# Patient Record
Sex: MALE | Race: WHITE | NOT HISPANIC OR LATINO
[De-identification: names, ages, dates, MRNs, and addresses within clinical notes are randomized per-mention and may not be internally consistent; named-entity substitution may affect disease eponyms.]

---

## 2018-04-03 ENCOUNTER — APPOINTMENT (OUTPATIENT)
Dept: PULMONOLOGY | Facility: CLINIC | Age: 83
End: 2018-04-03
Payer: MEDICARE

## 2018-04-03 VITALS
WEIGHT: 145 LBS | SYSTOLIC BLOOD PRESSURE: 145 MMHG | DIASTOLIC BLOOD PRESSURE: 80 MMHG | OXYGEN SATURATION: 96 % | TEMPERATURE: 96.8 F | BODY MASS INDEX: 24.16 KG/M2 | HEIGHT: 65 IN | HEART RATE: 66 BPM

## 2018-04-03 DIAGNOSIS — Z87.09 PERSONAL HISTORY OF OTHER DISEASES OF THE RESPIRATORY SYSTEM: ICD-10-CM

## 2018-04-03 PROCEDURE — 94060 EVALUATION OF WHEEZING: CPT

## 2018-04-03 PROCEDURE — 99214 OFFICE O/P EST MOD 30 MIN: CPT | Mod: 25

## 2018-04-03 PROCEDURE — 71046 X-RAY EXAM CHEST 2 VIEWS: CPT

## 2018-04-28 ENCOUNTER — EMERGENCY (EMERGENCY)
Facility: HOSPITAL | Age: 83
LOS: 1 days | Discharge: HOME | End: 2018-04-28
Attending: EMERGENCY MEDICINE | Admitting: EMERGENCY MEDICINE
Payer: MEDICARE

## 2018-04-28 VITALS
HEART RATE: 88 BPM | DIASTOLIC BLOOD PRESSURE: 81 MMHG | TEMPERATURE: 98 F | HEIGHT: 65 IN | OXYGEN SATURATION: 97 % | WEIGHT: 145.06 LBS | RESPIRATION RATE: 28 BRPM | SYSTOLIC BLOOD PRESSURE: 178 MMHG

## 2018-04-28 VITALS
SYSTOLIC BLOOD PRESSURE: 165 MMHG | OXYGEN SATURATION: 98 % | TEMPERATURE: 98 F | RESPIRATION RATE: 18 BRPM | DIASTOLIC BLOOD PRESSURE: 80 MMHG | HEART RATE: 90 BPM

## 2018-04-28 DIAGNOSIS — Z98.890 OTHER SPECIFIED POSTPROCEDURAL STATES: Chronic | ICD-10-CM

## 2018-04-28 DIAGNOSIS — Z87.891 PERSONAL HISTORY OF NICOTINE DEPENDENCE: ICD-10-CM

## 2018-04-28 DIAGNOSIS — J44.1 CHRONIC OBSTRUCTIVE PULMONARY DISEASE WITH (ACUTE) EXACERBATION: ICD-10-CM

## 2018-04-28 DIAGNOSIS — R06.02 SHORTNESS OF BREATH: ICD-10-CM

## 2018-04-28 DIAGNOSIS — Z90.2 ACQUIRED ABSENCE OF LUNG [PART OF]: Chronic | ICD-10-CM

## 2018-04-28 DIAGNOSIS — E78.00 PURE HYPERCHOLESTEROLEMIA, UNSPECIFIED: ICD-10-CM

## 2018-04-28 DIAGNOSIS — I10 ESSENTIAL (PRIMARY) HYPERTENSION: ICD-10-CM

## 2018-04-28 LAB
ALBUMIN SERPL ELPH-MCNC: 3.9 G/DL — SIGNIFICANT CHANGE UP (ref 3.3–5)
ALP SERPL-CCNC: 40 U/L — SIGNIFICANT CHANGE UP (ref 40–120)
ALT FLD-CCNC: 20 U/L — SIGNIFICANT CHANGE UP (ref 10–45)
ANION GAP SERPL CALC-SCNC: 15 MMOL/L — SIGNIFICANT CHANGE UP (ref 5–17)
APTT BLD: 29.9 SEC — SIGNIFICANT CHANGE UP (ref 27.5–37.4)
AST SERPL-CCNC: 23 U/L — SIGNIFICANT CHANGE UP (ref 10–40)
BASE EXCESS BLDV CALC-SCNC: -0.1 MMOL/L — SIGNIFICANT CHANGE UP
BASOPHILS NFR BLD AUTO: 0.5 % — SIGNIFICANT CHANGE UP (ref 0–2)
BILIRUB SERPL-MCNC: 0.7 MG/DL — SIGNIFICANT CHANGE UP (ref 0.2–1.2)
BUN SERPL-MCNC: 21 MG/DL — SIGNIFICANT CHANGE UP (ref 7–23)
CALCIUM SERPL-MCNC: 9 MG/DL — SIGNIFICANT CHANGE UP (ref 8.4–10.5)
CHLORIDE SERPL-SCNC: 104 MMOL/L — SIGNIFICANT CHANGE UP (ref 96–108)
CK MB CFR SERPL CALC: 4.1 NG/ML — SIGNIFICANT CHANGE UP (ref 0–6.7)
CK SERPL-CCNC: 114 U/L — SIGNIFICANT CHANGE UP (ref 30–200)
CO2 SERPL-SCNC: 23 MMOL/L — SIGNIFICANT CHANGE UP (ref 22–31)
CREAT SERPL-MCNC: 1.36 MG/DL — HIGH (ref 0.5–1.3)
EOSINOPHIL NFR BLD AUTO: 9.5 % — HIGH (ref 0–6)
GAS PNL BLDV: SIGNIFICANT CHANGE UP
GLUCOSE SERPL-MCNC: 106 MG/DL — HIGH (ref 70–99)
HCO3 BLDV-SCNC: 26 MMOL/L — SIGNIFICANT CHANGE UP (ref 20–27)
HCT VFR BLD CALC: 41.4 % — SIGNIFICANT CHANGE UP (ref 39–50)
HGB BLD-MCNC: 13.9 G/DL — SIGNIFICANT CHANGE UP (ref 13–17)
INR BLD: 1 — SIGNIFICANT CHANGE UP (ref 0.88–1.16)
LYMPHOCYTES # BLD AUTO: 37.5 % — SIGNIFICANT CHANGE UP (ref 13–44)
MAGNESIUM SERPL-MCNC: 2 MG/DL — SIGNIFICANT CHANGE UP (ref 1.6–2.6)
MCHC RBC-ENTMCNC: 30 PG — SIGNIFICANT CHANGE UP (ref 27–34)
MCHC RBC-ENTMCNC: 33.6 G/DL — SIGNIFICANT CHANGE UP (ref 32–36)
MCV RBC AUTO: 89.4 FL — SIGNIFICANT CHANGE UP (ref 80–100)
MONOCYTES NFR BLD AUTO: 9.8 % — SIGNIFICANT CHANGE UP (ref 2–14)
NEUTROPHILS NFR BLD AUTO: 42.7 % — LOW (ref 43–77)
NT-PROBNP SERPL-SCNC: 609 PG/ML — HIGH (ref 0–300)
PCO2 BLDV: 47 MMHG — SIGNIFICANT CHANGE UP (ref 41–51)
PH BLDV: 7.36 — SIGNIFICANT CHANGE UP (ref 7.32–7.43)
PLATELET # BLD AUTO: 127 K/UL — LOW (ref 150–400)
PO2 BLDV: 41 MMHG — SIGNIFICANT CHANGE UP
POTASSIUM SERPL-MCNC: 4.5 MMOL/L — SIGNIFICANT CHANGE UP (ref 3.5–5.3)
POTASSIUM SERPL-SCNC: 4.5 MMOL/L — SIGNIFICANT CHANGE UP (ref 3.5–5.3)
PROT SERPL-MCNC: 6.8 G/DL — SIGNIFICANT CHANGE UP (ref 6–8.3)
PROTHROM AB SERPL-ACNC: 11.1 SEC — SIGNIFICANT CHANGE UP (ref 9.8–12.7)
RBC # BLD: 4.63 M/UL — SIGNIFICANT CHANGE UP (ref 4.2–5.8)
RBC # FLD: 14.7 % — SIGNIFICANT CHANGE UP (ref 10.3–16.9)
SAO2 % BLDV: 72 % — SIGNIFICANT CHANGE UP
SODIUM SERPL-SCNC: 142 MMOL/L — SIGNIFICANT CHANGE UP (ref 135–145)
TROPONIN T SERPL-MCNC: <0.01 NG/ML — SIGNIFICANT CHANGE UP (ref 0–0.01)
WBC # BLD: 6.1 K/UL — SIGNIFICANT CHANGE UP (ref 3.8–10.5)
WBC # FLD AUTO: 6.1 K/UL — SIGNIFICANT CHANGE UP (ref 3.8–10.5)

## 2018-04-28 PROCEDURE — 93005 ELECTROCARDIOGRAM TRACING: CPT | Mod: 76

## 2018-04-28 PROCEDURE — 83735 ASSAY OF MAGNESIUM: CPT

## 2018-04-28 PROCEDURE — 80053 COMPREHEN METABOLIC PANEL: CPT

## 2018-04-28 PROCEDURE — 71250 CT THORAX DX C-: CPT

## 2018-04-28 PROCEDURE — 85025 COMPLETE CBC W/AUTO DIFF WBC: CPT

## 2018-04-28 PROCEDURE — 84484 ASSAY OF TROPONIN QUANT: CPT

## 2018-04-28 PROCEDURE — 83880 ASSAY OF NATRIURETIC PEPTIDE: CPT

## 2018-04-28 PROCEDURE — 85610 PROTHROMBIN TIME: CPT

## 2018-04-28 PROCEDURE — 96374 THER/PROPH/DIAG INJ IV PUSH: CPT

## 2018-04-28 PROCEDURE — 82550 ASSAY OF CK (CPK): CPT

## 2018-04-28 PROCEDURE — 93010 ELECTROCARDIOGRAM REPORT: CPT

## 2018-04-28 PROCEDURE — 94640 AIRWAY INHALATION TREATMENT: CPT

## 2018-04-28 PROCEDURE — 71250 CT THORAX DX C-: CPT | Mod: 26

## 2018-04-28 PROCEDURE — 85730 THROMBOPLASTIN TIME PARTIAL: CPT

## 2018-04-28 PROCEDURE — 36415 COLL VENOUS BLD VENIPUNCTURE: CPT

## 2018-04-28 PROCEDURE — 82553 CREATINE MB FRACTION: CPT

## 2018-04-28 PROCEDURE — 99285 EMERGENCY DEPT VISIT HI MDM: CPT | Mod: 25

## 2018-04-28 PROCEDURE — 82803 BLOOD GASES ANY COMBINATION: CPT

## 2018-04-28 PROCEDURE — 99284 EMERGENCY DEPT VISIT MOD MDM: CPT | Mod: 25

## 2018-04-28 RX ORDER — IPRATROPIUM/ALBUTEROL SULFATE 18-103MCG
3 AEROSOL WITH ADAPTER (GRAM) INHALATION
Qty: 30 | Refills: 0
Start: 2018-04-28 | End: 2018-05-07

## 2018-04-28 RX ORDER — IPRATROPIUM/ALBUTEROL SULFATE 18-103MCG
3 AEROSOL WITH ADAPTER (GRAM) INHALATION
Qty: 0 | Refills: 0 | Status: COMPLETED | OUTPATIENT
Start: 2018-04-28 | End: 2018-04-28

## 2018-04-28 RX ORDER — IPRATROPIUM/ALBUTEROL SULFATE 18-103MCG
3 AEROSOL WITH ADAPTER (GRAM) INHALATION ONCE
Qty: 0 | Refills: 0 | Status: COMPLETED | OUTPATIENT
Start: 2018-04-28 | End: 2018-04-28

## 2018-04-28 RX ADMIN — Medication 125 MILLIGRAM(S): at 02:25

## 2018-04-28 RX ADMIN — Medication 3 MILLILITER(S): at 03:20

## 2018-04-28 RX ADMIN — Medication 3 MILLILITER(S): at 02:40

## 2018-04-28 RX ADMIN — Medication 3 MILLILITER(S): at 05:06

## 2018-04-28 RX ADMIN — Medication 3 MILLILITER(S): at 02:55

## 2018-04-28 NOTE — ED PROVIDER NOTE - MEDICAL DECISION MAKING DETAILS
diffuse wheezing, SOB, cough, speaking in full sentences  -check labs, ekg, ct chest, duonebs, solumedrol

## 2018-04-28 NOTE — ED PROVIDER NOTE - PROGRESS NOTE DETAILS
wheezing improved, pt feeling much better.  no PNA on CT chest.  pt would like to go home and continue nebs and prednisone as outpt.  offered admission however pt declined.  recommend f/u with Dr. Santo.  I have discussed the discharge plan with the patient. The patient agrees with the plan, as discussed.  The patient understands Emergency Department diagnosis is a preliminary diagnosis often based on limited information and that the patient must adhere to the follow-up plan as discussed.  The patient understands that if the symptoms worsen or if prescribed medications do not have the desired/planned effect that the patient may return to the Emergency Department at any time for further evaluation and treatment.

## 2018-04-28 NOTE — ED ADULT NURSE NOTE - PMH
BPH (benign prostatic hyperplasia)    COPD (chronic obstructive pulmonary disease)    High cholesterol    HTN (hypertension)    Lung cancer

## 2018-04-28 NOTE — ED PROVIDER NOTE - OBJECTIVE STATEMENT
84M hx COPD, lung CA (s/p lung resection), htn, c/o SOB.  pt states worsening SOB over past 2 days. +cough with yellow phlegm.  no fevers. tightness across chest.  states used nebulizer without relief at home.  pt states he saw Dr. Santo 2 weeks ago and was treated with course of steroids and ABX with improvement in breathing.  now worsening again. no LE swelling. no n/v/d.  states did not take his metoprolol today d/t wheezing.

## 2018-05-16 ENCOUNTER — APPOINTMENT (OUTPATIENT)
Dept: PULMONOLOGY | Facility: CLINIC | Age: 83
End: 2018-05-16
Payer: MEDICARE

## 2018-05-16 PROCEDURE — 96374 THER/PROPH/DIAG INJ IV PUSH: CPT

## 2018-05-16 PROCEDURE — 99214 OFFICE O/P EST MOD 30 MIN: CPT | Mod: 25

## 2018-05-16 PROCEDURE — 94010 BREATHING CAPACITY TEST: CPT

## 2018-05-17 ENCOUNTER — MED ADMIN CHARGE (OUTPATIENT)
Age: 83
End: 2018-05-17

## 2018-05-17 RX ORDER — METHYLPREDNISOLONE 125 MG/2ML
125 INJECTION, POWDER, LYOPHILIZED, FOR SOLUTION INTRAMUSCULAR; INTRAVENOUS
Qty: 0 | Refills: 0 | Status: COMPLETED | OUTPATIENT
Start: 2018-05-17

## 2018-05-17 RX ADMIN — Medication 0 MG: at 00:00

## 2018-05-23 ENCOUNTER — APPOINTMENT (OUTPATIENT)
Dept: PULMONOLOGY | Facility: CLINIC | Age: 83
End: 2018-05-23
Payer: MEDICARE

## 2018-05-23 VITALS
WEIGHT: 145 LBS | DIASTOLIC BLOOD PRESSURE: 84 MMHG | HEART RATE: 68 BPM | OXYGEN SATURATION: 98 % | SYSTOLIC BLOOD PRESSURE: 138 MMHG | TEMPERATURE: 98 F | HEIGHT: 65 IN | BODY MASS INDEX: 24.16 KG/M2

## 2018-05-23 PROCEDURE — 94010 BREATHING CAPACITY TEST: CPT

## 2018-05-23 PROCEDURE — 99214 OFFICE O/P EST MOD 30 MIN: CPT | Mod: 25

## 2018-08-22 ENCOUNTER — APPOINTMENT (OUTPATIENT)
Dept: PULMONOLOGY | Facility: CLINIC | Age: 83
End: 2018-08-22
Payer: MEDICARE

## 2018-08-22 VITALS
HEART RATE: 62 BPM | HEIGHT: 65 IN | TEMPERATURE: 97.8 F | SYSTOLIC BLOOD PRESSURE: 120 MMHG | DIASTOLIC BLOOD PRESSURE: 70 MMHG | OXYGEN SATURATION: 96 %

## 2018-08-22 PROCEDURE — 99213 OFFICE O/P EST LOW 20 MIN: CPT | Mod: 25

## 2018-08-22 PROCEDURE — 94010 BREATHING CAPACITY TEST: CPT

## 2019-04-09 PROBLEM — N40.0 BENIGN PROSTATIC HYPERPLASIA WITHOUT LOWER URINARY TRACT SYMPTOMS: Chronic | Status: ACTIVE | Noted: 2018-04-28

## 2019-04-09 PROBLEM — E78.00 PURE HYPERCHOLESTEROLEMIA, UNSPECIFIED: Chronic | Status: ACTIVE | Noted: 2018-04-28

## 2019-04-09 PROBLEM — I10 ESSENTIAL (PRIMARY) HYPERTENSION: Chronic | Status: ACTIVE | Noted: 2018-04-28

## 2019-04-09 PROBLEM — J44.9 CHRONIC OBSTRUCTIVE PULMONARY DISEASE, UNSPECIFIED: Chronic | Status: ACTIVE | Noted: 2018-04-28

## 2019-04-09 PROBLEM — C34.90 MALIGNANT NEOPLASM OF UNSPECIFIED PART OF UNSPECIFIED BRONCHUS OR LUNG: Chronic | Status: ACTIVE | Noted: 2018-04-28

## 2019-05-02 ENCOUNTER — OUTPATIENT (OUTPATIENT)
Dept: OUTPATIENT SERVICES | Facility: HOSPITAL | Age: 84
LOS: 1 days | Discharge: HOME | End: 2019-05-02

## 2019-05-02 DIAGNOSIS — Z98.890 OTHER SPECIFIED POSTPROCEDURAL STATES: Chronic | ICD-10-CM

## 2019-05-02 DIAGNOSIS — Z90.2 ACQUIRED ABSENCE OF LUNG [PART OF]: Chronic | ICD-10-CM

## 2019-06-11 ENCOUNTER — APPOINTMENT (OUTPATIENT)
Dept: PULMONOLOGY | Facility: CLINIC | Age: 84
End: 2019-06-11
Payer: MEDICARE

## 2019-06-11 VITALS
SYSTOLIC BLOOD PRESSURE: 130 MMHG | HEART RATE: 80 BPM | HEIGHT: 65 IN | DIASTOLIC BLOOD PRESSURE: 80 MMHG | TEMPERATURE: 98.9 F | OXYGEN SATURATION: 95 %

## 2019-06-11 PROCEDURE — 94010 BREATHING CAPACITY TEST: CPT

## 2019-06-11 PROCEDURE — 71046 X-RAY EXAM CHEST 2 VIEWS: CPT

## 2019-06-11 PROCEDURE — 99214 OFFICE O/P EST MOD 30 MIN: CPT | Mod: 25

## 2019-06-11 PROCEDURE — 96374 THER/PROPH/DIAG INJ IV PUSH: CPT

## 2019-06-12 ENCOUNTER — APPOINTMENT (OUTPATIENT)
Dept: PULMONOLOGY | Facility: CLINIC | Age: 84
End: 2019-06-12
Payer: MEDICARE

## 2019-06-12 PROCEDURE — 96374 THER/PROPH/DIAG INJ IV PUSH: CPT

## 2019-06-12 PROCEDURE — 99213 OFFICE O/P EST LOW 20 MIN: CPT | Mod: 25

## 2019-06-12 NOTE — PROCEDURE
[FreeTextEntry1] : reduced spisromatry flows\par \par chest film with no change from post surgical film\par

## 2019-06-12 NOTE — HISTORY OF PRESENT ILLNESS
[FreeTextEntry1] : [age] year old man status post remote lung cancer, with copd exacerbation,  colored sputum, dyspnea cough, and dyspnea, no fever

## 2019-06-12 NOTE — DISCUSSION/SUMMARY
[FreeTextEntry1] : 125 mg iv steroids given\par \par cefdinir\par \par will give another dose tomorrow\par \par nebulizer.

## 2019-06-12 NOTE — REVIEW OF SYSTEMS
[Fatigue] : fatigue [Cough] : cough [Sputum] : sputum  [Hemoptysis] : no hemoptysis [Dyspnea] : dyspnea [Chest Tightness] : no chest tightness [Pleuritic Pain] : no pleuritic pain [Frequent URIs] : no frequent upper respiratory infections [Wheezing] : wheezing [Negative] : Musculoskeletal [FreeTextEntry8] : dyspnea, cough,  wheezing [FreeTextEntry9] : slgiht cough still

## 2019-06-12 NOTE — PHYSICAL EXAM
[General Appearance - Well Developed] : well developed [Normal Appearance] : normal appearance [Well Groomed] : well groomed [General Appearance - Well Nourished] : well nourished [No Deformities] : no deformities [General Appearance - In No Acute Distress] : no acute distress [Normal Conjunctiva] : the conjunctiva exhibited no abnormalities [Eyelids - No Xanthelasma] : the eyelids demonstrated no xanthelasmas [Heart Rate And Rhythm] : heart rate and rhythm were normal [Heart Sounds] : normal S1 and S2 [Murmurs] : no murmurs present [Respiration, Rhythm And Depth] : normal respiratory rhythm and effort [FreeTextEntry1] : wheezing dyspnea [Kyphosis] : kyphosis [Abnormal Walk] : normal gait [Gait - Sufficient For Exercise Testing] : the gait was sufficient for exercise testing [Nail Clubbing] : no clubbing of the fingernails [Cyanosis, Localized] : no localized cyanosis [Petechial Hemorrhages (___cm)] : no petechial hemorrhages [] : no ischemic changes

## 2019-06-13 NOTE — REASON FOR VISIT
[Follow-Up] : a follow-up visit [FreeTextEntry1] : brandy for another IV medrol for copd exacerbation

## 2019-06-13 NOTE — DISCUSSION/SUMMARY
[FreeTextEntry1] : svere exacerbation of copd, giving  daily iv steroids in effort to keep him out of the hospital.\par \par will return tomorrow.

## 2019-06-13 NOTE — HISTORY OF PRESENT ILLNESS
[FreeTextEntry1] : using nebulizer and on antibiotics, status post IV steroids yesterday, here for the same copious colored sputum send for culture

## 2019-06-18 ENCOUNTER — APPOINTMENT (OUTPATIENT)
Dept: PULMONOLOGY | Facility: CLINIC | Age: 84
End: 2019-06-18
Payer: MEDICARE

## 2019-06-18 VITALS
DIASTOLIC BLOOD PRESSURE: 70 MMHG | OXYGEN SATURATION: 97 % | HEIGHT: 65 IN | SYSTOLIC BLOOD PRESSURE: 112 MMHG | TEMPERATURE: 97 F | HEART RATE: 66 BPM

## 2019-06-18 PROCEDURE — 94010 BREATHING CAPACITY TEST: CPT

## 2019-06-18 PROCEDURE — 99213 OFFICE O/P EST LOW 20 MIN: CPT | Mod: 25

## 2019-06-19 NOTE — DISCUSSION/SUMMARY
[FreeTextEntry1] : wean steroids to off\par \par follow up in one month, so I can see baseline\par \par discused exacerbation of copd and possible prophylaxis

## 2019-06-19 NOTE — REASON FOR VISIT
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

## 2019-06-19 NOTE — PHYSICAL EXAM
[Normal Appearance] : normal appearance [General Appearance - Well Developed] : well developed [Well Groomed] : well groomed [General Appearance - Well Nourished] : well nourished [No Deformities] : no deformities [General Appearance - In No Acute Distress] : no acute distress [Heart Rate And Rhythm] : heart rate and rhythm were normal [Normal Conjunctiva] : the conjunctiva exhibited no abnormalities [Eyelids - No Xanthelasma] : the eyelids demonstrated no xanthelasmas [Murmurs] : no murmurs present [Heart Sounds] : normal S1 and S2 [Kyphosis] : kyphosis [FreeTextEntry1] : bilateral wheeze continues [Gait - Sufficient For Exercise Testing] : the gait was sufficient for exercise testing [Abnormal Walk] : normal gait [Cyanosis, Localized] : no localized cyanosis [Nail Clubbing] : no clubbing of the fingernails [Petechial Hemorrhages (___cm)] : no petechial hemorrhages [] : no ischemic changes

## 2019-07-19 ENCOUNTER — APPOINTMENT (OUTPATIENT)
Dept: PULMONOLOGY | Facility: CLINIC | Age: 84
End: 2019-07-19
Payer: MEDICARE

## 2019-07-19 VITALS
WEIGHT: 135 LBS | SYSTOLIC BLOOD PRESSURE: 117 MMHG | OXYGEN SATURATION: 98 % | HEIGHT: 65 IN | BODY MASS INDEX: 22.49 KG/M2 | HEART RATE: 63 BPM | DIASTOLIC BLOOD PRESSURE: 78 MMHG | TEMPERATURE: 97.3 F

## 2019-07-19 PROCEDURE — 94010 BREATHING CAPACITY TEST: CPT

## 2019-07-19 PROCEDURE — 99213 OFFICE O/P EST LOW 20 MIN: CPT | Mod: 25

## 2019-07-20 NOTE — HISTORY OF PRESENT ILLNESS
[FreeTextEntry1] : finally wanted to see this gentleman when he was not in the midst of an exacerbation and he is acutlly qutie good.

## 2019-07-20 NOTE — PHYSICAL EXAM
[General Appearance - Well Developed] : well developed [Normal Appearance] : normal appearance [Well Groomed] : well groomed [General Appearance - Well Nourished] : well nourished [No Deformities] : no deformities [General Appearance - In No Acute Distress] : no acute distress [Normal Conjunctiva] : the conjunctiva exhibited no abnormalities [Eyelids - No Xanthelasma] : the eyelids demonstrated no xanthelasmas [Heart Rate And Rhythm] : heart rate and rhythm were normal [Heart Sounds] : normal S1 and S2 [Murmurs] : no murmurs present [FreeTextEntry1] : clear lungs [Kyphosis] : kyphosis [Abnormal Walk] : normal gait [Gait - Sufficient For Exercise Testing] : the gait was sufficient for exercise testing [Nail Clubbing] : no clubbing of the fingernails [Cyanosis, Localized] : no localized cyanosis [Petechial Hemorrhages (___cm)] : no petechial hemorrhages [] : no ischemic changes

## 2019-07-20 NOTE — DISCUSSION/SUMMARY
[FreeTextEntry1] : very interesting, he has almost normal pfts when he is not sick.  he was a heavy smoker, but does this make him a person with mild copd or an asthmatic who happened to have smoked.\par \par i really don't know

## 2019-07-20 NOTE — REVIEW OF SYSTEMS
[Fatigue] : fatigue [Cough] : no cough [Sputum] : not coughing up ~M sputum [Hemoptysis] : no hemoptysis [Dyspnea] : no dyspnea [Chest Tightness] : no chest tightness [Pleuritic Pain] : no pleuritic pain [Frequent URIs] : no frequent upper respiratory infections [Wheezing] : no wheezing [Negative] : Sleep Disorder [FreeTextEntry9] : slgiht cough still

## 2019-09-10 ENCOUNTER — APPOINTMENT (OUTPATIENT)
Dept: PULMONOLOGY | Facility: CLINIC | Age: 84
End: 2019-09-10
Payer: MEDICARE

## 2019-09-10 VITALS
WEIGHT: 135 LBS | OXYGEN SATURATION: 97 % | SYSTOLIC BLOOD PRESSURE: 110 MMHG | HEART RATE: 67 BPM | TEMPERATURE: 97 F | HEIGHT: 65 IN | BODY MASS INDEX: 22.49 KG/M2 | DIASTOLIC BLOOD PRESSURE: 72 MMHG

## 2019-09-10 PROCEDURE — 94010 BREATHING CAPACITY TEST: CPT

## 2019-09-10 PROCEDURE — 99214 OFFICE O/P EST MOD 30 MIN: CPT | Mod: 25

## 2019-09-10 RX ORDER — BUDESONIDE AND FORMOTEROL FUMARATE DIHYDRATE 160; 4.5 UG/1; UG/1
160-4.5 AEROSOL RESPIRATORY (INHALATION) TWICE DAILY
Qty: 1 | Refills: 2 | Status: DISCONTINUED | COMMUNITY
Start: 2019-09-10 | End: 2019-09-10

## 2019-09-11 NOTE — PHYSICAL EXAM
[Exaggerated Use Of Accessory Muscles For Inspiration] : no accessory muscle use [Well Groomed] : well groomed [General Appearance - In No Acute Distress] : no acute distress [Normal Conjunctiva] : the conjunctiva exhibited no abnormalities [Normal Oropharynx] : normal oropharynx [IV] : IV [Neck Appearance] : the appearance of the neck was normal [Neck Cervical Mass (___cm)] : no neck mass was observed [Jugular Venous Distention Increased] : there was no jugular-venous distention [Heart Rate And Rhythm] : heart rate and rhythm were normal [Murmurs] : no murmurs present [Arterial Pulses Normal] : the arterial pulses were normal [Kyphosis] : kyphosis [Nail Clubbing] : no clubbing of the fingernails [Cyanosis, Localized] : no localized cyanosis [] : no rash [Skin Lesions] : no skin lesions [No Focal Deficits] : no focal deficits [Affect] : the affect was normal [Mood] : the mood was normal [FreeTextEntry1] : Mild mid to end expiratory wheezing at left base and B/L upper lung fields

## 2019-09-11 NOTE — ASSESSMENT
[FreeTextEntry1] : 87 yo M with COPD/chronic bronchitis, 1-2 exacerbations per year with recent exacerbation 2 weeks ago resolved with self administered prednisone, now episode symptoms always relating to meal intake - especially quick and large meals. Suspect element of aspiration\par - Given frequent exacerbations, will increase to triple therapy with Trilegy, no indication for systemic steroids at this time\par - Recommend increasing nebulizer to 3x/day for the next few days to avoid progression to exacerbation\par - Barium swallow for aspiration work up as well as counseling re: small meals and eating slowly to prevent aspiration

## 2019-09-11 NOTE — HISTORY OF PRESENT ILLNESS
[FreeTextEntry1] : Presents for follow up, felt 2 weeks ago he had an "attack", started himself on tapering prednisone from 40mg and completed it 1 week ago. Was feeling well, but yesterday after lunch began wheezing. Took 4 nebulizers at home and klonopin to sleep, woke up this morning feeling like a "gem". Again today, after lunch, started wheezing and had difficulty getting home. Now feels semi good, but still wheezing. Minimal cough with small amount of whitish phlegm. Unsure if he is aspirating, but does eat quickly and notices occasionally bringing up food. Doesn't notice obvious acid reflux. On symbicort daily, uses the nebulizer rarely when feeling well.

## 2019-09-11 NOTE — REVIEW OF SYSTEMS
[Dyspnea] : dyspnea [Wheezing] : wheezing [Reflux] : reflux [Dysphagia] : dysphagia [Negative] : Neurologic [Chills] : no chills [Fever] : no fever [Cough] : no cough [Fatigue] : no fatigue [Palpitations] : no palpitations [Sputum] : not coughing up ~M sputum [Heartburn] : no heartburn [Edema] : ~T edema was not present [Nausea] : no nausea

## 2019-09-13 ENCOUNTER — APPOINTMENT (OUTPATIENT)
Dept: PULMONOLOGY | Facility: CLINIC | Age: 84
End: 2019-09-13
Payer: MEDICARE

## 2019-09-13 VITALS
DIASTOLIC BLOOD PRESSURE: 70 MMHG | HEIGHT: 65 IN | HEART RATE: 74 BPM | SYSTOLIC BLOOD PRESSURE: 110 MMHG | WEIGHT: 135 LBS | TEMPERATURE: 98.3 F | OXYGEN SATURATION: 93 % | BODY MASS INDEX: 22.49 KG/M2

## 2019-09-13 PROCEDURE — 99213 OFFICE O/P EST LOW 20 MIN: CPT | Mod: 25

## 2019-09-13 PROCEDURE — 71046 X-RAY EXAM CHEST 2 VIEWS: CPT

## 2019-09-13 PROCEDURE — 94010 BREATHING CAPACITY TEST: CPT

## 2019-09-13 PROCEDURE — 96374 THER/PROPH/DIAG INJ IV PUSH: CPT

## 2019-09-15 NOTE — HISTORY OF PRESENT ILLNESS
[FreeTextEntry1] : two days before this visit, mild exacerbatoin and now worsened with green sputum and worsenign dyspnea,  using nebulizer frequently.  no fever, and chest pain, but increase dyspnea and wheezing

## 2019-09-15 NOTE — PROCEDURE
[FreeTextEntry1] : given 125 mg iv steroids, and will take 40mg thru weekend, may need another dose on monday\par \par will put him on azithormycin every other day after this since exacerbations are coming too frequently\par \par at age 86, perhaps it's time to retire?

## 2019-09-15 NOTE — REASON FOR VISIT
[Follow-Up] : a follow-up visit [FreeTextEntry1] : here after being seen two days ago for exacerbation of copd that worsened

## 2019-09-15 NOTE — REVIEW OF SYSTEMS
[Fatigue] : fatigue [Cough] : cough [Sputum] : sputum  [Hemoptysis] : no hemoptysis [Chest Tightness] : no chest tightness [Pleuritic Pain] : no pleuritic pain [Frequent URIs] : no frequent upper respiratory infections [Wheezing] : no wheezing [Negative] : Sleep Disorder [FreeTextEntry9] : slgiht cough still

## 2019-09-15 NOTE — PHYSICAL EXAM
[General Appearance - Well Developed] : well developed [Normal Appearance] : normal appearance [Well Groomed] : well groomed [General Appearance - Well Nourished] : well nourished [No Deformities] : no deformities [General Appearance - In No Acute Distress] : no acute distress [Normal Conjunctiva] : the conjunctiva exhibited no abnormalities [Eyelids - No Xanthelasma] : the eyelids demonstrated no xanthelasmas [Heart Rate And Rhythm] : heart rate and rhythm were normal [Heart Sounds] : normal S1 and S2 [Murmurs] : no murmurs present [FreeTextEntry1] : bilateral wheezing and rhonchi [Kyphosis] : kyphosis [Abnormal Walk] : normal gait [Gait - Sufficient For Exercise Testing] : the gait was sufficient for exercise testing [Nail Clubbing] : no clubbing of the fingernails [Cyanosis, Localized] : no localized cyanosis [Petechial Hemorrhages (___cm)] : no petechial hemorrhages [] : no ischemic changes

## 2019-09-16 ENCOUNTER — APPOINTMENT (OUTPATIENT)
Dept: PULMONOLOGY | Facility: CLINIC | Age: 84
End: 2019-09-16
Payer: MEDICARE

## 2019-09-16 VITALS
OXYGEN SATURATION: 96 % | SYSTOLIC BLOOD PRESSURE: 130 MMHG | DIASTOLIC BLOOD PRESSURE: 80 MMHG | BODY MASS INDEX: 22.49 KG/M2 | TEMPERATURE: 97.7 F | HEART RATE: 59 BPM | HEIGHT: 65 IN | WEIGHT: 135 LBS

## 2019-09-16 PROCEDURE — 99213 OFFICE O/P EST LOW 20 MIN: CPT | Mod: 25

## 2019-09-16 PROCEDURE — 94010 BREATHING CAPACITY TEST: CPT

## 2019-09-17 NOTE — PHYSICAL EXAM
[General Appearance - Well Developed] : well developed [Normal Appearance] : normal appearance [Well Groomed] : well groomed [General Appearance - Well Nourished] : well nourished [No Deformities] : no deformities [General Appearance - In No Acute Distress] : no acute distress [Normal Conjunctiva] : the conjunctiva exhibited no abnormalities [Eyelids - No Xanthelasma] : the eyelids demonstrated no xanthelasmas [Heart Rate And Rhythm] : heart rate and rhythm were normal [Murmurs] : no murmurs present [Heart Sounds] : normal S1 and S2 [FreeTextEntry1] : much clearer today [Kyphosis] : kyphosis [Abnormal Walk] : normal gait [Gait - Sufficient For Exercise Testing] : the gait was sufficient for exercise testing [Nail Clubbing] : no clubbing of the fingernails [Cyanosis, Localized] : no localized cyanosis [Petechial Hemorrhages (___cm)] : no petechial hemorrhages [] : no ischemic changes

## 2019-09-17 NOTE — DISCUSSION/SUMMARY
[FreeTextEntry1] : will taper prednisone\par \par start on alternate day azithromycin  for prophylaxis,  already on advair daily.

## 2019-09-17 NOTE — HISTORY OF PRESENT ILLNESS
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  given iv steroids and then po prednisone three days ago, and now improved, with cleareing sputum and better breathing.

## 2019-10-03 ENCOUNTER — OUTPATIENT (OUTPATIENT)
Dept: OUTPATIENT SERVICES | Facility: HOSPITAL | Age: 84
LOS: 1 days | Discharge: HOME | End: 2019-10-03

## 2019-10-03 DIAGNOSIS — Z90.2 ACQUIRED ABSENCE OF LUNG [PART OF]: Chronic | ICD-10-CM

## 2019-10-03 DIAGNOSIS — Z98.890 OTHER SPECIFIED POSTPROCEDURAL STATES: Chronic | ICD-10-CM

## 2019-10-10 LAB — BACTERIA SPT CULT: ABNORMAL

## 2019-10-11 ENCOUNTER — APPOINTMENT (OUTPATIENT)
Dept: PULMONOLOGY | Facility: CLINIC | Age: 84
End: 2019-10-11
Payer: MEDICARE

## 2019-10-11 VITALS
HEIGHT: 65 IN | TEMPERATURE: 95.5 F | SYSTOLIC BLOOD PRESSURE: 110 MMHG | OXYGEN SATURATION: 98 % | BODY MASS INDEX: 22.49 KG/M2 | WEIGHT: 135 LBS | HEART RATE: 54 BPM | DIASTOLIC BLOOD PRESSURE: 60 MMHG

## 2019-10-11 PROCEDURE — 99213 OFFICE O/P EST LOW 20 MIN: CPT | Mod: 25

## 2019-10-11 PROCEDURE — 94010 BREATHING CAPACITY TEST: CPT

## 2019-10-12 NOTE — REVIEW OF SYSTEMS
[Fatigue] : fatigue [Cough] : no cough [Sputum] : not coughing up ~M sputum [Dyspnea] : dyspnea [Chest Tightness] : no chest tightness [Pleuritic Pain] : no pleuritic pain [Frequent URIs] : no frequent upper respiratory infections [Wheezing] : no wheezing [Negative] : Sleep Disorder [FreeTextEntry9] : slgiht cough still

## 2019-10-12 NOTE — HISTORY OF PRESENT ILLNESS
[FreeTextEntry1] : ex smoker physician with innumerable exacerbations,  about to retire, and his exposure to viral illnesses likely makes his exacerbation frequency worse\par \par feeling beter but her has not returned to his baseline

## 2019-10-12 NOTE — DISCUSSION/SUMMARY
[FreeTextEntry1] : continue with advair,  ad azithry every other day\par \par return in three months\par \par encouraged ot retire

## 2019-10-12 NOTE — PHYSICAL EXAM
[General Appearance - Well Developed] : well developed [Normal Appearance] : normal appearance [Well Groomed] : well groomed [General Appearance - Well Nourished] : well nourished [No Deformities] : no deformities [General Appearance - In No Acute Distress] : no acute distress [Normal Conjunctiva] : the conjunctiva exhibited no abnormalities [Eyelids - No Xanthelasma] : the eyelids demonstrated no xanthelasmas [Heart Rate And Rhythm] : heart rate and rhythm were normal [Heart Sounds] : normal S1 and S2 [Murmurs] : no murmurs present [FreeTextEntry1] : much clearer today [Kyphosis] : kyphosis [Abnormal Walk] : normal gait [Gait - Sufficient For Exercise Testing] : the gait was sufficient for exercise testing [Nail Clubbing] : no clubbing of the fingernails [Cyanosis, Localized] : no localized cyanosis [Petechial Hemorrhages (___cm)] : no petechial hemorrhages [] : no ischemic changes [Deep Tendon Reflexes (DTR)] : deep tendon reflexes were 2+ and symmetric [Sensation] : the sensory exam was normal to light touch and pinprick [No Focal Deficits] : no focal deficits [Oriented To Time, Place, And Person] : oriented to person, place, and time [Impaired Insight] : insight and judgment were intact [Affect] : the affect was normal

## 2020-07-14 NOTE — ED ADULT TRIAGE NOTE - RESPIRATORY RATE (BREATHS/MIN)
Referred by: Roseanna Edwards NP; Medical Diagnosis (from order):    Diagnosis Information      Diagnosis    719.41, 338.29 (ICD-9-CM) - M25.512, G89.29 (ICD-10-CM) - Chronic left shoulder pain                Physical Therapy -  Daily Treatment Note    Visit:  2     SUBJECTIVE                                                                                                             Reports pain increases to 8-9/10 with working.  Was sore the night after last session.         Pain / Symptoms:  Pain rating (out of 10): Current: 2     OBJECTIVE                                                                                                                          TREATMENT                                                                                                                initial evaluation completed  Therapeutic Exercise:  Arm bike workload 2 x 5 minutes retro and forward    Supine:  AAROM shoulder flexion x 10  AAROM shoulder external rotation x 10; close to full range  AROM flexion x 10    Sidelying:   AROM External Rotation with scapular retraction x 10  AROM abduction x 12    Seated:  AAROM shoulder flexion x 10  AAROM shoulder External Rotation x 10   Scapular retraction x 10   upper trapezius stretch x 30 seconds x 2  Levator scapula stretch x 30 seconds x 2    Standing:  Resisted scapular rows vs orange tubing x 10 with scapular retraction  Resisted shoulder extension vs orange tubing x 10 with scapular retraction       Manual Therapy:  Supine:  Glenohumeral joint mobilization posterior and inferior grade 2 and 3   Passive shoulder flexion, scaption and external rotation range of motion  Soft tissue mobilization with Omni roller to Left lateral deltoid, biceps, supraspinatus and medial scapular border    Skilled input: verbal instruction/cues and as detailed above    Writer verbally educated and received verbal consent for hand placement, positioning of patient, and techniques to be performed today  from patient for clothing adjustments for techniques, hand placement and palpation for techniques and therapist position for techniques as described above and how they are pertinent to the patient's plan of care.    Home Exercise Program: (*above indicates provided as part of home exercise program)    Evaluation:  AAROM shoulder flexion, AAROM shoulder external rotation, scapular retraction, upper trapezius and Levator scapula stretches      ASSESSMENT                                                                                                             Patient tolerated use of arm bike.  Completed massage with Omni roller which patient found more tolerable over therapist hand.  Note full passive and improved active range to External Rotation.  Encouraged completing AAROM in sitting as supine is getting easy.  Added AROM and scapular strengthening, patient denied increase in pain.  Note significant tightness along medial scapular border; recommended use of tennis ball to decrease pain and tightness.  Encouraged ice/heat as needed.  If patient responded well to session, will progress Home Exercises Program next session.    Pain/symptoms after session: 2  Patient Education:   Results of above outlined education: Verbalizes understanding and Needs reinforcement     PLAN                                                                                                                             Suggestions for next session as indicated: Progress per plan of care assess response to previous treatment, progress AROM and scapular strengthening as tolerated, continue manual as needed.        Procedures and total treatment time documented Time Entry flowsheet.     28

## 2020-10-05 ENCOUNTER — TRANSCRIPTION ENCOUNTER (OUTPATIENT)
Age: 85
End: 2020-10-05

## 2020-10-07 ENCOUNTER — LABORATORY RESULT (OUTPATIENT)
Age: 85
End: 2020-10-07

## 2020-10-12 ENCOUNTER — APPOINTMENT (OUTPATIENT)
Dept: PULMONOLOGY | Facility: CLINIC | Age: 85
End: 2020-10-12
Payer: MEDICARE

## 2020-10-12 VITALS
HEIGHT: 65 IN | WEIGHT: 135 LBS | BODY MASS INDEX: 22.49 KG/M2 | SYSTOLIC BLOOD PRESSURE: 110 MMHG | OXYGEN SATURATION: 97 % | TEMPERATURE: 97.4 F | DIASTOLIC BLOOD PRESSURE: 66 MMHG | HEART RATE: 65 BPM

## 2020-10-12 PROCEDURE — 71046 X-RAY EXAM CHEST 2 VIEWS: CPT

## 2020-10-12 PROCEDURE — 94010 BREATHING CAPACITY TEST: CPT

## 2020-10-12 PROCEDURE — 99214 OFFICE O/P EST MOD 30 MIN: CPT | Mod: 25

## 2020-10-13 NOTE — PHYSICAL EXAM
[No Acute Distress] : no acute distress [Normal Oropharynx] : normal oropharynx [Normal Appearance] : normal appearance [No Neck Mass] : no neck mass [Normal Rate/Rhythm] : normal rate/rhythm [Normal S1, S2] : normal s1, s2 [No Murmurs] : no murmurs [No Resp Distress] : no resp distress [Clear to Auscultation Bilaterally] : clear to auscultation bilaterally [Normal Gait] : normal gait [No Clubbing] : no clubbing [No Edema] : no edema

## 2020-10-13 NOTE — DISCUSSION/SUMMARY
[FreeTextEntry1] : continue héctor trelegy that seems to be working well\par \par return in four months\par \par watch out for winter exacerbgation that always seems to happened

## 2020-10-13 NOTE — PROCEDURE
[FreeTextEntry1] : chest film, sp resection no acute findings\par \par spirometyr with moderately severe obstruction

## 2020-10-13 NOTE — REVIEW OF SYSTEMS
[Dyspnea] : dyspnea [Frequency] : dysuria [Arthralgias] : arthralgias [Negative] : Gastrointestinal [TextBox_30] : baseline

## 2020-10-21 ENCOUNTER — LABORATORY RESULT (OUTPATIENT)
Age: 85
End: 2020-10-21

## 2020-10-23 ENCOUNTER — APPOINTMENT (OUTPATIENT)
Dept: PULMONOLOGY | Facility: CLINIC | Age: 85
End: 2020-10-23
Payer: MEDICARE

## 2020-10-23 VITALS
WEIGHT: 135 LBS | BODY MASS INDEX: 22.49 KG/M2 | DIASTOLIC BLOOD PRESSURE: 62 MMHG | SYSTOLIC BLOOD PRESSURE: 110 MMHG | HEART RATE: 54 BPM | HEIGHT: 65 IN | OXYGEN SATURATION: 98 % | TEMPERATURE: 97.7 F

## 2020-10-23 PROCEDURE — 89220 SPUTUM SPECIMEN COLLECTION: CPT

## 2020-10-23 PROCEDURE — 99213 OFFICE O/P EST LOW 20 MIN: CPT | Mod: 25

## 2020-10-24 NOTE — DISCUSSION/SUMMARY
[FreeTextEntry1] : short course of prednisone is reasonab le\par \par 40 down by 10 every two days\par \par will try hypertonic saline \par \par folow up prn

## 2020-10-24 NOTE — REASON FOR VISIT
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

## 2020-10-24 NOTE — REVIEW OF SYSTEMS
[Fatigue] : fatigue [Cough] : cough [Sputum] : no sputum [Dyspnea] : no dyspnea [Negative] : Psychiatric [TextBox_30] : no more dyspnea than usual [TextBox_134] : seems very depressed since he retired

## 2020-10-24 NOTE — HISTORY OF PRESENT ILLNESS
[TextBox_4] : mild exacerbatiion , no clear trigger took 40 mg of pred and 30 \par \par used nebulizer as well,  he says he "doesn’t know why he's here".  meaning he may have jumped the gun and didn't need to make an appt\par \par there is no clear trigger,  just increased cogh and congestion\par \par no fever, sputum, not more dyspneic

## 2020-12-17 NOTE — PROCEDURE
The patient has been notified of this information and all questions answered.   [FreeTextEntry1] : chest film, sp resection no acute findings\par \par spirometyr with moderately severe obstruction

## 2021-01-13 ENCOUNTER — RX RENEWAL (OUTPATIENT)
Age: 86
End: 2021-01-13

## 2021-04-16 ENCOUNTER — NON-APPOINTMENT (OUTPATIENT)
Age: 86
End: 2021-04-16

## 2021-04-16 ENCOUNTER — APPOINTMENT (OUTPATIENT)
Dept: PULMONOLOGY | Facility: CLINIC | Age: 86
End: 2021-04-16
Payer: MEDICARE

## 2021-04-16 VITALS
TEMPERATURE: 97.5 F | SYSTOLIC BLOOD PRESSURE: 110 MMHG | BODY MASS INDEX: 22.49 KG/M2 | HEART RATE: 52 BPM | HEIGHT: 65 IN | DIASTOLIC BLOOD PRESSURE: 62 MMHG | OXYGEN SATURATION: 97 % | WEIGHT: 135 LBS

## 2021-04-16 PROCEDURE — 71046 X-RAY EXAM CHEST 2 VIEWS: CPT

## 2021-04-16 PROCEDURE — 94010 BREATHING CAPACITY TEST: CPT

## 2021-04-16 PROCEDURE — 99213 OFFICE O/P EST LOW 20 MIN: CPT | Mod: 25

## 2021-04-18 NOTE — DISCUSSION/SUMMARY
[FreeTextEntry1] : unclear if this was an "event" or what cardiac, or simply aging and debilitation along with depression\par \par wilil be seeing a cardiologist

## 2021-04-18 NOTE — HISTORY OF PRESENT ILLNESS
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

## 2021-04-18 NOTE — PHYSICAL EXAM
[No Acute Distress] : no acute distress [Normal Oropharynx] : normal oropharynx [Normal Appearance] : normal appearance [No Neck Mass] : no neck mass [Normal Rate/Rhythm] : normal rate/rhythm [Normal S1, S2] : normal s1, s2 [No Murmurs] : no murmurs [No Resp Distress] : no resp distress [Clear to Auscultation Bilaterally] : clear to auscultation bilaterally [Normal Gait] : normal gait [No Clubbing] : no clubbing [No Edema] : no edema [TextBox_68] : clear quiet lungs

## 2021-04-18 NOTE — REASON FOR VISIT
[Follow-Up] : a follow-up visit [TextBox_44] : patient with copd, who had episode of dyspnea that was transient

## 2021-04-18 NOTE — REVIEW OF SYSTEMS
[Fatigue] : fatigue [Cough] : cough [Sputum] : no sputum [Dyspnea] : no dyspnea [Negative] : Psychiatric [TextBox_30] : now back to baseline level of dyspnea [TextBox_134] : seems very depressed since he retired

## 2021-08-25 ENCOUNTER — RX RENEWAL (OUTPATIENT)
Age: 86
End: 2021-08-25

## 2021-09-13 NOTE — ED ADULT NURSE NOTE - CAS ELECT INFOMATION PROVIDED
Vaccine Information Statement(s) was given today. This has been reviewed, questions answered, and verbal consent given by Patient for injection(s) and administration of Influenza (Inactivated).      Patient tolerated without incident. See immunization grid for documentation.         DC instructions

## 2021-11-24 RX ORDER — ALBUTEROL SULFATE 90 UG/1
108 (90 BASE) INHALANT RESPIRATORY (INHALATION)
Qty: 1 | Refills: 5 | Status: ACTIVE | COMMUNITY
Start: 2020-12-15 | End: 1900-01-01

## 2021-11-30 ENCOUNTER — APPOINTMENT (OUTPATIENT)
Dept: PULMONOLOGY | Facility: CLINIC | Age: 86
End: 2021-11-30
Payer: MEDICARE

## 2021-11-30 VITALS
HEIGHT: 65 IN | WEIGHT: 135 LBS | TEMPERATURE: 97.9 F | DIASTOLIC BLOOD PRESSURE: 62 MMHG | OXYGEN SATURATION: 94 % | HEART RATE: 65 BPM | BODY MASS INDEX: 22.49 KG/M2 | SYSTOLIC BLOOD PRESSURE: 110 MMHG

## 2021-11-30 PROCEDURE — 99214 OFFICE O/P EST MOD 30 MIN: CPT | Mod: 25

## 2021-11-30 PROCEDURE — 94010 BREATHING CAPACITY TEST: CPT

## 2021-12-02 NOTE — PHYSICAL EXAM
[No Acute Distress] : no acute distress [Normal Oropharynx] : normal oropharynx [Normal Appearance] : normal appearance [No Neck Mass] : no neck mass [Normal Rate/Rhythm] : normal rate/rhythm [Normal S1, S2] : normal s1, s2 [No Murmurs] : no murmurs [No Resp Distress] : no resp distress [Clear to Auscultation Bilaterally] : clear to auscultation bilaterally [Normal Gait] : normal gait [No Clubbing] : no clubbing [No Edema] : no edema [TextBox_68] : bilateral rhonchi and wheeze

## 2021-12-02 NOTE — PROCEDURE
[FreeTextEntry1] : exacerbation\par \par [id number] is lower than usual for him\par \par plan prednisone and also start azithromycin long term q o d

## 2021-12-02 NOTE — HISTORY OF PRESENT ILLNESS
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

## 2021-12-02 NOTE — REVIEW OF SYSTEMS
[Fatigue] : fatigue [Cough] : cough [Sputum] : sputum [Dyspnea] : no dyspnea [Wheezing] : wheezing [Negative] : Psychiatric [TextBox_30] : dyspnea, wheezing [TextBox_134] : seems very depressed since he retired

## 2021-12-21 ENCOUNTER — APPOINTMENT (OUTPATIENT)
Dept: PULMONOLOGY | Facility: CLINIC | Age: 86
End: 2021-12-21
Payer: MEDICARE

## 2021-12-21 VITALS
HEIGHT: 66 IN | HEART RATE: 61 BPM | TEMPERATURE: 97 F | WEIGHT: 133 LBS | DIASTOLIC BLOOD PRESSURE: 75 MMHG | SYSTOLIC BLOOD PRESSURE: 117 MMHG | OXYGEN SATURATION: 95 % | BODY MASS INDEX: 21.38 KG/M2

## 2021-12-21 PROCEDURE — 99214 OFFICE O/P EST MOD 30 MIN: CPT | Mod: 25

## 2021-12-21 PROCEDURE — 94010 BREATHING CAPACITY TEST: CPT

## 2021-12-24 NOTE — PHYSICAL EXAM
[No Acute Distress] : no acute distress [Normal Appearance] : normal appearance [Normal Oropharynx] : normal oropharynx [No Neck Mass] : no neck mass [Normal Rate/Rhythm] : normal rate/rhythm [Normal S1, S2] : normal s1, s2 [No Murmurs] : no murmurs [No Resp Distress] : no resp distress [Clear to Auscultation Bilaterally] : clear to auscultation bilaterally [Normal Gait] : normal gait [No Clubbing] : no clubbing [No Edema] : no edema [TextBox_68] : bilateral rhonchi and wheeze

## 2021-12-24 NOTE — HISTORY OF PRESENT ILLNESS
[TextBox_4] : 88 year old man with copd that when he is not having an exacerbation is relatilvely modest, but when he does excerbate he is terrible.  just started him on alternate day azithromycin at the same time I treated him for an exacerbation.  already on triple therapy.\par \par he is much bttter and his spirometry is exceptionally better,.\par \par often there is no real reason for a trigger

## 2021-12-24 NOTE — REVIEW OF SYSTEMS
[Fatigue] : fatigue [Cough] : no cough [Sputum] : no sputum [Wheezing] : wheezing [Negative] : Psychiatric [TextBox_30] : says he wheezed a bit the other day [TextBox_134] : seems very depressed since he retired

## 2021-12-24 NOTE — DISCUSSION/SUMMARY
[FreeTextEntry1] : i am hoping that every other day azithromycin helps his exacerbations to lessen \par \par remain on present inhalers

## 2022-03-18 ENCOUNTER — EMERGENCY (EMERGENCY)
Facility: HOSPITAL | Age: 87
LOS: 1 days | Discharge: HOME | End: 2022-03-18
Attending: EMERGENCY MEDICINE | Admitting: EMERGENCY MEDICINE
Payer: MEDICARE

## 2022-03-18 VITALS
OXYGEN SATURATION: 98 % | DIASTOLIC BLOOD PRESSURE: 66 MMHG | SYSTOLIC BLOOD PRESSURE: 110 MMHG | HEART RATE: 65 BPM | RESPIRATION RATE: 18 BRPM | TEMPERATURE: 99 F

## 2022-03-18 VITALS
HEIGHT: 65 IN | OXYGEN SATURATION: 96 % | TEMPERATURE: 99 F | RESPIRATION RATE: 18 BRPM | WEIGHT: 130.07 LBS | DIASTOLIC BLOOD PRESSURE: 68 MMHG | SYSTOLIC BLOOD PRESSURE: 132 MMHG | HEART RATE: 63 BPM

## 2022-03-18 DIAGNOSIS — S20.229A CONTUSION OF UNSPECIFIED BACK WALL OF THORAX, INITIAL ENCOUNTER: ICD-10-CM
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
## 2022-03-18 DIAGNOSIS — Z85.118 PERSONAL HISTORY OF OTHER MALIGNANT NEOPLASM OF BRONCHUS AND LUNG: ICD-10-CM
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
## 2022-03-18 DIAGNOSIS — I10 ESSENTIAL (PRIMARY) HYPERTENSION: ICD-10-CM

## 2022-03-18 DIAGNOSIS — Z90.2 ACQUIRED ABSENCE OF LUNG [PART OF]: Chronic | ICD-10-CM

## 2022-03-18 DIAGNOSIS — Z20.822 CONTACT WITH AND (SUSPECTED) EXPOSURE TO COVID-19: ICD-10-CM

## 2022-03-18 DIAGNOSIS — E78.00 PURE HYPERCHOLESTEROLEMIA, UNSPECIFIED: ICD-10-CM

## 2022-03-18 DIAGNOSIS — Z90.2 ACQUIRED ABSENCE OF LUNG [PART OF]: ICD-10-CM

## 2022-03-18 DIAGNOSIS — Z98.890 OTHER SPECIFIED POSTPROCEDURAL STATES: Chronic | ICD-10-CM

## 2022-03-18 DIAGNOSIS — R53.1 WEAKNESS: ICD-10-CM

## 2022-03-18 DIAGNOSIS — J44.9 CHRONIC OBSTRUCTIVE PULMONARY DISEASE, UNSPECIFIED: ICD-10-CM

## 2022-03-18 DIAGNOSIS — M54.6 PAIN IN THORACIC SPINE: ICD-10-CM

## 2022-03-18 LAB
ALBUMIN SERPL ELPH-MCNC: 3.9 G/DL — SIGNIFICANT CHANGE UP (ref 3.3–5)
ALP SERPL-CCNC: 51 U/L — SIGNIFICANT CHANGE UP (ref 40–120)
ALT FLD-CCNC: SIGNIFICANT CHANGE UP U/L (ref 10–45)
ANION GAP SERPL CALC-SCNC: 10 MMOL/L — SIGNIFICANT CHANGE UP (ref 5–17)
AST SERPL-CCNC: SIGNIFICANT CHANGE UP U/L (ref 10–40)
BASOPHILS # BLD AUTO: 0.04 K/UL — SIGNIFICANT CHANGE UP (ref 0–0.2)
BASOPHILS NFR BLD AUTO: 0.6 % — SIGNIFICANT CHANGE UP (ref 0–2)
BILIRUB SERPL-MCNC: 1.2 MG/DL — SIGNIFICANT CHANGE UP (ref 0.2–1.2)
BUN SERPL-MCNC: 28 MG/DL — HIGH (ref 7–23)
CALCIUM SERPL-MCNC: 9.3 MG/DL — SIGNIFICANT CHANGE UP (ref 8.4–10.5)
CHLORIDE SERPL-SCNC: 103 MMOL/L — SIGNIFICANT CHANGE UP (ref 96–108)
CO2 SERPL-SCNC: 20 MMOL/L — LOW (ref 22–31)
CREAT SERPL-MCNC: 1.57 MG/DL — HIGH (ref 0.5–1.3)
EGFR: 42 ML/MIN/1.73M2 — LOW
EOSINOPHIL # BLD AUTO: 0.04 K/UL — SIGNIFICANT CHANGE UP (ref 0–0.5)
EOSINOPHIL NFR BLD AUTO: 0.6 % — SIGNIFICANT CHANGE UP (ref 0–6)
GLUCOSE SERPL-MCNC: 106 MG/DL — HIGH (ref 70–99)
HCT VFR BLD CALC: 48 % — SIGNIFICANT CHANGE UP (ref 39–50)
HGB BLD-MCNC: 14.9 G/DL — SIGNIFICANT CHANGE UP (ref 13–17)
IMM GRANULOCYTES NFR BLD AUTO: 0.1 % — SIGNIFICANT CHANGE UP (ref 0–1.5)
LYMPHOCYTES # BLD AUTO: 0.98 K/UL — LOW (ref 1–3.3)
LYMPHOCYTES # BLD AUTO: 13.9 % — SIGNIFICANT CHANGE UP (ref 13–44)
MCHC RBC-ENTMCNC: 30.1 PG — SIGNIFICANT CHANGE UP (ref 27–34)
MCHC RBC-ENTMCNC: 31 GM/DL — LOW (ref 32–36)
MCV RBC AUTO: 97 FL — SIGNIFICANT CHANGE UP (ref 80–100)
MONOCYTES # BLD AUTO: 0.49 K/UL — SIGNIFICANT CHANGE UP (ref 0–0.9)
MONOCYTES NFR BLD AUTO: 7 % — SIGNIFICANT CHANGE UP (ref 2–14)
NEUTROPHILS # BLD AUTO: 5.47 K/UL — SIGNIFICANT CHANGE UP (ref 1.8–7.4)
NEUTROPHILS NFR BLD AUTO: 77.8 % — HIGH (ref 43–77)
NRBC # BLD: 0 /100 WBCS — SIGNIFICANT CHANGE UP (ref 0–0)
PLATELET # BLD AUTO: 116 K/UL — LOW (ref 150–400)
POTASSIUM SERPL-MCNC: SIGNIFICANT CHANGE UP MMOL/L (ref 3.5–5.3)
POTASSIUM SERPL-SCNC: SIGNIFICANT CHANGE UP MMOL/L (ref 3.5–5.3)
PROT SERPL-MCNC: 6.6 G/DL — SIGNIFICANT CHANGE UP (ref 6–8.3)
RBC # BLD: 4.95 M/UL — SIGNIFICANT CHANGE UP (ref 4.2–5.8)
RBC # FLD: 14 % — SIGNIFICANT CHANGE UP (ref 10.3–14.5)
SARS-COV-2 RNA SPEC QL NAA+PROBE: SIGNIFICANT CHANGE UP
SODIUM SERPL-SCNC: 133 MMOL/L — LOW (ref 135–145)
WBC # BLD: 7.03 K/UL — SIGNIFICANT CHANGE UP (ref 3.8–10.5)
WBC # FLD AUTO: 7.03 K/UL — SIGNIFICANT CHANGE UP (ref 3.8–10.5)

## 2022-03-18 PROCEDURE — 85025 COMPLETE CBC W/AUTO DIFF WBC: CPT

## 2022-03-18 PROCEDURE — 70450 CT HEAD/BRAIN W/O DYE: CPT | Mod: MA

## 2022-03-18 PROCEDURE — 80053 COMPREHEN METABOLIC PANEL: CPT

## 2022-03-18 PROCEDURE — 99284 EMERGENCY DEPT VISIT MOD MDM: CPT | Mod: 25

## 2022-03-18 PROCEDURE — U0003: CPT

## 2022-03-18 PROCEDURE — 72125 CT NECK SPINE W/O DYE: CPT | Mod: 26,MA

## 2022-03-18 PROCEDURE — 72128 CT CHEST SPINE W/O DYE: CPT | Mod: 26,MA

## 2022-03-18 PROCEDURE — 70450 CT HEAD/BRAIN W/O DYE: CPT | Mod: 26,MA

## 2022-03-18 PROCEDURE — 73070 X-RAY EXAM OF ELBOW: CPT | Mod: 26,LT

## 2022-03-18 PROCEDURE — 93010 ELECTROCARDIOGRAM REPORT: CPT

## 2022-03-18 PROCEDURE — 93005 ELECTROCARDIOGRAM TRACING: CPT

## 2022-03-18 PROCEDURE — 99285 EMERGENCY DEPT VISIT HI MDM: CPT | Mod: 25

## 2022-03-18 PROCEDURE — 73070 X-RAY EXAM OF ELBOW: CPT

## 2022-03-18 PROCEDURE — 72128 CT CHEST SPINE W/O DYE: CPT | Mod: MA

## 2022-03-18 PROCEDURE — 36415 COLL VENOUS BLD VENIPUNCTURE: CPT

## 2022-03-18 PROCEDURE — U0005: CPT

## 2022-03-18 PROCEDURE — 72125 CT NECK SPINE W/O DYE: CPT | Mod: MA

## 2022-03-18 RX ORDER — ACETAMINOPHEN 500 MG
1000 TABLET ORAL ONCE
Refills: 0 | Status: COMPLETED | OUTPATIENT
Start: 2022-03-18 | End: 2022-03-18

## 2022-03-18 RX ADMIN — Medication 1000 MILLIGRAM(S): at 13:02

## 2022-03-18 NOTE — ED ADULT NURSE NOTE - MODE OF DISCHARGE
56-year-old woman here today for the first time for the evaluation of disparate results on her stress test.  She had ECG changes with Lexiscan but no chest discomfort.  The myocardial perfusion imaging portion of the study was normal.  It is unusual to have ECG changes associated with Lexiscan and that is rarely a false positive.  She does have risk factors for coronary artery disease including recent cessation of smoking and a marked dyslipidemia with a non-HDL cholesterol of almost 230.  It is however unusual to have conversational dyspnea is a manifestation of angina.  However I think because of her risk factors and the disparity in the stress test, we should look at her coronaries further.  I have recommended diagnostic angiography rather than coronary CTA given her high risk of having disease.  In the interim I have started her on 40 mg of atorvastatin in the hope that we can achieve a 50% reduction in her total cholesterol.  I also started her on a baby aspirin.  Irrespective of the findings on her cath would leave her on long-term lipid-lowering therapy.   Ambulatory

## 2022-03-18 NOTE — ED PROVIDER NOTE - OBJECTIVE STATEMENT
88 m w weakness- pmh copd, htn, lung ca- states he had trouble sleeping- had 1/2 glass of wine yesterday- then took a 1/2 klonopin last night and fell- hit the mid back area- had severe pain- now feeling better no ha no n/v  no AC  mild-moderate severity- able to ambulate  no meds taken for pain pta  he and his wife were both on the floor and had difficulty getting up

## 2022-03-18 NOTE — ED ADULT NURSE NOTE - NSICDXPASTMEDICALHX_GEN_ALL_CORE_FT
PAST MEDICAL HISTORY:  BPH (benign prostatic hyperplasia)     COPD (chronic obstructive pulmonary disease)     High cholesterol     HTN (hypertension)     Lung cancer

## 2022-03-18 NOTE — ED PROVIDER NOTE - PATIENT PORTAL LINK FT
You can access the FollowMyHealth Patient Portal offered by [facility] by registering at the following website: [email or web address]. By joining [employer]’s FollowMyHealth portal, you will also be able to view your health information using other applications (apps) compatible with our system.

## 2022-03-18 NOTE — ED ADULT NURSE REASSESSMENT NOTE - NS ED NURSE REASSESS COMMENT FT1
Patient provided for rounding. Patient in no apparent distress. Resting comfortably. Patient provided for emotional support, comfort, safety, and review of plan of care. Pending urine sample, provided with water.

## 2022-03-18 NOTE — ED PROVIDER NOTE - CLINICAL SUMMARY MEDICAL DECISION MAKING FREE TEXT BOX
gen weakness w fall- check head/spinal CT- labs, ekg gen weakness w fall- check head/spinal CT- labs, ekg  px wants to go- does not want to stay for results - stable gait

## 2022-03-18 NOTE — ED ADULT NURSE NOTE - OBJECTIVE STATEMENT
87 yo M pmhx of COPD, lung CA with lobectomy, stage 3 CKD presents to ED co weakness/ mechanical trip and fall at 3AM. Pt states, "I got the 4th booster shot yesterday and I couldn't sleep so I took a Klonipin last night and I got up at 3am and felt woozy and fell on my back." Denies head inj, LOC, no blood thinner use. Pt ambulatory w assistance in ED with abrasion noted to cervical spine. Denies loss of control of bowels or bladder, headache, dizziness.

## 2022-03-18 NOTE — ED PROVIDER NOTE - NSFOLLOWUPINSTRUCTIONS_ED_ALL_ED_FT
Acute Back Pain, Adult      Acute back pain is sudden and usually short-lived. It is often caused by an injury to the muscles and tissues in the back. The injury may result from:  •A muscle or ligament getting overstretched or torn (strained). Ligaments are tissues that connect bones to each other. Lifting something improperly can cause a back strain.      •Wear and tear (degeneration) of the spinal disks. Spinal disks are circular tissue that provide cushioning between the bones of the spine (vertebrae).      •Twisting motions, such as while playing sports or doing yard work.      •A hit to the back.      •Arthritis.      You may have a physical exam, lab tests, and imaging tests to find the cause of your pain. Acute back pain usually goes away with rest and home care.      Follow these instructions at home:    Managing pain, stiffness, and swelling     •Treatment may include medicines for pain and inflammation that are taken by mouth or applied to the skin, prescription pain medicine, or muscle relaxants. Take over-the-counter and prescription medicines only as told by your health care provider.    •Your health care provider may recommend applying ice during the first 24–48 hours after your pain starts. To do this:  •Put ice in a plastic bag.      •Place a towel between your skin and the bag.      •Leave the ice on for 20 minutes, 2–3 times a day.      •If directed, apply heat to the affected area as often as told by your health care provider. Use the heat source that your health care provider recommends, such as a moist heat pack or a heating pad.  •Place a towel between your skin and the heat source.      •Leave the heat on for 20–30 minutes.      •Remove the heat if your skin turns bright red. This is especially important if you are unable to feel pain, heat, or cold. You have a greater risk of getting burned.          Activity      • Do not stay in bed. Staying in bed for more than 1–2 days can delay your recovery.    •Sit up and stand up straight. Avoid leaning forward when you sit or hunching over when you stand.  •If you work at a desk, sit close to it so you do not need to lean over. Keep your chin tucked in. Keep your neck drawn back, and keep your elbows bent at a 90-degree angle (right angle).      •Sit high and close to the steering wheel when you drive. Add lower back (lumbar) support to your car seat, if needed.        •Take short walks on even surfaces as soon as you are able. Try to increase the length of time you walk each day.      • Do not sit, drive, or  one place for more than 30 minutes at a time. Sitting or standing for long periods of time can put stress on your back.      • Do not drive or use heavy machinery while taking prescription pain medicine.    •Use proper lifting techniques. When you bend and lift, use positions that put less stress on your back:  •Bend your knees.      •Keep the load close to your body.      •Avoid twisting.        •Exercise regularly as told by your health care provider. Exercising helps your back heal faster and helps prevent back injuries by keeping muscles strong and flexible.      •Work with a physical therapist to make a safe exercise program, as recommended by your health care provider. Do any exercises as told by your physical therapist.      Lifestyle     •Maintain a healthy weight. Extra weight puts stress on your back and makes it difficult to have good posture.      •Avoid activities or situations that make you feel anxious or stressed. Stress and anxiety increase muscle tension and can make back pain worse. Learn ways to manage anxiety and stress, such as through exercise.      General instructions     •Sleep on a firm mattress in a comfortable position. Try lying on your side with your knees slightly bent. If you lie on your back, put a pillow under your knees.    •Follow your treatment plan as told by your health care provider. This may include:  •Cognitive or behavioral therapy.      •Acupuncture or massage therapy.      •Meditation or yoga.          Contact a health care provider if:    •You have pain that is not relieved with rest or medicine.      •You have increasing pain going down into your legs or buttocks.      •Your pain does not improve after 2 weeks.      •You have pain at night.      •You lose weight without trying.      •You have a fever or chills.        Get help right away if:    •You develop new bowel or bladder control problems.      •You have unusual weakness or numbness in your arms or legs.      •You develop nausea or vomiting.      •You develop abdominal pain.      •You feel faint.        Summary    •Acute back pain is sudden and usually short-lived.      •Use proper lifting techniques. When you bend and lift, use positions that put less stress on your back.      •Take over-the-counter and prescription medicines and apply heat or ice as directed by your health care provider.      This information is not intended to replace advice given to you by your health care provider. Make sure you discuss any questions you have with your health care provider.      Document Revised: 09/07/2021 Document Reviewed: 09/10/2021    [employer] Patient Education © 2022 [employer]

## 2022-03-18 NOTE — ED PROVIDER NOTE - SECONDARY DIAGNOSIS.
CC:  Betsy Bartlett is here today for Follow-up (Knee pain)   Patient has no concerns     No medications are needed to be refilled at this time    Patient preferred phone number: [phone]  Ok to leave detailed message on [employer]    Health Maintenance Due   Topic Date Due   • COVID-19 Vaccine (1) Never done   • Influenza Vaccine (1) 08/01/2021       Patient is due for topics as listed above but is not proceeding with Immunization(s) COVID-19 and Influenza at this time.      Over the last 2 weeks, how often have you been bothered by the following problems?        PHQ2 Score: 2  PHQ2 Score Interpretation: No further screening needed  1. Feeling down, depressed, irritable or hopeless?: 1  2. Little interest or pleasure in activity?: 1          Contusion of thoracic spine

## 2022-03-18 NOTE — ED ADULT TRIAGE NOTE - CHIEF COMPLAINT QUOTE
BIBA from home c.o weakness. last night took Klonopin at 0400 due to inability to sleep. had 4th moderna booster yesterday. reports weakness to bilateral legs. had a fall at 0500, no hitting head/ LOC. no blood thinners. reports lower back pain.

## 2022-03-22 ENCOUNTER — APPOINTMENT (OUTPATIENT)
Dept: PULMONOLOGY | Facility: CLINIC | Age: 87
End: 2022-03-22
Payer: MEDICARE

## 2022-03-22 VITALS
DIASTOLIC BLOOD PRESSURE: 59 MMHG | WEIGHT: 133 LBS | BODY MASS INDEX: 21.38 KG/M2 | OXYGEN SATURATION: 97 % | SYSTOLIC BLOOD PRESSURE: 99 MMHG | HEART RATE: 60 BPM | HEIGHT: 66 IN

## 2022-03-22 PROCEDURE — 99214 OFFICE O/P EST MOD 30 MIN: CPT | Mod: 25

## 2022-03-22 PROCEDURE — 94010 BREATHING CAPACITY TEST: CPT

## 2022-03-22 NOTE — ED POST DISCHARGE NOTE - DETAILS
Left messages on home and cell phone numbers to call ED to discuss results. Will leave on list for repeated attempts to notify patient.

## 2022-03-22 NOTE — ED POST DISCHARGE NOTE - ADDITIONAL DOCUMENTATION
3/22/2022 at 2:17 pm - discussed results with patient. He reports having an old fracture of left elbow, as well as an old fracture of the left wrist.  He was already aware of the elbow effusion. He says he has no pain or swelling of his elbow, and it is not bothering him at all.  Advised to follow up with orthopedist if he develops any elbow pain or other concerns.

## 2022-03-24 NOTE — REVIEW OF SYSTEMS
[Fatigue] : fatigue [Negative] : Psychiatric [Cough] : no cough [Sputum] : no sputum [Wheezing] : no wheezing [TextBox_30] : says he wheezed a bit the other day [TextBox_134] : seems very depressed since he retired

## 2022-03-24 NOTE — HISTORY OF PRESENT ILLNESS
[TextBox_4] : 88 year old man with COPD here for follow up. \par He reports no new symptoms or worsening of his pulmonary symptoms since every other day azithromycin was started.  He recently had an ER visit after he fell in his house after taking Klonopin.  No fracture was found.  He reports being at baseline with the ability to walk 1 to 2 mile with his dog.  Complains of chronic cough which not producing thick sputum.  Has been using Trelegy.  Asking for referral to a new internist.

## 2022-03-24 NOTE — DISCUSSION/SUMMARY
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  Reports stabilization of his symptoms or some improvement addition of alternative day of azithromycin.  We will continue with the same.  New PCP referral given.  Follow-up annually 4 months

## 2022-07-26 ENCOUNTER — APPOINTMENT (OUTPATIENT)
Dept: PULMONOLOGY | Facility: CLINIC | Age: 87
End: 2022-07-26

## 2022-07-26 VITALS
SYSTOLIC BLOOD PRESSURE: 100 MMHG | WEIGHT: 133 LBS | HEART RATE: 55 BPM | BODY MASS INDEX: 21.38 KG/M2 | OXYGEN SATURATION: 97 % | DIASTOLIC BLOOD PRESSURE: 63 MMHG | HEIGHT: 66 IN | TEMPERATURE: 97.1 F

## 2022-07-26 PROCEDURE — 94010 BREATHING CAPACITY TEST: CPT

## 2022-07-26 PROCEDURE — 99214 OFFICE O/P EST MOD 30 MIN: CPT | Mod: 25

## 2022-07-28 NOTE — HISTORY OF PRESENT ILLNESS
[TextBox_4] : severe copd, soing well with no exacerbations since starting the azithromycin every other day\par \par on trelegy and doing well\par \par depressed from having retired

## 2022-07-28 NOTE — REVIEW OF SYSTEMS
[Fatigue] : fatigue [Cough] : no cough [Sputum] : no sputum [Wheezing] : wheezing [Negative] : Psychiatric [TextBox_30] : doing welll  baseline dyspnea [TextBox_134] : seems very depressed since he retired

## 2022-07-28 NOTE — REASON FOR VISIT
[Follow-Up] : a follow-up visit [TextBox_44] : doing well, with severe copd ever since starting the azithromycin every other day no exacerbations

## 2022-07-28 NOTE — DISCUSSION/SUMMARY
[FreeTextEntry1] : doing very well on present regimen\par \par azithromycin made a major difference\par \par return in six month

## 2022-11-02 ENCOUNTER — APPOINTMENT (OUTPATIENT)
Dept: FAMILY MEDICINE | Facility: CLINIC | Age: 87
End: 2022-11-02

## 2022-11-02 VITALS
DIASTOLIC BLOOD PRESSURE: 59 MMHG | TEMPERATURE: 98 F | OXYGEN SATURATION: 98 % | HEART RATE: 60 BPM | BODY MASS INDEX: 20.09 KG/M2 | WEIGHT: 125 LBS | SYSTOLIC BLOOD PRESSURE: 100 MMHG | HEIGHT: 66 IN

## 2022-11-02 DIAGNOSIS — Z87.09 PERSONAL HISTORY OF OTHER DISEASES OF THE RESPIRATORY SYSTEM: ICD-10-CM

## 2022-11-02 DIAGNOSIS — Z78.9 OTHER SPECIFIED HEALTH STATUS: ICD-10-CM

## 2022-11-02 DIAGNOSIS — J42 UNSPECIFIED CHRONIC BRONCHITIS: ICD-10-CM

## 2022-11-02 DIAGNOSIS — Z87.891 PERSONAL HISTORY OF NICOTINE DEPENDENCE: ICD-10-CM

## 2022-11-02 DIAGNOSIS — Z87.39 PERSONAL HISTORY OF OTHER DISEASES OF THE MUSCULOSKELETAL SYSTEM AND CONNECTIVE TISSUE: ICD-10-CM

## 2022-11-02 PROCEDURE — G0438: CPT

## 2022-11-02 PROCEDURE — 36415 COLL VENOUS BLD VENIPUNCTURE: CPT

## 2022-11-02 RX ORDER — ROSUVASTATIN CALCIUM 10 MG/1
10 TABLET, FILM COATED ORAL
Qty: 90 | Refills: 0 | Status: ACTIVE | COMMUNITY
Start: 2022-09-06

## 2022-11-02 RX ORDER — TAMSULOSIN HYDROCHLORIDE 0.4 MG/1
0.4 CAPSULE ORAL
Qty: 180 | Refills: 0 | Status: ACTIVE | COMMUNITY
Start: 2021-11-08

## 2022-11-02 RX ORDER — METOPROLOL SUCCINATE 50 MG/1
50 TABLET, EXTENDED RELEASE ORAL
Refills: 0 | Status: ACTIVE | COMMUNITY

## 2022-11-02 RX ORDER — PREDNISONE 20 MG/1
20 TABLET ORAL
Qty: 30 | Refills: 0 | Status: DISCONTINUED | COMMUNITY
Start: 2019-06-13 | End: 2022-11-02

## 2022-11-02 RX ORDER — AZITHROMYCIN 250 MG/1
250 TABLET, FILM COATED ORAL
Qty: 15 | Refills: 6 | Status: DISCONTINUED | COMMUNITY
Start: 2019-09-16 | End: 2022-11-02

## 2022-11-02 RX ORDER — CITALOPRAM HYDROBROMIDE 10 MG/1
10 TABLET, FILM COATED ORAL
Qty: 180 | Refills: 0 | Status: DISCONTINUED | COMMUNITY
Start: 2021-11-08

## 2022-11-02 RX ORDER — METOPROLOL SUCCINATE 50 MG/1
50 TABLET, EXTENDED RELEASE ORAL
Qty: 90 | Refills: 0 | Status: DISCONTINUED | COMMUNITY
Start: 2021-09-23

## 2022-11-02 RX ORDER — PREDNISONE 10 MG/1
10 TABLET ORAL
Qty: 60 | Refills: 1 | Status: DISCONTINUED | COMMUNITY
Start: 2018-05-16 | End: 2022-11-02

## 2022-11-02 NOTE — HEALTH RISK ASSESSMENT
[Good] : ~his/her~  mood as  good [Former] : Former [1 or 2 (0 pts)] : 1 or 2 (0 points) [Never (0 pts)] : Never (0 points) [No] : In the past 12 months have you used drugs other than those required for medical reasons? No [0] : 2) Feeling down, depressed, or hopeless: Not at all (0) [PHQ-2 Negative - No further assessment needed] : PHQ-2 Negative - No further assessment needed [YearQuit] : 1980 [Audit-CScore] : 0 [de-identified] : walking  [de-identified] : fruits, veggies  [[id number]] : 0 [Patient declined colonoscopy] : Patient declined colonoscopy [HIV test declined] : HIV test declined [Hepatitis C test declined] : Hepatitis C test declined [Change in mental status noted] : No change in mental status noted [Language] : denies difficulty with language [None] : None [With Significant Other] : lives with significant other [Retired] : retired [] :  [Feels Safe at Home] : Feels safe at home [Fully functional (bathing, dressing, toileting, transferring, walking, feeding)] : Fully functional (bathing, dressing, toileting, transferring, walking, feeding) [Fully functional (using the telephone, shopping, preparing meals, housekeeping, doing laundry, using] : Fully functional and needs no help or supervision to perform IADLs (using the telephone, shopping, preparing meals, housekeeping, doing laundry, using transportation, managing medications and managing finances) [Reports changes in hearing] : Reports no changes in hearing [Reports changes in vision] : Reports no changes in vision [FreeTextEntry2] : primary care

## 2022-11-02 NOTE — HISTORY OF PRESENT ILLNESS
[FreeTextEntry1] : establish care  [de-identified] : 88 yo m presents to establish care. \par No complaints today.

## 2022-11-05 LAB
25(OH)D3 SERPL-MCNC: 72.9 NG/ML
ALBUMIN SERPL ELPH-MCNC: 3.9 G/DL
ALP BLD-CCNC: 40 U/L
ALT SERPL-CCNC: 10 U/L
ANION GAP SERPL CALC-SCNC: 10 MMOL/L
AST SERPL-CCNC: 18 U/L
BASOPHILS # BLD AUTO: 0.03 K/UL
BASOPHILS NFR BLD AUTO: 0.5 %
BILIRUB SERPL-MCNC: 0.8 MG/DL
BUN SERPL-MCNC: 28 MG/DL
CALCIUM SERPL-MCNC: 9.5 MG/DL
CHLORIDE SERPL-SCNC: 105 MMOL/L
CHOLEST SERPL-MCNC: 102 MG/DL
CK SERPL-CCNC: 97 U/L
CO2 SERPL-SCNC: 24 MMOL/L
CREAT SERPL-MCNC: 1.69 MG/DL
EGFR: 38 ML/MIN/1.73M2
EOSINOPHIL # BLD AUTO: 0.29 K/UL
EOSINOPHIL NFR BLD AUTO: 5.2 %
ESTIMATED AVERAGE GLUCOSE: 126 MG/DL
GLUCOSE SERPL-MCNC: 103 MG/DL
HBA1C MFR BLD HPLC: 6 %
HCT VFR BLD CALC: 45.2 %
HDLC SERPL-MCNC: 35 MG/DL
HGB BLD-MCNC: 14.2 G/DL
IMM GRANULOCYTES NFR BLD AUTO: 0.2 %
LDLC SERPL CALC-MCNC: 53 MG/DL
LYMPHOCYTES # BLD AUTO: 1.53 K/UL
LYMPHOCYTES NFR BLD AUTO: 27.2 %
MAN DIFF?: NORMAL
MCHC RBC-ENTMCNC: 30.1 PG
MCHC RBC-ENTMCNC: 31.4 GM/DL
MCV RBC AUTO: 95.8 FL
MONOCYTES # BLD AUTO: 0.54 K/UL
MONOCYTES NFR BLD AUTO: 9.6 %
NEUTROPHILS # BLD AUTO: 3.22 K/UL
NEUTROPHILS NFR BLD AUTO: 57.3 %
NONHDLC SERPL-MCNC: 67 MG/DL
PLATELET # BLD AUTO: 157 K/UL
POTASSIUM SERPL-SCNC: 4.7 MMOL/L
PROT SERPL-MCNC: 6.1 G/DL
RBC # BLD: 4.72 M/UL
RBC # FLD: 14 %
SODIUM SERPL-SCNC: 139 MMOL/L
TRIGL SERPL-MCNC: 73 MG/DL
TSH SERPL-ACNC: 3.36 UIU/ML
WBC # FLD AUTO: 5.62 K/UL

## 2022-11-06 ENCOUNTER — NON-APPOINTMENT (OUTPATIENT)
Age: 87
End: 2022-11-06

## 2022-11-08 ENCOUNTER — NON-APPOINTMENT (OUTPATIENT)
Age: 87
End: 2022-11-08

## 2022-11-28 ENCOUNTER — APPOINTMENT (OUTPATIENT)
Dept: PULMONOLOGY | Facility: CLINIC | Age: 87
End: 2022-11-28

## 2022-11-28 VITALS
WEIGHT: 125 LBS | HEART RATE: 63 BPM | DIASTOLIC BLOOD PRESSURE: 78 MMHG | BODY MASS INDEX: 20.09 KG/M2 | TEMPERATURE: 97.9 F | SYSTOLIC BLOOD PRESSURE: 110 MMHG | HEIGHT: 66 IN | OXYGEN SATURATION: 96 %

## 2022-11-28 PROCEDURE — 99212 OFFICE O/P EST SF 10 MIN: CPT | Mod: 25

## 2022-11-28 PROCEDURE — 94010 BREATHING CAPACITY TEST: CPT

## 2022-11-29 NOTE — DISCUSSION/SUMMARY
[FreeTextEntry1] : doing well\par \par the addition of the azithro has all but eliminated exacerbations\par \par return in four months

## 2022-11-29 NOTE — HISTORY OF PRESENT ILLNESS
[TextBox_4] : doing very well on trelegy and every other day azithromycin\par \par no exacerbations since on this regimen\par \par walks every day for a few hours

## 2022-12-21 ENCOUNTER — APPOINTMENT (OUTPATIENT)
Dept: HEART AND VASCULAR | Facility: CLINIC | Age: 87
End: 2022-12-21

## 2022-12-21 ENCOUNTER — NON-APPOINTMENT (OUTPATIENT)
Age: 87
End: 2022-12-21

## 2022-12-21 VITALS
HEART RATE: 70 BPM | HEIGHT: 66 IN | TEMPERATURE: 97.9 F | DIASTOLIC BLOOD PRESSURE: 72 MMHG | OXYGEN SATURATION: 99 % | SYSTOLIC BLOOD PRESSURE: 128 MMHG | BODY MASS INDEX: 19.93 KG/M2 | WEIGHT: 124 LBS

## 2022-12-21 PROCEDURE — 99203 OFFICE O/P NEW LOW 30 MIN: CPT

## 2022-12-21 PROCEDURE — 93000 ELECTROCARDIOGRAM COMPLETE: CPT

## 2022-12-21 NOTE — ASSESSMENT
[FreeTextEntry1] : CAD- coronary calcification noted on CT.  Discussed with pt who wants a conservative approach.  LDL 53, A1c 6.0.  No testing planned. He was on SA but he elected to stop it D/T bruising\par \par HLD- continue current meds\par \par Prediabetes- manage with diet.

## 2022-12-21 NOTE — REASON FOR VISIT
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  Recently saw Dr Santo for SOB and it was suggested he see a Cardiologist.  He was formerly with Dr Cristi Bryant.  Pt was given prednisone and the SOB has resolved.  Pt walks over a mile daily without CO.  EKG is normal.   A chest CT does report multivessel coronary artery calcification.

## 2022-12-21 NOTE — PHYSICAL EXAM
[Well Developed] : well developed [Well Nourished] : well nourished [No Acute Distress] : no acute distress [Normal Conjunctiva] : normal conjunctiva [Normal Venous Pressure] : normal venous pressure [No Carotid Bruit] : no carotid bruit [Normal S1, S2] : normal S1, S2 [No Murmur] : no murmur [No Rub] : no rub [No Gallop] : no gallop [Clear Lung Fields] : clear lung fields [Good Air Entry] : good air entry [No Respiratory Distress] : no respiratory distress  [Soft] : abdomen soft [Non Tender] : non-tender [No Masses/organomegaly] : no masses/organomegaly [Normal Bowel Sounds] : normal bowel sounds [Normal Gait] : normal gait [No Edema] : no edema [No Cyanosis] : no cyanosis [No Clubbing] : no clubbing [No Varicosities] : no varicosities [No Rash] : no rash [No Skin Lesions] : no skin lesions [Moves all extremities] : moves all extremities [No Focal Deficits] : no focal deficits [Normal Speech] : normal speech [Alert and Oriented] : alert and oriented [Normal memory] : normal memory [Normal PT B/L] : normal PT B/L [de-identified] : Absent DP B/L

## 2023-02-28 ENCOUNTER — APPOINTMENT (OUTPATIENT)
Dept: PULMONOLOGY | Facility: CLINIC | Age: 88
End: 2023-02-28
Payer: MEDICARE

## 2023-02-28 PROCEDURE — 71046 X-RAY EXAM CHEST 2 VIEWS: CPT

## 2023-02-28 PROCEDURE — 99214 OFFICE O/P EST MOD 30 MIN: CPT

## 2023-03-02 ENCOUNTER — EMERGENCY (EMERGENCY)
Facility: HOSPITAL | Age: 88
LOS: 1 days | Discharge: HOME | End: 2023-03-02
Admitting: EMERGENCY MEDICINE
Payer: MEDICARE

## 2023-03-02 VITALS
OXYGEN SATURATION: 99 % | RESPIRATION RATE: 18 BRPM | SYSTOLIC BLOOD PRESSURE: 118 MMHG | DIASTOLIC BLOOD PRESSURE: 71 MMHG | WEIGHT: 125 LBS | TEMPERATURE: 97 F | HEART RATE: 67 BPM

## 2023-03-02 DIAGNOSIS — Z23 ENCOUNTER FOR IMMUNIZATION: ICD-10-CM
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
## 2023-03-02 DIAGNOSIS — S61.411A LACERATION WITHOUT FOREIGN BODY OF RIGHT HAND, INITIAL ENCOUNTER: ICD-10-CM

## 2023-03-02 DIAGNOSIS — Z90.2 ACQUIRED ABSENCE OF LUNG [PART OF]: Chronic | ICD-10-CM
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
## 2023-03-02 DIAGNOSIS — Z98.890 OTHER SPECIFIED POSTPROCEDURAL STATES: Chronic | ICD-10-CM
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
## 2023-03-02 PROCEDURE — 73130 X-RAY EXAM OF HAND: CPT

## 2023-03-02 PROCEDURE — 12001 RPR S/N/AX/GEN/TRNK 2.5CM/<: CPT

## 2023-03-02 PROCEDURE — 99284 EMERGENCY DEPT VISIT MOD MDM: CPT | Mod: 25

## 2023-03-02 PROCEDURE — 90715 TDAP VACCINE 7 YRS/> IM: CPT

## 2023-03-02 PROCEDURE — 73130 X-RAY EXAM OF HAND: CPT | Mod: 26,RT

## 2023-03-02 PROCEDURE — 99283 EMERGENCY DEPT VISIT LOW MDM: CPT | Mod: 25

## 2023-03-02 PROCEDURE — 90471 IMMUNIZATION ADMIN: CPT

## 2023-03-02 RX ORDER — TETANUS TOXOID, REDUCED DIPHTHERIA TOXOID AND ACELLULAR PERTUSSIS VACCINE, ADSORBED 5; 2.5; 8; 8; 2.5 [IU]/.5ML; [IU]/.5ML; UG/.5ML; UG/.5ML; UG/.5ML
0.5 SUSPENSION INTRAMUSCULAR ONCE
Refills: 0 | Status: COMPLETED | OUTPATIENT
Start: 2023-03-02 | End: 2023-03-02

## 2023-03-02 RX ADMIN — TETANUS TOXOID, REDUCED DIPHTHERIA TOXOID AND ACELLULAR PERTUSSIS VACCINE, ADSORBED 0.5 MILLILITER(S): 5; 2.5; 8; 8; 2.5 SUSPENSION INTRAMUSCULAR at 20:21

## 2023-03-02 NOTE — ED PROVIDER NOTE - PATIENT PORTAL LINK FT
You can access the FollowMyHealth Patient Portal offered by [facility] by registering at the following website: [email or web address]. By joining [employer]’s FollowMyHealth portal, you will also be able to view your health information using other applications (apps) compatible with our system.

## 2023-03-02 NOTE — ED ADULT NURSE NOTE - OBJECTIVE STATEMENT
Patient a+o x4 c/o laceration to right hand s/p fall while walking dog. Denies loc/head injury. Bleeding controlled. Medication administered; tolerated well. Physician at bedside.

## 2023-03-02 NOTE — HISTORY OF PRESENT ILLNESS
[TextBox_4] : frequenst exacerbations despite azithromcycin\par \par and advair\par \par had to take prednisoe.\par \par gave himsef two course and then it came back\par \par ten days after prednisone it return.\par \par he doesn't seem to be wheezing just having a great deal of sputum secretions\par

## 2023-03-02 NOTE — ED ADULT NURSE NOTE - NS_ED_NURSE_TEACHING_TOPIC_ED_A_ED
16 year old F with no PMHx transfer from [facility] with first time onset of seizure with flu-like symptoms as per mom.  Mom states that for the last couple of days she has not been eating as well and mom has been trying to encourage her to eat. Mom said that she also was complaining of a headache over the last day. Sibling heard a thump at home, found her on the kitchen floor, episode was unwitnessed, mom came home and noticed shaking movements so called ambulance and went to [facility]. Mom noticed blood around the face and nose after the fall. No prior history of seizures no family hx of seizure disorder.  At [facility] GCS 5 so was intubated.  CT head/cervical spine/chest/abdomen/pelvis normal. CMP, CBC wnl.  Was given a total ativan of 9mg, Keppra 1g, and started on a 10mcg propofol drip. PE: bite on tongue, rotary nystagmus, 4mm pupils sluggish, tachycardic, diminished breath sounds, obtunded, unresponsive, aphasic, unable to bear weight, decerebrate posturing, bruise to left clavicle, swollen nose.    PICU Course:  Patient persistently febrile throughout first day of hospital stay.   Resp: patient remained intubated on PRVC minimal settings initially for altered mental status and then for MRI brain/spinal cord.    Neurological: patient with bilateral lower extremity clonus (8 beats bilaterally) and hyperreflexia in lower extremities bilaterally.  Exam improved over course of stay.  Versed drip started per toxicology given concern for serotonin syndrome. MRI brain/spine within normal limits.  ID: Blood culture, urine culture, trach culture, CSF culture NG.  CSF with pleocytosis of 99 with 38 RBCs.  Ceftriaxone started prior to LP and continued (1/24-   ).   NMDA pending.     Toxicology: serotonin serum and 5 HIAA urine pending,   FEN/GI: patient kept NPO on MIVF while intubated.  CV: hemodynamically stable 16 year old F with no PMHx transfer from [facility] with first time onset of seizure with flu-like symptoms as per mom.  Mom states that for the last couple of days she has not been eating as well and mom has been trying to encourage her to eat. Mom said that she also was complaining of a headache over the last day. Sibling heard a thump at home, found her on the kitchen floor, episode was unwitnessed, mom came home and noticed shaking movements so called ambulance and went to [facility]. Mom noticed blood around the face and nose after the fall. No prior history of seizures no family hx of seizure disorder.  At [facility] GCS 5 so was intubated.  CT head/cervical spine/chest/abdomen/pelvis normal. CMP, CBC wnl.  Was given a total ativan of 9mg, Keppra 1g, and started on a 10mcg propofol drip. PE: bite on tongue, rotary nystagmus, 4mm pupils sluggish, tachycardic, diminished breath sounds, obtunded, unresponsive, aphasic, unable to bear weight, decerebrate posturing, bruise to left clavicle, swollen nose.    PICU Course:  Patient persistently febrile throughout first day of hospital stay.   Resp: patient remained intubated on PRVC minimal settings initially for altered mental status and then for MRI brain/spinal cord.    Neurological: patient with bilateral lower extremity clonus (8 beats bilaterally) and hyperreflexia in lower extremities bilaterally.  Exam improved over course of stay.  Versed drip started per toxicology given concern for serotonin syndrome. MRI brain/spine within normal limits.  ID: Blood culture, urine culture, trach culture, CSF culture NG.  CSF with pleocytosis of 99 with 38 RBCs.  Ceftriaxone started prior to LP and continued (1/24-1/29).   NMDA pending.     Toxicology: serotonin serum and 5 HIAA urine pending,   FEN/GI: patient kept NPO on MIVF while intubated.  CV: hemodynamically stable 16 year old F with no PMHx transfer from [facility] with first time onset of seizure with flu-like symptoms as per mom.  Mom states that for the last couple of days she has not been eating as well and mom has been trying to encourage her to eat. Mom said that she also was complaining of a headache over the last day. Sibling heard a thump at home, found her on the kitchen floor, episode was unwitnessed, mom came home and noticed shaking movements so called ambulance and went to [facility]. Mom noticed blood around the face and nose after the fall. No prior history of seizures no family hx of seizure disorder.  At [facility] GCS 5 so was intubated.  CT head/cervical spine/chest/abdomen/pelvis normal. CMP, CBC wnl.  Was given a total ativan of 9mg, Keppra 1g, and started on a 10mcg propofol drip. PE: bite on tongue, rotary nystagmus, 4mm pupils sluggish, tachycardic, diminished breath sounds, obtunded, unresponsive, aphasic, unable to bear weight, decerebrate posturing, bruise to left clavicle, swollen nose.    PICU Course:  Patient persistently febrile throughout first day of hospital stay.   Resp: patient remained intubated on PRVC minimal settings initially for altered mental status and then for MRI brain/spinal cord.    Neurological: patient with bilateral lower extremity clonus (8 beats bilaterally) and hyperreflexia in lower extremities bilaterally.  Exam improved over course of stay.  Versed drip started per toxicology given concern for serotonin syndrome. MRI brain/spine within normal limits.  ID: Blood culture, urine culture, trach culture, CSF culture NG.  CSF with pleocytosis of 99 with 38 RBCs.  Ceftriaxone started prior to LP and continued (1/24-1/29).   NMDA pending.     Toxicology: serotonin serum and 5 HIAA urine pending,   FEN/GI: patient kept NPO on MIVF while intubated.  CV: hemodynamically stable    Discharge Physical  VS   General: awake, no apparent distress  HEENT: NCAT, white sclera, GUSTAVO, clear oropharynx  Neck: Supple, no lymphadenopathy  Cardiac: regular rate, no murmur  Respiratory: CTAB, no accessory muscle use, retractions, or nasal flaring  Abdomen: Soft, nontender not distended, no HSM,  bowel sounds present  Extremities: FROM, pulses 2+ and equal in upper and lower extremities, no edema, no peeling  Skin: No rash.   Neurologic: alert, oriented 16 year old F with no PMHx transfer from [facility] with first time onset of seizure with flu-like symptoms as per mom.  Mom states that for the last couple of days she has not been eating as well and mom has been trying to encourage her to eat. Mom said that she also was complaining of a headache over the last day. Sibling heard a thump at home, found her on the kitchen floor, episode was unwitnessed, mom came home and noticed shaking movements so called ambulance and went to [facility]. Mom noticed blood around the face and nose after the fall. No prior history of seizures no family hx of seizure disorder.  At [facility] GCS 5 so was intubated.  CT head/cervical spine/chest/abdomen/pelvis normal. CMP, CBC wnl.  Was given a total ativan of 9mg, Keppra 1g, and started on a 10mcg propofol drip. PE: bite on tongue, rotary nystagmus, 4mm pupils sluggish, tachycardic, diminished breath sounds, obtunded, unresponsive, aphasic, unable to bear weight, decerebrate posturing, bruise to left clavicle, swollen nose.    PICU Course:  Patient persistently febrile throughout first day of hospital stay.   Resp: patient remained intubated on PRVC minimal settings initially for altered mental status and then for MRI brain/spinal cord.    Neurological: patient with bilateral lower extremity clonus (8 beats bilaterally) and hyperreflexia in lower extremities bilaterally.  Exam improved over course of stay.  Versed drip started per toxicology given concern for serotonin syndrome. MRI brain/spine within normal limits.  ID: Blood culture, urine culture, trach culture, CSF culture no growth.  CSF with pleocytosis of 99 with 38 RBCs.  Ceftriaxone started prior to LP and continued (1/24-1/29).   NMDA pending.     Toxicology: serotonin serum and 5 HIAA urine pending.    Floor Course: Patient remained afebrile and had no seizure events on the floor. She was ambulating well and tolerating PO at the time of discharge.    Discharge Physical  VS T 36.7, HR 86, /58, RR 20, O2 100%  General: awake, no apparent distress  HEENT: NCAT, white sclera, GUSTAVO, clear oropharynx  Neck: Supple, no lymphadenopathy  Cardiac: regular rate, no murmur  Respiratory: CTAB, no accessory muscle use, retractions, or nasal flaring  Abdomen: Soft, nontender not distended, no HSM,  bowel sounds present  Extremities: FROM, pulses 2+ and equal in upper and lower extremities, no edema, no peeling  Skin: No rash.   Neurologic: alert, oriented, CN II-XII grossly intact, reflexes 2+ Wound care

## 2023-03-02 NOTE — DISCUSSION/SUMMARY
[FreeTextEntry1] : chest film with no infitlrates\par \par right sided residual from surgery many years ago for lung cancer

## 2023-03-02 NOTE — ED PROVIDER NOTE - NSFOLLOWUPINSTRUCTIONS_ED_ALL_ED_FT
Please return to ED in 10 days for suture removal on 3/12/23      Laceration Care, Adult      A laceration is a cut that may go through all layers of the skin. The cut may also go into the tissue that is right under the skin. Some cuts heal on their own. Other cuts need to be closed with stitches (sutures), staples, skin adhesive strips, or skin glue. Taking care of your cut lowers your risk of infection, helps your injury heal better, and may prevent scarring.      General tips    •Keep your wound clean and dry.      • Do not scratch or pick at your wound.      •Wash your hands with soap and water for at least 20 seconds before and after touching your wound or changing your bandage (dressing). If you cannot use soap and water, use hand .      • Do not usedisinfectants or antiseptics, such as rubbing alcohol, to clean your wound unless told by your doctor.      •If you were given a bandage, change it at least once a day, or as told by your doctor. You should also change it if it gets wet or dirty.        How to take care of your cut    If your doctor used stitches or staples:     •Keep the wound fully dry for the first 24 hours, or as told by your doctor. After that, you may take a shower or a bath. Do not soak the wound in water until after the stitches or staples have been taken out.    •Clean the wound once a day, or as told by your doctor. To do this:  •Wash the wound with soap and water.      •Rinse the wound with water to remove all soap.      •Pat the wound dry with a clean towel. Do not rub the wound.      •After you clean the wound, put a thin layer of antibiotic ointment, another ointment, or a nonstick bandage on it as told by your doctor. This will help to:  •Prevent infection.      •Keep the bandage from sticking to the wound.        •Have your stitches or staples taken out as told by your doctor.      If your doctor used skin adhesive strips:     • Do not get the skin adhesive strips wet. You can take a shower or a bath, but keep the wound dry.      •If the wound gets wet, pat it dry with a clean towel. Do not rub the wound.      •Skin adhesive strips fall off on their own. You can trim the strips as the wound heals. Do not take off any strips that are still stuck to the wound unless told by your doctor. The strips will fall off after a while.      If your doctor used skin glue:     •You may take a shower or a bath, but try to keep the wound dry. Do not soak the wound in water.      •After you take a shower or a bath, pat the wound dry with a clean towel. Do not rub the wound.      • Do not do any activities that will make you sweat a lot until the skin glue has fallen off.      • Do not apply liquid, cream, or ointment medicine to your wound while the skin glue is still on.      •If a bandage is placed over the wound, do not put tape right on top of the skin glue.      • Do not pick at the glue. The skin glue usually stays on for 5–10 days. Then, it falls off the skin.        Follow these instructions at home:    Medicines     •Take over-the-counter and prescription medicines only as told by your doctor.      •If you were prescribed an antibiotic medicine, take or apply it as told by your doctor. Do not stop using it even if you start to feel better.      Managing pain and swelling   •If told, put ice on the injured area. To do this:  •Put ice in a plastic bag.      •Place a towel between your skin and the bag.      •Leave the ice on for 20 minutes, 2–3 times a day.      •Take off the ice if your skin turns bright red. This is very important. If you cannot feel pain, heat, or cold, you have a greater risk of damage to the area.        •Raise the injured area above the level of your heart while you are sitting or lying down.        General instructions   Two wounds closed with skin glue. One is normal. The other is red with pus and infected.   •Avoid any activity that could make your wound reopen.    •Check your wound every day for signs of infection. Check for:  •More redness, swelling, or pain.      •Fluid or blood.      •Warmth.      •Pus or a bad smell.        •Keep all follow-up visits.        Contact a doctor if:  •You got a tetanus shot and you have any of these problems where the needle went in:  •Swelling.      •Very bad pain.      •Redness.      •Bleeding.        •A wound that was closed breaks open.      •You have a fever.    •You have any of these signs of infection in your wound:  •More redness, swelling, or pain.      •Fluid or blood.      •Warmth.      •Pus or a bad smell.        •You see something coming out of the wound, such as wood or glass.      •Medicine does not make your pain go away.      •You notice a change in the color of your skin near your wound.      •You need to change the bandage often.      •You have a new rash.      •You lose feeling (have numbness) around the wound.        Get help right away if:    •You have very bad swelling around the wound.      •Your pain suddenly gets worse and is very bad.      •You have painful lumps near the wound or on skin anywhere on your body.      •You have a red streak going away from your wound.    •The wound is on your hand or foot, and:  •You cannot move a finger or toe.      •Your fingers or toes look pale or bluish.          Summary    •A laceration is a cut that may go through all layers of the skin. The cut may also go into the tissue right under the skin.      •Some cuts heal on their own. Others need to be closed with stitches, staples, skin adhesive strips, or skin glue.      •Follow your doctor's instructions for caring for your cut. Proper care of a cut lowers the risk of infection, helps the cut heal better, and may prevent scarring.      This information is not intended to replace advice given to you by your health care provider. Make sure you discuss any questions you have with your health care provider.

## 2023-03-02 NOTE — ED ADULT NURSE NOTE - NSIMPLEMENTINTERV_GEN_ALL_ED
Implemented All Fall with Harm Risk Interventions:  [place] to call system. Call bell, personal items and telephone within reach. Instruct patient to call for assistance. Room bathroom lighting operational. Non-slip footwear when patient is off stretcher. Physically safe environment: no spills, clutter or unnecessary equipment. Stretcher in lowest position, wheels locked, appropriate side rails in place. Provide visual cue, wrist band, yellow gown, etc. Monitor gait and stability. Monitor for mental status changes and reorient to person, place, and time. Review medications for side effects contributing to fall risk. Reinforce activity limits and safety measures with patient and family. Provide visual clues: red socks.

## 2023-03-02 NOTE — PHYSICAL EXAM
[No Acute Distress] : no acute distress [Normal Oropharynx] : normal oropharynx [Normal Appearance] : normal appearance [No Neck Mass] : no neck mass [Normal Rate/Rhythm] : normal rate/rhythm [Normal S1, S2] : normal s1, s2 [No Murmurs] : no murmurs [No Resp Distress] : no resp distress [Clear to Auscultation Bilaterally] : clear to auscultation bilaterally [Normal Gait] : normal gait [No Clubbing] : no clubbing [No Edema] : no edema [TextBox_68] : cl ramirez

## 2023-03-02 NOTE — ASSESSMENT
[FreeTextEntry1] : collectd sputum\par \par try hypertonic saline\par \par treat as sputum culture dictates\par \par possible chronic use of hypertonic saline

## 2023-03-02 NOTE — ED PROVIDER NOTE - SKIN, MLM
2 2 cm lacerations to the palm of right hand, no bony tenderness, no swelling or deformity, +FROM wrist and digits x 5, sensation intact distally

## 2023-03-02 NOTE — ED PROVIDER NOTE - CLINICAL SUMMARY MEDICAL DECISION MAKING FREE TEXT BOX
88 y/o m presents s/p trip and fall today with lacerations to palm of right hand.  No FB on xr, ext NVI, tetanus updated.  Lac repaired, d/c with wound care instructions, f/u in 10 days for suture removal, sooner if having redness, pain, swelling (concerns for infection).

## 2023-03-02 NOTE — REVIEW OF SYSTEMS
[Fatigue] : fatigue [Cough] : cough [Sputum] : sputum [Dyspnea] : dyspnea [Wheezing] : wheezing [Negative] : Psychiatric [TextBox_134] : seems very depressed since he retired

## 2023-03-02 NOTE — ED PROVIDER NOTE - OBJECTIVE STATEMENT
88 y/o m presents c/o trip and fall today, with lacerations to palm of right hand.  Pt broke his fall with his hand, has no pain to the hand apart from the lacerations to the palm.  Denies head trauma, numbness/tingling to ext, all other ROS negative.

## 2023-03-26 LAB — BACTERIA SPT CULT: NORMAL

## 2023-04-25 ENCOUNTER — APPOINTMENT (OUTPATIENT)
Dept: HEART AND VASCULAR | Facility: CLINIC | Age: 88
End: 2023-04-25
Payer: MEDICARE

## 2023-04-25 ENCOUNTER — NON-APPOINTMENT (OUTPATIENT)
Age: 88
End: 2023-04-25

## 2023-04-25 VITALS
BODY MASS INDEX: 20.16 KG/M2 | DIASTOLIC BLOOD PRESSURE: 70 MMHG | OXYGEN SATURATION: 97 % | HEART RATE: 63 BPM | TEMPERATURE: 97.1 F | HEIGHT: 65 IN | WEIGHT: 121 LBS | SYSTOLIC BLOOD PRESSURE: 118 MMHG

## 2023-04-25 PROCEDURE — 99213 OFFICE O/P EST LOW 20 MIN: CPT

## 2023-04-25 PROCEDURE — 93000 ELECTROCARDIOGRAM COMPLETE: CPT

## 2023-04-25 RX ORDER — PREDNISONE 10 MG/1
10 TABLET ORAL
Refills: 0 | Status: DISCONTINUED | COMMUNITY
End: 2023-04-25

## 2023-04-25 NOTE — REASON FOR VISIT
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  Recently saw Dr Santo for SOB and it was suggested he see a Cardiologist.  He was formerly with Dr Cristi Bryant.  Pt was given prednisone and the SOB has resolved.  Pt walks over a mile daily without CO.  EKG is normal.   A chest CT does report multivessel coronary artery calcification.\par \par 4/25/23 No new c/o

## 2023-04-25 NOTE — ASSESSMENT
[FreeTextEntry1] : CAD- coronary calcification noted on CT.  Discussed with pt who wants a conservative approach.  LDL 53, A1c 6.0.  No testing planned. He was on ASA but he elected to stop it D/T bruising\par \par HLD- continue current meds\par \par Prediabetes- manage with diet. A1c 6.0, pt likes cake.

## 2023-04-25 NOTE — PHYSICAL EXAM
[Well Developed] : well developed [Well Nourished] : well nourished [No Acute Distress] : no acute distress [Normal Conjunctiva] : normal conjunctiva [Normal Venous Pressure] : normal venous pressure [No Carotid Bruit] : no carotid bruit [Normal S1, S2] : normal S1, S2 [No Murmur] : no murmur [No Rub] : no rub [No Gallop] : no gallop [Clear Lung Fields] : clear lung fields [Good Air Entry] : good air entry [No Respiratory Distress] : no respiratory distress  [Soft] : abdomen soft [Non Tender] : non-tender [No Masses/organomegaly] : no masses/organomegaly [Normal Bowel Sounds] : normal bowel sounds [Normal Gait] : normal gait [No Edema] : no edema [No Cyanosis] : no cyanosis [No Clubbing] : no clubbing [No Varicosities] : no varicosities [Normal PT B/L] : normal PT B/L [No Rash] : no rash [No Skin Lesions] : no skin lesions [Moves all extremities] : moves all extremities [No Focal Deficits] : no focal deficits [Normal Speech] : normal speech [Alert and Oriented] : alert and oriented [Normal memory] : normal memory [de-identified] : Kyphosis [de-identified] : Absent DP B/L

## 2023-05-01 ENCOUNTER — APPOINTMENT (OUTPATIENT)
Dept: PULMONOLOGY | Facility: CLINIC | Age: 88
End: 2023-05-01
Payer: MEDICARE

## 2023-05-01 VITALS
TEMPERATURE: 97.1 F | WEIGHT: 121 LBS | OXYGEN SATURATION: 93 % | DIASTOLIC BLOOD PRESSURE: 72 MMHG | BODY MASS INDEX: 20.16 KG/M2 | HEIGHT: 65 IN | HEART RATE: 66 BPM | SYSTOLIC BLOOD PRESSURE: 119 MMHG

## 2023-05-01 PROCEDURE — 99214 OFFICE O/P EST MOD 30 MIN: CPT | Mod: 25

## 2023-05-01 PROCEDURE — 94010 BREATHING CAPACITY TEST: CPT

## 2023-05-02 NOTE — ASSESSMENT
[FreeTextEntry1] : 88yo M with severe COPD, on triple-tx and Azithromycin with persistent symptoms. \par \par Will likely require some degree of chronic low-dose Prednisone for symptom control. Will give Prednisone course (40mg 3-days, 30mg 3-days, 20mg 3-days), then remain on 10mg daily until next visit \par \par Referral and contact information for Dr. [facility] given \par \par \par RTC in 1-month

## 2023-05-02 NOTE — PHYSICAL EXAM
[No Acute Distress] : no acute distress [Normal Oropharynx] : normal oropharynx [Normal Appearance] : normal appearance [Normal Rate/Rhythm] : normal rate/rhythm [No Neck Mass] : no neck mass [Normal S1, S2] : normal s1, s2 [No Murmurs] : no murmurs [No Resp Distress] : no resp distress [No Abnormalities] : no abnormalities [Benign] : benign [Normal Gait] : normal gait [No Clubbing] : no clubbing [No Cyanosis] : no cyanosis [No Edema] : no edema [FROM] : FROM [Normal Color/ Pigmentation] : normal color/ pigmentation [No Focal Deficits] : no focal deficits [Oriented x3] : oriented x3 [Normal Affect] : normal affect [TextBox_68] : Scattered wheezing

## 2023-05-02 NOTE — HISTORY OF PRESENT ILLNESS
[Former] : former [Never] : never [TextBox_4] : 88yo M with severe COPD, on triple-tx and Azithromycin with persistent symptoms. \par \par On Trelegy and Atrovent nebs. Every few days, has worsening SOB and productive cough, relieved with Prednisone PRN, which he self-tapers off, but symptoms recur after a few days. Otherwise, no new symptoms.

## 2023-05-02 NOTE — REVIEW OF SYSTEMS
[Cough] : cough [Sputum] : sputum [Dyspnea] : dyspnea [Wheezing] : wheezing [SOB on Exertion] : sob on exertion [Negative] : Endocrine [Hemoptysis] : no hemoptysis

## 2023-06-05 ENCOUNTER — APPOINTMENT (OUTPATIENT)
Dept: PULMONOLOGY | Facility: CLINIC | Age: 88
End: 2023-06-05
Payer: MEDICARE

## 2023-06-05 VITALS
TEMPERATURE: 97.1 F | HEART RATE: 57 BPM | OXYGEN SATURATION: 94 % | WEIGHT: 121 LBS | SYSTOLIC BLOOD PRESSURE: 101 MMHG | HEIGHT: 65 IN | DIASTOLIC BLOOD PRESSURE: 57 MMHG | BODY MASS INDEX: 20.16 KG/M2

## 2023-06-05 PROCEDURE — 99214 OFFICE O/P EST MOD 30 MIN: CPT

## 2023-06-06 NOTE — ASSESSMENT
[FreeTextEntry1] : 89yo M with severe COPD, on triple-tx and Azithromycin with persistent symptoms, now complicated by steroir-induced muscle weakness. \par \par Difficult to control COPD without systemic steroids, which must be weighed against the harms of muscle weakness. \par \par Will continue on Prednisone 5mg every other day; encouraged physical therapy\par Not much else to offer at this point, unfortunately

## 2023-06-06 NOTE — HISTORY OF PRESENT ILLNESS
[TextBox_4] : 89yo M with severe COPD, on triple-tx and Azithromycin with persistent symptoms. \par \par Since last visit, has been on tapering doses of Prednisone, now 5mg every other day. 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 \par \par Compliant on Trelegy and Albuterol nebs

## 2023-07-10 ENCOUNTER — APPOINTMENT (OUTPATIENT)
Dept: PULMONOLOGY | Facility: CLINIC | Age: 88
End: 2023-07-10
Payer: MEDICARE

## 2023-07-10 VITALS
SYSTOLIC BLOOD PRESSURE: 110 MMHG | HEART RATE: 77 BPM | HEIGHT: 65 IN | WEIGHT: 121 LBS | DIASTOLIC BLOOD PRESSURE: 68 MMHG | OXYGEN SATURATION: 96 % | BODY MASS INDEX: 20.16 KG/M2 | TEMPERATURE: 97.9 F

## 2023-07-10 PROCEDURE — 94010 BREATHING CAPACITY TEST: CPT

## 2023-07-10 PROCEDURE — 99213 OFFICE O/P EST LOW 20 MIN: CPT | Mod: 25

## 2023-07-11 NOTE — PHYSICAL EXAM
[No Acute Distress] : no acute distress [Normal Oropharynx] : normal oropharynx [Normal Appearance] : normal appearance [No Neck Mass] : no neck mass [Normal Rate/Rhythm] : normal rate/rhythm [Normal S1, S2] : normal s1, s2 [No Murmurs] : no murmurs [Clear to Auscultation Bilaterally] : clear to auscultation bilaterally [No Resp Distress] : no resp distress [No Abnormalities] : no abnormalities [Benign] : benign [Normal Gait] : normal gait [No Clubbing] : no clubbing [No Edema] : no edema [No Cyanosis] : no cyanosis [FROM] : FROM [Normal Color/ Pigmentation] : normal color/ pigmentation [No Focal Deficits] : no focal deficits [Oriented x3] : oriented x3 [Normal Affect] : normal affect

## 2023-07-11 NOTE — REVIEW OF SYSTEMS
[Fatigue] : fatigue [Cough] : no cough [Sputum] : no sputum [Dyspnea] : dyspnea [Wheezing] : wheezing [Negative] : Psychiatric [TextBox_134] : seems very depressed since he retired

## 2023-07-11 NOTE — HISTORY OF PRESENT ILLNESS
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

## 2023-09-05 ENCOUNTER — INPATIENT (INPATIENT)
Facility: HOSPITAL | Age: 88
LOS: 2 days | Discharge: HOME | DRG: 189 | End: 2023-09-08
Attending: INTERNAL MEDICINE | Admitting: INTERNAL MEDICINE
Payer: MEDICARE

## 2023-09-05 VITALS
HEIGHT: 65 IN | OXYGEN SATURATION: 88 % | HEART RATE: 78 BPM | DIASTOLIC BLOOD PRESSURE: 73 MMHG | RESPIRATION RATE: 29 BRPM | SYSTOLIC BLOOD PRESSURE: 178 MMHG | WEIGHT: 119.93 LBS | TEMPERATURE: 98 F

## 2023-09-05 DIAGNOSIS — Z90.2 ACQUIRED ABSENCE OF LUNG [PART OF]: Chronic | ICD-10-CM

## 2023-09-05 DIAGNOSIS — Z98.890 OTHER SPECIFIED POSTPROCEDURAL STATES: Chronic | ICD-10-CM

## 2023-09-05 LAB
ANION GAP SERPL CALC-SCNC: 12 MMOL/L — SIGNIFICANT CHANGE UP (ref 5–17)
BASE EXCESS BLDV CALC-SCNC: -7.1 MMOL/L — LOW (ref -2–3)
BASE EXCESS BLDV CALC-SCNC: -7.6 MMOL/L — LOW (ref -2–3)
BASOPHILS # BLD AUTO: 0.07 K/UL — SIGNIFICANT CHANGE UP (ref 0–0.2)
BASOPHILS NFR BLD AUTO: 0.8 % — SIGNIFICANT CHANGE UP (ref 0–2)
BUN SERPL-MCNC: 34 MG/DL — HIGH (ref 7–23)
CALCIUM SERPL-MCNC: 9.2 MG/DL — SIGNIFICANT CHANGE UP (ref 8.4–10.5)
CHLORIDE SERPL-SCNC: 110 MMOL/L — HIGH (ref 96–108)
CO2 BLDV-SCNC: 22.8 MMOL/L — SIGNIFICANT CHANGE UP (ref 22–26)
CO2 BLDV-SCNC: 24.5 MMOL/L — SIGNIFICANT CHANGE UP (ref 22–26)
CO2 SERPL-SCNC: 20 MMOL/L — LOW (ref 22–31)
CREAT SERPL-MCNC: 1.45 MG/DL — HIGH (ref 0.5–1.3)
EGFR: 46 ML/MIN/1.73M2 — LOW
EOSINOPHIL # BLD AUTO: 0.55 K/UL — HIGH (ref 0–0.5)
EOSINOPHIL NFR BLD AUTO: 6.1 % — HIGH (ref 0–6)
GAS PNL BLDV: SIGNIFICANT CHANGE UP
GLUCOSE SERPL-MCNC: 124 MG/DL — HIGH (ref 70–99)
HCO3 BLDV-SCNC: 21 MMOL/L — LOW (ref 22–29)
HCO3 BLDV-SCNC: 22 MMOL/L — SIGNIFICANT CHANGE UP (ref 22–29)
HCT VFR BLD CALC: 47.9 % — SIGNIFICANT CHANGE UP (ref 39–50)
HGB BLD-MCNC: 14.9 G/DL — SIGNIFICANT CHANGE UP (ref 13–17)
IMM GRANULOCYTES NFR BLD AUTO: 0.3 % — SIGNIFICANT CHANGE UP (ref 0–0.9)
LYMPHOCYTES # BLD AUTO: 2.79 K/UL — SIGNIFICANT CHANGE UP (ref 1–3.3)
LYMPHOCYTES # BLD AUTO: 30.8 % — SIGNIFICANT CHANGE UP (ref 13–44)
MAGNESIUM SERPL-MCNC: 1.9 MG/DL — SIGNIFICANT CHANGE UP (ref 1.6–2.6)
MCHC RBC-ENTMCNC: 30.8 PG — SIGNIFICANT CHANGE UP (ref 27–34)
MCHC RBC-ENTMCNC: 31.1 GM/DL — LOW (ref 32–36)
MCV RBC AUTO: 99.2 FL — SIGNIFICANT CHANGE UP (ref 80–100)
MONOCYTES # BLD AUTO: 0.87 K/UL — SIGNIFICANT CHANGE UP (ref 0–0.9)
MONOCYTES NFR BLD AUTO: 9.6 % — SIGNIFICANT CHANGE UP (ref 2–14)
NEUTROPHILS # BLD AUTO: 4.75 K/UL — SIGNIFICANT CHANGE UP (ref 1.8–7.4)
NEUTROPHILS NFR BLD AUTO: 52.4 % — SIGNIFICANT CHANGE UP (ref 43–77)
NRBC # BLD: 0 /100 WBCS — SIGNIFICANT CHANGE UP (ref 0–0)
NT-PROBNP SERPL-SCNC: 685 PG/ML — HIGH (ref 0–300)
PCO2 BLDV: 53 MMHG — SIGNIFICANT CHANGE UP (ref 42–55)
PCO2 BLDV: 66 MMHG — HIGH (ref 42–55)
PH BLDV: 7.14 — CRITICAL LOW (ref 7.32–7.43)
PH BLDV: 7.21 — LOW (ref 7.32–7.43)
PLATELET # BLD AUTO: 144 K/UL — LOW (ref 150–400)
PO2 BLDV: 128 MMHG — HIGH (ref 25–45)
PO2 BLDV: 43 MMHG — SIGNIFICANT CHANGE UP (ref 25–45)
POTASSIUM SERPL-MCNC: 4.7 MMOL/L — SIGNIFICANT CHANGE UP (ref 3.5–5.3)
POTASSIUM SERPL-SCNC: 4.7 MMOL/L — SIGNIFICANT CHANGE UP (ref 3.5–5.3)
RAPID RVP RESULT: SIGNIFICANT CHANGE UP
RBC # BLD: 4.83 M/UL — SIGNIFICANT CHANGE UP (ref 4.2–5.8)
RBC # FLD: 13.7 % — SIGNIFICANT CHANGE UP (ref 10.3–14.5)
SAO2 % BLDV: 64.1 % — LOW (ref 67–88)
SAO2 % BLDV: 99.2 % — HIGH (ref 67–88)
SARS-COV-2 RNA SPEC QL NAA+PROBE: SIGNIFICANT CHANGE UP
SODIUM SERPL-SCNC: 142 MMOL/L — SIGNIFICANT CHANGE UP (ref 135–145)
WBC # BLD: 9.06 K/UL — SIGNIFICANT CHANGE UP (ref 3.8–10.5)
WBC # FLD AUTO: 9.06 K/UL — SIGNIFICANT CHANGE UP (ref 3.8–10.5)

## 2023-09-05 PROCEDURE — 99223 1ST HOSP IP/OBS HIGH 75: CPT

## 2023-09-05 PROCEDURE — 71045 X-RAY EXAM CHEST 1 VIEW: CPT | Mod: 26

## 2023-09-05 PROCEDURE — 99291 CRITICAL CARE FIRST HOUR: CPT

## 2023-09-05 RX ORDER — IPRATROPIUM/ALBUTEROL SULFATE 18-103MCG
3 AEROSOL WITH ADAPTER (GRAM) INHALATION
Refills: 0 | Status: COMPLETED | OUTPATIENT
Start: 2023-09-05 | End: 2023-09-05

## 2023-09-05 RX ORDER — MAGNESIUM SULFATE 500 MG/ML
2 VIAL (ML) INJECTION ONCE
Refills: 0 | Status: COMPLETED | OUTPATIENT
Start: 2023-09-05 | End: 2023-09-05

## 2023-09-05 RX ORDER — AZITHROMYCIN 500 MG/1
500 TABLET, FILM COATED ORAL EVERY 24 HOURS
Refills: 0 | Status: COMPLETED | OUTPATIENT
Start: 2023-09-05 | End: 2023-09-07

## 2023-09-05 RX ADMIN — Medication 3 MILLILITER(S): at 21:47

## 2023-09-05 RX ADMIN — Medication 3 MILLILITER(S): at 21:45

## 2023-09-05 RX ADMIN — Medication 125 MILLIGRAM(S): at 21:53

## 2023-09-05 RX ADMIN — Medication 3 MILLILITER(S): at 21:46

## 2023-09-05 RX ADMIN — Medication 150 GRAM(S): at 21:49

## 2023-09-05 NOTE — ED PROVIDER NOTE - WR ORDER ID 1
[address]   Daily Progress Note      Admit Date:  6/1/2023    Reason for follow up visit: Chest pain    CC: \"I'm ready to go home. \"    1 y/o female with H/O congenital long QT syndrome and S/P L sympathetic ganglionectomy, S/p PPM , H/O ASD repair who was admitted with palpitations and chest pain. Primary Cardiologist: Dr Annmarie Ang [facility])    Interval History:  Pt. seen and examined; records reviewed  BP low and stable   Denies further chest pain or SOB  No further palpitations    Subjective:  Pt with no acute overnight cardiac events. Objective:   BP 93/66   Pulse 71   Temp 97.5 °F (36.4 °C) (Oral)   Resp 18   Ht 5' 6\" (1.676 m)   Wt 142 lb 3.2 oz (64.5 kg)   SpO2 96%   BMI 22.95 kg/m²     Intake/Output Summary (Last 24 hours) at 6/3/2023 0807  Last data filed at 6/2/2023 2146  Gross per 24 hour   Intake 250 ml   Output 0 ml   Net 250 ml     Wt Readings from Last 3 Encounters:   06/03/23 142 lb 3.2 oz (64.5 kg)   05/31/23 135 lb (61.2 kg)   05/06/23 136 lb 12.8 oz (62.1 kg)       Physical Exam:  General: In no acute distress. Awake, alert, and oriented X4. Resting in bed  Skin:  Warm and dry. Neck:  Supple. No JVD appreciated. Chest: Lungs clear to auscultation. No wheezes/rhonchi/rales  Cardiovascular:  RRR. Normal S1 and S2. No murmur/gallop or rub. Abdomen:  Soft, nontender, nondistended, +bowel sounds. Extremities:  No LE edema. No clubbing or cyanosis. 2+ bilateral radial and DP pulses.     Medications:    aspirin  81 mg Oral Daily    nadolol  120 mg Oral Daily    venlafaxine  225 mg Oral Daily with breakfast    sodium chloride flush  5-40 mL IntraVENous 2 times per day    enoxaparin  40 mg SubCUTAneous Daily      sodium chloride       albuterol, traZODone, sodium chloride flush, sodium chloride, potassium chloride, magnesium sulfate, ondansetron **OR** ondansetron, polyethylene glycol, nicotine, acetaminophen **OR** acetaminophen, morphine    Lab Data:  CBC:   Recent Labs [id number]

## 2023-09-05 NOTE — ED PROVIDER NOTE - CARE PLAN
1 Principal Discharge DX:	Acute respiratory failure with hypoxia and hypercapnia  Secondary Diagnosis:	COPD exacerbation

## 2023-09-05 NOTE — CONSULT NOTE ADULT - SUBJECTIVE AND OBJECTIVE BOX
ALEKSANDAR HIGGINS, 89y, Male  [id number]    ****ICU CONSULT NOTE****    HPI: 89 yo male with PMHx of COPD presents to the ED I/s/o increased WOB with ambulation. Pt has never been hospitalized prior for his COPD. Noticed worsening symptoms despite using home inhalers. Patient noted to be hypoxic to SpO2 mid 80s in the ED with increased WOB. ICU consulted for further management.     Home Meds:    ED Course:  Vitals: /73, HR 78, SpO2 88% on RA --> placed on BiPAP for WOB --> SpO2 97%.   Labs: WBC 9.06, Hgb 14.9, Plt 144, Na 142, K 4.7, , BUN 34, Cr 1.45, Pro-  VBG: pH 7.14, pCO2 66, pO2 43, venous sat 64.1%  CXR Wet Read: b/l opacities in upper + lower lung fields   Interventions: Mag 2g IV x1, Solumedrol 125mg IV x1, Duoneb x3    12 Point ROS Negative unless noted otherwise above.  -------------------------------------------------------------------------------  VITAL SIGNS:  Vital Signs Last 24 Hrs  T(C): 36.8 (05 Sep 2023 21:30), Max: 36.8 (05 Sep 2023 21:30)  T(F): 98.2 (05 Sep 2023 21:30), Max: 98.2 (05 Sep 2023 21:30)  HR: 78 (05 Sep 2023 21:30) (78 - 78)  BP: 178/73 (05 Sep 2023 21:30) (178/73 - 178/73)  BP(mean): --  RR: 29 (05 Sep 2023 21:30) (29 - 29)  SpO2: 88% (05 Sep 2023 21:30) (88% - 88%)    Parameters below as of 05 Sep 2023 21:30  Patient On (Oxygen Delivery Method): room air      I&O's Summary      PHYSICAL EXAM:    General: NAD, well developed  HEENT: NC/AT; EOMI, PERRLA, anicteric sclera; moist mucosal membranes.  Neck: supple, trachea midline  Cardiovascular: RRR, +S1/S2; NO M/R/G  Respiratory: CTA B/L; no W/R/R  Gastrointestinal: soft, NT/ND; +BSx4  Extremities: WWP; no edema or cyanosis  Vascular: 2+ radial, DP/PT pulses B/L  Neurological: AAOx3; no focal deficits    ALLERGIES:  Allergies    No Known Allergies    Intolerances    MEDICATIONS:  MEDICATIONS  (STANDING):    MEDICATIONS  (PRN):  -------------------------------------------------------------------------------  LABS:                        14.9   9.06  )-----------( 144      ( 05 Sep 2023 21:51 )             47.9     09-05    142  |  110<H>  |  34<H>  ----------------------------<  124<H>  4.7   |  20<L>  |  1.45<H>    Ca    9.2      05 Sep 2023 21:51  Mg     1.9     09-05      Urinalysis Basic - ( 05 Sep 2023 21:51 )    Color: x / Appearance: x / SG: x / pH: x  Gluc: 124 mg/dL / Ketone: x  / Bili: x / Urobili: x   Blood: x / Protein: x / Nitrite: x   Leuk Esterase: x / RBC: x / WBC x   Sq Epi: x / Non Sq Epi: x / Bacteria: x    CAPILLARY BLOOD GLUCOSE    RADIOLOGY & ADDITIONAL TESTS: Reviewed.   ALEKSANDAR HIGGINS, 89y, Male  [id number]    ****ICU CONSULT NOTE****    HPI: 89 yo male with PMHx of COPD, HTN, HLD, NSCLC s/p lobe resection 10 years prior presents to the ED I/s/o increased WOB with ambulation. Pt has never been hospitalized prior for his COPD. Noticed worsening symptoms despite using home inhalers. Patient noted to be hypoxic to SpO2 mid 80s in the ED with increased WOB. ICU consulted for further management.     Per wife at bedside, patient titrates his own medications and self-treats. He was placed on PO Azithromycin every other day by his pulmonologist and his COPD is moderate/severe however he is not on home O2 and baseline O2 is in mid 90s at home. Pt endorsing he has never been hospitalized for an exacerbation. Admits to mild increase in yellow-tinged sputum over the past 2-3 days. Had a fall recently (2 weeks prior) so endorses some chest wall pain w/ deep breaths. Otherwise denies chest pain, n/v, diarrhea/constipation. Pt states he takes PO prednisone every couple of days or so to prevent exacerbations and also uses PRN albuterol nebs at home. Uses Trelegy as his inhaler and is compliant with medications. 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 Quit smoking in the 90's, no alcohol use.     Home Meds: Trelegy, Prednisone (self tapers dose), albuterol nebulizers -- formal med rec in AM pending     ED Course:  Vitals: /73, HR 78, SpO2 88% on RA --> placed on BiPAP for WOB --> SpO2 97%.   Labs: WBC 9.06, Hgb 14.9, Plt 144, Na 142, K 4.7, , BUN 34, Cr 1.45, Pro-  VBG: pH 7.14, pCO2 66, pO2 43, venous sat 64.1%  CXR Wet Read: b/l opacities in upper + lower lung fields   Interventions: Mag 2g IV x1, Solumedrol 125mg IV x1, Duoneb x3    12 Point ROS Negative unless noted otherwise above.  -------------------------------------------------------------------------------  VITAL SIGNS:  Vital Signs Last 24 Hrs  T(C): 36.8 (05 Sep 2023 21:30), Max: 36.8 (05 Sep 2023 21:30)  T(F): 98.2 (05 Sep 2023 21:30), Max: 98.2 (05 Sep 2023 21:30)  HR: 78 (05 Sep 2023 21:30) (78 - 78)  BP: 178/73 (05 Sep 2023 21:30) (178/73 - 178/73)  BP(mean): --  RR: 29 (05 Sep 2023 21:30) (29 - 29)  SpO2: 88% (05 Sep 2023 21:30) (88% - 88%)    Parameters below as of 05 Sep 2023 21:30  Patient On (Oxygen Delivery Method): room air      I&O's Summary      PHYSICAL EXAM:    General: elderly male; BiPAP in place  HEENT: NC/AT  Cardiovascular: RRR, +S1/S2; NO M/R/G  Respiratory: b/l wheezing noted in upper and lower lung fields predominantly during expiration; no rales or rhonchi   Gastrointestinal: soft, NT/ND; +BSx4  Extremities: WWP; no edema or cyanosis  Vascular: 2+ radial, DP/PT pulses B/L  Neurological: AAOx3; no focal deficits    ALLERGIES:  Allergies    No Known Allergies    Intolerances    MEDICATIONS:  MEDICATIONS  (STANDING):    MEDICATIONS  (PRN):  -------------------------------------------------------------------------------  LABS:                        14.9   9.06  )-----------( 144      ( 05 Sep 2023 21:51 )             47.9     09-05    142  |  110<H>  |  34<H>  ----------------------------<  124<H>  4.7   |  20<L>  |  1.45<H>    Ca    9.2      05 Sep 2023 21:51  Mg     1.9     09-05      Urinalysis Basic - ( 05 Sep 2023 21:51 )    Color: x / Appearance: x / SG: x / pH: x  Gluc: 124 mg/dL / Ketone: x  / Bili: x / Urobili: x   Blood: x / Protein: x / Nitrite: x   Leuk Esterase: x / RBC: x / WBC x   Sq Epi: x / Non Sq Epi: x / Bacteria: x    CAPILLARY BLOOD GLUCOSE    RADIOLOGY & ADDITIONAL TESTS: Reviewed.

## 2023-09-05 NOTE — ED PROVIDER NOTE - OBJECTIVE STATEMENT
89yM w/COPD (no prior intubations or hospitalizations), HTN, HLD, NSCLC s/o lobectomy 10 years ago, comes in for respiratory distress. per wife, says pt has been managing his symptoms himself w/ inhalers and then today became acutely worse with difficulty breathing. Per EMS, pt noted w/ retractions and O2 sat 88%, was given 4 albuterol.   In the ED pt noted w/ audible wheezing, belly breathing w/ retractions, tachypnea, hypoxic to high 80s. Denies fever/chills/cough.

## 2023-09-05 NOTE — ED ADULT TRIAGE NOTE - NSWEIGHTCALCTOOLDRUG_GEN_A_CORE
Assessment/Plan:   Jayson Villareal is a 55 y o male who is here for:   Chief Complaint   Patient presents with   • Pre-op Exam     [facility] REPAIR        Scrotal ultrasound shows a moderately sized fat filled inguinal hernia on the left  (Scrotal hernia)  Scrotal component is small on examination so will try to repair this robotically but if can not reduce will open patient understands this    History of a right inguinal hernia repair in 2006  History of pyloric stenosis surgery with scars along the abdomen as a child  Please take a look at the right inguinal hernia as he still has pain there and would like to ensure no recurrent hernia and if is recurrent would like it fixed at the same time  Plan:    1  Patient has been off all medications because he has not followed up with PCP he understands he needs to be seen to manage BP prior to surgery so we will get medical clearance  2  robotic assisted laparoscopic with mesh  repair of Left inguinal hernia, with scrotal component  Please take a look at the right inguinal hernia as he still has pain there and would like to ensure no recurrent hernia and if is recurrent would like it fixed at the same time  3  Patient is due for his first screening colonoscopy which we can do before or after his surgical repair    Body mass index is 34 43 kg/m²  Post Op Pain Management: Norco    Preoperative Clearance: Medical      In preparation for this visit all relevant and known prior office notes, prior consultations, emergency room visits, blood work results, and imaging studies were personally reviewed  A total of  30 minutes was spent reviewing all of this information, caring for this patient, providing differential diagnosis, instructions for management, counseling and coordination of care  This also includes planning surgical intervention where indicated        Patient understands hernia occurrence or re-occurrence risk is higher in a diabetic, tobacco user, with elevated BMIs  - Patient has been instructed to avoid herbs or non-directed vitamins the week prior to surgery to ensure no drug interactions with perioperative surgical and anesthetic medications  - Patient should continue beta-blocker medication up through and including the day of surgery but hold any other hypertensive medications, including diuretics, unless instructed by PCP or anesthesia  - Patient should continue his statin medication up through and including the day of surgery   - Hold metformin , If on this medication, the morning of surgery and do not resume until 48 hours AFTER surgery to avoid risk of lactic acidosis  Do not resume if eGFR is < 30  - Insulin Management:If on Insulin, patient advised to call PCP for explicit instructions  In general, will need to take one-half normal dose am of surgery but pt advised to consult PCP before making any changes  - Patient has been instructed to avoid aspirin containing medications or non-steroidal anti-inflammatory drugs for SEVEN days preceding surgery  ______________________________________________________  CC:  Chief Complaint   Patient presents with   • Pre-op Exam     [facility] REPAIR      HPI:  Beto Garcia is a 55 y o male who was referred for evaluation of Pre-op Exam ([id number]  [facility] [address] )    Currently complaining of persistent     left inguinal hernia worse with coughing, lifting, standing, walking,   no nausea and no vomiting,     Duration of pain: Intermittent  and over 2 years        regular bowel movement  Prior abdominal surgery: Yes:  [facility] open repair   Pyloric stenosis surgery as a child    Smoking Status: Non-smoker     ROS:  General ROS: negative  negative for - chills, fatigue, fever or night sweats, weight loss  Respiratory ROS: no cough, shortness of breath, or wheezing  Cardiovascular ROS: no chest pain or dyspnea on exertion  Genito-Urinary ROS: no dysuria, trouble voiding, or hematuria  Musculoskeletal ROS: negative for - gait disturbance, joint pain or muscle pain  Neurological ROS: no TIA or stroke symptoms  GI ROS: see HPI  Skin ROS: no new rashes or lesions   Lymphatic ROS: no new adenopathy noted by pt  GYN ROS: see HPI, no new GYN history or bleeding noted  Psy ROS: no new mental or behavioral disturbances         Patient Active Problem List   Diagnosis   • Mild persistent asthma without complication   • MANOJ (obstructive sleep apnea)   • Chronic cough   • Class 2 obesity due to excess calories with body mass index (BMI) of 36 0 to 36 9 in adult   • Essential (primary) hypertension   • Hallux interphalangeus, acquired, right       Allergies:  Patient has no known allergies  Current Outpatient Medications:   •  albuterol (ProAir HFA) 90 mcg/act inhaler, Inhale 2 puffs every 6 (six) hours as needed for wheezing (Patient not taking: Reported on 2/28/2023), Disp: 8 5 g, Rfl: 0  •  albuterol (Ventolin HFA) 90 mcg/act inhaler, Inhale 2 puffs every 6 (six) hours as needed for wheezing, Disp: 18 g, Rfl: 3  •  aspirin (ECOTRIN) 325 mg EC tablet, Take 1 tablet (325 mg total) by mouth 2 (two) times a day (Patient not taking: Reported on 3/20/2023), Disp: 90 tablet, Rfl: 0  •  atorvastatin (LIPITOR) 40 mg tablet, Take 1 tablet (40 mg total) by mouth daily (Patient not taking: Reported on 3/20/2023), Disp: 90 tablet, Rfl: 3  •  fluticasone (FLOVENT HFA) 110 MCG/ACT inhaler, Inhale 2 puffs 2 (two) times a day Rinse mouth after use   (Patient not taking: Reported on 3/20/2023), Disp: 1 Inhaler, Rfl: 3  •  LORazepam (ATIVAN) 2 mg tablet, Take 1 tablet (2 mg total) by mouth 60 minutes pre-procedure (Patient not taking: Reported on 3/20/2023), Disp: 1 tablet, Rfl: 0  •  losartan (COZAAR) 50 mg tablet, Take 1 tablet (50 mg total) by mouth daily (Patient not taking: Reported on 2/28/2023), Disp: 90 tablet, Rfl: 3  •  metoprolol succinate (TOPROL-XL) 50 mg 24 hr tablet, Take 1 tablet (50 mg total) by mouth daily (Patient not taking: Reported on 2/28/2023), Disp: 90 tablet, Rfl: 3  •  montelukast (SINGULAIR) 10 mg tablet, Take 1 tablet (10 mg total) by mouth daily at bedtime (Patient not taking: Reported on 3/20/2023), Disp: 30 tablet, Rfl: 3    Past Medical History:   Diagnosis Date   • Asthma    • CPAP (continuous positive airway pressure) dependence    • Hypertension    • Nasal disorder    • Pyloric stenosis in pediatric patient    • Sleep apnea        Past Surgical History:   Procedure Laterality Date   • HERNIA REPAIR     • MD CORRJ HALLUX VALGUS W/SESMDC W/PROX PHLNX OSTEOT Right 4/21/2021    Procedure: Franco Navdeep, And tibial sesamoid excision;  Surgeon: Ron De La Paz MD;  Location: [facility];  Service: Orthopedics       History reviewed  No pertinent family history  reports that he has never smoked  He has never used smokeless tobacco  He reports that he does not drink alcohol and does not use drugs  Vitals:    03/30/23 0852   BP: 152/90   Pulse: 78   Temp: 97 5 °F (36 4 °C)        PHYSICAL EXAM  General Appearance:    Alert, cooperative, no distress  Head:    Normocephalic without obvious abnormality   Eyes:    PERRL, conjunctiva/corneas clear    Neck:   Supple, no adenopathy, no JVD   Back:     Symmetric, no spinal or CVA tenderness   Lungs:     Clear to auscultation bilaterally, no wheezing or rhonchi   Heart:    Regular  rate and rhythm, S1 and S2 normal, no murmur     Abdomen:     abdomen is soft without significant tenderness, masses, organomegaly or guarding    left sided inguinal hernia    Scrotum: normal testicles bilateral  and hernia extends to scrotum       Extremities:   Extremities normal  No clubbing, cyanosis or edema   Psych:   Normal Affect, AOx3  Neurologic:  Skin:   CNII-XII intact  Strength symmetric, speech intact    Warm, dry, intact, no visible rashes or lesions             Informed consent for procedure was personally discussed, reviewed, and signed by Dr Mavis Angel   Discussion by Dr Mavis Angel was carried out regarding risks, benefits, and alternatives with the patient  Risks include but are not limited to:  bleeding, infection, and delayed wound healing or an open wound, pulmonary embolus, leaks from bowel or bile ducts or other viscus, transfusions, death  Discussed in further detail the more common complications and their rates of occurrence   was used if necessary  Patient expressed understanding of the issues discussed and wished/consented to proceed  All questions were answered by Dr Abelardo Hussein  Discussion performed between patient and the provider signing below       Signature:   Dionna Galeana PA-C    Date: 3/30/2023 Time: 9:12 AM  used

## 2023-09-05 NOTE — ED ADULT NURSE NOTE - OBJECTIVE STATEMENT
Pt arrives from home with wife for shortness of breath. Using inhaler/nebs at home without improvement. Hx of lung ca. Pt with labored breathing, retractions on arrival, started on nonrebreather with O2 >95%. PA and MD to bedside, ordered for stat IV solu-medrol and duonebs.

## 2023-09-05 NOTE — ED ADULT NURSE NOTE - NSFALLHARMRISKINTERV_ED_ALL_ED

## 2023-09-05 NOTE — ED PROVIDER NOTE - CLINICAL SUMMARY MEDICAL DECISION MAKING FREE TEXT BOX
Pt w/ COPD exacerbation w/ moderate respiratory distress and acute hypoxic respiratory failure, will check VBG for pCO2   placed on BIPAP w/ nebs/steroids/mag  will check RVP/CXR for any infectious inciting factors such as URI/less likely PNA  not consistent w/ ACS, pt not clinically fluid overloaded  plan for ICU consult

## 2023-09-05 NOTE — CONSULT NOTE ADULT - ASSESSMENT
89 yo male with PMHx of COPD, HLD presents to ED i/s/o increased WOB. Found to be hypoxic to mid 80s on room air. Placed on BiPAP by ED provider with improvement in WOB + hypoxia. ICU Consulted for further management.       Respiratory  #Acute Hypoxic Respiratory Failure i/s/o COPD Exacerbation  Pt with hx of COPD. Home inhalers: ____. Pt endorsing he has been using his home inhalers without much improvemnet. SpO2 88% on RA with tachypnea to 30s per ED provider. Placed on BiPAP with improvement. Initial VBG pH 7.14 --> (on BiPAP).  89 yo male with PMHx of COPD, HLD presents to ED i/s/o increased WOB. Found to be hypoxic to mid 80s on room air. Placed on BiPAP by ED provider with improvement in WOB + hypoxia. ICU Consulted for further management.       Respiratory  #Acute Hypoxic Respiratory Failure i/s/o COPD Exacerbation  Pt with hx of COPD. Home inhalers: Trelegy and PRN Albuterol nebulizers. Pt endorsing he has been using his home inhalers without much improvement. Went on a long walk earlier and can normally walk >1 mile without feeling SOB. SpO2 88% on RA with tachypnea to 30s per ED provider. Placed on BiPAP with improvement. Initial VBG pH 7.14 --> 7.21 (on BiPAP 10/6). S/p Duonebs and IV Solumedrol in ED.   - start Azithromycin 500mg IV qd (9/6-9/8)  - continue with duonebs standing q4hr  - follow up pro-calcitonin levels   - Given IV steroids in ED -- continue with PO Prednisone 40mg x4 days (9/6-9/9)  - keep on BiPAP overnight @ 10/6 for WOB  -- can transition to NC in AM as patient with clinical improvement noted since ED arrival    Dispo: [place]  Discussed w/ ICU Intensivist Dr. Arenas

## 2023-09-05 NOTE — ED PROVIDER NOTE - PHYSICAL EXAMINATION
CONST: in moderate respiratory distress  HEAD: atraumatic  EYES: conjunctivae clear  NECK: supple  CARD: regular rate  CHEST:  tachypnea, retractions, hypoxic, b/l wheezing  EXT: FROM  SKIN: warm, dry  NEURO: awake alert answering questions following commands moving all extremities

## 2023-09-05 NOTE — ED ADULT TRIAGE NOTE - CHIEF COMPLAINT QUOTE
Pt, hx of lung CA and rt lobectomy (approx 10 years ago) and COPD, presents with "SOB", sudden onset x approx one hour PTA. Labored breathing with retractions noted, 4 albuterol mauricio aments PTA with no relief. Spo2 88% on RA.

## 2023-09-06 ENCOUNTER — TRANSCRIPTION ENCOUNTER (OUTPATIENT)
Age: 88
End: 2023-09-06

## 2023-09-06 DIAGNOSIS — Z29.9 ENCOUNTER FOR PROPHYLACTIC MEASURES, UNSPECIFIED: ICD-10-CM

## 2023-09-06 DIAGNOSIS — J44.1 CHRONIC OBSTRUCTIVE PULMONARY DISEASE WITH (ACUTE) EXACERBATION: ICD-10-CM

## 2023-09-06 DIAGNOSIS — R53.81 OTHER MALAISE: ICD-10-CM

## 2023-09-06 DIAGNOSIS — I10 ESSENTIAL (PRIMARY) HYPERTENSION: ICD-10-CM

## 2023-09-06 DIAGNOSIS — N40.0 BENIGN PROSTATIC HYPERPLASIA WITHOUT LOWER URINARY TRACT SYMPTOMS: ICD-10-CM

## 2023-09-06 DIAGNOSIS — Z51.5 ENCOUNTER FOR PALLIATIVE CARE: ICD-10-CM

## 2023-09-06 DIAGNOSIS — E87.20 ACIDOSIS, UNSPECIFIED: ICD-10-CM

## 2023-09-06 DIAGNOSIS — N18.30 CHRONIC KIDNEY DISEASE, STAGE 3 UNSPECIFIED: ICD-10-CM

## 2023-09-06 DIAGNOSIS — Z71.89 OTHER SPECIFIED COUNSELING: ICD-10-CM

## 2023-09-06 DIAGNOSIS — R13.10 DYSPHAGIA, UNSPECIFIED: ICD-10-CM

## 2023-09-06 DIAGNOSIS — E78.5 HYPERLIPIDEMIA, UNSPECIFIED: ICD-10-CM

## 2023-09-06 DIAGNOSIS — F41.9 ANXIETY DISORDER, UNSPECIFIED: ICD-10-CM

## 2023-09-06 LAB
ALBUMIN SERPL ELPH-MCNC: 3.7 G/DL — SIGNIFICANT CHANGE UP (ref 3.3–5)
ALP SERPL-CCNC: 45 U/L — SIGNIFICANT CHANGE UP (ref 40–120)
ALT FLD-CCNC: 9 U/L — LOW (ref 10–45)
ANION GAP SERPL CALC-SCNC: 10 MMOL/L — SIGNIFICANT CHANGE UP (ref 5–17)
APPEARANCE UR: CLEAR — SIGNIFICANT CHANGE UP
AST SERPL-CCNC: 16 U/L — SIGNIFICANT CHANGE UP (ref 10–40)
BASOPHILS # BLD AUTO: 0.01 K/UL — SIGNIFICANT CHANGE UP (ref 0–0.2)
BASOPHILS NFR BLD AUTO: 0.2 % — SIGNIFICANT CHANGE UP (ref 0–2)
BILIRUB SERPL-MCNC: 0.4 MG/DL — SIGNIFICANT CHANGE UP (ref 0.2–1.2)
BILIRUB UR-MCNC: NEGATIVE — SIGNIFICANT CHANGE UP
BLD GP AB SCN SERPL QL: NEGATIVE — SIGNIFICANT CHANGE UP
BUN SERPL-MCNC: 34 MG/DL — HIGH (ref 7–23)
CALCIUM SERPL-MCNC: 9.5 MG/DL — SIGNIFICANT CHANGE UP (ref 8.4–10.5)
CHLORIDE SERPL-SCNC: 108 MMOL/L — SIGNIFICANT CHANGE UP (ref 96–108)
CO2 SERPL-SCNC: 22 MMOL/L — SIGNIFICANT CHANGE UP (ref 22–31)
COLOR SPEC: YELLOW — SIGNIFICANT CHANGE UP
CREAT ?TM UR-MCNC: 63 MG/DL — SIGNIFICANT CHANGE UP
CREAT SERPL-MCNC: 1.4 MG/DL — HIGH (ref 0.5–1.3)
CYSTATIN C SERPL-MCNC: 1.37 MG/L — SIGNIFICANT CHANGE UP
DIFF PNL FLD: NEGATIVE — SIGNIFICANT CHANGE UP
EGFR: 48 ML/MIN/1.73M2 — LOW
EOSINOPHIL # BLD AUTO: 0.01 K/UL — SIGNIFICANT CHANGE UP (ref 0–0.5)
EOSINOPHIL NFR BLD AUTO: 0.2 % — SIGNIFICANT CHANGE UP (ref 0–6)
GFR/BSA.PRED SERPLBLD CYS-BASED-ARV: 45 ML/MIN/1.73M2 — LOW
GLUCOSE SERPL-MCNC: 204 MG/DL — HIGH (ref 70–99)
GLUCOSE UR QL: 100
HCT VFR BLD CALC: 45.8 % — SIGNIFICANT CHANGE UP (ref 39–50)
HGB BLD-MCNC: 14.1 G/DL — SIGNIFICANT CHANGE UP (ref 13–17)
IMM GRANULOCYTES NFR BLD AUTO: 0.4 % — SIGNIFICANT CHANGE UP (ref 0–0.9)
KETONES UR-MCNC: NEGATIVE — SIGNIFICANT CHANGE UP
LACTATE SERPL-SCNC: 1.3 MMOL/L — SIGNIFICANT CHANGE UP (ref 0.5–2)
LEUKOCYTE ESTERASE UR-ACNC: NEGATIVE — SIGNIFICANT CHANGE UP
LYMPHOCYTES # BLD AUTO: 0.6 K/UL — LOW (ref 1–3.3)
LYMPHOCYTES # BLD AUTO: 13 % — SIGNIFICANT CHANGE UP (ref 13–44)
MAGNESIUM SERPL-MCNC: 2.3 MG/DL — SIGNIFICANT CHANGE UP (ref 1.6–2.6)
MCHC RBC-ENTMCNC: 30.6 PG — SIGNIFICANT CHANGE UP (ref 27–34)
MCHC RBC-ENTMCNC: 30.8 GM/DL — LOW (ref 32–36)
MCV RBC AUTO: 99.3 FL — SIGNIFICANT CHANGE UP (ref 80–100)
MONOCYTES # BLD AUTO: 0.06 K/UL — SIGNIFICANT CHANGE UP (ref 0–0.9)
MONOCYTES NFR BLD AUTO: 1.3 % — LOW (ref 2–14)
NEUTROPHILS # BLD AUTO: 3.93 K/UL — SIGNIFICANT CHANGE UP (ref 1.8–7.4)
NEUTROPHILS NFR BLD AUTO: 84.9 % — HIGH (ref 43–77)
NITRITE UR-MCNC: NEGATIVE — SIGNIFICANT CHANGE UP
NRBC # BLD: 0 /100 WBCS — SIGNIFICANT CHANGE UP (ref 0–0)
PH UR: 6 — SIGNIFICANT CHANGE UP (ref 5–8)
PHOSPHATE SERPL-MCNC: 2.9 MG/DL — SIGNIFICANT CHANGE UP (ref 2.5–4.5)
PLATELET # BLD AUTO: 132 K/UL — LOW (ref 150–400)
POTASSIUM SERPL-MCNC: 4.6 MMOL/L — SIGNIFICANT CHANGE UP (ref 3.5–5.3)
POTASSIUM SERPL-SCNC: 4.6 MMOL/L — SIGNIFICANT CHANGE UP (ref 3.5–5.3)
PROCALCITONIN SERPL-MCNC: 0.06 NG/ML — SIGNIFICANT CHANGE UP (ref 0.02–0.1)
PROT SERPL-MCNC: 6.3 G/DL — SIGNIFICANT CHANGE UP (ref 6–8.3)
PROT UR-MCNC: NEGATIVE MG/DL — SIGNIFICANT CHANGE UP
RBC # BLD: 4.61 M/UL — SIGNIFICANT CHANGE UP (ref 4.2–5.8)
RBC # FLD: 13.6 % — SIGNIFICANT CHANGE UP (ref 10.3–14.5)
RH IG SCN BLD-IMP: POSITIVE — SIGNIFICANT CHANGE UP
SODIUM SERPL-SCNC: 140 MMOL/L — SIGNIFICANT CHANGE UP (ref 135–145)
SODIUM UR-SCNC: 68 MMOL/L — SIGNIFICANT CHANGE UP
SP GR SPEC: 1.02 — SIGNIFICANT CHANGE UP (ref 1–1.03)
TROPONIN T, HIGH SENSITIVITY RESULT: 21 NG/L — SIGNIFICANT CHANGE UP (ref 0–51)
UROBILINOGEN FLD QL: 0.2 E.U./DL — SIGNIFICANT CHANGE UP
WBC # BLD: 4.63 K/UL — SIGNIFICANT CHANGE UP (ref 3.8–10.5)
WBC # FLD AUTO: 4.63 K/UL — SIGNIFICANT CHANGE UP (ref 3.8–10.5)

## 2023-09-06 PROCEDURE — 99222 1ST HOSP IP/OBS MODERATE 55: CPT

## 2023-09-06 PROCEDURE — 99497 ADVNCD CARE PLAN 30 MIN: CPT | Mod: 25

## 2023-09-06 RX ORDER — TAMSULOSIN HYDROCHLORIDE 0.4 MG/1
0.8 CAPSULE ORAL AT BEDTIME
Refills: 0 | Status: DISCONTINUED | OUTPATIENT
Start: 2023-09-06 | End: 2023-09-08

## 2023-09-06 RX ORDER — ESCITALOPRAM OXALATE 10 MG/1
10 TABLET, FILM COATED ORAL DAILY
Refills: 0 | Status: DISCONTINUED | OUTPATIENT
Start: 2023-09-06 | End: 2023-09-08

## 2023-09-06 RX ORDER — HEPARIN SODIUM 5000 [USP'U]/ML
5000 INJECTION INTRAVENOUS; SUBCUTANEOUS EVERY 8 HOURS
Refills: 0 | Status: DISCONTINUED | OUTPATIENT
Start: 2023-09-06 | End: 2023-09-08

## 2023-09-06 RX ORDER — ATORVASTATIN CALCIUM 80 MG/1
40 TABLET, FILM COATED ORAL AT BEDTIME
Refills: 0 | Status: DISCONTINUED | OUTPATIENT
Start: 2023-09-06 | End: 2023-09-08

## 2023-09-06 RX ORDER — PANTOPRAZOLE SODIUM 20 MG/1
40 TABLET, DELAYED RELEASE ORAL
Refills: 0 | Status: DISCONTINUED | OUTPATIENT
Start: 2023-09-06 | End: 2023-09-08

## 2023-09-06 RX ORDER — BUDESONIDE AND FORMOTEROL FUMARATE DIHYDRATE 160; 4.5 UG/1; UG/1
2 AEROSOL RESPIRATORY (INHALATION)
Refills: 0 | Status: DISCONTINUED | OUTPATIENT
Start: 2023-09-06 | End: 2023-09-08

## 2023-09-06 RX ORDER — METOPROLOL TARTRATE 50 MG
25 TABLET ORAL EVERY 12 HOURS
Refills: 0 | Status: DISCONTINUED | OUTPATIENT
Start: 2023-09-06 | End: 2023-09-06

## 2023-09-06 RX ORDER — IPRATROPIUM/ALBUTEROL SULFATE 18-103MCG
3 AEROSOL WITH ADAPTER (GRAM) INHALATION EVERY 4 HOURS
Refills: 0 | Status: DISCONTINUED | OUTPATIENT
Start: 2023-09-06 | End: 2023-09-08

## 2023-09-06 RX ORDER — TAMSULOSIN HYDROCHLORIDE 0.4 MG/1
0.4 CAPSULE ORAL AT BEDTIME
Refills: 0 | Status: DISCONTINUED | OUTPATIENT
Start: 2023-09-06 | End: 2023-09-06

## 2023-09-06 RX ADMIN — Medication 3 MILLILITER(S): at 12:40

## 2023-09-06 RX ADMIN — Medication 40 MILLIGRAM(S): at 06:13

## 2023-09-06 RX ADMIN — BUDESONIDE AND FORMOTEROL FUMARATE DIHYDRATE 2 PUFF(S): 160; 4.5 AEROSOL RESPIRATORY (INHALATION) at 17:19

## 2023-09-06 RX ADMIN — Medication 3 MILLILITER(S): at 21:53

## 2023-09-06 RX ADMIN — Medication 3 MILLILITER(S): at 16:53

## 2023-09-06 RX ADMIN — AZITHROMYCIN 255 MILLIGRAM(S): 500 TABLET, FILM COATED ORAL at 02:14

## 2023-09-06 RX ADMIN — Medication 3 MILLILITER(S): at 02:14

## 2023-09-06 RX ADMIN — TAMSULOSIN HYDROCHLORIDE 0.8 MILLIGRAM(S): 0.4 CAPSULE ORAL at 21:38

## 2023-09-06 RX ADMIN — ESCITALOPRAM OXALATE 10 MILLIGRAM(S): 10 TABLET, FILM COATED ORAL at 12:40

## 2023-09-06 RX ADMIN — BUDESONIDE AND FORMOTEROL FUMARATE DIHYDRATE 2 PUFF(S): 160; 4.5 AEROSOL RESPIRATORY (INHALATION) at 07:32

## 2023-09-06 RX ADMIN — Medication 3 MILLILITER(S): at 05:11

## 2023-09-06 RX ADMIN — Medication 3 MILLILITER(S): at 09:00

## 2023-09-06 RX ADMIN — HEPARIN SODIUM 5000 UNIT(S): 5000 INJECTION INTRAVENOUS; SUBCUTANEOUS at 04:54

## 2023-09-06 RX ADMIN — ATORVASTATIN CALCIUM 40 MILLIGRAM(S): 80 TABLET, FILM COATED ORAL at 21:37

## 2023-09-06 NOTE — PHARMACY COMMUNICATION NOTE - COMMENTS
Primary pharmacy updated: [facility]  Admission medication:  -	Lexapro 10mg daily  -	Rosuvastatin 10mg daily  -	Tamsulosin 0.4mg 2cap daily   -	Toprol XL 50mg daily  -	Trelegy Ellipta 100/6.26/25 mcg/inh 1puff daily  -	Azithromycin 250mg every other day  -	Albuterol-ipratropium 0.5/3ml 3ml neb Q4H prn  Spoke with pharmacy and patient to confirm home medication. Updated MD as above:  -	Take Tamsulosin 0.8mg daily instead of 0.4mg daily (per pharmacy and patient)  -	Take Azithromycin 250mg every other day (per pharmacy and patient)  -	Take Albuterol-ipratropium 0.5/3ml 3ml neb Q4H prn (per pharmacy and patient)

## 2023-09-06 NOTE — H&P ADULT - PROBLEM SELECTOR PLAN 6
Patient with a history of HTN. On home Toprol 50mg QD    Plan   - c/w Toprol 50mg when patient is off BiPAP Patient with a history of anxiety/depression. On home Lexapro 10mg QD    Plan   - c/w Lexapro 10mg when patient is off BiPAP

## 2023-09-06 NOTE — CONSULT NOTE ADULT - PROBLEM SELECTOR RECOMMENDATION 5
.  - GOC as above  - HCP completed, names his wife, Carina Celaya #[phone].   - Continue full code, though he is strongly considering DNR with trial of intubation    In addition to the E/M visit, an advance care planning meeting was performed. Start time: 230 ; End time: 300; Total time: 30 min. A face to face meeting to discuss advance care planning was held today regarding: Fernando Ohara    Primary decision maker:  Patient is able to participate in decision making;  Alternate/surrogate: Carina Celaya    . Discussed advance directives including, but not limited to, healthcare proxy and code status, as well as disease trajectory, patient's values/goals, and health care options that are available for end of life care. Decision regarding code status: FULL CODE; Documentation completed today: HCP GOC note

## 2023-09-06 NOTE — PROGRESS NOTE ADULT - PROBLEM SELECTOR PLAN 4
Patient with a history of BPH. On home Flomax 0.4mg QD    Plan   - holding Flomax, will restart upon oral intake

## 2023-09-06 NOTE — H&P ADULT - PROBLEM SELECTOR PLAN 5
Patient with a history of HLD. On home Rosuvastatin 10mg QD    Plan   - c/w therapeutic exchange Atorvastatin 40mg QD when patient is off BiPAP Patient with a history of HTN. On home Toprol 50mg QD    Plan   - c/w Toprol 50mg when patient is off BiPAP

## 2023-09-06 NOTE — DISCHARGE NOTE PROVIDER - NSDCFUADDAPPT_GEN_ALL_CORE_FT
(1) Please follow up with your Pulmonary Medicine Provider, Dr. Ji Santo at [address] [place] [address] on 09/19/2023 at 4:00pm.    Appointment was confirmed by Ms. SARAH Max, Referral Coordinator. (1) Please follow up with your Pulmonary Medicine Provider, Dr. Ji Santo at [address] [place] [address] on 09/18/2023 at 4:00pm.    Appointment was confirmed by Ms. SARAH Max, Referral Coordinator.

## 2023-09-06 NOTE — DISCHARGE NOTE PROVIDER - NSDCFUSCHEDAPPT_GEN_ALL_CORE_FT
Ji Santo  [facility] Physician [facility]  PULMMED [address]  Scheduled Appointment: 09/18/2023    Dharmesh France  [facility] Physician [facility]  HEARTVASC [address]  Scheduled Appointment: 10/25/2023

## 2023-09-06 NOTE — DISCHARGE NOTE PROVIDER - NSDCMRMEDTOKEN_GEN_ALL_CORE_FT
Lexapro 10 mg oral tablet: 1 orally once a day  rosuvastatin 10 mg oral capsule: 1 orally once a day  tamsulosin 0.4 mg oral capsule: 1 orally once a day (at bedtime)  Toprol-XL 50 mg oral tablet, extended release: 1 orally once a day  Trelegy Ellipta 100 mcg-62.5 mcg-25 mcg/inh inhalation powder: 1 inhaled once a day   Lasix 20 mg oral tablet: 1 tab(s) orally 2 times a week  Lexapro 10 mg oral tablet: 1 orally once a day  predniSONE 20 mg oral tablet: 2 tab(s) orally once  predniSONE 20 mg oral tablet: 1 tab(s) orally once a day  rosuvastatin 10 mg oral capsule: 1 orally once a day  tamsulosin 0.4 mg oral capsule: 1 orally once a day (at bedtime)  Toprol-XL 50 mg oral tablet, extended release: 1 orally once a day  Trelegy Ellipta 100 mcg-62.5 mcg-25 mcg/inh inhalation powder: 1 inhaled once a day

## 2023-09-06 NOTE — PROGRESS NOTE ADULT - PROBLEM SELECTOR PLAN 5
Patient with a history of HTN. On home Toprol 50mg QD    Plan   - c/w Toprol 50mg when patient is off BiPAP

## 2023-09-06 NOTE — PATIENT PROFILE ADULT - FALL HARM RISK - HARM RISK INTERVENTIONS

## 2023-09-06 NOTE — PROGRESS NOTE ADULT - PROBLEM SELECTOR PLAN 2
Patient found to have acidemia, pH 7.14. Hyperchloremia, 110. Bicarb 20. Anion Gap 12   pH improved with BiPAP 7.14 --> 7.21    Plan   - Improving, continue to trend

## 2023-09-06 NOTE — H&P ADULT - PROBLEM SELECTOR PLAN 3
Plan   - continue to monitor Patient with history of CKD III. Cr 1.45 (1.57 in 3/2022), eGFR 46 (42 in 3/2022)    Plan   - continue to monitor Patient with history of CKD III. Cr 1.45 (1.57 in 3/2022), eGFR 46 (42 in 3/2022)    Plan   - continue to monitor  - f/u cystatin c Patient with history of CKD III. Cr 1.45 (1.57 in 3/2022), eGFR 46 (42 in 3/2022)  Baseline Cr 1.5    Plan   - continue to monitor  - f/u cystatin c

## 2023-09-06 NOTE — H&P ADULT - PROBLEM SELECTOR PLAN 8
F: none  E: replete K<4, Mg<2  N: NPO for now   VTE Prophylaxis: Heparin 5000 q8h  GI: protonix 40mg QD  C: Full Code  D: telemetry

## 2023-09-06 NOTE — H&P ADULT - PROBLEM SELECTOR PLAN 2
Patient found to have acidemia, pH 7.14. Hyperchloremia, 110. Bicarb     Plan   - Improving  - continue to trend Patient found to have acidemia, pH 7.14. Hyperchloremia, 110. Bicarb 20. Anion Gap 12   pH improved with BiPAP 7.14 --> 7.21    Plan   - Improving, continue to trend

## 2023-09-06 NOTE — CONSULT NOTE ADULT - SUBJECTIVE AND OBJECTIVE BOX
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 Patient is on home inhaler Trelegy and uses Prednisone intermittently, which he self-tapers but reports no improvement in symptoms. Last used Prednisone 10mg the day before coming into the hospital. Patient also complains of a productive cough with yellow sputum over the past 2-3 days. Of note, patient had a mechanical fall 2 weeks ago and has been walking with a cane since then (was able to ambulate without assistance prior to falling). Denies chest pain, n/v/d, constipation. Denies any sick contacts or recent travel.  (06 Sep 2023 01:27)    Hospital course, primary team, and consultant notes reviewed. Met patient at bedside, found sitting up in bed, no acute distress, overtly comfortable. Feels somewhat fatigued, but is tolerable. Breathing significantly improved-- comfortable on RA. Pt follows closely with his pulmonologist. He does not have PCP and is interested in establishing care. Also c/o dysphagia for the last several weeks, he is interested in further workup either as outpatient or inpatient. Eager to go home soon. Comprehensive ROS as noted below, collateral obtained from chart/team/family. Exploration of GOC documented as below.     PAST MEDICAL & SURGICAL HISTORY:  COPD (chronic obstructive pulmonary disease)  Lung cancer  HTN (hypertension)  High cholesterol  BPH (benign prostatic hyperplasia)  S/P lobectomy of lung  History of hip surgery      FAMILY HISTORY:   Reviewed; no history of     in mother or father    Opiate Naive (Y/N):  Yes  iStop reviewed (Y/N): Yes.   No Rx found on iStop review (Ref#:    [id number]       Items that are not checked are not present  PSYCHOSOCIAL ASSESSMENT:  - Significant other/partner: [ x ]  Children: [ x ]  - Living Situation: Home [  x] Long term care [  ]  Rehab[  ]  Other[  ]  - Support system: strong[  x] adequate[  ] inadequate[  ]  - Religious/Spiritual practice: Scientologist   - Role of organized Mandaeism important [  ] some [ x ] unable to assess dt pt mentation [  ]  - Coping: well[ x ]  with difficulty[  ]  poor coping[  ]  unable to assess dt pt mentation [  ]  - retired MD    ADVANCE DIRECTIVES:    - MOLST[  ]     - Living Will [ x ]     DECISION MAKER(s):  - Health Care Proxy(s) [  ]  Surrogate(s)[  ] Guardian[  ]           Name(s)/Phone Number(s):   - wife is legal decision making surrogate, Carina Celaya     BASELINE (I)ADLs (prior to admission):  - [place]:  Total[ x ] Moderate[  ] Dependent[  ]  - pt walks "almost a mile" daily with his dog  - started using a cane in recent weeks after he fell   - lives with his wife, no HHA support     Allergies    No Known Allergies    Intolerances        Medications:      MEDICATIONS  (STANDING):  albuterol/ipratropium for Nebulization 3 milliLiter(s) Nebulizer every 4 hours  atorvastatin 40 milliGRAM(s) Oral at bedtime  azithromycin  IVPB 500 milliGRAM(s) IV Intermittent every 24 hours  budesonide  80 MICROgram(s)/formoterol 4.5 MICROgram(s) Inhaler 2 Puff(s) Inhalation two times a day  escitalopram 10 milliGRAM(s) Oral daily  heparin   Injectable 5000 Unit(s) SubCutaneous every 8 hours  pantoprazole    Tablet 40 milliGRAM(s) Oral before breakfast  tamsulosin 0.4 milliGRAM(s) Oral at bedtime    MEDICATIONS  (PRN):      PRESENT SYMPTOMS:   [ ]Unable to obtain due to poor mentation   Source if other than patient:  [ ]Family   [ ]Team     Pain [  ]    PAIN AD Score:   [email or web address] (press ctrl +  left click to view)    Analgesic Use (PRNs) for past 24 hours:    - none     If [  ], pt denies symptom.   Dyspnea:         [ x ] resolved   Anxiety:           [  ]  Difficulty sleeping: [  ]  Fatigue:           [  x] mild, tolerable   Nausea:           [  ]  Loss of appetite:     [  ]  Dysphagia: [  ]  Constipation:   [  ]        Other Symptoms:    All other review of systems negative [x  ]    ECOG Performance:     2  Current Palliative Performance Scale/Karnofsky Score:  40  %  Preadmit Karnofsky:  60 %          PEx:  General: Elderly man sitting up in bed, pleasant and in NAD on RA  alert  oriented x 3 verbal   Behavioral: Calm, cooperative, engaging   HEENT: atraumatic,  No temporal wasting,  No dry mouth  RESP: Reg rhythm, No  tachypnea/labored breathing,  No audible excessive secretions, diminished bilat bases +barrel chest   CV: RRR, S1S2,  No  tachycardia  GI: soft non distended non tender    MUSK: weakness x4, No  edema, ambulatory   SKIN: No abnormal skin lesions, Poor skin turgor  NEURO:  No deficits, cognitive impairment, encephalopathic dsyphagia dysarthria paresis  Oral intake ability: full capability      T(C): 36.8 (09-06-23 @ 13:39), Max: 36.8 (09-05-23 @ 21:30)  HR: 62 (09-06-23 @ 12:35) (61 - 98)  BP: 160/72 (09-06-23 @ 12:35) (140/64 - 178/73)  RR: 20 (09-06-23 @ 12:35) (18 - 29)  SpO2: 97% (09-06-23 @ 12:35) (88% - 100%)  Wt(kg): --    Labs:    CBC:                        14.1   4.63  )-----------( 132      ( 06 Sep 2023 06:12 )             45.8     CMP:    09-06    140  |  108  |  34<H>  ----------------------------<  204<H>  4.6   |  22  |  1.40<H>    Ca    9.5      06 Sep 2023 06:12  Phos  2.9     09-06  Mg     2.3     09-06    TPro  6.3  /  Alb  3.7  /  TBili  0.4  /  DBili  x   /  AST  16  /  ALT  9<L>  /  AlkPhos  45  09-06       Urinalysis Basic - ( 06 Sep 2023 06:12 )    Color: x / Appearance: x / SG: x / pH: x  Gluc: 204 mg/dL / Ketone: x  / Bili: x / Urobili: x   Blood: x / Protein: x / Nitrite: x   Leuk Esterase: x / RBC: x / WBC x   Sq Epi: x / Non Sq Epi: x / Bacteria: x        RADIOLOGY & ADDITIONAL STUDIES:  Imaging:  Reviewed  < from: Xray Chest 1 View-PORTABLE IMMEDIATE (Xray Chest 1 View-PORTABLE IMMEDIATE .) (09.05.23 @ 22:16) >  INTERPRETATION:  Clinical history reason for exam: Shortness of breath.    Frontal chest.    COMPARISON: May 4, 2010.    Findings/  impression: Right hemithorax decreased volume, status post right lower   lobectomy, postsurgical changes, right basilar focal atelectasis. Heart   size within normal limits, thoracic aortic calcification.. Stable bony   structures, scoliosis.          GOC discussion:

## 2023-09-06 NOTE — DISCHARGE NOTE PROVIDER - CARE PROVIDER_API CALL
Ji Santo  Pulmonary Medicine  [address] [address]  Phone: [phone]  Fax: (   )    -  Established Patient  Scheduled Appointment: 09/18/2023 04:00 PM

## 2023-09-06 NOTE — PROGRESS NOTE ADULT - PROBLEM SELECTOR PLAN 1
Pt with hx of COPD. Home inhalers: Trelegy and PRN Albuterol nebulizers. Pt endorsing he has been using his home inhalers and steroids without much improvement.   SpO2 88% on RA with tachypnea to 30s per ED provider. Placed on BiPAP with improvement. Initial VBG pH 7.14 --> 7.21 (on BiPAP 10/6). S/p Duonebs and IV Solumedrol in ED. Elevated procal.     Plan  - start Azithromycin 500mg IV qd x 3 days (9/6-9/8)  - Outpatient prednison taper pack  - continue with duonebs standing q4hr

## 2023-09-06 NOTE — DISCHARGE NOTE PROVIDER - HOSPITAL COURSE
#Discharge: do not delete    ALEKSANDAR HIGGISN 89 yo male with PMHx of COPD (does not require home O2, reports no prior hospitalizations or intubations due to an exacerbation), HTN, HLD, NSCLC s/p [id number] years prior, BPH, presented due to shortness of breath found to be hypoxic with increased WOB, improved with BiPAP and one Solumederol dose, weaned off oxygen and stable for discharge with outpatient steroid taper.       Problem List/Main Diagnoses (system-based):   #COPD exacerbation.   Reported shortness of breath which did not resolve on home inhalers and steroids. Given Duonebs, Solumederol dose and BIPAP, with resolution and weaned off oxygen to roomair, with no cough or difficulty breathing on ambulation prior to discharge.   Plan: Outpatient followup with Pulmonologist Dr. Santo, Prednison pack, duonebs, trilegy therapy     #Normal anion gap metabolic acidosis.   Normal anion gap metabolic acidosis that resolved with fluids and treatment for COPD exacerbation.    Patient was discharged to: home    New medications: Prednisone taper pack   Changes to old medications: none     Items to follow up as outpatient: outpatient Dr. Santo followup    #Discharge: do not delete    ALEKSANDAR HIGGINS 89 yo male with PMHx of COPD (does not require home O2, reports no prior hospitalizations or intubations due to an exacerbation), HTN, HLD, NSCLC s/p [id number] years prior, BPH, presented due to shortness of breath found to be hypoxic with increased WOB, improved with BiPAP and one Solumederol dose, weaned off oxygen and stable for discharge with outpatient steroid taper.       Problem List/Main Diagnoses (system-based):   #COPD exacerbation.   Reported shortness of breath which did not resolve on home inhalers and steroids. Given Duonebs, Solumederol dose and BIPAP, with resolution and weaned off oxygen to roomair, with no cough or difficulty breathing on ambulation prior to discharge. TTE showed reduced diastolic function.  Plan: Outpatient followup with Pulmonologist Dr. Santo, Prednisone taper, duonebs, trilegy therapy, Lasix 20mg PO 2 a week.     #Normal anion gap metabolic acidosis.   Normal anion gap metabolic acidosis that resolved with fluids and treatment for COPD exacerbation.    #Dysphagia  Barium swallow showed no acute interventions needed. Recommended regular diet with thin liquid alternating.     Patient was discharged to: home    New medications: Prednisone taper pack, Lasix 20mg PO 2 times a week  Changes to old medications: none     Items to follow up as outpatient: outpatient Dr. Santo followup

## 2023-09-06 NOTE — H&P ADULT - HISTORY OF PRESENT ILLNESS
(INCOMPLETE NOTE)    89M w/PMH of COPD presents for acute SOB    In the ED  Vitals 98.2, /73, HR 78, RR 29, spO2 88%   Labs VBG 7.14, CO2 66, HCO3 22. 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 Patient is on home inhaler Trelegy and uses Prednisone intermittently, which he self-tapers. However, he states his symptoms did not improve this time. Last used Prednisone 10mg the day before coming into the hospital. Patient also complains of a productive cough with yellow sputum over the past 2-3 days. Of note, patient had a mechanical fall 2 weeks ago and has been walking with a cane since then (was able to ambulate without assistance prior to falling). Denies chest pain, n/v/d, constipation. Denies any sick contacts and recent travel.     In the ED  Vitals 98.2, /73, HR 78, RR 29, spO2 88%   Labs VBG 7.14, CO2 66, HCO3 22. 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 Patient is on home inhaler Trelegy and uses Prednisone intermittently, which he self-tapers but reports no improvement in symptoms. Last used Prednisone 10mg the day before coming into the hospital. Patient also complains of a productive cough with yellow sputum over the past 2-3 days. Of note, patient had a mechanical fall 2 weeks ago and has been walking with a cane since then (was able to ambulate without assistance prior to falling). Denies chest pain, n/v/d, constipation. Denies any sick contacts and recent travel.     In the ED  Vitals 98.2, /73, HR 78, RR 29, spO2 88%   Labs VBG 7.14, CO2 66, HCO3 22. 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 Patient is on home inhaler Trelegy and uses Prednisone intermittently, which he self-tapers but reports no improvement in symptoms. Last used Prednisone 10mg the day before coming into the hospital. Patient also complains of a productive cough with yellow sputum over the past 2-3 days. Of note, patient had a mechanical fall 2 weeks ago and has been walking with a cane since then (was able to ambulate without assistance prior to falling). Denies chest pain, n/v/d, constipation. Denies any sick contacts or recent travel.     In the ED  Vitals 98.2, /73, HR 78, RR 29, spO2 88%   Labs VBG 7.14, CO2 66, HCO3 22. 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 Patient is on home inhaler Trelegy and uses Prednisone intermittently, which he self-tapers but reports no improvement in symptoms. Last used Prednisone 10mg the day before coming into the hospital. Patient also complains of a productive cough with yellow sputum over the past 2-3 days. Of note, patient had a mechanical fall 2 weeks ago and has been walking with a cane since then (was able to ambulate without assistance prior to falling). Denies chest pain, n/v/d, constipation. Denies any sick contacts or recent travel.     In the ED  Vitals 98.2, /73, HR 78, RR 29, spO2 88%   Labs VBG 7.14, CO2 66, HCO3 22. Cr 1.45 -> after BiPAP pH 7.21, pCO2 128, HCO3 21  CXR Wet Read: b/l opacities in upper + lower lung fields   Interventions Mg 2g, solumedrol 125, duoneb x3

## 2023-09-06 NOTE — CONSULT NOTE ADULT - PROBLEM SELECTOR RECOMMENDATION 2
.  recent mechanical fall in late August, now is walking more regularly with cane. otherwise is independent with all ADLs.   - cw supportive care

## 2023-09-06 NOTE — H&P ADULT - NSHPPHYSICALEXAM_GEN_ALL_CORE
PHYSICAL EXAM  General: NAD  Head: NC/AT; MMM; PERRL; EOMI;  Neck: Supple; no JVD  Respiratory: CTAB; no wheezes/rales/rhonchi  Cardiovascular: Regular rhythm/rate; S1/S2+, no murmurs, rubs gallops   Gastrointestinal: Soft; NTND; bowel sounds normal and present  Extremities: WWP; no edema/cyanosis  Neurological: A&Ox3, CNII-XII grossly intact; no obvious focal deficits  Skin: Clean and intact. Good skin turgor. Without open wounds and sores PHYSICAL EXAM  General: elderly male in NAD, on BiPAP   Respiratory: b/l expiratory wheezing appreciated, no rales or rhonchi   Cardiovascular: Regular rhythm/rate; S1/S2+, no murmurs, rubs gallops   Gastrointestinal: Soft; NTND; positive bowel sounds in all 4 quadrants   Extremities: WWP; no edema/cyanosis  Neurological: A&Ox3 PHYSICAL EXAM  General: elderly male in NAD, on BiPAP   Respiratory: b/l expiratory wheezes appreciated, no rales or rhonchi   Cardiovascular: Regular rhythm/rate; S1/S2+, no murmurs, rubs gallops   Gastrointestinal: Soft; NTND; positive bowel sounds in all 4 quadrants   Extremities: WWP; no edema/cyanosis  Neurological: A&Ox3

## 2023-09-06 NOTE — DISCHARGE NOTE PROVIDER - NSDCCPTREATMENT_GEN_ALL_CORE_FT
PRINCIPAL PROCEDURE  Procedure: XR, chest, 1 view  Findings and Treatment: Findings/impression: Right hemithorax decreased volume, status post right lower lobectomy, postsurgical changes, right basilar focal atelectasis. Heart size within normal limits, thoracic aortic calcification.. Stable bony structures, scoliosis.       PRINCIPAL PROCEDURE  Procedure: CT chest wo con  Findings and Treatment: FINDINGS:  LUNGS AND AIRWAYS: Central airways are patent. Post right lower lobe lobectomy with stable postsurgical change. There is interlobular septal thickening which has increased in extent compatible with interval increase in interstitial pulmonary edema. There is substantial paraseptal emphysema most pronounced within the right apical region. No developing suspicious parenchymal nodule or masses. New linear atelectasis within the posterior basal segment of the left lower lobe scattered calcified granulomata.  Lungs are clear.  PLEURA: Trace bibasilar pleural effusions more pronounced when compared to prior imaging. There are new coarse pleural calcifications identified  MEDIASTINUM AND BRYNN: Surgical clips noted in the rightparatracheal and right infrahilar region. Post right lower lobe lobectomy. No mediastinal or hilar lymphadenopathy.  VESSELS: Moderate calcified aortic mural plaque. No evidence of  aortic aneurysm. Moderate calcified aortic valve and mitral annulus. Moderate coronary artery calcification.  HEART: Heart size is normal. No pericardial effusion.  CHEST WALL AND LOWER NECK: Within normal limits.  VISUALIZED UPPER ABDOMEN: Multiple simple parapelvic as well as cortical cysts the largest emanating from the lateral cortex of the upper pole of the rightkidney measuring 8 cm. Multiple punctate calcified splenic granulomata  BONES: Age-appropriate degenerative changes with a mild S shaped thoracolumbar scoliosis.        SECONDARY PROCEDURE  Procedure: Barium swallow  Findings and Treatment: Oral Phase:  · Oral Phase Comments  Adequate oral containment, mildly prolonged mastication with solids, reduced bolus cohesion with premature loss imaged to the hypopharynx with thin liquids, timely a/p transport, and minimal oral residual. Oral residual cleared with subsequent swallow.  Pharyngeal Phase:  · Pharyngeal Phase Comments  Initiation of the pharyngeal swallow imaged at the level of the pyriform sinuses with thin liquids across trials. Delayed supraglottic closure/delayed swallow initiation resulted in penetration before the swallow with thin liquids. Similarly, reduced hyolaryngeal elevation, anterior hyoid excursion, with partial epiglottic inversion (though variably complete) and delayed and incomplete (narrow column) supraglottic closure resulted in chronic penetration during the swallow with thin liquids and in 1/2 trials with pudding. Though minimal, retention in the vallecular space from pudding and epiglottic position (tip contacting BOT/reduced vallecular space for collection) resulted in spillage of solids over the epiglottic rim resulting in penetration before the swallow, though cleared. Penetration with thin liquids was typically transient, above the level of the vocal folds, and cleared upon completion of the swallow or with spontaneous subsequent swallow. Reduced BOT retraction, partial epiglottic inversion, resulted in minimal vallecular residual with purees and solids. Reduced pharyngeal stripping and reduced pharyngoesophageal segment opening due to reduced anterior hyoid excursion resulted in mild to moderate pyriform sinus residual with solids, cleared with spontaneous subsequent swallow. Liquid wash further cleared pharyngeal residual. Infrequent trace penetration after the swallow imaged with thin liquids from vallecular residual, cleared with spontaneous throat clear-reswallow.  Esophageal Phase:  · Esophageal Stage  Unable to assess  Belching noted intermittently  Recommendations:   · Diagnostic Impressions  Mild oral phase impairment edmond    Procedure: EKG performed  Findings and Treatment:     Procedure: LE arterial dopplers  Findings and Treatment: FINDINGS:  RIGHT:  Normal compressibility of the RIGHT common femoral, femoral and popliteal veins.  Doppler examination shows normal spontaneous and phasic flow.  No RIGHT calf vein thrombosis is detected.  LEFT:  Normal compressibility of the LEFT common femoral, femoral and popliteal veins.  Doppler examination shows normal spontaneous and phasic flow.  No LEFT calf vein thrombosis is detected.  Incidental bilateral groin hernias. Upon Valsalva maneuver, there is a   spontaneously reducing right groin hernia containing bowel with a 0.9 cm neck and 4.8 x 2.5 cm hernia sac. On the left, thereis a spontaneously   reducing left groin hernia containing fat with a 1.3 cm hernia neck and a   2.4 x 1.5 cm hernia sac.      Procedure: Transthoracic echocardiography (TTE)  Findings and Treatment: CONCLUSIONS:   1. Mildly reduced left ventricular systolic function, LVEF 45-50%.   2. Mid and apical anterior septum, apical anterior segment, and apex are   akinetic.   3. Grade II left ventricular diastolic dysfunction.   4. Normal right ventricular size and systolic function.   5. Aortic sclerosis without significant stenosis.   6. Pulmonary hypertension present, pulmonary artery systolic pressure is   35 mmHg.   7. No pericardial effusion.   8. No prior echo is available for comparison.      Procedure: CT chest wo con  Findings and Treatment: FINDINGS:  LUNGS AND AIRWAYS: Central airways are patent. Post right lower lobe lobectomy with stable postsurgical change. There is interlobular septal thickening which has increased in extent compatible with interval increase in interstitial pulmonary edema. There is substantial paraseptal emphysema most pronounced within the right apical region. No developing suspicious parenchymal nodule or masses. New linear atelectasis within the posterior basal segment of the left lower lobe scattered calcified granulomata.  Lungs are clear.  PLEURA: Trace bibasilar pleural effusions more pronounced when compared to prior imaging. There are new coarse pleural calcifications identified  MEDIASTINUM AND BRYNN: Surgical clips noted in the rightparatracheal and right infrahilar region. Post right lower lobe lobectomy. No mediastinal or hilar lymphadenopathy.  VESSELS: Moderate calcified aortic mural plaque. No evidence of  aortic aneurysm. Moderate calcified aortic valve and mitral annulus. Moderate coronary artery calcification.  HEART: Heart size is normal. No pericardial effusion.  CHEST WALL AND LOWER NECK: Within normal limits.  VISUALIZED UPPER ABDOMEN: Multiple simple parapelvic as well as cortical cysts the largest emanating from the lateral cortex of the upper pole of the rightkidney measuring 8 cm. Multiple punctate calcified splenic granulomata  BONES: Age-appropriate degenerative changes with a mild S shaped thoracolumbar scoliosis.

## 2023-09-06 NOTE — CONSULT NOTE ADULT - PROBLEM SELECTOR RECOMMENDATION 3
.  not on home oxygen. first hospitalization for COPD exacerbation. no e/o cor pulmonale, rv dysfunction. good performance status.   - care, abx, steroids per primary team  - does not meet hospice criteria

## 2023-09-06 NOTE — PROGRESS NOTE ADULT - SUBJECTIVE AND OBJECTIVE BOX
CC: Patient is a 89y old  Male who presents with a chief complaint of COPD Exacerbation  (06 Sep 2023 15:21)    INTERVAL EVENTS: GEOFFREY    SUBJECTIVE / INTERVAL HPI: Patient seen and examined at bedside.   Patient specified that his breathing has significantly improved and is able to ambulate with oxygen support.   On initial visit, patient requested home discharge for today if stable enough. Later on in the evening patient requested staying to identify cause of difficulty swallowing. Denied chestpain/pressure, hematemesis, n/v/d, or other concerns.     ROS: negative unless otherwise stated above.    VITAL SIGNS:  Vitals Interpretation review:  Vital Signs Last 24 Hrs  T(C): 36.9 (06 Sep 2023 18:02), Max: 36.9 (06 Sep 2023 18:02)  T(F): 98.5 (06 Sep 2023 18:02), Max: 98.5 (06 Sep 2023 18:02)  HR: 76 (06 Sep 2023 20:32) (61 - 98)  BP: 101/54 (06 Sep 2023 20:32) (101/54 - 160/74)  BP(mean): 76 (06 Sep 2023 20:32) (76 - 107)  RR: 16 (06 Sep 2023 20:32) (16 - 20)  SpO2: 98% (06 Sep 2023 20:32) (95% - 100%)    Parameters below as of 06 Sep 2023 20:32  Patient On (Oxygen Delivery Method): room air          09-05-23 @ 07:01  -  09-06-23 @ 07:00  --------------------------------------------------------  IN: 0 mL / OUT: 150 mL / NET: -150 mL    09-06-23 @ 07:01  -  09-06-23 @ 22:11  --------------------------------------------------------  IN: 0 mL / OUT: 1100 mL / NET: -1100 mL        PHYSICAL EXAM:    General: NAD  HEENT: MMM  Neck: supple  Cardiovascular: +S1/S2; RRR  Respiratory: CTA B/L; no W/R/R  Gastrointestinal: soft, NT/ND  Extremities: WWP; no edema, clubbing or cyanosis. Age related skin changes.   Vascular: 2+ radial, DP/PT pulses B/L  Neurological: AAOx3; no focal deficits    MEDICATIONS:  MEDICATIONS  (STANDING):  albuterol/ipratropium for Nebulization 3 milliLiter(s) Nebulizer every 4 hours  atorvastatin 40 milliGRAM(s) Oral at bedtime  azithromycin  IVPB 500 milliGRAM(s) IV Intermittent every 24 hours  budesonide  80 MICROgram(s)/formoterol 4.5 MICROgram(s) Inhaler 2 Puff(s) Inhalation two times a day  escitalopram 10 milliGRAM(s) Oral daily  heparin   Injectable 5000 Unit(s) SubCutaneous every 8 hours  pantoprazole    Tablet 40 milliGRAM(s) Oral before breakfast  tamsulosin 0.8 milliGRAM(s) Oral at bedtime    MEDICATIONS  (PRN):      ALLERGIES:  Allergies    No Known Allergies    Intolerances        LABS:  Labs Interpretation:                         14.1   4.63  )-----------( 132      ( 06 Sep 2023 06:12 )             45.8     09-06    140  |  108  |  34<H>  ----------------------------<  204<H>  4.6   |  22  |  1.40<H>    Ca    9.5      06 Sep 2023 06:12  Phos  2.9     09-06  Mg     2.3     09-06    TPro  6.3  /  Alb  3.7  /  TBili  0.4  /  DBili  x   /  AST  16  /  ALT  9<L>  /  AlkPhos  45  09-06      Urinalysis Basic - ( 06 Sep 2023 06:12 )    Color: x / Appearance: x / SG: x / pH: x  Gluc: 204 mg/dL / Ketone: x  / Bili: x / Urobili: x   Blood: x / Protein: x / Nitrite: x   Leuk Esterase: x / RBC: x / WBC x   Sq Epi: x / Non Sq Epi: x / Bacteria: x    RADIOLOGY & ADDITIONAL TESTS: Reviewed.

## 2023-09-06 NOTE — DISCHARGE NOTE PROVIDER - PROVIDER TOKENS
FREE:[LAST:[Hansa],FIRST:[Ji],PHONE:[phone]],FAX:[(   )    -],ADDRESS:[Pulmonary Medicine  [address] [place] [address]],SCHEDULEDAPPT:[09/18/2023],SCHEDULEDAPPTTIME:[04:00 PM],ESTABLISHEDPATIENT:[T]]

## 2023-09-06 NOTE — CONSULT NOTE ADULT - PROBLEM SELECTOR RECOMMENDATION 9
.  c/o progressive dysphagia in recent weeks. reports coughing up food during and sometimes after meals.   - he would like further workup with SLP -- fees, mbs

## 2023-09-06 NOTE — H&P ADULT - PROBLEM SELECTOR PLAN 1
Pt with hx of COPD. Home inhalers: Trelegy and PRN Albuterol nebulizers. Pt endorsing he has been using his home inhalers and steroids without much improvement.   SpO2 88% on RA with tachypnea to 30s per ED provider. Placed on BiPAP with improvement. Initial VBG pH 7.14 --> 7.21 (on BiPAP 10/6). S/p Duonebs and IV Solumedrol in ED.     Plan  - start Azithromycin 500mg IV qd x 3 days (9/6-9/8)  - start PO Prednisone 40mg x 4 days  (9/6-9/9)  - continue with duonebs standing q4hr  - follow up pro-calcitonin levels   - keep on BiPAP overnight @ 10/6 for iWOB  -- can transition to NC in AM as patient with clinical improvement noted since ED arrival

## 2023-09-06 NOTE — H&P ADULT - PROBLEM SELECTOR PLAN 7
Patient with a history of anxiety/depression. On home Lexapro 10mg QD    Plan   - c/w Lexapro 10mg when patient is off BiPAP Patient with a history of HLD. On home Rosuvastatin 10mg QD    Plan   - c/w therapeutic exchange Atorvastatin 40mg QD when patient is off BiPAP

## 2023-09-06 NOTE — PROGRESS NOTE ADULT - PROBLEM SELECTOR PLAN 8
F: none  E: replete K<4, Mg<2  N: NPO for now   VTE Prophylaxis: Heparin 5000 q8h  GI: protonix 40mg QD  C: Full Code  D: telemetry F: none, speech and swallow ordered  E: replete K<4, Mg<2  N: NPO for now   VTE Prophylaxis: Heparin 5000 q8h  GI: protonix 40mg QD  C: Full Code  D: telemetry

## 2023-09-06 NOTE — H&P ADULT - ASSESSMENT
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 Found to be hypoxic to mid 80s on room air. Placed on BiPAP by ED provider with improvement in WOB + hypoxia. ICU Consulted for further management.  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 Admitted to telemetry for further management.

## 2023-09-06 NOTE — DISCHARGE NOTE PROVIDER - NSDCCPCAREPLAN_GEN_ALL_CORE_FT
PRINCIPAL DISCHARGE DIAGNOSIS  Diagnosis: COPD exacerbation  Assessment and Plan of Treatment: You were admitted for exacerbation of your COPD condition.   COPD is a lung disease that makes it hard to breathe. In people with COPD, the airways (the branching tubes that carry air within the lungs) become narrow and damaged. This makes people feel out of breath and tired. COPD can be a serious illness. It cannot be cured and it usually gets worse over time. But there are treatments that can help. The most common cause of COPD is smoking. Smoke can damage the lungs forever and cause COPD. Common symptoms include shortness of breath, wheezing, and worsening cough and mucus production. COPD can put you at an increased risk for lung infections, lung cancer and heart problems. If you smoke, the most important thing you can do for your COPD is to stop smoking. There are a lot of medicines to treat COPD. Most people use inhalers that help open up their airways or decrease swelling in the airways. Often people need more than one inhaler at a time. You might need to take a steroid medicine in a pill for a flare of COPD. If the disease gets worse, you might need to use oxygen. Pulmonary rehabilitation may be recommended and can teach you how to improve your symptoms through exercises and different ways to breathe. If your shortness of breath worsens or your experience an increase in or change of your sputum production you should seek evaluation by your primary care doctor, or, if severe, an emergency room.   Please continue to take your home Prednisone regimen upon discussion with your pulmonologist Dr. Santo.   Once you are discharged, please take your a tapered regimen of Prednisole tablets, with 40mg for first three days starting 09/07/23, then 20mg for next 2 days then 10mg daily moving forward.     PRINCIPAL DISCHARGE DIAGNOSIS  Diagnosis: COPD exacerbation  Assessment and Plan of Treatment: You were admitted for exacerbation of your COPD condition.   COPD is a lung disease that makes it hard to breathe. In people with COPD, the airways (the branching tubes that carry air within the lungs) become narrow and damaged. This makes people feel out of breath and tired. COPD can be a serious illness. It cannot be cured and it usually gets worse over time. But there are treatments that can help. The most common cause of COPD is smoking. Smoke can damage the lungs forever and cause COPD. Common symptoms include shortness of breath, wheezing, and worsening cough and mucus production. COPD can put you at an increased risk for lung infections, lung cancer and heart problems. If you smoke, the most important thing you can do for your COPD is to stop smoking. There are a lot of medicines to treat COPD. Most people use inhalers that help open up their airways or decrease swelling in the airways. Often people need more than one inhaler at a time. You might need to take a steroid medicine in a pill for a flare of COPD. If the disease gets worse, you might need to use oxygen. Pulmonary rehabilitation may be recommended and can teach you how to improve your symptoms through exercises and different ways to breathe. If your shortness of breath worsens or your experience an increase in or change of your sputum production you should seek evaluation by your primary care doctor, or, if severe, an emergency room.   Please continue to take your home Prednisone regimen upon discussion with your pulmonologist Dr. Santo. Please take 2 tablets of 20mg on 09/09/23 then 1 tablet of 20mg for 2 days, and then continue with your normal regiment of taper starting with 10mg daily tablets. Additionally, per Cardiology recommendations, please take Lasix 20mg tablets twice a week to keep your fluid levels from going over the limit. If you experience dizziness, headaches, lightheadedness, urinary retention, or any significant concerns, please visit your doctor as soon as possible.      SECONDARY DISCHARGE DIAGNOSES  Diagnosis: Dysphagia  Assessment and Plan of Treatment: For your dysphagia, you underwent a Barium Swallow exam. It showed that you do not need any current intervention and can continue with a regular diet that alternates with thin liquid diet.

## 2023-09-06 NOTE — H&P ADULT - PROBLEM SELECTOR PLAN 4
Patient with a history of BPH. On home Flomax 0.4mg QD    Plan   - holding Flomax since patient is NPO on BiPAP, consider restarting when patient is weaned to NC

## 2023-09-06 NOTE — H&P ADULT - NSHPREVIEWOFSYSTEMS_GEN_ALL_CORE
REVIEW OF SYSTEMS:  CONSTITUTIONAL: denies fevers, fatigue   RESPIRATORY: complains of shortness of breath, productive cough with yellow-tinged sputum, denies hemoptysis   CARDIOVASCULAR: denies chest pain, palpitations  GASTROINTESTINAL: denies abdominal pain. n/v/d, constipation, hematemesis;   GENITOURINARY: denies dysuria, frequency or hematuria  NEUROLOGICAL: denies numbness or weakness  SKIN: denies itching, rashes REVIEW OF SYSTEMS:  CONSTITUTIONAL: denies fevers, fatigue   RESPIRATORY: complains of shortness of breath and productive cough with yellow-tinged sputum, denies hemoptysis   CARDIOVASCULAR: denies chest pain, palpitations  GASTROINTESTINAL: denies abdominal pain. n/v/d, constipation, hematemesis   GENITOURINARY: denies dysuria, frequency or hematuria  NEUROLOGICAL: denies numbness or weakness  SKIN: denies itching, rashes

## 2023-09-06 NOTE — CONSULT NOTE ADULT - ASSESSMENT
89 yo M ambulatory from home PMHx of COPD (does not require home O2, reports no prior hospitalizations or intubations due to an exacerbation), remote hx NSCLC s/p lobectomy (2013), BPH, presented due to shortness of breath found to be hypoxic with increased WOB, improved with BiPAP and one Solumederol dose, weaned off oxygen and stable for discharge with outpatient steroid taper. Palliative trigger for GOC/ACP in the setting of advanced age, chronic illness.        89 yo M ambulatory from home PMHx of COPD (does not require home O2, reports no prior hospitalizations or intubations due to an exacerbation), remote hx NSCLC s/p lobectomy (2013), BPH, presented due to shortness of breath found to be hypoxic with increased WOB, improved with BiPAP and one Solumederol dose, weaned off oxygen and stable for discharge with outpatient steroid taper. Palliative trigger for GOC/ACP in the setting of advanced age, chronic illness.       ·	c/o dysphagia, wants further workup   ·	HCP completed, names his wife, Carina Celaya #[phone]. Discussed code status: continue full code, though he is strongly considering DNR with trial of intubation.   ·	Requesting to establish PCP care with Dr. Brandt

## 2023-09-06 NOTE — H&P ADULT - ATTENDING COMMENTS
LACERATION LACERATION/inferior/medial to right eye Agree with above note, Please see earlier consult.

## 2023-09-06 NOTE — H&P ADULT - NSHPSOCIALHISTORY_GEN_ALL_CORE
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

## 2023-09-06 NOTE — H&P ADULT - NSHPLABSRESULTS_GEN_ALL_CORE
.  LABS:                         14.9   9.06  )-----------( 144      ( 05 Sep 2023 21:51 )             47.9     09-05    142  |  110<H>  |  34<H>  ----------------------------<  124<H>  4.7   |  20<L>  |  1.45<H>    Ca    9.2      05 Sep 2023 21:51  Mg     1.9     09-05        Urinalysis Basic - ( 05 Sep 2023 21:51 )    Color: x / Appearance: x / SG: x / pH: x  Gluc: 124 mg/dL / Ketone: x  / Bili: x / Urobili: x   Blood: x / Protein: x / Nitrite: x   Leuk Esterase: x / RBC: x / WBC x   Sq Epi: x / Non Sq Epi: x / Bacteria: x            Lactate, Blood: 1.3 mmol/L (09-06 @ 01:23)      RADIOLOGY, EKG & ADDITIONAL TESTS: Reviewed.

## 2023-09-06 NOTE — CONSULT NOTE ADULT - CONVERSATION DETAILS
Met with patient and his wife at bedside to discuss diagnosis, explore treatment preferences/GOC, and review ACP. Introduced the role of palliative medicine for ACP, GOC, and support.    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 This is his first admission for COPD exacerbation. Patient typically "titrates his medications (prednisone)" at home, though he does follow closely with pulmonologist. He currently lives with a good performance status- walking dog daily, is independent with all ADLs-  and acceptable QOL.     Pt has not completed HCP. Explained role and circumstances when form would come into effect. Form completed and placed in physical chart; pt electing his wife, Carina.     Pt reportedly has a Living Will, however he is unsure if it outlines his wishes for/against resuscitation. Reviewed risks/benefits/potential complications of cpr/intubation in light of advanced age and alternatively explained option for allowing a natural death. Patient is clear that he would never want to be kept alive on machines if there was no chance of meaningful recovery-- confirms trach would never be acceptable. He feels a trial of intubation, but allowing natural death in the event of cardiac arrest are most in line with his treatment preferences, but he would like more time to consider his options. Continue full code.

## 2023-09-06 NOTE — CONSULT NOTE ADULT - PROBLEM SELECTOR RECOMMENDATION 6
.  Complex medical decision making / symptom management in the setting of advanced illness.    Provided patient and his wife the office/contact information for Geriatrician Dr. Palmer, he declines formal appointment at this time  GOC reviewed, ACP completed   Without distressing symptom burden     Coping:  well[ x ] with difficulty[  ] poor coping[  ] unable to assess[  ]   Support system: strong[ x ] adequate[  ] inadequate[  ]    Active Psychosocial Referrals:  SW/CM[ x ] PT/OT[  ] Hospice[  ]  Ethics[  ]    - Palliative medicine will sign off.  Please contact Palliative Medicine 24/7 at [phone] if the patient's symptoms and/or goals of care change, need to be readdressed, or if patient is readmitted in the future.

## 2023-09-07 LAB
A1C WITH ESTIMATED AVERAGE GLUCOSE RESULT: 5.7 % — HIGH (ref 4–5.6)
ALBUMIN SERPL ELPH-MCNC: 3.2 G/DL — LOW (ref 3.3–5)
ALP SERPL-CCNC: 36 U/L — LOW (ref 40–120)
ALT FLD-CCNC: 8 U/L — LOW (ref 10–45)
ANION GAP SERPL CALC-SCNC: 9 MMOL/L — SIGNIFICANT CHANGE UP (ref 5–17)
AST SERPL-CCNC: 10 U/L — SIGNIFICANT CHANGE UP (ref 10–40)
BASOPHILS # BLD AUTO: 0.01 K/UL — SIGNIFICANT CHANGE UP (ref 0–0.2)
BASOPHILS NFR BLD AUTO: 0.1 % — SIGNIFICANT CHANGE UP (ref 0–2)
BILIRUB SERPL-MCNC: 0.5 MG/DL — SIGNIFICANT CHANGE UP (ref 0.2–1.2)
BLD GP AB SCN SERPL QL: NEGATIVE — SIGNIFICANT CHANGE UP
BUN SERPL-MCNC: 40 MG/DL — HIGH (ref 7–23)
CALCIUM SERPL-MCNC: 9.2 MG/DL — SIGNIFICANT CHANGE UP (ref 8.4–10.5)
CHLORIDE SERPL-SCNC: 110 MMOL/L — HIGH (ref 96–108)
CK MB CFR SERPL CALC: 2.7 NG/ML — SIGNIFICANT CHANGE UP (ref 0–6.7)
CK SERPL-CCNC: 38 U/L — SIGNIFICANT CHANGE UP (ref 30–200)
CO2 SERPL-SCNC: 21 MMOL/L — LOW (ref 22–31)
CREAT SERPL-MCNC: 1.73 MG/DL — HIGH (ref 0.5–1.3)
EGFR: 37 ML/MIN/1.73M2 — LOW
EOSINOPHIL # BLD AUTO: 0.01 K/UL — SIGNIFICANT CHANGE UP (ref 0–0.5)
EOSINOPHIL NFR BLD AUTO: 0.1 % — SIGNIFICANT CHANGE UP (ref 0–6)
ESTIMATED AVERAGE GLUCOSE: 117 MG/DL — HIGH (ref 68–114)
GLUCOSE SERPL-MCNC: 128 MG/DL — HIGH (ref 70–99)
HCT VFR BLD CALC: 39.6 % — SIGNIFICANT CHANGE UP (ref 39–50)
HGB BLD-MCNC: 12.6 G/DL — LOW (ref 13–17)
IMM GRANULOCYTES NFR BLD AUTO: 0.5 % — SIGNIFICANT CHANGE UP (ref 0–0.9)
LYMPHOCYTES # BLD AUTO: 0.99 K/UL — LOW (ref 1–3.3)
LYMPHOCYTES # BLD AUTO: 6.6 % — LOW (ref 13–44)
MAGNESIUM SERPL-MCNC: 2 MG/DL — SIGNIFICANT CHANGE UP (ref 1.6–2.6)
MCHC RBC-ENTMCNC: 31 PG — SIGNIFICANT CHANGE UP (ref 27–34)
MCHC RBC-ENTMCNC: 31.8 GM/DL — LOW (ref 32–36)
MCV RBC AUTO: 97.5 FL — SIGNIFICANT CHANGE UP (ref 80–100)
MONOCYTES # BLD AUTO: 0.99 K/UL — HIGH (ref 0–0.9)
MONOCYTES NFR BLD AUTO: 6.6 % — SIGNIFICANT CHANGE UP (ref 2–14)
NEUTROPHILS # BLD AUTO: 13.03 K/UL — HIGH (ref 1.8–7.4)
NEUTROPHILS NFR BLD AUTO: 86.1 % — HIGH (ref 43–77)
NRBC # BLD: 0 /100 WBCS — SIGNIFICANT CHANGE UP (ref 0–0)
PHOSPHATE SERPL-MCNC: 3.4 MG/DL — SIGNIFICANT CHANGE UP (ref 2.5–4.5)
PLATELET # BLD AUTO: 132 K/UL — LOW (ref 150–400)
POTASSIUM SERPL-MCNC: 4.6 MMOL/L — SIGNIFICANT CHANGE UP (ref 3.5–5.3)
POTASSIUM SERPL-SCNC: 4.6 MMOL/L — SIGNIFICANT CHANGE UP (ref 3.5–5.3)
PROT SERPL-MCNC: 5.5 G/DL — LOW (ref 6–8.3)
RBC # BLD: 4.06 M/UL — LOW (ref 4.2–5.8)
RBC # FLD: 14 % — SIGNIFICANT CHANGE UP (ref 10.3–14.5)
RH IG SCN BLD-IMP: POSITIVE — SIGNIFICANT CHANGE UP
SODIUM SERPL-SCNC: 140 MMOL/L — SIGNIFICANT CHANGE UP (ref 135–145)
TROPONIN T, HIGH SENSITIVITY RESULT: 23 NG/L — SIGNIFICANT CHANGE UP (ref 0–51)
WBC # BLD: 15.1 K/UL — HIGH (ref 3.8–10.5)
WBC # FLD AUTO: 15.1 K/UL — HIGH (ref 3.8–10.5)

## 2023-09-07 PROCEDURE — 93010 ELECTROCARDIOGRAM REPORT: CPT

## 2023-09-07 PROCEDURE — 99223 1ST HOSP IP/OBS HIGH 75: CPT | Mod: GC

## 2023-09-07 PROCEDURE — 93970 EXTREMITY STUDY: CPT | Mod: 26

## 2023-09-07 PROCEDURE — 71045 X-RAY EXAM CHEST 1 VIEW: CPT | Mod: 26

## 2023-09-07 PROCEDURE — 93306 TTE W/DOPPLER COMPLETE: CPT | Mod: 26

## 2023-09-07 PROCEDURE — 99233 SBSQ HOSP IP/OBS HIGH 50: CPT | Mod: GC

## 2023-09-07 PROCEDURE — 71250 CT THORAX DX C-: CPT | Mod: 26

## 2023-09-07 RX ORDER — FUROSEMIDE 40 MG
20 TABLET ORAL ONCE
Refills: 0 | Status: COMPLETED | OUTPATIENT
Start: 2023-09-07 | End: 2023-09-07

## 2023-09-07 RX ORDER — SODIUM CHLORIDE 9 MG/ML
500 INJECTION, SOLUTION INTRAVENOUS ONCE
Refills: 0 | Status: COMPLETED | OUTPATIENT
Start: 2023-09-07 | End: 2023-09-07

## 2023-09-07 RX ORDER — POLYETHYLENE GLYCOL 3350 17 G/17G
17 POWDER, FOR SOLUTION ORAL DAILY
Refills: 0 | Status: DISCONTINUED | OUTPATIENT
Start: 2023-09-07 | End: 2023-09-08

## 2023-09-07 RX ADMIN — Medication 3 MILLILITER(S): at 21:05

## 2023-09-07 RX ADMIN — HEPARIN SODIUM 5000 UNIT(S): 5000 INJECTION INTRAVENOUS; SUBCUTANEOUS at 15:28

## 2023-09-07 RX ADMIN — ESCITALOPRAM OXALATE 10 MILLIGRAM(S): 10 TABLET, FILM COATED ORAL at 10:03

## 2023-09-07 RX ADMIN — Medication 3 MILLILITER(S): at 18:44

## 2023-09-07 RX ADMIN — AZITHROMYCIN 255 MILLIGRAM(S): 500 TABLET, FILM COATED ORAL at 02:41

## 2023-09-07 RX ADMIN — TAMSULOSIN HYDROCHLORIDE 0.8 MILLIGRAM(S): 0.4 CAPSULE ORAL at 21:05

## 2023-09-07 RX ADMIN — BUDESONIDE AND FORMOTEROL FUMARATE DIHYDRATE 2 PUFF(S): 160; 4.5 AEROSOL RESPIRATORY (INHALATION) at 06:10

## 2023-09-07 RX ADMIN — PANTOPRAZOLE SODIUM 40 MILLIGRAM(S): 20 TABLET, DELAYED RELEASE ORAL at 06:09

## 2023-09-07 RX ADMIN — BUDESONIDE AND FORMOTEROL FUMARATE DIHYDRATE 2 PUFF(S): 160; 4.5 AEROSOL RESPIRATORY (INHALATION) at 18:43

## 2023-09-07 RX ADMIN — Medication 40 MILLIGRAM(S): at 06:10

## 2023-09-07 RX ADMIN — ATORVASTATIN CALCIUM 40 MILLIGRAM(S): 80 TABLET, FILM COATED ORAL at 21:05

## 2023-09-07 RX ADMIN — HEPARIN SODIUM 5000 UNIT(S): 5000 INJECTION INTRAVENOUS; SUBCUTANEOUS at 21:04

## 2023-09-07 RX ADMIN — Medication 20 MILLIGRAM(S): at 19:36

## 2023-09-07 RX ADMIN — SODIUM CHLORIDE 1000 MILLILITER(S): 9 INJECTION, SOLUTION INTRAVENOUS at 08:48

## 2023-09-07 RX ADMIN — POLYETHYLENE GLYCOL 3350 17 GRAM(S): 17 POWDER, FOR SOLUTION ORAL at 10:04

## 2023-09-07 RX ADMIN — AZITHROMYCIN 255 MILLIGRAM(S): 500 TABLET, FILM COATED ORAL at 22:09

## 2023-09-07 RX ADMIN — Medication 3 MILLILITER(S): at 15:28

## 2023-09-07 RX ADMIN — Medication 3 MILLILITER(S): at 10:03

## 2023-09-07 RX ADMIN — Medication 3 MILLILITER(S): at 06:10

## 2023-09-07 RX ADMIN — Medication 10 MILLIGRAM(S): at 10:03

## 2023-09-07 NOTE — SWALLOW BEDSIDE ASSESSMENT ADULT - SWALLOW EVAL: DIAGNOSIS
Seemingly grossly functional oropharyngeal swallow. No clinical indicators of penetration/aspiration. No gross clinical indicators of pharyngeal inefficiency noted. Multiple swallows (2) noted across PO. Mild rough and gravelly vocal quality noted. Given patient report, recommend a modified barium swallow study to further assess swallowing physiology. Further dysphagia intervention pending objective assessment results. Seemingly grossly functional oropharyngeal swallow. No clinical indicators of penetration/aspiration. No gross clinical indicators of pharyngeal inefficiency noted. Multiple swallows (2) noted across PO. Mild rough and gravelly vocal quality noted. Given patient report and remarkable pulmonary hx, recommend a modified barium swallow study to further assess swallowing physiology. Further dysphagia intervention pending objective assessment results.

## 2023-09-07 NOTE — PHYSICAL THERAPY INITIAL EVALUATION ADULT - PERTINENT HX OF CURRENT PROBLEM, REHAB EVAL
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 Patient also complains of a productive cough with yellow sputum over the past 2-3 days. Of note, patient had a mechanical fall 2 weeks ago and has been walking with a cane since then (was able to ambulate without assistance prior to falling).

## 2023-09-07 NOTE — CONSULT NOTE ADULT - SUBJECTIVE AND OBJECTIVE BOX
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 Patient is on home inhaler Trelegy and uses Prednisone intermittently, which he self-tapers but reports no improvement in symptoms. Last used Prednisone 10mg the day before coming into the hospital. Patient also complains of a productive cough with yellow sputum over the past 2-3 days. Of note, patient had a mechanical fall 2 weeks ago and has been walking with a cane since then (was able to ambulate without assistance prior to falling). Denies chest pain, n/v/d, constipation. Denies any sick contacts or recent travel.     In the ED  Vitals 98.2, /73, HR 78, RR 29, spO2 88%   Labs VBG 7.14, CO2 66, HCO3 22. Cr 1.45 -> after BiPAP pH 7.21, pCO2 128, HCO3 21  CXR Wet Read: b/l opacities in upper + lower lung fields   Interventions Mg 2g, solumedrol 125, duoneb x3 (06 Sep 2023 01:27)    PAST MEDICAL & SURGICAL HISTORY:  COPD (chronic obstructive pulmonary disease)      Lung cancer      HTN (hypertension)      High cholesterol      BPH (benign prostatic hyperplasia)      S/P lobectomy of lung      History of hip surgery        MEDICATIONS  (STANDING):  albuterol/ipratropium for Nebulization 3 milliLiter(s) Nebulizer every 4 hours  atorvastatin 40 milliGRAM(s) Oral at bedtime  azithromycin  IVPB 500 milliGRAM(s) IV Intermittent every 24 hours  budesonide  80 MICROgram(s)/formoterol 4.5 MICROgram(s) Inhaler 2 Puff(s) Inhalation two times a day  escitalopram 10 milliGRAM(s) Oral daily  heparin   Injectable 5000 Unit(s) SubCutaneous every 8 hours  pantoprazole    Tablet 40 milliGRAM(s) Oral before breakfast  predniSONE   Tablet 40 milliGRAM(s) Oral every 24 hours  tamsulosin 0.8 milliGRAM(s) Oral at bedtime    MEDICATIONS  (PRN):               Home Living Status :  lives with his wife in an elevator accessible apartment building           -  Prior Home Care Services :  none          -  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     Baseline Functional Level Prior to Admission :             - ADL's/ IADL's :  independent          - Ambulatory status prior to admission :   walked with a cane         FAMILY HISTORY:      CBC Full  -  ( 07 Sep 2023 06:11 )  WBC Count : 15.10 K/uL  RBC Count : 4.06 M/uL  Hemoglobin : 12.6 g/dL  Hematocrit : 39.6 %  Platelet Count - Automated : 132 K/uL  Mean Cell Volume : 97.5 fl  Mean Cell Hemoglobin : 31.0 pg  Mean Cell Hemoglobin Concentration : 31.8 gm/dL  Auto Neutrophil # : 13.03 K/uL  Auto Lymphocyte # : 0.99 K/uL  Auto Monocyte # : 0.99 K/uL  Auto Eosinophil # : 0.01 K/uL  Auto Basophil # : 0.01 K/uL  Auto Neutrophil % : 86.1 %  Auto Lymphocyte % : 6.6 %  Auto Monocyte % : 6.6 %  Auto Eosinophil % : 0.1 %  Auto Basophil % : 0.1 %      09-07    140  |  110<H>  |  40<H>  ----------------------------<  128<H>  4.6   |  21<L>  |  1.73<H>    Ca    9.2      07 Sep 2023 06:11  Phos  3.4     09-07  Mg     2.0     09-07    TPro  5.5<L>  /  Alb  3.2<L>  /  TBili  0.5  /  DBili  x   /  AST  10  /  ALT  8<L>  /  AlkPhos  36<L>  09-07      Urinalysis Basic - ( 07 Sep 2023 06:11 )    Color: x / Appearance: x / SG: x / pH: x  Gluc: 128 mg/dL / Ketone: x  / Bili: x / Urobili: x   Blood: x / Protein: x / Nitrite: x   Leuk Esterase: x / RBC: x / WBC x   Sq Epi: x / Non Sq Epi: x / Bacteria: x        Radiology :     < from: Xray Chest 1 View- PORTABLE-Urgent (Xray Chest 1 View- PORTABLE-Urgent .) (09.07.23 @ 08:47) >  ACC: [id number] EXAM:  XR CHEST PORTABLE URGENT 1V   ORDERED BY: ALVINO DECKER     PROCEDURE DATE:  09/07/2023          INTERPRETATION:  Portable chest    HISTORY: Shortness of breath    IMPRESSION:    Similar appearance to prior exam 9/5/2023. Noacute infiltrates lung   consolidation or pneumothorax. No significant pleural effusion. Postop   change. No acute bone abnormality.             Review of Systems : per HPI         Vital Signs Last 24 Hrs  T(C): 36.6 (07 Sep 2023 04:27), Max: 36.9 (06 Sep 2023 18:02)  T(F): 97.9 (07 Sep 2023 04:27), Max: 98.5 (06 Sep 2023 18:02)  HR: 68 (07 Sep 2023 08:50) (62 - 77)  BP: 126/59 (07 Sep 2023 08:50) (101/54 - 160/72)  BP(mean): 85 (07 Sep 2023 08:50) (76 - 103)  RR: 20 (07 Sep 2023 08:50) (16 - 20)  SpO2: 98% (07 Sep 2023 08:50) (96% - 99%)    Parameters below as of 07 Sep 2023 08:50  Patient On (Oxygen Delivery Method): room air            Physical Exam :  89 y o man , lying comfortably in semi Connors's position , awake , alert , no acute complaints     Head : normocephalic , atraumatic    Eyes : PERRLA , EOMI , no nystagmus , sclera anicteric    ENT / FACE : neg nasal discharge , uvula midline , no oropharyngeal erythema / exudate    Neck : supple , negative JVD , negative carotid bruits , no thyromegaly    Chest : CTA bilaterally , neg wheeze / rhonchi / rales / crackles / egophany    Cardiovascular : regular rate and rhythm , neg murmurs / rubs / gallops    Abdomen : soft , non distended , non tender to palpation in all 4 quadrants ,  normal bowel sounds     Extremities : WWP , neg cyanosis /clubbing / edema     Neurologic Exam :    Alert and oriented  x 3      Motor Exam:          Right UE:               no focal weakness ,  > 3+/5 throughout      Left UE:                 no focal weakness ,  > 3+/5 throughout          Right LE:    no focal weakness ,  > 3+/5 throughout        Left LE:    no focal weakness ,  > 3+/5 throughout         Sensation :         intact to light touch x 4 extremities     DTR :     biceps/brachioradialis : equal                      patella/ankle : equal          Gait :  not tested          PM&R Impression :     admitted for c/o     - no acute pathology on CT brain imaging , MRI pending     - deconditioned    - no focal weakness  neurologic deficits         Recommendations / Plan :      1) Physical / Occupational therapy focusing on therapeutic exercises , equipment evaluation , bed mobility/transfer out of bed evaluation , progressive ambulation with assistive devices prn .    2) Current disposition plan recommendation  :    pending functional progress

## 2023-09-07 NOTE — SWALLOW BEDSIDE ASSESSMENT ADULT - SLP GENERAL OBSERVATIONS
Pt was seen fully awake and alert, HOB fully elevated, on room air. A&Ox3, followed simple directives, and communicated wants/needs. Motor speech exam (AMRS and SMRs) revealed regular pattern, precise speech, and normal rate. Mild rough and gravelly vocal quality noted.

## 2023-09-07 NOTE — PROGRESS NOTE ADULT - PROBLEM SELECTOR PLAN 1
Pt with hx of COPD. Home inhalers: Trelegy and PRN Albuterol nebulizers. Pt endorsing he has been using his home inhalers and steroids without much improvement.   SpO2 88% on RA with tachypnea to 30s per ED provider. Placed on BiPAP with improvement. Initial VBG pH 7.14 --> 7.21 (on BiPAP 10/6). S/p Duonebs and IV Solumedrol in ED. Elevated procal. New increased wob on ambulation.    Plan  - Azithromycin 500mg IV qd x 3 days (9/6-9/8)  - Outpatient prednison taper pack  - continue with duonebs standing q4hr  - Cardiology recs  - TTE, CT chest, b/l LE dopplers

## 2023-09-07 NOTE — PHYSICAL THERAPY INITIAL EVALUATION ADULT - ADDITIONAL COMMENTS
As per pt, PTA she was completely independent with all functional mobility, ADLs, and IADLs and is a community ambulator. Pt reports 2 recent mechanical falls while picking up his dog's poop on the sidewalk, takes dog for 1.5 mile walk everyday. Pt has been using a straight cane for the past ~1 week in the community, not in the home. Has a tub and walk in shower.

## 2023-09-07 NOTE — PHYSICAL THERAPY INITIAL EVALUATION ADULT - CRITERIA FOR SKILLED THERAPEUTIC INTERVENTIONS
Referral ordered. Accessed patient chart to print all necessary info for referral for faxing it to referred provider. impairments found/functional limitations in following categories

## 2023-09-07 NOTE — PHYSICAL THERAPY INITIAL EVALUATION ADULT - PRECAUTIONS/LIMITATIONS, REHAB EVAL
2 recent mechanical falls while picking up his dog's poop on the sidewalk/fall precautions Recent mechanical fall while picking up his dog's poop on the sidewalk/fall precautions

## 2023-09-07 NOTE — PHYSICAL THERAPY INITIAL EVALUATION ADULT - GAIT DEVIATIONS NOTED, PT EVAL
SpO2 >92% on room air with ambulation/decreased cesar/decreased velocity of limb motion/decreased step length/decreased weight-shifting ability

## 2023-09-07 NOTE — PROGRESS NOTE ADULT - SUBJECTIVE AND OBJECTIVE BOX
CC: Patient is a 89y old  Male who presents with a chief complaint of COPD exacerbation (07 Sep 2023 11:13)      INTERVAL EVENTS: GEOFFREY    SUBJECTIVE / INTERVAL HPI: Patient seen and examined at bedside.   Found to have difficulty breathing when ambulating to the bathroom. No chest pain or pressure. Otherwise no other concerns.    ROS: negative unless otherwise stated above.      VITAL SIGNS:  T(F): 97.5 (09-07-23 @ 10:22)  HR: 68 (09-07-23 @ 08:50)  BP: 126/59 (09-07-23 @ 08:50)  RR: 20 (09-07-23 @ 08:50)  SpO2: 98% (09-07-23 @ 08:50)  Wt(kg): --      09-06-23 @ 07:01  -  09-07-23 @ 07:00  --------------------------------------------------------  IN: 250 mL / OUT: 1450 mL / NET: -1200 mL    09-07-23 @ 07:01  -  09-07-23 @ 11:15  --------------------------------------------------------  IN: 0 mL / OUT: 160 mL / NET: -160 mL    PHYSICAL EXAM:    General: NAD  HEENT: MMM  Neck: supple  Cardiovascular: +S1/S2; RRR  Respiratory: increase wheeze.   Gastrointestinal: soft, NT/ND  Extremities: WWP; no edema, clubbing or cyanosis  Vascular: 2+ radial, DP/PT pulses B/L  Neurological: AAOx3; no focal deficits    MEDICATIONS:  MEDICATIONS  (STANDING):  albuterol/ipratropium for Nebulization 3 milliLiter(s) Nebulizer every 4 hours  atorvastatin 40 milliGRAM(s) Oral at bedtime  budesonide  80 MICROgram(s)/formoterol 4.5 MICROgram(s) Inhaler 2 Puff(s) Inhalation two times a day  escitalopram 10 milliGRAM(s) Oral daily  heparin   Injectable 5000 Unit(s) SubCutaneous every 8 hours  pantoprazole    Tablet 40 milliGRAM(s) Oral before breakfast  polyethylene glycol 3350 17 Gram(s) Oral daily  predniSONE   Tablet 40 milliGRAM(s) Oral every 24 hours  tamsulosin 0.8 milliGRAM(s) Oral at bedtime    MEDICATIONS  (PRN):      ALLERGIES:  Allergies    No Known Allergies    Intolerances        LABS:  Labs Interpretation:      LABS:                        12.6   15.10 )-----------( 132      ( 07 Sep 2023 06:11 )             39.6     09-07    140  |  110<H>  |  40<H>  ----------------------------<  128<H>  4.6   |  21<L>  |  1.73<H>    Ca    9.2      07 Sep 2023 06:11  Phos  3.4     09-07  Mg     2.0     09-07    TPro  5.5<L>  /  Alb  3.2<L>  /  TBili  0.5  /  DBili  x   /  AST  10  /  ALT  8<L>  /  AlkPhos  36<L>  09-07      Urinalysis Basic - ( 07 Sep 2023 06:11 )    Color: x / Appearance: x / SG: x / pH: x  Gluc: 128 mg/dL / Ketone: x  / Bili: x / Urobili: x   Blood: x / Protein: x / Nitrite: x   Leuk Esterase: x / RBC: x / WBC x   Sq Epi: x / Non Sq Epi: x / Bacteria: x                  RADIOLOGY & ADDITIONAL TESTS: Reviewed.  CXR  EKG    Imaging Interpretation Review:    FMHx  Surgical Hx

## 2023-09-07 NOTE — SWALLOW BEDSIDE ASSESSMENT ADULT - COMMENTS
INCOMPLETE Pt reports frequent cough with oral intake. He reports symptoms have been notable and worsening over the last year. He also reported occasional expectoration of food particles. He denied hx of frequent PNAs or unintentional weight loss. He denied hx of esophageal dysmotility or reflux.

## 2023-09-07 NOTE — PROGRESS NOTE ADULT - PROBLEM SELECTOR PLAN 8
F: none, speech and swallow ordered  E: replete K<4, Mg<2  N: regular  VTE Prophylaxis: Heparin 5000 q8h  GI: protonix 40mg QD  C: Full Code  D: telemetry

## 2023-09-07 NOTE — CONSULT NOTE ADULT - SUBJECTIVE AND OBJECTIVE BOX
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 Pt began having worsening SOB 4 days prior to arrival and while eating dinner on 9/5/23, he couldn't catch his breath so he took a cab to the ED. Pt states that he is on prednisone intermittently and when his SOB worsening 4 days prior to arrival, he began a prednisone 30mg taper (has been off of steroids for 4 months). Pt follows up outpatient with Dr. Santo, was last seen on 7/10/23 for dyspnea and wheezing, reported that he felt better back then even though his spirometry has worsened (per [facility] records). Pt is on home azithromycin 250mg q2d (has been on it for the past year) and Trelegy ellipta 100-62.5-25 1 puff qd. In the ED, pt was placed on BiPAP, was given magnesium 2mg, solumedrol x1, and duoneb x3 and pt was admitted to [facility]. Pt was weaned off O2, however, pt had amb sat of 73% on 9/6 with increased wheezing. Pt is currently on prednisone 40mg qd (9/7-), azithromycin 500mg IV qd (9/6-), symbicort q12h and duoneb q4h. Now, pt is feeling much better, back to his baseline. Report chronic productive cough but denies SOB, difficulty breathing or chest tightness now.     Social history: 1.5PPD for 60+ years (quit in his 80s), no recreational drug use, drinks 1 glass of wine rarely when he goes out to dinner. Lives at home with wife and dog.   Allergies: NKDA, hay fever.    VITAL SIGNS:  T(F): 97.5 (09-07-23 @ 10:22)  HR: 68 (09-07-23 @ 08:50)  BP: 126/59 (09-07-23 @ 08:50)  RR: 20 (09-07-23 @ 08:50)  SpO2: 98% (09-07-23 @ 08:50)  Wt(kg): --      09-06-23 @ 07:01  -  09-07-23 @ 07:00  --------------------------------------------------------  IN: 250 mL / OUT: 1450 mL / NET: -1200 mL    09-07-23 @ 07:01  -  09-07-23 @ 11:15  --------------------------------------------------------  IN: 0 mL / OUT: 160 mL / NET: -160 mL        PHYSICAL EXAM:  General: NAD, comfortable  HEENT: MMM  Neck: supple  Cardiovascular: +S1/S2; RRR  Respiratory: CTA B/L; diffuse wheezing to all lung fields, worse at bases  Gastrointestinal: soft, NT/ND, +BS  Extremities: WWP; no LE edema, clubbing or cyanosis. Age related skin changes.   Vascular: 2+ radial pulses B/L  Neurological: AAOx3; no focal deficits    MEDICATIONS  (STANDING):  albuterol/ipratropium for Nebulization 3 milliLiter(s) Nebulizer every 4 hours  atorvastatin 40 milliGRAM(s) Oral at bedtime  azithromycin  IVPB 500 milliGRAM(s) IV Intermittent every 24 hours  budesonide  80 MICROgram(s)/formoterol 4.5 MICROgram(s) Inhaler 2 Puff(s) Inhalation two times a day  escitalopram 10 milliGRAM(s) Oral daily  heparin   Injectable 5000 Unit(s) SubCutaneous every 8 hours  pantoprazole    Tablet 40 milliGRAM(s) Oral before breakfast  polyethylene glycol 3350 17 Gram(s) Oral daily  predniSONE   Tablet 40 milliGRAM(s) Oral every 24 hours  tamsulosin 0.8 milliGRAM(s) Oral at bedtime    MEDICATIONS  (PRN):      Allergies    No Known Allergies    Intolerances        LABS:                        12.6   15.10 )-----------( 132      ( 07 Sep 2023 06:11 )             39.6     09-07    140  |  110<H>  |  40<H>  ----------------------------<  128<H>  4.6   |  21<L>  |  1.73<H>    Ca    9.2      07 Sep 2023 06:11  Phos  3.4     09-07  Mg     2.0     09-07    TPro  5.5<L>  /  Alb  3.2<L>  /  TBili  0.5  /  DBili  x   /  AST  10  /  ALT  8<L>  /  AlkPhos  36<L>  09-07      Urinalysis Basic - ( 07 Sep 2023 06:11 )    Color: x / Appearance: x / SG: x / pH: x  Gluc: 128 mg/dL / Ketone: x  / Bili: x / Urobili: x   Blood: x / Protein: x / Nitrite: x   Leuk Esterase: x / RBC: x / WBC x   Sq Epi: x / Non Sq Epi: x / Bacteria: x        RADIOLOGY & ADDITIONAL TESTS:  Reviewed SUBJECTIVE/HOSPITAL COURSE:  89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 France, not on ASA due to bruising), and BPH presented due to progressively worsening SOB for 4 days. Pt began having worsening SOB 4 days prior to arrival and while eating dinner on 9/5/23, he couldn't catch his breath so he took a cab to the ED. Pt states that he is on prednisone intermittently and when his SOB worsening 4 days prior to arrival, he began a prednisone 30mg taper (has been off of steroids for 4 months). Pt follows up outpatient with Dr. Santo, was last seen on 7/10/23 for dyspnea and wheezing, reported that he felt better back then even though his spirometry has worsened (per [facility]). Pt is on home azithromycin 250mg q2d (has been on it for the past year) and Trelegy ellipta 100-62.5-25 1 puff qd. In the ED, pt was placed on BiPAP, was given magnesium 2mg, solumedrol x1, and duoneb x3 and pt was admitted to [facility]. Pt was weaned off O2, however, pt had amb sat of 73% on 9/6 with increased wheezing. Pt is currently on prednisone 40mg qd (9/7-), azithromycin 500mg IV qd (9/6-), symbicort q12h and duoneb q4h. Now, pt is feeling much better, back to his baseline. Report chronic productive cough but denies SOB, difficulty breathing or chest tightness now.     Social history: 1.5PPD for 60+ years (quit in his 80s), no recreational drug use, drinks 1 glass of wine rarely when he goes out to dinner. Lives at home with wife and dog.   Allergies: NKDA, hay fever.    VITAL SIGNS:  T(F): 97.5 (09-07-23 @ 10:22)  HR: 68 (09-07-23 @ 08:50)  BP: 126/59 (09-07-23 @ 08:50)  RR: 20 (09-07-23 @ 08:50)  SpO2: 98% (09-07-23 @ 08:50)  Wt(kg): --      09-06-23 @ 07:01  -  09-07-23 @ 07:00  --------------------------------------------------------  IN: 250 mL / OUT: 1450 mL / NET: -1200 mL    09-07-23 @ 07:01  -  09-07-23 @ 11:15  --------------------------------------------------------  IN: 0 mL / OUT: 160 mL / NET: -160 mL    PHYSICAL EXAM:  General: NAD, comfortable  HEENT: MMM  Neck: supple  Cardiovascular: +S1/S2; RRR  Respiratory: CTA B/L; diffuse wheezing to all lung fields, worse at bases  Gastrointestinal: soft, NT/ND, +BS  Extremities: WWP; no LE edema, clubbing or cyanosis. Age related skin changes.   Vascular: 2+ radial pulses B/L  Neurological: AAOx3; no focal deficits    MEDICATIONS  (STANDING):  albuterol/ipratropium for Nebulization 3 milliLiter(s) Nebulizer every 4 hours  atorvastatin 40 milliGRAM(s) Oral at bedtime  azithromycin  IVPB 500 milliGRAM(s) IV Intermittent every 24 hours  budesonide  80 MICROgram(s)/formoterol 4.5 MICROgram(s) Inhaler 2 Puff(s) Inhalation two times a day  escitalopram 10 milliGRAM(s) Oral daily  heparin   Injectable 5000 Unit(s) SubCutaneous every 8 hours  pantoprazole    Tablet 40 milliGRAM(s) Oral before breakfast  polyethylene glycol 3350 17 Gram(s) Oral daily  predniSONE   Tablet 40 milliGRAM(s) Oral every 24 hours  tamsulosin 0.8 milliGRAM(s) Oral at bedtime    MEDICATIONS  (PRN):      Allergies    No Known Allergies    Intolerances        LABS:                        12.6   15.10 )-----------( 132      ( 07 Sep 2023 06:11 )             39.6     09-07    140  |  110<H>  |  40<H>  ----------------------------<  128<H>  4.6   |  21<L>  |  1.73<H>    Ca    9.2      07 Sep 2023 06:11  Phos  3.4     09-07  Mg     2.0     09-07    TPro  5.5<L>  /  Alb  3.2<L>  /  TBili  0.5  /  DBili  x   /  AST  10  /  ALT  8<L>  /  AlkPhos  36<L>  09-07      Urinalysis Basic - ( 07 Sep 2023 06:11 )    Color: x / Appearance: x / SG: x / pH: x  Gluc: 128 mg/dL / Ketone: x  / Bili: x / Urobili: x   Blood: x / Protein: x / Nitrite: x   Leuk Esterase: x / RBC: x / WBC x   Sq Epi: x / Non Sq Epi: x / Bacteria: x        RADIOLOGY & ADDITIONAL TESTS:  Reviewed SUBJECTIVE/HOSPITAL COURSE:  89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 France, not on ASA due to bruising), and BPH presented due to progressively worsening SOB for 4 days. Reported change in cough frequency and sputum color. Pt began having worsening SOB 4 days prior to arrival and after eating dinner on 9/5/23, had iWOB and couldn't catch his breath so he took a cab to the ED. Pt states that he is on prednisone intermittently and when his SOB worsening 4 days prior to arrival, he began a prednisone 30mg taper (has been off of steroids for 4 months). Pt follows up outpatient with Dr. Santo, was last seen on 7/10/23 for dyspnea and wheezing, reported that he felt better and his spirometry at that time was consistent with restrictive lung disease (per [facility] records). Pt is on home azithromycin 250mg q2d (has been on it for the past year) and Trelegy ellipta 100-62.5-25 1 puff qd. In the ED, pt was placed on BiPAP, was given magnesium 2mg, solumedrol x1, and duoneb x3 and pt was admitted to [facility]. Pt was weaned off O2, however, pt had amb sat of 73% on 9/6 with increased wheezing. Pt is currently on prednisone 40mg qd (9/7-), azithromycin 500mg IV qd (9/6-), symbicort q12h and duoneb q4h. Now, pt is feeling much better, back to his baseline. Report chronic productive cough but denies SOB, difficulty breathing or chest tightness now.     Social history: 1.5PPD for 60+ years (quit in his 80s), no recreational drug use, drinks 1 glass of wine rarely when he goes out to dinner. Lives at home with wife and dog.   Allergies: NKDA, hay fever.    VITAL SIGNS:  T(F): 97.5 (09-07-23 @ 10:22)  HR: 68 (09-07-23 @ 08:50)  BP: 126/59 (09-07-23 @ 08:50)  RR: 20 (09-07-23 @ 08:50)  SpO2: 98% (09-07-23 @ 08:50)  Wt(kg): --      09-06-23 @ 07:01  -  09-07-23 @ 07:00  --------------------------------------------------------  IN: 250 mL / OUT: 1450 mL / NET: -1200 mL    09-07-23 @ 07:01  -  09-07-23 @ 11:15  --------------------------------------------------------  IN: 0 mL / OUT: 160 mL / NET: -160 mL    PHYSICAL EXAM:  General: NAD, comfortable  HEENT: MMM  Neck: supple  Cardiovascular: +S1/S2; RRR  Respiratory: CTA B/L; diffuse wheezing to all lung fields, worse at bases  Gastrointestinal: soft, NT/ND, +BS  Extremities: WWP; no LE edema, clubbing or cyanosis. Age related skin changes.   Vascular: 2+ radial pulses B/L  Neurological: AAOx3; no focal deficits    MEDICATIONS  (STANDING):  albuterol/ipratropium for Nebulization 3 milliLiter(s) Nebulizer every 4 hours  atorvastatin 40 milliGRAM(s) Oral at bedtime  azithromycin  IVPB 500 milliGRAM(s) IV Intermittent every 24 hours  budesonide  80 MICROgram(s)/formoterol 4.5 MICROgram(s) Inhaler 2 Puff(s) Inhalation two times a day  escitalopram 10 milliGRAM(s) Oral daily  heparin   Injectable 5000 Unit(s) SubCutaneous every 8 hours  pantoprazole    Tablet 40 milliGRAM(s) Oral before breakfast  polyethylene glycol 3350 17 Gram(s) Oral daily  predniSONE   Tablet 40 milliGRAM(s) Oral every 24 hours  tamsulosin 0.8 milliGRAM(s) Oral at bedtime    MEDICATIONS  (PRN):      Allergies    No Known Allergies    Intolerances        LABS:                        12.6   15.10 )-----------( 132      ( 07 Sep 2023 06:11 )             39.6     09-07    140  |  110<H>  |  40<H>  ----------------------------<  128<H>  4.6   |  21<L>  |  1.73<H>    Ca    9.2      07 Sep 2023 06:11  Phos  3.4     09-07  Mg     2.0     09-07    TPro  5.5<L>  /  Alb  3.2<L>  /  TBili  0.5  /  DBili  x   /  AST  10  /  ALT  8<L>  /  AlkPhos  36<L>  09-07      Urinalysis Basic - ( 07 Sep 2023 06:11 )    Color: x / Appearance: x / SG: x / pH: x  Gluc: 128 mg/dL / Ketone: x  / Bili: x / Urobili: x   Blood: x / Protein: x / Nitrite: x   Leuk Esterase: x / RBC: x / WBC x   Sq Epi: x / Non Sq Epi: x / Bacteria: x        RADIOLOGY & ADDITIONAL TESTS:  Reviewed

## 2023-09-07 NOTE — SWALLOW BEDSIDE ASSESSMENT ADULT - CONSISTENCIES ADMINISTERED
thin liquids via cup and straw (single and consecutive sips, approx 4oz); purees x 2; and solids x 3

## 2023-09-07 NOTE — CONSULT NOTE ADULT - ASSESSMENT
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    #COPD exacerbation.   Pt presented with a 4-day history of worsening SOB. Initially placed on BiPAP in the ED but is now satting 98% on RA. CXR showed R basilar focal atelectasis, no infiltrate or consolidation. Pt had amb sat of 73% on RA on 9/6 but satting in high 90s on RA. Pt reports his SOB is back at baseline, denies any difficulty breathing, chest pain.   - Continue with triple therapy (symbicort and duoneb), azithromycin and prednisone per primary team.  - As per nurse, physical therapy did amb sat on patient this morning and was satting 97% RA.   - Supplemental O2 as needed.   - F/u outpatient with Dr. Santo as scheduled on 9/18 at 4PM.   - F/u CT chest and echo results (to r/o triggers of COPD exacerbation).     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    #COPD exacerbation.   Pt presented with a 4-day history of worsening SOB. Reported change in chronic cough frequency and sputum color. Initially placed on BiPAP in the ED but is now satting 98% on RA. CXR showed R basilar focal atelectasis, no infiltrate or consolidation. Pt had amb sat of 73% on RA on 9/6 but satting in high 90s on RA. Pt reports his SOB is back at baseline, denies any difficulty breathing, chest pain.   Of note, last spirometry with Dr. Santo on 7/10/23 was consistent with restrictive lung disease.   - Continue with triple therapy (symbicort and duoneb), azithromycin and prednisone per primary team.  - Physical therapy obtained amb sat this morning and was satting 97% on RA.   - Please add Spiriva.    - Supplemental O2 as needed.   - F/u outpatient with Dr. Santo as scheduled on 9/18 at 4PM.   - Please continue home medications (Trelegy ellipta and azithro) once pt is discharged.  - F/u CT chest and echo results (to r/o triggers of COPD exacerbation).     Rest of management per primary team

## 2023-09-07 NOTE — CONSULT NOTE ADULT - ASSESSMENT
{\rtf1\dimjvj40435\ansi\lncrybt0015\ftnbj\uc1\deff0  {\fonttbl{\f0 \fnil Segoe UI;}{\f1 \fnil \fcharset0 Segoe UI;}{\f2 \fnil Times New Oskar;}}  {\colortbl ;\dwx491\toeni047\mtpe458 ;\red0\green0\blue0 ;\red0\green0\apno129 ;\red0\green0\blue0 ;}  {\stylesheet{\f0\fs20 Normal;}{\cs1 Default Paragraph Font;}{\cs2\f0\fs16 Line Number;}{\cs3\f2\fs24\ul\cf3 Hyperlink;}}  {\*\revtbl{Unknown;}}  \wohwjr69191\bsvsuo94428\xgxjq1125\xxyan9602\tdsqr2646\tqhxn7260\pwvxtdc428\zzqzpxj641\nogrowautofit\cewvur115\formshade\nofeaturethrottle1\dntblnsbdb\fet4\aendnotes\aftnnrlc\pgbrdrhead\pgbrdrfoot  \sectd\immdzf31797\xuumpi00497\guttersxn0\pcmnhexi0503\etjfzhlv9948\pjxtpewf5928\obkkpjfg5977\gaegqsl734\vmakyie729\sbkpage\pgncont\pgndec  \plain\plain\f0\fs24\ql\plain\f0\fs24\plain\f0\fs20\plva9316\hich\f0\dbch\f0\loch\f0\fs20\par  I M\par  \par  89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 \par  \par  \plain\f1\fs20\axow9764\hich\f1\dbch\f1\loch\f1\cf2\fs20\ul{\field{\*\fldinst HYPERLINK [id number] }{\fldrslt Problem/Plan - 1:}}\plain\f0\fs20\zfgd9023\hich\f0\dbch\f0\loch\f0\fs20\ql\par  \'b7  {\*\bkmkstart [id number]}{\*\bkmkend xp02259351334}Problem: {\*\bkmkstart ae82656245299}{\*\bkmkend qn85873628608}COPD exacerbation. \par  \'b7  {\*\bkmkstart ex97422103883}{\*\bkmkend zc35002844359}Plan: {\*\bkmkstart ye59148300357}{\*\bkmkend ev65913814895}Pt with hx of COPD. Home inhalers: Trelegy and PRN Albuterol nebulizers. Pt endorsing he has been using his home inhalers and steroids   without much improvement. \par  SpO2 88% on RA with tachypnea to 30s per ED provider. Placed on BiPAP with improvement. Initial VBG pH 7.14 --> 7.21 (on BiPAP 10/6). S/p Duonebs and IV Solumedrol in ED. Elevated procal. \par  \par  Plan\par  - start Azithromycin 500mg IV qd x 3 days (9/6-9/8)\par  - Outpatient prednison taper pack\par  - continue with duonebs standing q4hr.\par  \par  \plain\f1\fs20\zurz7240\hich\f1\dbch\f1\loch\f1\cf2\fs20\ul{\field{\*\fldinst HYPERLINK [id number] }{\fldrslt Problem/Plan - 2:}}\plain\f0\fs20\iguy4090\hich\f0\dbch\f0\loch\f0\fs20\ql\par  \'b7  {\*\bkmkstart tx08764887723}{\*\bkmkend sw98172448469}Problem: {\*\bkmkstart qb17421349892}{\*\bkmkend lu46390959813}Normal anion gap metabolic acidosis. \par  \'b7  {\*\bkmkstart vq25755537553}{\*\bkmkend it50311751381}Plan: {\*\bkmkstart wx49757359990}{\*\bkmkend gl26518958437}Patient found to have acidemia, pH 7.14. Hyperchloremia, 110. Bicarb 20. Anion Gap 12 \par  pH improved with BiPAP 7.14 --> 7.21\par  \par  Plan \par  - Improving, continue to trend.\par  \par  \plain\f1\fs20\cxrw3896\hich\f1\dbch\f1\loch\f1\cf2\fs20\ul{\field{\*\fldinst HYPERLINK [id number] }{\fldrslt Problem/Plan - 3:}}\plain\f0\fs20\rvxd5112\hich\f0\dbch\f0\loch\f0\fs20\ql\par  \'b7  {\*\bkmkstart gi96912392739}{\*\bkmkend in48368171758}Problem: {\*\bkmkstart fa65315272883}{\*\bkmkend mn26346090450}Stage 3 chronic kidney disease. \par  \'b7  {\*\bkmkstart qd80098311965}{\*\bkmkend vk10979208092}Plan: {\*\bkmkstart jh74233384064}{\*\bkmkend vp81915937414}Patient with history of CKD III. Cr 1.45 (1.57 in 3/2022), eGFR 46 (42 in 3/2022)\par  Baseline Cr 1.5\par  \par  Plan \par  - continue to monitor\par  - f/u cystatin c.\par  \par  \plain\f1\fs20\powv8074\hich\f1\dbch\f1\loch\f1\cf2\fs20\ul{\field{\*\fldinst HYPERLINK [id number] }{\fldrslt Problem/Plan - 4:}}\plain\f0\fs20\ldfd3555\hich\f0\dbch\f0\loch\f0\fs20\ql\par  \'b7  {\*\bkmkstart uv77932815746}{\*\bkmkend nd37729803569}Problem: {\*\bkmkstart ld16379382645}{\*\bkmkend zg82933004680}Benign prostate hyperplasia. \par  \'b7  {\*\bkmkstart fz99910164567}{\*\bkmkend gq91925020873}Plan: {\*\bkmkstart kq06162543165}{\*\bkmkend fe16359460786}Patient with a history of BPH. On home Flomax 0.4mg QD\par  \par  Plan \par  - holding Flomax, will restart upon oral intake.\par  \par  \plain\f1\fs20\dfit3559\hich\f1\dbch\f1\loch\f1\cf2\fs20\ul{\field{\*\fldinst HYPERLINK [id number] }{\fldrslt Problem/Plan - 5:}}\plain\f0\fs20\frhu9356\hich\f0\dbch\f0\loch\f0\fs20\ql\par  \'b7  {\*\bkmkstart fr53170669723}{\*\bkmkend ih60953364373}Problem: {\*\bkmkstart ch84308564525}{\*\bkmkend ht34986924387}Hypertension. \par  \'b7  {\*\bkmkstart ts57539342130}{\*\bkmkend xm28677745027}Plan: {\*\bkmkstart vk08456192612}{\*\bkmkend hr86356318559}Patient with a history of HTN. On home Toprol 50mg QD\par  \par  Plan \par  - c/w Toprol 50mg when patient is off BiPAP.\par  \par  \plain\f1\fs20\fgdo2349\hich\f1\dbch\f1\loch\f1\cf2\fs20\ul{\field{\*\fldinst HYPERLINK [id number] }{\fldrslt Problem/Plan - 6:}}\plain\f0\fs20\lvok8675\hich\f0\dbch\f0\loch\f0\fs20\ql\par  \'b7  {\*\bkmkstart xo45790009615}{\*\bkmkend kx74729803668}Problem: {\*\bkmkstart pw79140364530}{\*\bkmkend qa39833684889}Anxiety and depression. \par  \'b7  {\*\bkmkstart zj28572780518}{\*\bkmkend mb44826488641}Plan: {\*\bkmkstart fg10063371855}{\*\bkmkend si37692773841}Patient with a history of anxiety/depression. On home Lexapro 10mg QD\par  \par  Plan \par  - c/w Lexapro 10mg.\par  \par  \plain\f1\fs20\bqnc5314\hich\f1\dbch\f1\loch\f1\cf2\fs20\ul{\field{\*\fldinst HYPERLINK [id number] }{\fldrslt Problem/Plan - 7:}}\plain\f0\fs20\eewb7699\hich\f0\dbch\f0\loch\f0\fs20\ql\par  \'b7  {\*\bkmkstart yq38821179976}{\*\bkmkend ps45818456652}Problem: {\*\bkmkstart yr30794837972}{\*\bkmkend in58146479522}Hyperlipidemia. \par  \'b7  {\*\bkmkstart id24996317671}{\*\bkmkend bl40147305239}Plan: {\*\bkmkstart et80305317916}{\*\bkmkend ay94009749269}Patient with a history of HLD. On home Rosuvastatin 10mg QD\par  \par  Plan \par  - c/w therapeutic exchange Atorvastatin 40mg QD.\par  \par  \plain\f1\fs20\otia8680\hich\f1\dbch\f1\loch\f1\cf2\fs20\ul{\field{\*\fldinst HYPERLINK [id number] }{\fldrslt Problem/Plan - 8:}}\plain\f0\fs20\koez3092\hich\f0\dbch\f0\loch\f0\fs20\ql\par  \'b7  {\*\bkmkstart px52615155593}{\*\bkmkend fa96484882996}Problem: {\*\bkmkstart vf01271353011}{\*\bkmkend gr52914056590}Prophylactic measure. \par  \'b7  {\*\bkmkstart gy42371642906}{\*\bkmkend oz10188338472}Plan: {\*\bkmkstart mx00893814755}{\*\bkmkend rs01343416641}F: none, speech and swallow ordered\par  E: replete K<4, Mg<2\par  N: NPO for now \par  VTE Prophylaxis: Heparin 5000 q8h\par  GI: protonix 40mg QD\par  C: Full Code\par  D: telemetry.\plain\f1\fs20\eliu2722\hich\f1\dbch\f1\loch\f1\cf2\fs20\strike\plain\f0\fs20\mqdz3916\hich\f0\dbch\f0\loch\f0\fs20\par  \par  }

## 2023-09-07 NOTE — SWALLOW BEDSIDE ASSESSMENT ADULT - PHARYNGEAL PHASE
Seemingly timely swallow, hyolaryngeal movement appreciated, with no clinical indicators of penetration/aspiration noted. Multiple swallows (2) noted across PO, though no gross clinical indicators of pharyngeal inefficiency noted. No regurgitation noted.

## 2023-09-07 NOTE — SWALLOW BEDSIDE ASSESSMENT ADULT - SLP PERTINENT HISTORY OF CURRENT PROBLEM
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 Pt initiated on abx and steroids. CXR 9/7/23: Similar appearance to prior exam 9/5/2023. No acute infiltrates lung consolidation or pneumothorax. No significant pleural effusion. Postop change. No acute bone abnormality.

## 2023-09-08 ENCOUNTER — TRANSCRIPTION ENCOUNTER (OUTPATIENT)
Age: 88
End: 2023-09-08

## 2023-09-08 VITALS — TEMPERATURE: 98 F

## 2023-09-08 LAB
ALBUMIN SERPL ELPH-MCNC: 3.4 G/DL — SIGNIFICANT CHANGE UP (ref 3.3–5)
ALP SERPL-CCNC: 35 U/L — LOW (ref 40–120)
ALT FLD-CCNC: 8 U/L — LOW (ref 10–45)
ANION GAP SERPL CALC-SCNC: 11 MMOL/L — SIGNIFICANT CHANGE UP (ref 5–17)
AST SERPL-CCNC: 10 U/L — SIGNIFICANT CHANGE UP (ref 10–40)
BASOPHILS # BLD AUTO: 0.01 K/UL — SIGNIFICANT CHANGE UP (ref 0–0.2)
BASOPHILS NFR BLD AUTO: 0.1 % — SIGNIFICANT CHANGE UP (ref 0–2)
BILIRUB SERPL-MCNC: 0.6 MG/DL — SIGNIFICANT CHANGE UP (ref 0.2–1.2)
BUN SERPL-MCNC: 34 MG/DL — HIGH (ref 7–23)
CALCIUM SERPL-MCNC: 9.2 MG/DL — SIGNIFICANT CHANGE UP (ref 8.4–10.5)
CHLORIDE SERPL-SCNC: 107 MMOL/L — SIGNIFICANT CHANGE UP (ref 96–108)
CO2 SERPL-SCNC: 21 MMOL/L — LOW (ref 22–31)
CREAT SERPL-MCNC: 1.46 MG/DL — HIGH (ref 0.5–1.3)
EGFR: 45 ML/MIN/1.73M2 — LOW
EOSINOPHIL # BLD AUTO: 0.02 K/UL — SIGNIFICANT CHANGE UP (ref 0–0.5)
EOSINOPHIL NFR BLD AUTO: 0.2 % — SIGNIFICANT CHANGE UP (ref 0–6)
GLUCOSE SERPL-MCNC: 112 MG/DL — HIGH (ref 70–99)
HCT VFR BLD CALC: 39.3 % — SIGNIFICANT CHANGE UP (ref 39–50)
HGB BLD-MCNC: 12.6 G/DL — LOW (ref 13–17)
IMM GRANULOCYTES NFR BLD AUTO: 0.4 % — SIGNIFICANT CHANGE UP (ref 0–0.9)
LYMPHOCYTES # BLD AUTO: 1.03 K/UL — SIGNIFICANT CHANGE UP (ref 1–3.3)
LYMPHOCYTES # BLD AUTO: 9.2 % — LOW (ref 13–44)
MAGNESIUM SERPL-MCNC: 1.8 MG/DL — SIGNIFICANT CHANGE UP (ref 1.6–2.6)
MCHC RBC-ENTMCNC: 30.9 PG — SIGNIFICANT CHANGE UP (ref 27–34)
MCHC RBC-ENTMCNC: 32.1 GM/DL — SIGNIFICANT CHANGE UP (ref 32–36)
MCV RBC AUTO: 96.3 FL — SIGNIFICANT CHANGE UP (ref 80–100)
MONOCYTES # BLD AUTO: 0.8 K/UL — SIGNIFICANT CHANGE UP (ref 0–0.9)
MONOCYTES NFR BLD AUTO: 7.1 % — SIGNIFICANT CHANGE UP (ref 2–14)
NEUTROPHILS # BLD AUTO: 9.31 K/UL — HIGH (ref 1.8–7.4)
NEUTROPHILS NFR BLD AUTO: 83 % — HIGH (ref 43–77)
NRBC # BLD: 0 /100 WBCS — SIGNIFICANT CHANGE UP (ref 0–0)
PHOSPHATE SERPL-MCNC: 2.9 MG/DL — SIGNIFICANT CHANGE UP (ref 2.5–4.5)
PLATELET # BLD AUTO: 137 K/UL — LOW (ref 150–400)
POTASSIUM SERPL-MCNC: 4.2 MMOL/L — SIGNIFICANT CHANGE UP (ref 3.5–5.3)
POTASSIUM SERPL-SCNC: 4.2 MMOL/L — SIGNIFICANT CHANGE UP (ref 3.5–5.3)
PROT SERPL-MCNC: 5.3 G/DL — LOW (ref 6–8.3)
RBC # BLD: 4.08 M/UL — LOW (ref 4.2–5.8)
RBC # FLD: 14.1 % — SIGNIFICANT CHANGE UP (ref 10.3–14.5)
SODIUM SERPL-SCNC: 139 MMOL/L — SIGNIFICANT CHANGE UP (ref 135–145)
WBC # BLD: 11.22 K/UL — HIGH (ref 3.8–10.5)
WBC # FLD AUTO: 11.22 K/UL — HIGH (ref 3.8–10.5)

## 2023-09-08 PROCEDURE — 71045 X-RAY EXAM CHEST 1 VIEW: CPT

## 2023-09-08 PROCEDURE — 86900 BLOOD TYPING SEROLOGIC ABO: CPT

## 2023-09-08 PROCEDURE — 99221 1ST HOSP IP/OBS SF/LOW 40: CPT

## 2023-09-08 PROCEDURE — 94640 AIRWAY INHALATION TREATMENT: CPT

## 2023-09-08 PROCEDURE — 0225U NFCT DS DNA&RNA 21 SARSCOV2: CPT

## 2023-09-08 PROCEDURE — 84100 ASSAY OF PHOSPHORUS: CPT

## 2023-09-08 PROCEDURE — 82550 ASSAY OF CK (CPK): CPT

## 2023-09-08 PROCEDURE — 83735 ASSAY OF MAGNESIUM: CPT

## 2023-09-08 PROCEDURE — 74230 X-RAY XM SWLNG FUNCJ C+: CPT

## 2023-09-08 PROCEDURE — 81003 URINALYSIS AUTO W/O SCOPE: CPT

## 2023-09-08 PROCEDURE — C8929: CPT

## 2023-09-08 PROCEDURE — 94660 CPAP INITIATION&MGMT: CPT

## 2023-09-08 PROCEDURE — 93005 ELECTROCARDIOGRAM TRACING: CPT

## 2023-09-08 PROCEDURE — 83036 HEMOGLOBIN GLYCOSYLATED A1C: CPT

## 2023-09-08 PROCEDURE — 82570 ASSAY OF URINE CREATININE: CPT

## 2023-09-08 PROCEDURE — 92611 MOTION FLUOROSCOPY/SWALLOW: CPT

## 2023-09-08 PROCEDURE — 84484 ASSAY OF TROPONIN QUANT: CPT

## 2023-09-08 PROCEDURE — 83880 ASSAY OF NATRIURETIC PEPTIDE: CPT

## 2023-09-08 PROCEDURE — 84145 PROCALCITONIN (PCT): CPT

## 2023-09-08 PROCEDURE — 96374 THER/PROPH/DIAG INJ IV PUSH: CPT

## 2023-09-08 PROCEDURE — 93970 EXTREMITY STUDY: CPT

## 2023-09-08 PROCEDURE — 86901 BLOOD TYPING SEROLOGIC RH(D): CPT

## 2023-09-08 PROCEDURE — 80053 COMPREHEN METABOLIC PANEL: CPT

## 2023-09-08 PROCEDURE — 84300 ASSAY OF URINE SODIUM: CPT

## 2023-09-08 PROCEDURE — 86850 RBC ANTIBODY SCREEN: CPT

## 2023-09-08 PROCEDURE — 96375 TX/PRO/DX INJ NEW DRUG ADDON: CPT

## 2023-09-08 PROCEDURE — 71250 CT THORAX DX C-: CPT

## 2023-09-08 PROCEDURE — 82803 BLOOD GASES ANY COMBINATION: CPT

## 2023-09-08 PROCEDURE — 36415 COLL VENOUS BLD VENIPUNCTURE: CPT

## 2023-09-08 PROCEDURE — 97161 PT EVAL LOW COMPLEX 20 MIN: CPT

## 2023-09-08 PROCEDURE — 82610 CYSTATIN C: CPT

## 2023-09-08 PROCEDURE — 85025 COMPLETE CBC W/AUTO DIFF WBC: CPT

## 2023-09-08 PROCEDURE — 74230 X-RAY XM SWLNG FUNCJ C+: CPT | Mod: 26

## 2023-09-08 PROCEDURE — 83605 ASSAY OF LACTIC ACID: CPT

## 2023-09-08 PROCEDURE — 99232 SBSQ HOSP IP/OBS MODERATE 35: CPT | Mod: GC

## 2023-09-08 PROCEDURE — 99285 EMERGENCY DEPT VISIT HI MDM: CPT

## 2023-09-08 PROCEDURE — 82553 CREATINE MB FRACTION: CPT

## 2023-09-08 PROCEDURE — 92610 EVALUATE SWALLOWING FUNCTION: CPT

## 2023-09-08 PROCEDURE — 80048 BASIC METABOLIC PNL TOTAL CA: CPT

## 2023-09-08 RX ORDER — MAGNESIUM SULFATE 500 MG/ML
1 VIAL (ML) INJECTION ONCE
Refills: 0 | Status: COMPLETED | OUTPATIENT
Start: 2023-09-08 | End: 2023-09-08

## 2023-09-08 RX ORDER — FUROSEMIDE 40 MG
1 TABLET ORAL
Qty: 10 | Refills: 0
Start: 2023-09-08

## 2023-09-08 RX ADMIN — PANTOPRAZOLE SODIUM 40 MILLIGRAM(S): 20 TABLET, DELAYED RELEASE ORAL at 06:04

## 2023-09-08 RX ADMIN — Medication 40 MILLIGRAM(S): at 05:54

## 2023-09-08 RX ADMIN — ESCITALOPRAM OXALATE 10 MILLIGRAM(S): 10 TABLET, FILM COATED ORAL at 11:10

## 2023-09-08 RX ADMIN — BUDESONIDE AND FORMOTEROL FUMARATE DIHYDRATE 2 PUFF(S): 160; 4.5 AEROSOL RESPIRATORY (INHALATION) at 05:54

## 2023-09-08 RX ADMIN — Medication 3 MILLILITER(S): at 11:09

## 2023-09-08 RX ADMIN — Medication 3 MILLILITER(S): at 06:03

## 2023-09-08 RX ADMIN — Medication 100 GRAM(S): at 11:09

## 2023-09-08 RX ADMIN — Medication 3 MILLILITER(S): at 15:13

## 2023-09-08 RX ADMIN — Medication 3 MILLILITER(S): at 02:05

## 2023-09-08 RX ADMIN — POLYETHYLENE GLYCOL 3350 17 GRAM(S): 17 POWDER, FOR SOLUTION ORAL at 11:09

## 2023-09-08 RX ADMIN — HEPARIN SODIUM 5000 UNIT(S): 5000 INJECTION INTRAVENOUS; SUBCUTANEOUS at 05:54

## 2023-09-08 NOTE — DISCHARGE NOTE NURSING/CASE MANAGEMENT/SOCIAL WORK - NSDCVIVACCINE_GEN_ALL_CORE_FT
Tdap; 02-Mar-2023 20:21; Hafsa Voung (BRIGIDO); [employer]; [id number] (Exp. Date: 08-Dec-2024); IntraMuscular; Deltoid Left.; 0.5 milliLiter(s); VIS (VIS Published: 09-May-2013, VIS Presented: 02-Mar-2023);

## 2023-09-08 NOTE — PROGRESS NOTE ADULT - PROBLEM SELECTOR PLAN 8
F: none, speech and swallow ordered  E: replete K<4, Mg<2  N: regular  VTE Prophylaxis: Heparin 5000 q8h  GI: protonix 40mg QD  C: Full Code  D: telemetry F: Regular as barium swallow indicating grossly normal swallowing function.   E: replete K<4, Mg<2  N: regular  VTE Prophylaxis: Heparin 5000 q8h  GI: protonix 40mg QD  C: Full Code  D: pending

## 2023-09-08 NOTE — SWALLOW VFSS/MBS ASSESSMENT ADULT - SLP GENERAL OBSERVATIONS
Pt was seen fully awake and alert, HOB fully elevated, on room air. Patient on telemetry. Pt followed simple directives and communicated wants/needs.

## 2023-09-08 NOTE — PROGRESS NOTE ADULT - ATTENDING COMMENTS
Swallow eval noted. Card recs for biweekly Lasix noted. Pt ambulating on RA and lungs clear. card and Pulm outpt f/u.

## 2023-09-08 NOTE — PROGRESS NOTE ADULT - PROBLEM SELECTOR PLAN 4
Patient with a history of BPH. On home Flomax 0.4mg QD    Plan   - flomax Patient with a history of BPH. On home Flomax 0.4mg QD  ToV successful at multiple trials, no signs of retention.     Plan   - flomax

## 2023-09-08 NOTE — SWALLOW VFSS/MBS ASSESSMENT ADULT - ORAL PHASE COMMENTS
Adequate oral containment, mildly prolonged mastication with solids, reduced bolus cohesion with premature loss imaged to the hypopharynx with thin liquids, timely a/p transport, and minimal oral residual. Oral residual cleared with subsequent swallow.

## 2023-09-08 NOTE — SWALLOW VFSS/MBS ASSESSMENT ADULT - ADDITIONAL INFORMATION
Anatomical observations: degenerative spine changes, osteophytes at C3-C4 impacting epiglottic inversion

## 2023-09-08 NOTE — PROGRESS NOTE ADULT - ASSESSMENT
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

## 2023-09-08 NOTE — PROGRESS NOTE ADULT - PROBLEM SELECTOR PLAN 6
Addendum Note by Néstor Howard ATC at 01/25/18 03:12 PM     Author:  Néstor Howard ATC Service:  (none) Author Type:  Trainer     Filed:  01/25/18 03:12 PM Encounter Date:  1/25/2018 Status:  Signed     :  Néstor Howard ATC (Trainer)       Addended by: NÉSTOR HOWARD on: 1/25/2018 03:12 PM        Modules accepted: Orders         Revision History        Date/Time User Provider Type Action    > 01/25/18 03:12 PM Néstor Howard ATC Trainer Sign    Attribution information within the note text is not available.            
Patient with a history of anxiety/depression. On home Lexapro 10mg QD    Plan   - c/w Lexapro 10mg

## 2023-09-08 NOTE — PROGRESS NOTE ADULT - SUBJECTIVE AND OBJECTIVE BOX
CC: Patient is a 89y old  Male who presents with a chief complaint of COPD exacerbation (07 Sep 2023 11:13)      INTERVAL EVENTS: GEOFFREY    SUBJECTIVE / INTERVAL HPI: Patient seen and examined at bedside.     ROS: negative unless otherwise stated above.    VITAL SIGNS:  Vitals Interpretation review:  Vital Signs Last 24 Hrs  T(C): 36.6 (08 Sep 2023 04:30), Max: 36.6 (07 Sep 2023 18:09)  T(F): 97.8 (08 Sep 2023 04:30), Max: 97.9 (07 Sep 2023 18:09)  HR: 79 (08 Sep 2023 06:47) (65 - 83)  BP: 120/85 (08 Sep 2023 04:58) (117/59 - 141/65)  BP(mean): 88 (08 Sep 2023 04:58) (80 - 93)  RR: 19 (08 Sep 2023 06:47) (17 - 21)  SpO2: 98% (08 Sep 2023 06:47) (95% - 100%)    Parameters below as of 08 Sep 2023 06:47  Patient On (Oxygen Delivery Method): room air          09-07-23 @ 07:01  -  09-08-23 @ 07:00  --------------------------------------------------------  IN: 680 mL / OUT: 1820 mL / NET: -1140 mL        PHYSICAL EXAM:    General: NAD  HEENT: MMM  Neck: supple  Cardiovascular: +S1/S2; RRR  Respiratory: CTA B/L; no W/R/R  Gastrointestinal: soft, NT/ND  Extremities: WWP; no edema, clubbing or cyanosis  Vascular: 2+ radial, DP/PT pulses B/L  Neurological: AAOx3; no focal deficits    MEDICATIONS:  MEDICATIONS  (STANDING):  albuterol/ipratropium for Nebulization 3 milliLiter(s) Nebulizer every 4 hours  atorvastatin 40 milliGRAM(s) Oral at bedtime  budesonide  80 MICROgram(s)/formoterol 4.5 MICROgram(s) Inhaler 2 Puff(s) Inhalation two times a day  escitalopram 10 milliGRAM(s) Oral daily  heparin   Injectable 5000 Unit(s) SubCutaneous every 8 hours  pantoprazole    Tablet 40 milliGRAM(s) Oral before breakfast  polyethylene glycol 3350 17 Gram(s) Oral daily  predniSONE   Tablet 40 milliGRAM(s) Oral every 24 hours  tamsulosin 0.8 milliGRAM(s) Oral at bedtime    MEDICATIONS  (PRN):      ALLERGIES:  Allergies    No Known Allergies    Intolerances        LABS:  Labs Interpretation:                         12.6   11.22 )-----------( 137      ( 08 Sep 2023 05:29 )             39.3     09-08    139  |  107  |  34<H>  ----------------------------<  112<H>  4.2   |  21<L>  |  1.46<H>    Ca    9.2      08 Sep 2023 05:29  Phos  2.9     09-08  Mg     1.8     09-08    TPro  5.3<L>  /  Alb  3.4  /  TBili  0.6  /  DBili  x   /  AST  10  /  ALT  8<L>  /  AlkPhos  35<L>  09-08      Urinalysis Basic - ( 08 Sep 2023 05:29 )    Color: x / Appearance: x / SG: x / pH: x  Gluc: 112 mg/dL / Ketone: x  / Bili: x / Urobili: x   Blood: x / Protein: x / Nitrite: x   Leuk Esterase: x / RBC: x / WBC x   Sq Epi: x / Non Sq Epi: x / Bacteria: x      CAPILLARY BLOOD GLUCOSE                RADIOLOGY & ADDITIONAL TESTS: Reviewed.  CXR  EKG    Imaging Interpretation Review:    FMHx  Surgical Hx   CC: Patient is a 89y old  Male who presents with a chief complaint of COPD exacerbation (07 Sep 2023 11:13)      INTERVAL EVENTS: GEOFFREY    SUBJECTIVE / INTERVAL HPI: Patient seen and examined at bedside.   Patient denies SoB/cough/chestpain or pressure over the night.     ROS: negative unless otherwise stated above.    VITAL SIGNS:  Vitals Interpretation review:  Vital Signs Last 24 Hrs  T(C): 36.6 (08 Sep 2023 04:30), Max: 36.6 (07 Sep 2023 18:09)  T(F): 97.8 (08 Sep 2023 04:30), Max: 97.9 (07 Sep 2023 18:09)  HR: 79 (08 Sep 2023 06:47) (65 - 83)  BP: 120/85 (08 Sep 2023 04:58) (117/59 - 141/65)  BP(mean): 88 (08 Sep 2023 04:58) (80 - 93)  RR: 19 (08 Sep 2023 06:47) (17 - 21)  SpO2: 98% (08 Sep 2023 06:47) (95% - 100%)    Parameters below as of 08 Sep 2023 06:47  Patient On (Oxygen Delivery Method): room air          09-07-23 @ 07:01  -  09-08-23 @ 07:00  --------------------------------------------------------  IN: 680 mL / OUT: 1820 mL / NET: -1140 mL        PHYSICAL EXAM:    General: NAD  HEENT: MMM  Neck: supple  Cardiovascular: +S1/S2; RRR  Respiratory: CTA B/L; Mild end expiratory wheeze, improved from yesterday  Gastrointestinal: soft, NT/ND  Extremities: WWP; no edema, clubbing or cyanosis  Vascular: 2+ radial, DP/PT pulses B/L  Neurological: AAOx3; no focal deficits    MEDICATIONS:  MEDICATIONS  (STANDING):  albuterol/ipratropium for Nebulization 3 milliLiter(s) Nebulizer every 4 hours  atorvastatin 40 milliGRAM(s) Oral at bedtime  budesonide  80 MICROgram(s)/formoterol 4.5 MICROgram(s) Inhaler 2 Puff(s) Inhalation two times a day  escitalopram 10 milliGRAM(s) Oral daily  heparin   Injectable 5000 Unit(s) SubCutaneous every 8 hours  pantoprazole    Tablet 40 milliGRAM(s) Oral before breakfast  polyethylene glycol 3350 17 Gram(s) Oral daily  predniSONE   Tablet 40 milliGRAM(s) Oral every 24 hours  tamsulosin 0.8 milliGRAM(s) Oral at bedtime    MEDICATIONS  (PRN):      ALLERGIES:  Allergies    No Known Allergies    Intolerances        LABS:  Labs Interpretation:                         12.6   11.22 )-----------( 137      ( 08 Sep 2023 05:29 )             39.3     09-08    139  |  107  |  34<H>  ----------------------------<  112<H>  4.2   |  21<L>  |  1.46<H>    Ca    9.2      08 Sep 2023 05:29  Phos  2.9     09-08  Mg     1.8     09-08    TPro  5.3<L>  /  Alb  3.4  /  TBili  0.6  /  DBili  x   /  AST  10  /  ALT  8<L>  /  AlkPhos  35<L>  09-08      Urinalysis Basic - ( 08 Sep 2023 05:29 )    Color: x / Appearance: x / SG: x / pH: x  Gluc: 112 mg/dL / Ketone: x  / Bili: x / Urobili: x   Blood: x / Protein: x / Nitrite: x   Leuk Esterase: x / RBC: x / WBC x   Sq Epi: x / Non Sq Epi: x / Bacteria: x      CAPILLARY BLOOD GLUCOSE                RADIOLOGY & ADDITIONAL TESTS: Reviewed.  CXR  EKG    Imaging Interpretation Review:    FMHx  Surgical Hx

## 2023-09-08 NOTE — SWALLOW VFSS/MBS ASSESSMENT ADULT - COMMENTS
Clinical swallow evaluation completed yesterday, 9/7/23 revealed a seemingly functional swallow. However, given patient report of worsening dysphagia symptoms over the last year and remarkable pulmonary hx, SLP recommended a modified barium swallow study.

## 2023-09-08 NOTE — SWALLOW VFSS/MBS ASSESSMENT ADULT - SLP PERTINENT HISTORY OF CURRENT PROBLEM
(retired physician) with PMHx of severe COPD (not on home O2, no prior hospitalizations or intubations due to an exacerbation, last exacerbation was a year ago), NSCLC s/p lobectomy of RLL 10 years prior, CKD IIIb (baseline Cr 1.5), HTN, HLD and BPH who presented to [facility] on 9/5/23 due to progressively worsening SOB for 4 days. Pt initiated on abx and steroids. CXR 9/7/23: Similar appearance to prior exam 9/5/2023. No acute infiltrates lung consolidation or pneumothorax. No significant pleural effusion. Postop change. No acute bone abnormality.

## 2023-09-08 NOTE — PROGRESS NOTE ADULT - PROBLEM SELECTOR PLAN 2
Patient found to have acidemia, pH 7.14. Hyperchloremia, 110. Bicarb 20. Anion Gap 12   pH improved with BiPAP 7.14 --> 7.21    Plan   - Improving, continue to trend Patient found to have acidemia, pH 7.14. Hyperchloremia, 110. Bicarb 20. Anion Gap 12   pH improved with BiPAP     Plan   - Improving, continue to trend

## 2023-09-08 NOTE — PROGRESS NOTE ADULT - PROBLEM SELECTOR PLAN 1
Pt with hx of COPD. Home inhalers: Trelegy and PRN Albuterol nebulizers. Pt endorsing he has been using his home inhalers and steroids without much improvement.   SpO2 88% on RA with tachypnea to 30s per ED provider. Placed on BiPAP with improvement. Initial VBG pH 7.14 --> 7.21 (on BiPAP 10/6). S/p Duonebs and IV Solumedrol in ED. Elevated procal. New increased wob on ambulation.    Plan  - Azithromycin 500mg IV qd x 3 days (9/6-9/8)  - Outpatient prednison taper pack  - continue with duonebs standing q4hr  - Cardiology recs  - TTE, CT chest, b/l LE dopplers Pt with hx of COPD. Home inhalers: Trelegy and PRN Albuterol nebulizers. Pt endorsing he has been using his home inhalers and steroids without much improvement.   SpO2 88% on RA with tachypnea to 30s per ED provider. Placed on BiPAP with improvement. Initial VBG pH 7.14 --> 7.21 (on BiPAP 10/6). S/p Duonebs and IV Solumedrol in ED. Elevated procal. New increased wob on ambulation.    Plan  - Azithromycin 500mg IV qd x 3 days (9/6-9/8)  - Outpatient prednison taper pack  - continue with duonebs standing q4hr  - Cardiology recs Lasix 20mg po 3 a week, outpatient f/u  - TTE showing GII diastolic dysfunction  - b/l LE doppler indicating no acute DVT

## 2023-09-08 NOTE — CONSULT NOTE ADULT - TIME BILLING
As above
Emotional Support/Supportive Care and Clarification of Potential Disease Trajectory related to COPD  Assessment of Symptom [place] and Palliative regimen  Exploration of GOC/Advanced Directives including HCP designation, code status, and hospice eligibility    Time exclusive of ACP discussion  Time inclusive of chart review, medication ordering, discussion with primary team, clinical documentation, and communication with family/caregiver

## 2023-09-08 NOTE — PROGRESS NOTE ADULT - PROBLEM SELECTOR PLAN 3
Patient with history of CKD III. Cr 1.45 (1.57 in 3/2022), eGFR 46 (42 in 3/2022)  Baseline Cr 1.5    Plan   - continue to monitor  - f/u cystatin c

## 2023-09-08 NOTE — CONSULT NOTE ADULT - ATTENDING COMMENTS
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 Cardiology consulted for optimization    REVIEW OF STUDIES  - TTE 09/07/23; Mildly reduced left ventricular systolic function, LVEF 45-50%. Mid and apical anterior septum, apical anterior segment, and apex are   akinetic. Grade II left ventricular diastolic dysfunction. Normal right ventricular size and systolic function. Aortic sclerosis without significant stenosis. Pulmonary hypertension present, pulmonary artery systolic pressure is 35 mmHg.  - Tele: NSR no Ectopy or ischemic changes    # HFmREF  - Patient with known HFmrEF (45-50%) closely followed by outpatient Cardiologist. He used to see Dr Bryant and now sees Dr France  - Patient reports stable chronic HFmrEF, was previously on Toprol BID with dose decreased to daily regimen due to near syncopal episode  - He has never had an ischemic workup. Outpatient CT revealed coronary Calcifications, at the time he deferred further testing and opted for conservative management given his age.  - Clinically patient is well compensated with no JVD,  fine bibasilar crackles and no lower extremity edema  - Would recommend continuing Toprol 50 mg po daily patient's home dose  - Ideally while hospitalized would trial on Valsartan 20 mg po BID to see if patient tolerates with goal to switch to Entresto, however patient is being discharged today. Will thus defer new therapy initiation today  - Would recommend 20 mg PO Lasix 3 times a week for Grade II diastolic dysfunction and Mildly reduced EF as patient has evidence of bibasilar crackles on exam  - Please ensure patient has outpatient follow up with Dr France within 2 weeks of DC
89 M with COPD, lung Ca, CKD, and HTN presents with dyspnea for two days. He denies fever, sick contacts, but noted increased yellow phlegm. He was started on BIPAP in ED and was given bronchodilator treatment with improvement of the symptoms and hypoxia.   1. COPDE  - bronchodilators  - prednisone  - azithromycin  - ECG  - troponin  - BNP  - RVP  - BIPAP  2. NAGMA - due to CKD, hyperchloremia  3. CKD
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 This afternoon he has just one wheeze and good air movement. He's back on room air. Pls continue prednisone, continue inpatient inhalers (triple tx: cont Symbicort and add Spiriva), and at discharge should go back on his home Trelegy and azithro. CT personally reviewed, no infiltrate.

## 2023-09-08 NOTE — PROGRESS NOTE ADULT - PROBLEM/PLAN-8
DISPLAY PLAN FREE TEXT
Skin Substitute Text: The defect edges were debeveled with a #15 scalpel blade.  Given the location of the defect, shape of the defect and the proximity to free margins a skin substitute graft was deemed most appropriate.  The graft material was trimmed to fit the size of the defect. The graft was then placed in the primary defect and oriented appropriately.

## 2023-09-08 NOTE — DISCHARGE NOTE NURSING/CASE MANAGEMENT/SOCIAL WORK - PATIENT PORTAL LINK FT
You can access the FollowMyHealth Patient Portal offered by [facility] by registering at the following website: [email or web address]. By joining [employer]’s FollowMyHealth portal, you will also be able to view your health information using other applications (apps) compatible with our system.

## 2023-09-08 NOTE — SWALLOW VFSS/MBS ASSESSMENT ADULT - PHARYNGEAL PHASE COMMENTS
Initiation of the pharyngeal swallow imaged at the level of the pyriform sinuses with thin liquids across trials. Delayed supraglottic closure/delayed swallow initiation resulted in penetration before the swallow with thin liquids. Similarly, reduced hyolaryngeal elevation, anterior hyoid excursion, with partial epiglottic inversion (though variably complete) and delayed and incomplete (narrow column) supraglottic closure resulted in chronic penetration during the swallow with thin liquids and in 1/2 trials with pudding. Though minimal, retention in the vallecular space from pudding and epiglottic position (tip contacting BOT/reduced vallecular space for collection) resulted in spillage of solids over the epiglottic rim resulting in penetration before the swallow, though cleared. Penetration with thin liquids was typically transient, above the level of the vocal folds, and cleared upon completion of the swallow or with spontaneous subsequent swallow. Reduced BOT retraction, partial epiglottic inversion, resulted in minimal vallecular residual with purees and solids. Reduced pharyngeal stripping and reduced pharyngoesophageal segment opening due to reduced anterior hyoid excursion resulted in mild to moderate pyriform sinus residual with solids, cleared with spontaneous subsequent swallow. Liquid wash further cleared pharyngeal residual. Infrequent trace penetration after the swallow imaged with thin liquids from vallecular residual, cleared with spontaneous throat clear-reswallow.

## 2023-09-08 NOTE — PROGRESS NOTE ADULT - PROBLEM SELECTOR PLAN 7
Patient with a history of HLD. On home Rosuvastatin 10mg QD    Plan   - c/w therapeutic exchange Atorvastatin 40mg QD

## 2023-09-08 NOTE — SWALLOW VFSS/MBS ASSESSMENT ADULT - DIAGNOSTIC IMPRESSIONS
Mild oral phase impairment marked by prolonged mastication with solids and reduced bolus cohesion with thin liquids, though deemed mild. Mild pharyngeal dysphagia with impact to efficiency>safety. Reduced hyolaryngeal complex with partial epiglottic inversion and delayed supraglottic closure resulted primarily in penetration above the level of the vocal folds during the swallow with thin liquids, though cleared (PAS 2) upon completion of the swallow or subsequent swallow. Similarly, delayed swallow initiation/supraglottic closure resulted in instances of penetration before the swallow with thin liquids and solids, though penetration imaged before the swallow with solids in part due to reduced vallecular space for collection.  Reduced BOT retraction and partial epiglottic inversion resulted in minimal vallecular retention with pudding and solids and reduced pharyngoesophageal segment opening resulted in mild-moderate pyriform sinus residue with solids. Pharyngeal residual cleared with subsequent swallow and nearly complete with liquid wash. Infrequent trace penetration imaged after the swallow with thin liquids, cleared with a spontaneous throat clear.     No aspiration imaged across consistencies. Degenerative spine changes/osteophytes at C3-C4 likely contributed to reduced epiglottic inversion, though there were instances of complete epiglottic inversion.    Presentation most consistent with the aging swallow. Patient with a well compensated swallow. No further acute intervention deemed warranted at this time. Mild oral phase impairment marked by prolonged mastication with solids and reduced bolus cohesion with thin liquids, though deemed mild. Mild pharyngeal dysphagia with impact to efficiency>safety. Reduced hyolaryngeal complex with partial epiglottic inversion and delayed supraglottic closure resulted primarily in penetration, above the level of the vocal folds, during the swallow with thin liquids, though cleared (PAS 2) upon completion of the swallow or subsequent swallow. Similarly, delayed swallow initiation/supraglottic closure resulted in instances of penetration before the swallow with thin liquids and solids, though penetration imaged before the swallow with solids in part due to reduced vallecular space for collection.  Reduced BOT retraction and partial epiglottic inversion resulted in minimal vallecular retention with pudding and solids and reduced pharyngoesophageal segment opening resulted in mild-moderate pyriform sinus residue with solids. Pharyngeal residual cleared with subsequent swallow and nearly complete with liquid wash. Infrequent trace penetration imaged after the swallow with thin liquids, cleared with a spontaneous throat clear.     No aspiration imaged across consistencies. Degenerative spine changes/osteophytes at C3-C4 likely contributed to reduced epiglottic inversion, though there were instances of complete epiglottic inversion.    Presentation most consistent with the aging swallow. Patient with a well compensated swallow. No further acute intervention deemed warranted at this time. Mild oral phase impairment marked by prolonged mastication with solids and reduced bolus cohesion with thin liquids, though deemed mild. Mild pharyngeal dysphagia with impact to efficiency>safety. Reduced hyolaryngeal complex with partial epiglottic inversion and delayed supraglottic closure resulted primarily in penetration, above the level of the vocal folds, during the swallow with thin liquids and pudding, though cleared (PAS 2) upon completion of the swallow or subsequent swallow. Similarly, delayed swallow initiation/supraglottic closure resulted in instances of penetration before the swallow with thin liquids and solids, though penetration imaged before the swallow with solids in part due to reduced vallecular space for collection.  Reduced BOT retraction and partial epiglottic inversion resulted in minimal vallecular retention with pudding and solids and reduced pharyngoesophageal segment opening resulted in mild-moderate pyriform sinus residue with solids. Pharyngeal residual cleared with subsequent swallow and nearly complete with liquid wash. Infrequent trace penetration imaged after the swallow with thin liquids, cleared with a spontaneous throat clear.     No aspiration imaged across consistencies. Degenerative spine changes/osteophytes at C3-C4 likely contributed to reduced epiglottic inversion, though there were instances of complete epiglottic inversion.    Presentation most consistent with the aging swallow. Patient with a well compensated swallow. No further acute intervention deemed warranted at this time.

## 2023-09-08 NOTE — PROGRESS NOTE ADULT - PROBLEM SELECTOR PLAN 5
Patient with a history of HTN. On home Toprol 50mg QD    Plan   - c/w Toprol 50mg Patient with a history of HTN. On home Toprol 50mg QD    Plan   - c/w Toprol 50mg  - Lasix 20mg po 2 times a week

## 2023-09-08 NOTE — PROGRESS NOTE ADULT - PROBLEM SELECTOR PROBLEM 2
Normal anion gap metabolic acidosis

## 2023-09-09 ENCOUNTER — TRANSCRIPTION ENCOUNTER (OUTPATIENT)
Age: 88
End: 2023-09-09

## 2023-09-11 ENCOUNTER — TRANSCRIPTION ENCOUNTER (OUTPATIENT)
Age: 88
End: 2023-09-11

## 2023-09-14 DIAGNOSIS — Z79.52 LONG TERM (CURRENT) USE OF SYSTEMIC STEROIDS: ICD-10-CM

## 2023-09-14 DIAGNOSIS — Z85.118 PERSONAL HISTORY OF OTHER MALIGNANT NEOPLASM OF BRONCHUS AND LUNG: ICD-10-CM

## 2023-09-14 DIAGNOSIS — F32.A DEPRESSION, UNSPECIFIED: ICD-10-CM

## 2023-09-14 DIAGNOSIS — J44.1 CHRONIC OBSTRUCTIVE PULMONARY DISEASE WITH (ACUTE) EXACERBATION: ICD-10-CM

## 2023-09-14 DIAGNOSIS — R13.10 DYSPHAGIA, UNSPECIFIED: ICD-10-CM

## 2023-09-14 DIAGNOSIS — N40.0 BENIGN PROSTATIC HYPERPLASIA WITHOUT LOWER URINARY TRACT SYMPTOMS: ICD-10-CM

## 2023-09-14 DIAGNOSIS — N18.32 CHRONIC KIDNEY DISEASE, STAGE 3B: ICD-10-CM

## 2023-09-14 DIAGNOSIS — F41.9 ANXIETY DISORDER, UNSPECIFIED: ICD-10-CM

## 2023-09-14 DIAGNOSIS — J96.02 ACUTE RESPIRATORY FAILURE WITH HYPERCAPNIA: ICD-10-CM

## 2023-09-14 DIAGNOSIS — E78.5 HYPERLIPIDEMIA, UNSPECIFIED: ICD-10-CM

## 2023-09-14 DIAGNOSIS — Z87.891 PERSONAL HISTORY OF NICOTINE DEPENDENCE: ICD-10-CM

## 2023-09-14 DIAGNOSIS — I50.22 CHRONIC SYSTOLIC (CONGESTIVE) HEART FAILURE: ICD-10-CM

## 2023-09-14 DIAGNOSIS — Z90.2 ACQUIRED ABSENCE OF LUNG [PART OF]: ICD-10-CM

## 2023-09-14 DIAGNOSIS — E87.20 ACIDOSIS, UNSPECIFIED: ICD-10-CM

## 2023-09-14 DIAGNOSIS — I13.0 HYPERTENSIVE HEART AND CHRONIC KIDNEY DISEASE WITH HEART FAILURE AND STAGE 1 THROUGH STAGE 4 CHRONIC KIDNEY DISEASE, OR UNSPECIFIED CHRONIC KIDNEY DISEASE: ICD-10-CM

## 2023-09-14 DIAGNOSIS — R53.81 OTHER MALAISE: ICD-10-CM

## 2023-09-14 DIAGNOSIS — E87.8 OTHER DISORDERS OF ELECTROLYTE AND FLUID BALANCE, NOT ELSEWHERE CLASSIFIED: ICD-10-CM

## 2023-09-14 DIAGNOSIS — J96.01 ACUTE RESPIRATORY FAILURE WITH HYPOXIA: ICD-10-CM

## 2023-09-18 ENCOUNTER — APPOINTMENT (OUTPATIENT)
Dept: PULMONOLOGY | Facility: CLINIC | Age: 88
End: 2023-09-18
Payer: MEDICARE

## 2023-09-18 PROCEDURE — 99214 OFFICE O/P EST MOD 30 MIN: CPT

## 2023-09-18 RX ORDER — AZITHROMYCIN 250 MG/1
250 TABLET, FILM COATED ORAL
Qty: 45 | Refills: 3 | Status: ACTIVE | COMMUNITY
Start: 2023-01-30 | End: 1900-01-01

## 2023-09-18 RX ORDER — IPRATROPIUM BROMIDE AND ALBUTEROL SULFATE 2.5; .5 MG/3ML; MG/3ML
0.5-2.5 (3) SOLUTION RESPIRATORY (INHALATION)
Qty: 540 | Refills: 3 | Status: ACTIVE | COMMUNITY
Start: 2018-05-16 | End: 1900-01-01

## 2023-09-19 ENCOUNTER — TRANSCRIPTION ENCOUNTER (OUTPATIENT)
Age: 88
End: 2023-09-19

## 2023-09-26 ENCOUNTER — TRANSCRIPTION ENCOUNTER (OUTPATIENT)
Age: 88
End: 2023-09-26

## 2023-10-04 ENCOUNTER — TRANSCRIPTION ENCOUNTER (OUTPATIENT)
Age: 88
End: 2023-10-04

## 2023-10-08 ENCOUNTER — EMERGENCY (EMERGENCY)
Facility: HOSPITAL | Age: 88
LOS: 1 days | Discharge: HOME | End: 2023-10-08
Attending: STUDENT IN AN ORGANIZED HEALTH CARE EDUCATION/TRAINING PROGRAM | Admitting: STUDENT IN AN ORGANIZED HEALTH CARE EDUCATION/TRAINING PROGRAM
Payer: MEDICARE

## 2023-10-08 VITALS
OXYGEN SATURATION: 93 % | WEIGHT: 119.93 LBS | DIASTOLIC BLOOD PRESSURE: 82 MMHG | HEART RATE: 61 BPM | TEMPERATURE: 98 F | SYSTOLIC BLOOD PRESSURE: 166 MMHG | HEIGHT: 65 IN | RESPIRATION RATE: 18 BRPM

## 2023-10-08 VITALS
HEART RATE: 54 BPM | RESPIRATION RATE: 16 BRPM | OXYGEN SATURATION: 94 % | SYSTOLIC BLOOD PRESSURE: 132 MMHG | DIASTOLIC BLOOD PRESSURE: 74 MMHG

## 2023-10-08 DIAGNOSIS — Z98.890 OTHER SPECIFIED POSTPROCEDURAL STATES: Chronic | ICD-10-CM

## 2023-10-08 DIAGNOSIS — Z90.2 ACQUIRED ABSENCE OF LUNG [PART OF]: Chronic | ICD-10-CM

## 2023-10-08 PROCEDURE — 70450 CT HEAD/BRAIN W/O DYE: CPT | Mod: MA

## 2023-10-08 PROCEDURE — 70486 CT MAXILLOFACIAL W/O DYE: CPT | Mod: 26,MA

## 2023-10-08 PROCEDURE — 99284 EMERGENCY DEPT VISIT MOD MDM: CPT | Mod: 25

## 2023-10-08 PROCEDURE — 12011 RPR F/E/E/N/L/M 2.5 CM/<: CPT

## 2023-10-08 PROCEDURE — 70450 CT HEAD/BRAIN W/O DYE: CPT | Mod: 26,MA

## 2023-10-08 PROCEDURE — 70486 CT MAXILLOFACIAL W/O DYE: CPT | Mod: MA

## 2023-10-08 RX ORDER — TETANUS TOXOID, REDUCED DIPHTHERIA TOXOID AND ACELLULAR PERTUSSIS VACCINE, ADSORBED 5; 2.5; 8; 8; 2.5 [IU]/.5ML; [IU]/.5ML; UG/.5ML; UG/.5ML; UG/.5ML
0.5 SUSPENSION INTRAMUSCULAR ONCE
Refills: 0 | Status: COMPLETED | OUTPATIENT
Start: 2023-10-08 | End: 2023-10-08

## 2023-10-08 NOTE — ED PROVIDER NOTE - PROGRESS NOTE DETAILS
Isai Larsen MD: Patient and spouse requesting lac repair prior to CT result--explained the risks of this including missed ICH vs fracture vs foreign body. Isai Larsen MD: Patient and spouse requesting lac repair prior to CT result--explained the risks of this including missed ICH vs fracture vs foreign body. Lac repaired without issues at bedside. Given wound care instructions and return precautions. F/u in 7d for suture removal. Patient and spouse again stating they would like to be discharged prior to results--understanding of the risks of this decision including potential fatal decompensation for missed ICH. Spoke with rads resident--reviewed CTH--no obvious ICH. Will DC as requested.

## 2023-10-08 NOTE — ED ADULT NURSE NOTE - OBJECTIVE STATEMENT
89 y/o M, s/p mechanical fall with a sustained laceration to the left eyebrow. pt was seen at  and advised to come to ED for Ct scan and laceration repair. denies LOC or blood thinners.

## 2023-10-08 NOTE — ED PROVIDER NOTE - PHYSICAL EXAMINATION
Gen - NAD; airway patent, follows commands easily, comfortable; A+Ox3   HEENT - NCAT, EOMI, PERRL, no raccoon eyes or paul's sign, no septal hematoma, no rhinorrhea  Neck - supple, no c-spine tenderness, FROM  Resp - clear equal breath sounds b/l  CV -  RRR, no m/r/g  Abd - soft, NT, ND; no guarding or rebound  Back - no midline tenderness or stepoffs  MSK - FROM of b/l UE and LE, no gross deformities  Ext - 2+ distal pulses throughout  Neuro - full motor strength and sensation to LT throughout, GCS 15  Skin - warm, well perfused; +2 cm well approximated superficial laceration to L eyebrow, not extending into eyelid, hemostatic; +skin tear to R patella

## 2023-10-08 NOTE — ED ADULT TRIAGE NOTE - CHIEF COMPLAINT QUOTE
Pt presents to ED c/o fall. was running for the bus, tripped and fell. Hit face on ground. Laceration to L brow. Sent in by urgent care. Laceration wrapped, no active bleeding on arrival. denies LOC, use of blood thinners.

## 2023-10-08 NOTE — ED PROVIDER NOTE - CLINICAL SUMMARY MEDICAL DECISION MAKING FREE TEXT BOX
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     Patient was sent here for CT imaging to r/o ICH and laceration repair that they were told would require plastics--on my evaluation, patient with simple superficial lac to L eyebrow not extending into upper eyelid. Patient and spouse agreeable to CT head/maxface imaging, primary closure by ED team, and TDAP update. Overall neurologically intact on exam with no spinal tenderness and good ROM through all joints. Ambulatory without issues.

## 2023-10-08 NOTE — ED PROVIDER NOTE - PATIENT PORTAL LINK FT
You can access the FollowMyHealth Patient Portal offered by [facility] by registering at the following website: [email or web address]. By joining [employer]’s FollowMyHealth portal, you will also be able to view your health information using other applications (apps) compatible with our system.

## 2023-10-08 NOTE — ED PROVIDER NOTE - OBJECTIVE STATEMENT
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 States he was running to try to catch the bus when he tripped and hit his L eyebrow to the ground, denies LOC or AC use. No numbness or weakness. Went to  for laceration to the L eyebrow but was sent here for CT imaging/repair. Unknown last TDAP. Has no vision changes or ocular pain.

## 2023-10-08 NOTE — ED PROVIDER NOTE - NSFOLLOWUPINSTRUCTIONS_ED_ALL_ED_FT
You were seen in the Emergency Department for: fall, eyebrow laceration    Apply bacitracin ointment to the area twice a day.    Return to the ER in 5-7 days to have your sutures removed.    Please follow up with your primary physician. If you do not have a primary physician or specialist of your needs, please call [phone] to find one convenient for you. At this number you will be able to locate a provider who accepts your insurance, as well as locate the right specialist for your needs.    You should return to the Emergency Department if you feel any new/worsening/persistent symptoms including but not limited to: chest pain, difficulty breathing, loss of consciousness, bleeding, uncontrolled pain, numbness/weakness of a body part

## 2023-10-08 NOTE — ED PROVIDER NOTE - NSCAREINITIATED _GEN_ER
Patient:   VIOLA BAZAN            MRN: [id number]            FIN: [id number]              Age:   21 hours     Sex:  MALE     :  [birth year]   Associated Diagnoses:   None   Author:   JARROD MARCOS       :  2019 11:51  AGE:  21 Hours  Duration of Current Hospitalization:  21 Hours  Gestational Age at Birth:  37 0/7   Corrected Gestational Age:  37 0/7     Birth Measurements:   Weight: 3750 grams  Length: 50.8 cm  Head circumference: 36.5 cm    BIRTH HISTORY     YOB: 2019  Time of Birth: 11:51  Gestational Age at Birth: 37 0/7  Day of Life on Admission (Day of birth being DOL 1): 1    REASON FOR ADMISSION: grunting    Birth weight: 3750 grams  Length at birth: 51 cm  Head circumference at birth: 34.5 cm    MATERNAL HISTORY     Maternal age: 30 y old     2 Para 1    Ethnicity: White    CURRENT PREGNANCY:   Prenatal Care (Yes / No): yes   Steroids (Yes / No): no       Magnesium Sulfate (Yes / No): no  Chorioamnionitis(Yes / No): no   Maternal HTN, Chronic or Pregnancy Induced (Yes / No): YES  Maternal Diabetes (Yes / No): no  Congenital Infection - TORCH / Zika / Parvovirus (Yes / No): no  Mutiple Gestation (Yes / No): no       PRENATAL LABS:   Blood type A+, antibody  negative  HIV negative,   RPR non reactive  Rubella immune  Hep B negative  GBS negative    MEDICATIONS DURING PREGNANCY: PNV    MEDICATIONS DURING DELIVERY: atenolol     HISTORY   Membranes: ROM at 11:50 on 2019   Fluid: clear     INFORMATION    Delivery Type:       If , reason for section: Repeat for maternal hypertension    RESUSCITATION:   none    DELIVERY ROOM RESUSCITATION:   Oxygen (Yes / No): no  Face Mask Vent (PPV) (Yes / No): no  Endotracheal Tube (Yes / No): no  CPAP (Yes / No): no    APGAR:9/9      Dosing Weight:   3.750 kg    2019 12:00  Most Recent Clinical Weight:   3.750  kg    2019 12:00    PHYSICAL EXAMINATION ON  DISCHARGE    .  General: No acute distress, well appearing, responds to stimuli appropriately  Eye: Normal conjunctiva  HENT: Normocephalic, Anterior fontanelle open/soft/flat  Neck: Supple  Respiratory: Lungs are clear to auscultation, Respirations are non-labored, Breath sounds are equal, Symmetrical chest wall expansion  Cardiovascular: Normal rate, Regular rhythm, No murmur, Normal peripheral perfusion  Gastrointestinal: Soft, Non-tender, Non-distended, Anus patent  Genitourinary: Normal genitalia for age and sex, No lesions  Musculoskeletal: No swelling. No deformity  Skin: Moist, No pallor, No rash  Neurologic: No focal deficits, appropriate tone    PROBLEM LIST:   Liveborn infant, of [place] pregnancy, born in hospital by  delivery  TTN (transient tachypnea of )    BRIEF HOSPITAL COURSE:   Brought to the NICU at 3 hours of life for TTN, did fine weaned to room air in a few hours, tolerating PO ad [place] feeds      HOSPITAL COURSE BY SYSTEM:    Fluids, electrolytes and nutrition: Started on D10W, made NPO given respiratory status, IVF weaned, tolerating PO ad [place] feeds well       Respiratory: Arrived to the NICU on 3L HFNC, CXR obtained - TTN, weaned to room air in a few hours             Cardiovascular: clinically stable, no issues        ID: Mother GBS negative, elective CS, CBC is reassuring, BCX was not sent, clinically baby is stable       Hyperbilirubinemia: Mother is A+,. baby bilirubin level in AM 4.0/0.2       Neuro: stable, no need  HUS              Lines: PIV, was d/c'ed prior to transfer             PATIENT'S MEDICAL CONDITION AT DISCHARGE       stable    INVASIVE PROCEDURES PERFORMED DURING HOSPITALIZATION    none    SURGICAL PROCEDURES   none    SUMMARY OF RESPIRATORY CARE:  HFNC for a few hours in NICU               SCREENING RESULTS    Orders:     SCREEN [NEOS] ( In Process ) 2019 18:49      Results:      Hearing Screening has not yet been documented.   CCHD has not  yet been documented.     Car Seat Screening has not yet been documented.    Vaccine Name: hepatitis B pediatric VACCINE ( Ordered ) Ordered On 2019 13:29  -   Administered: Not Administered           DISCHARGE DISPOSITION  Transfer to well baby nursery under Dr Pham care    DISCHARGE/TRANSFER MEDICATIONS  none    PRIMARY CARE PROVIDER   Dr Pham    DISCHARGE ORDERS AND INSTRUCTIONS          Anticipatory guidance including fevers, rear facing car seat, water temperature @ 120 degrees, feedings including avoiding water/juice until 6 months of age. Back to sleep and tummy time while awake.    PARENT EDUCATION DISCUSSED WITH PARENTS:   - Information on signs of significant cardiopulmonary events: color change, pauses in breathing, limpness in tone  - Opportunity to complete infant CPR  - Instruction on emergency procedures and when and how to call EMS  - Confirmation that there is family access to a reliable telephone  - Opportunity / encouragement to room-in and participate in feeding of the infant   Isai Larsen(Attending)

## 2023-10-08 NOTE — ED CLERICAL - NS ED CLERK NOTE PRE-ARRIVAL INFORMATION; ADDITIONAL PRE-ARRIVAL INFORMATION
This patient is enrolled in the Follow Your Heart program and has undergone a cardiac surgery procedure and has active care navigation. This patient can be followed up by the care navigation team within 24 hours. To arrange close follow-up or to obtain additional clinical information about this patient, please call the contact number above.     Please call the cardiac surgery team once patient is registered at [phone] for consultation PRIOR to disposition decision.  The patient recently underwent a cardiac surgery procedure and the team can assist in acute medical management.

## 2023-10-09 NOTE — ED POST DISCHARGE NOTE - ADDITIONAL DOCUMENTATION
MD Chava: called patient to inform him of negative CT results yesterday beyond known lac that was repaired. Patient appreciative of call.

## 2023-10-11 DIAGNOSIS — S80.211A ABRASION, RIGHT KNEE, INITIAL ENCOUNTER: ICD-10-CM
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
## 2023-10-11 DIAGNOSIS — J44.9 CHRONIC OBSTRUCTIVE PULMONARY DISEASE, UNSPECIFIED: ICD-10-CM

## 2023-10-11 DIAGNOSIS — N18.30 CHRONIC KIDNEY DISEASE, STAGE 3 UNSPECIFIED: ICD-10-CM

## 2023-10-11 DIAGNOSIS — Z85.118 PERSONAL HISTORY OF OTHER MALIGNANT NEOPLASM OF BRONCHUS AND LUNG: ICD-10-CM

## 2023-10-11 DIAGNOSIS — S01.112A LACERATION WITHOUT FOREIGN BODY OF LEFT EYELID AND PERIOCULAR AREA, INITIAL ENCOUNTER: ICD-10-CM

## 2023-10-11 DIAGNOSIS — Z87.891 PERSONAL HISTORY OF NICOTINE DEPENDENCE: ICD-10-CM

## 2023-10-11 DIAGNOSIS — E78.00 PURE HYPERCHOLESTEROLEMIA, UNSPECIFIED: ICD-10-CM

## 2023-10-11 DIAGNOSIS — Z90.2 ACQUIRED ABSENCE OF LUNG [PART OF]: ICD-10-CM
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
## 2023-10-11 DIAGNOSIS — I12.9 HYPERTENSIVE CHRONIC KIDNEY DISEASE WITH STAGE 1 THROUGH STAGE 4 CHRONIC KIDNEY DISEASE, OR UNSPECIFIED CHRONIC KIDNEY DISEASE: ICD-10-CM

## 2023-10-11 DIAGNOSIS — N40.0 BENIGN PROSTATIC HYPERPLASIA WITHOUT LOWER URINARY TRACT SYMPTOMS: ICD-10-CM

## 2023-10-15 ENCOUNTER — EMERGENCY (EMERGENCY)
Facility: HOSPITAL | Age: 88
LOS: 1 days | Discharge: HOME | End: 2023-10-15
Admitting: EMERGENCY MEDICINE
Payer: MEDICARE

## 2023-10-15 VITALS
TEMPERATURE: 98 F | OXYGEN SATURATION: 97 % | WEIGHT: 119.93 LBS | HEART RATE: 55 BPM | RESPIRATION RATE: 18 BRPM | HEIGHT: 65 IN | SYSTOLIC BLOOD PRESSURE: 135 MMHG | DIASTOLIC BLOOD PRESSURE: 72 MMHG

## 2023-10-15 DIAGNOSIS — Z90.2 ACQUIRED ABSENCE OF LUNG [PART OF]: Chronic | ICD-10-CM

## 2023-10-15 DIAGNOSIS — Z98.890 OTHER SPECIFIED POSTPROCEDURAL STATES: Chronic | ICD-10-CM

## 2023-10-15 PROCEDURE — 99212 OFFICE O/P EST SF 10 MIN: CPT

## 2023-10-15 PROCEDURE — L9995: CPT

## 2023-10-15 NOTE — ED ADULT NURSE NOTE - NSFALLCONCLUSION_ED_ALL_ED
Patient: Kendrick Crystal    Procedure Summary     Date:  04/26/19 Room / Location:   [facility]    Anesthesia Start:  1322 Anesthesia Stop:  1413    Procedure:  ABDOMINAL WALL DEBRIDEMENT (N/A Abdomen) Diagnosis:      Surgeon:  Fadi Kern MD Provider:  Guillermo Ann MD    Anesthesia Type:  general ASA Status:  3          Anesthesia Type: general  Last vitals  /76  172/100 (04/26/19 1218)   Temp 98.1  98 °F (36.7 °C) (04/26/19 1218)   Pulse 89  91 (04/26/19 1218)   Resp 16  18 (04/26/19 1218)     SpO2 94% RA  96 % (04/26/19 1218)     Post Anesthesia Care and Evaluation    Patient location during evaluation: PACU  Patient participation: complete - patient participated  Level of consciousness: awake and sleepy but conscious  Pain score: 0  Pain management: adequate  Airway patency: patent  Anesthetic complications: No anesthetic complications  PONV Status: none  Cardiovascular status: hemodynamically stable and acceptable  Respiratory status: nonlabored ventilation, acceptable and nasal cannula  Hydration status: acceptable      
Universal Safety Interventions

## 2023-10-15 NOTE — ED PROVIDER NOTE - OBJECTIVE STATEMENT
89 yo M here for suture removal. 3 sutures placed on 10/8. Denies fever, chills, pain, ha, dizziness, n/v.

## 2023-10-15 NOTE — ED ADULT NURSE NOTE - NSFALLUNIVINTERV_ED_ALL_ED
Bed/Stretcher in lowest position, wheels locked, appropriate side rails in place/Call bell, personal items and telephone in reach/Instruct patient to call for assistance before getting out of bed/chair/stretcher/Non-slip footwear applied when patient is off stretcher/[place] to call system/Physically safe environment - no spills, clutter or unnecessary equipment/Purposeful proactive rounding/Room/bathroom lighting operational, light cord in reach

## 2023-10-15 NOTE — ED PROVIDER NOTE - PATIENT PORTAL LINK FT
You can access the FollowMyHealth Patient Portal offered by [facility] by registering at the following website: [email or web address]. By joining [employer]’s FollowMyHealth portal, you will also be able to view your health information using other applications (apps) compatible with our system.

## 2023-10-15 NOTE — ED PROVIDER NOTE - PHYSICAL EXAMINATION
CONSTITUTIONAL: Well-appearing;  in no apparent distress.   HEAD: Normocephalic; atraumatic.   EYES: PERRL; EOM intact; conjunctiva and sclera clear  SKIN: L eyebrow - 3 sutures in place, well healed- +ecchymosis under eye

## 2023-10-15 NOTE — ED PROVIDER NOTE - CLINICAL SUMMARY MEDICAL DECISION MAKING FREE TEXT BOX
89 yo M here for suture removal. 3 sutures placed on 10/8. Denies fever, chills, pain, ha, dizziness, n/v. L eyebrow - 3 sutures in place, well healed- +ecchymosis under eye. sutures removed.

## 2023-10-18 DIAGNOSIS — S01.112D LACERATION WITHOUT FOREIGN BODY OF LEFT EYELID AND PERIOCULAR AREA, SUBSEQUENT ENCOUNTER: ICD-10-CM
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
## 2023-10-25 ENCOUNTER — APPOINTMENT (OUTPATIENT)
Dept: HEART AND VASCULAR | Facility: CLINIC | Age: 88
End: 2023-10-25
Payer: MEDICARE

## 2023-10-25 VITALS
TEMPERATURE: 97.6 F | SYSTOLIC BLOOD PRESSURE: 121 MMHG | HEART RATE: 70 BPM | DIASTOLIC BLOOD PRESSURE: 73 MMHG | WEIGHT: 124 LBS | HEIGHT: 65 IN | OXYGEN SATURATION: 98 % | BODY MASS INDEX: 20.66 KG/M2

## 2023-10-25 PROCEDURE — 99214 OFFICE O/P EST MOD 30 MIN: CPT

## 2023-10-25 RX ORDER — FUROSEMIDE 20 MG/1
20 TABLET ORAL DAILY
Refills: 0 | Status: ACTIVE | COMMUNITY

## 2023-11-01 ENCOUNTER — TRANSCRIPTION ENCOUNTER (OUTPATIENT)
Age: 88
End: 2023-11-01

## 2023-11-02 ENCOUNTER — EMERGENCY (EMERGENCY)
Facility: HOSPITAL | Age: 88
LOS: 1 days | Discharge: HOME | End: 2023-11-02
Attending: EMERGENCY MEDICINE | Admitting: EMERGENCY MEDICINE
Payer: MEDICARE

## 2023-11-02 VITALS
RESPIRATION RATE: 16 BRPM | TEMPERATURE: 97 F | DIASTOLIC BLOOD PRESSURE: 78 MMHG | SYSTOLIC BLOOD PRESSURE: 179 MMHG | HEART RATE: 67 BPM | HEIGHT: 65 IN | WEIGHT: 125 LBS | OXYGEN SATURATION: 96 %

## 2023-11-02 VITALS
SYSTOLIC BLOOD PRESSURE: 158 MMHG | TEMPERATURE: 98 F | HEART RATE: 70 BPM | RESPIRATION RATE: 17 BRPM | DIASTOLIC BLOOD PRESSURE: 71 MMHG | OXYGEN SATURATION: 96 %

## 2023-11-02 DIAGNOSIS — R55 SYNCOPE AND COLLAPSE: ICD-10-CM
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
## 2023-11-02 DIAGNOSIS — E78.5 HYPERLIPIDEMIA, UNSPECIFIED: ICD-10-CM

## 2023-11-02 DIAGNOSIS — S01.21XA LACERATION WITHOUT FOREIGN BODY OF NOSE, INITIAL ENCOUNTER: ICD-10-CM

## 2023-11-02 DIAGNOSIS — I12.9 HYPERTENSIVE CHRONIC KIDNEY DISEASE WITH STAGE 1 THROUGH STAGE 4 CHRONIC KIDNEY DISEASE, OR UNSPECIFIED CHRONIC KIDNEY DISEASE: ICD-10-CM

## 2023-11-02 DIAGNOSIS — L98.9 DISORDER OF THE SKIN AND SUBCUTANEOUS TISSUE, UNSPECIFIED: ICD-10-CM

## 2023-11-02 DIAGNOSIS — Z90.2 ACQUIRED ABSENCE OF LUNG [PART OF]: ICD-10-CM

## 2023-11-02 DIAGNOSIS — Z85.118 PERSONAL HISTORY OF OTHER MALIGNANT NEOPLASM OF BRONCHUS AND LUNG: ICD-10-CM

## 2023-11-02 DIAGNOSIS — Z90.2 ACQUIRED ABSENCE OF LUNG [PART OF]: Chronic | ICD-10-CM

## 2023-11-02 DIAGNOSIS — S01.81XA LACERATION WITHOUT FOREIGN BODY OF OTHER PART OF HEAD, INITIAL ENCOUNTER: ICD-10-CM

## 2023-11-02 DIAGNOSIS — N18.30 CHRONIC KIDNEY DISEASE, STAGE 3 UNSPECIFIED: ICD-10-CM

## 2023-11-02 DIAGNOSIS — Z86.19 PERSONAL HISTORY OF OTHER INFECTIOUS AND PARASITIC DISEASES: ICD-10-CM

## 2023-11-02 DIAGNOSIS — Z87.09 PERSONAL HISTORY OF OTHER DISEASES OF THE RESPIRATORY SYSTEM: ICD-10-CM

## 2023-11-02 DIAGNOSIS — I25.10 ATHEROSCLEROTIC HEART DISEASE OF NATIVE CORONARY ARTERY WITHOUT ANGINA PECTORIS: ICD-10-CM

## 2023-11-02 DIAGNOSIS — S09.90XA UNSPECIFIED INJURY OF HEAD, INITIAL ENCOUNTER: ICD-10-CM

## 2023-11-02 DIAGNOSIS — Z87.438 PERSONAL HISTORY OF OTHER DISEASES OF MALE GENITAL ORGANS: ICD-10-CM

## 2023-11-02 DIAGNOSIS — Z98.890 OTHER SPECIFIED POSTPROCEDURAL STATES: Chronic | ICD-10-CM
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
## 2023-11-02 DIAGNOSIS — S01.112A LACERATION WITHOUT FOREIGN BODY OF LEFT EYELID AND PERIOCULAR AREA, INITIAL ENCOUNTER: ICD-10-CM

## 2023-11-02 LAB
ANION GAP SERPL CALC-SCNC: 14 MMOL/L — SIGNIFICANT CHANGE UP (ref 5–17)
ANION GAP SERPL CALC-SCNC: 14 MMOL/L — SIGNIFICANT CHANGE UP (ref 5–17)
BASOPHILS # BLD AUTO: 0.04 K/UL — SIGNIFICANT CHANGE UP (ref 0–0.2)
BASOPHILS # BLD AUTO: 0.04 K/UL — SIGNIFICANT CHANGE UP (ref 0–0.2)
BASOPHILS NFR BLD AUTO: 0.4 % — SIGNIFICANT CHANGE UP (ref 0–2)
BASOPHILS NFR BLD AUTO: 0.4 % — SIGNIFICANT CHANGE UP (ref 0–2)
BUN SERPL-MCNC: 40 MG/DL — HIGH (ref 7–23)
BUN SERPL-MCNC: 40 MG/DL — HIGH (ref 7–23)
CALCIUM SERPL-MCNC: 9.2 MG/DL — SIGNIFICANT CHANGE UP (ref 8.4–10.5)
CALCIUM SERPL-MCNC: 9.2 MG/DL — SIGNIFICANT CHANGE UP (ref 8.4–10.5)
CHLORIDE SERPL-SCNC: 99 MMOL/L — SIGNIFICANT CHANGE UP (ref 96–108)
CHLORIDE SERPL-SCNC: 99 MMOL/L — SIGNIFICANT CHANGE UP (ref 96–108)
CO2 SERPL-SCNC: 22 MMOL/L — SIGNIFICANT CHANGE UP (ref 22–31)
CO2 SERPL-SCNC: 22 MMOL/L — SIGNIFICANT CHANGE UP (ref 22–31)
CREAT SERPL-MCNC: 1.6 MG/DL — HIGH (ref 0.5–1.3)
CREAT SERPL-MCNC: 1.6 MG/DL — HIGH (ref 0.5–1.3)
EGFR: 41 ML/MIN/1.73M2 — LOW
EGFR: 41 ML/MIN/1.73M2 — LOW
EOSINOPHIL # BLD AUTO: 0.03 K/UL — SIGNIFICANT CHANGE UP (ref 0–0.5)
EOSINOPHIL # BLD AUTO: 0.03 K/UL — SIGNIFICANT CHANGE UP (ref 0–0.5)
EOSINOPHIL NFR BLD AUTO: 0.3 % — SIGNIFICANT CHANGE UP (ref 0–6)
EOSINOPHIL NFR BLD AUTO: 0.3 % — SIGNIFICANT CHANGE UP (ref 0–6)
GLUCOSE SERPL-MCNC: 96 MG/DL — SIGNIFICANT CHANGE UP (ref 70–99)
GLUCOSE SERPL-MCNC: 96 MG/DL — SIGNIFICANT CHANGE UP (ref 70–99)
HCT VFR BLD CALC: 45.5 % — SIGNIFICANT CHANGE UP (ref 39–50)
HCT VFR BLD CALC: 45.5 % — SIGNIFICANT CHANGE UP (ref 39–50)
HGB BLD-MCNC: 15.1 G/DL — SIGNIFICANT CHANGE UP (ref 13–17)
HGB BLD-MCNC: 15.1 G/DL — SIGNIFICANT CHANGE UP (ref 13–17)
IMM GRANULOCYTES NFR BLD AUTO: 0.6 % — SIGNIFICANT CHANGE UP (ref 0–0.9)
IMM GRANULOCYTES NFR BLD AUTO: 0.6 % — SIGNIFICANT CHANGE UP (ref 0–0.9)
LYMPHOCYTES # BLD AUTO: 2.21 K/UL — SIGNIFICANT CHANGE UP (ref 1–3.3)
LYMPHOCYTES # BLD AUTO: 2.21 K/UL — SIGNIFICANT CHANGE UP (ref 1–3.3)
LYMPHOCYTES # BLD AUTO: 23.1 % — SIGNIFICANT CHANGE UP (ref 13–44)
LYMPHOCYTES # BLD AUTO: 23.1 % — SIGNIFICANT CHANGE UP (ref 13–44)
MCHC RBC-ENTMCNC: 30.7 PG — SIGNIFICANT CHANGE UP (ref 27–34)
MCHC RBC-ENTMCNC: 30.7 PG — SIGNIFICANT CHANGE UP (ref 27–34)
MCHC RBC-ENTMCNC: 33.2 GM/DL — SIGNIFICANT CHANGE UP (ref 32–36)
MCHC RBC-ENTMCNC: 33.2 GM/DL — SIGNIFICANT CHANGE UP (ref 32–36)
MCV RBC AUTO: 92.5 FL — SIGNIFICANT CHANGE UP (ref 80–100)
MCV RBC AUTO: 92.5 FL — SIGNIFICANT CHANGE UP (ref 80–100)
MONOCYTES # BLD AUTO: 0.84 K/UL — SIGNIFICANT CHANGE UP (ref 0–0.9)
MONOCYTES # BLD AUTO: 0.84 K/UL — SIGNIFICANT CHANGE UP (ref 0–0.9)
MONOCYTES NFR BLD AUTO: 8.8 % — SIGNIFICANT CHANGE UP (ref 2–14)
MONOCYTES NFR BLD AUTO: 8.8 % — SIGNIFICANT CHANGE UP (ref 2–14)
NEUTROPHILS # BLD AUTO: 6.4 K/UL — SIGNIFICANT CHANGE UP (ref 1.8–7.4)
NEUTROPHILS # BLD AUTO: 6.4 K/UL — SIGNIFICANT CHANGE UP (ref 1.8–7.4)
NEUTROPHILS NFR BLD AUTO: 66.8 % — SIGNIFICANT CHANGE UP (ref 43–77)
NEUTROPHILS NFR BLD AUTO: 66.8 % — SIGNIFICANT CHANGE UP (ref 43–77)
NRBC # BLD: 0 /100 WBCS — SIGNIFICANT CHANGE UP (ref 0–0)
NRBC # BLD: 0 /100 WBCS — SIGNIFICANT CHANGE UP (ref 0–0)
PLATELET # BLD AUTO: 130 K/UL — LOW (ref 150–400)
PLATELET # BLD AUTO: 130 K/UL — LOW (ref 150–400)
POTASSIUM SERPL-MCNC: 4.8 MMOL/L — SIGNIFICANT CHANGE UP (ref 3.5–5.3)
POTASSIUM SERPL-MCNC: 4.8 MMOL/L — SIGNIFICANT CHANGE UP (ref 3.5–5.3)
POTASSIUM SERPL-SCNC: 4.8 MMOL/L — SIGNIFICANT CHANGE UP (ref 3.5–5.3)
POTASSIUM SERPL-SCNC: 4.8 MMOL/L — SIGNIFICANT CHANGE UP (ref 3.5–5.3)
RBC # BLD: 4.92 M/UL — SIGNIFICANT CHANGE UP (ref 4.2–5.8)
RBC # BLD: 4.92 M/UL — SIGNIFICANT CHANGE UP (ref 4.2–5.8)
RBC # FLD: 14.3 % — SIGNIFICANT CHANGE UP (ref 10.3–14.5)
RBC # FLD: 14.3 % — SIGNIFICANT CHANGE UP (ref 10.3–14.5)
SODIUM SERPL-SCNC: 135 MMOL/L — SIGNIFICANT CHANGE UP (ref 135–145)
SODIUM SERPL-SCNC: 135 MMOL/L — SIGNIFICANT CHANGE UP (ref 135–145)
TROPONIN T, HIGH SENSITIVITY RESULT: 28 NG/L — SIGNIFICANT CHANGE UP (ref 0–51)
TROPONIN T, HIGH SENSITIVITY RESULT: 28 NG/L — SIGNIFICANT CHANGE UP (ref 0–51)
TROPONIN T, HIGH SENSITIVITY RESULT: 31 NG/L — SIGNIFICANT CHANGE UP (ref 0–51)
TROPONIN T, HIGH SENSITIVITY RESULT: 31 NG/L — SIGNIFICANT CHANGE UP (ref 0–51)
WBC # BLD: 9.58 K/UL — SIGNIFICANT CHANGE UP (ref 3.8–10.5)
WBC # BLD: 9.58 K/UL — SIGNIFICANT CHANGE UP (ref 3.8–10.5)
WBC # FLD AUTO: 9.58 K/UL — SIGNIFICANT CHANGE UP (ref 3.8–10.5)
WBC # FLD AUTO: 9.58 K/UL — SIGNIFICANT CHANGE UP (ref 3.8–10.5)

## 2023-11-02 PROCEDURE — 70486 CT MAXILLOFACIAL W/O DYE: CPT | Mod: 26,MA

## 2023-11-02 PROCEDURE — 71045 X-RAY EXAM CHEST 1 VIEW: CPT | Mod: 26

## 2023-11-02 PROCEDURE — 70450 CT HEAD/BRAIN W/O DYE: CPT | Mod: 26,MA

## 2023-11-02 PROCEDURE — 99285 EMERGENCY DEPT VISIT HI MDM: CPT

## 2023-11-02 PROCEDURE — 72125 CT NECK SPINE W/O DYE: CPT | Mod: 26,MA

## 2023-11-02 RX ORDER — CEPHALEXIN 500 MG
500 CAPSULE ORAL ONCE
Refills: 0 | Status: COMPLETED | OUTPATIENT
Start: 2023-11-02 | End: 2023-11-02

## 2023-11-02 RX ORDER — CEPHALEXIN 500 MG
1 CAPSULE ORAL
Qty: 15 | Refills: 0
Start: 2023-11-02 | End: 2023-11-06

## 2023-11-02 RX ADMIN — Medication 500 MILLIGRAM(S): at 23:56

## 2023-11-02 NOTE — ED PROVIDER NOTE - PROGRESS NOTE DETAILS
Dr Meza at bedside for plastics repair of wounds D/w Dr France - he will f/u w/ pt this week. Also requesting referral to ENT. Repaired by Dr Meza. Pt to f/u w/ Dr Meza. Requesting abx rx for ppx for wound

## 2023-11-02 NOTE — ED PROVIDER NOTE - CARE PROVIDERS DIRECT ADDRESSES
,DirectAddress_Unknown,[email or web address]

## 2023-11-02 NOTE — ED PROVIDER NOTE - NSFOLLOWUPINSTRUCTIONS_ED_ALL_ED_FT
Eat soft items and thin liquids. Alternate food with liquid. Remain upright for at least 30 min after eating. Take small sips and bites.     Call your primary medical doctor and cardiologist tomorrow for follow up visit.     Syncope, Adult  Outline of the head showing blood vessels that supply the brain.  Syncope refers to a condition in which a person temporarily loses consciousness. Syncope may also be called fainting or passing out. It is caused by a sudden decrease in blood flow to the brain. This can happen for a variety of reasons.    Most causes of syncope are not dangerous. It can be triggered by things such as needle sticks, seeing blood, pain, or intense emotion. However, syncope can also be a sign of a serious medical problem, such as a heart abnormality. Other causes can include dehydration, migraines, or taking medicines that lower blood pressure. Your health care provider may do tests to find the reason why you are having syncope.    If you faint, get medical help right away. Call your local emergency services (911 in the [place]    Follow these instructions at home:  Pay attention to any changes in your symptoms. Take these actions to stay safe and to help relieve your symptoms:    Knowing when you may be about to faint    Signs that you may be about to faint include:  Feeling dizzy, weak, light-headed, or like the room is spinning.  Feeling nauseous.  Seeing spots or seeing all white or all black in your field of vision.  Having cold, clammy skin or feeling warm and sweaty.  Hearing ringing in the ears (tinnitus).  If you start to feel like you might faint, sit or lie down right away. If sitting, put your head down between your legs. If lying down, raise (elevate) your feet above the level of your heart.  Breathe deeply and steadily. Wait until all the symptoms have passed.  Have someone stay with you until you feel stable.  Medicines    Take over-the-counter and prescription medicines only as told by your health care provider.  If you are taking blood pressure or heart medicine, get up slowly and take several minutes to sit and then stand. This can reduce dizziness and decrease the risk of syncope.  Lifestyle    Do not drive, use machinery, or play sports until your health care provider says it is okay.  Do not drink alcohol.  Do not use any products that contain nicotine or tobacco. These products include cigarettes, chewing tobacco, and vaping devices, such as e-cigarettes. If you need help quitting, ask your health care provider.  Avoid hot tubs and saunas.  General instructions    Talk with your health care provider about your symptoms. You may need to have testing to understand the cause of your syncope.  Drink enough fluid to keep your urine pale yellow.  Avoid prolonged standing. If you must stand for a long time, do movements such as:  Moving your legs.  Crossing your legs.  Flexing and stretching your leg muscles.  Squatting.  Keep all follow-up visits. This is important.  Contact a health care provider if:  You have episodes of near fainting.  Get help right away if:  You faint.  You hit your head or are injured after fainting.  You have any of these symptoms that may indicate trouble with your heart:  Fast or irregular heartbeats (palpitations).  Unusual pain in your chest, abdomen, or back.  Shortness of breath.  You have a seizure.  You have a severe headache.  You are confused.  You have vision problems.  You have severe weakness or trouble walking.  You are bleeding from your mouth or rectum, or you have black or tarry stool.  These symptoms may represent a serious problem that is an emergency. Do not wait to see if your symptoms will go away. Get medical help right away. Call your local emergency services (911 in the [place] Do not drive yourself to the hospital.    Summary  Syncope refers to a condition in which a person temporarily loses consciousness. Syncope may also be called fainting or passing out. It is caused by a sudden decrease in blood flow to the brain.  Signs that you may be about to faint include dizziness, feeling light-headed, feeling nauseous, sudden vision changes, or cold, clammy skin.  Even though most causes of syncope are not dangerous, syncope can be a sign of a serious medical problem. Get help right away if you faint.  If you start to feel like you might faint, sit or lie down right away. If sitting, put your head down between your legs. If lying down, raise (elevate) your feet above the level of your heart.  This information is not intended to replace advice given to you by your health care provider. Make sure you discuss any questions you have with your health care provider.    Head Injury    WHAT YOU NEED TO KNOW:    A head injury can include your scalp, face, skull, or brain and range from mild to severe. Effects can appear immediately after the injury or develop later. The effects may last a short time or be permanent. Healthcare providers may want to check your recovery over time. Treatment may change as you recover or develop new health problems from the head injury.    DISCHARGE INSTRUCTIONS:    Call your local emergency number (911 in the [place]), or have someone else call if:    You cannot be woken.    You have a seizure.    You stop responding to others or you faint.    You have blurry or double vision.    Your speech becomes slurred or confused.    You have arm or leg weakness, loss of feeling, or new problems with coordination.    Your pupils are larger than usual, or one pupil is a different size than the other.    You have blood or clear fluid coming out of your ears or nose.  Seek care immediately if:    You have repeated or forceful vomiting.    You feel confused.    Your headache gets worse or becomes severe.    You or someone caring for you notices that you are harder to wake than usual.  Call your doctor if:    Your symptoms last longer than 6 weeks after the injury.    You have questions or concerns about your condition or care.  Medicines:    Acetaminophen decreases pain and fever. It is available without a doctor's order. Ask how much to take and how often to take it. Follow directions. Read the labels of all other medicines you are using to see if they also contain acetaminophen, or ask your doctor or pharmacist. Acetaminophen can cause liver damage if not taken correctly.    Take your medicine as directed. Contact your healthcare provider if you think your medicine is not helping or if you have side effects. Tell your provider if you are allergic to any medicine. Keep a list of the medicines, vitamins, and herbs you take. Include the amounts, and when and why you take them. Bring the list or the pill bottles to follow-up visits. Carry your medicine list with you in case of an emergency.  Self-care:    Rest or do quiet activities. Limit your time watching TV, using the computer, or doing tasks that require a lot of thinking. Slowly return to your normal activities as directed. Do not play sports or do activities that may cause you to get hit in the head. Ask your healthcare provider when you can return to sports.    Apply ice on your head for 15 to 20 minutes every hour or as directed. Use an ice pack, or put crushed ice in a plastic bag. Cover it with a towel before you apply it to your skin. Ice helps prevent tissue damage and decreases swelling and pain.    Have someone stay with you for 24 hours , or as directed. This person can monitor you for problems and call for help if needed. When you are awake, the person should ask you a few questions every few hours to see if you are thinking clearly. An example is to ask your name or address.  Prevent another head injury:    Wear a helmet that fits properly. Do this when you play sports, or ride a bike, scooter, or skateboard. Helmets help decrease your risk for a serious head injury. Talk to your healthcare provider about other ways you can protect yourself if you play sports.    Wear your seatbelt every time you are in a car. This helps lower your risk for a head injury if you are in a car accident.  Follow up with your doctor as directed: Write down your questions so you remember to ask them during your visits.    Facial Laceration  A facial laceration is a cut (laceration) on the face. It is caused by any injury that cuts or tears the skin or tissues on the face. Facial lacerations can bleed and be painful.    You may need medical attention to stop the bleeding, help the wound heal, lower your risk of infection, and prevent scarring. Lacerations usually heal quickly after treatment.    What are the causes?  This condition may be caused by:  A motor vehicle crash.  A sports injury.  A violent attack.  A fall.  What are the signs or symptoms?  Common symptoms of this condition include:  An obvious cut on the face.  Bleeding.  Pain.  Swelling.  Bruising.  A change in the appearance of the face.  How is this diagnosed?  Your health care provider can diagnose a facial laceration by doing a physical exam and asking how the injury happened. Your health care provider may also check for areas of bleeding, tissue damage, nerve injury, and objects (foreign bodies) in your wound.    How is this treated?  Treatment for a facial laceration depends on how severe and deep the wound is. It also depends on the risk for infection. First, your health care provider will clean the wound to prevent infection. Then, your health care provider will decide whether to close the wound. This depends on how deep the laceration is and how long ago your injury happened. If there is an increased risk of infection, the wound will not be closed.  If your wound needs to be closed:  Your health care provider will use stitches (sutures), skin glue (skin adhesive), or skin adhesive strips to repair the laceration.  Your health care provider may numb the area around your wound by injecting a numbing medicine in and around your laceration before doing the sutures.  Torn skin edges or dead skin may be removed.  If sutures are used, the laceration may be closed in layers. Absorbable sutures will be used for deep tissues and muscle. Removable sutures will be used to close the skin.  You may be given:  Pain medicine.  A tetanus shot.  Oral antibiotic medicines.  Antibiotic ointment.  Follow these instructions at home:  Wound care    Follow instructions from your health care provider about how to take care of your wound. Make sure you:  Wash your hands with soap and water for at least 20 seconds before and after you change your bandage (dressing). If soap and water are not available, use hand .  Change your dressing as told by your health care provider.  Leave sutures, skin adhesive, or adhesive strips in place. These skin closures may need to stay in place for 2 weeks or longer. If adhesive strip edges start to loosen and curl up, you may trim the loose edges. Do not remove adhesive strips completely unless your health care provider tells you to do that.  These instructions will vary depending on how the wound was closed.    For sutures:      Keep the wound clean and dry.  If you were given a dressing, change it at least once a day, or as told by your health care provider. Also change the dressing if it gets wet or dirty.  Wash the wound with soap and water two times a day, or as told by your health care provider. Rinse off the soap with water. Pat the wound dry with a clean towel.  After cleaning, apply a thin layer of antibiotic ointment as told by your health care provider. This helps prevent infection and keeps the dressing from sticking to the wound.  You may shower as usual after the first 24 hours. Do not soak the wound until the sutures are removed.  Return to have your sutures removed as told by your health care provider.  Do not wear makeup in the area of the wound until your health care provider has approved.  For skin adhesive:      You may briefly wet your wound in the shower or bath.  Do not soak or scrub the wound.  Do not swim.  Do not sweat heavily until the skin adhesive has fallen off on its own.  After showering or bathing, gently pat the wound dry with a clean towel.  Do not apply liquid medicine, cream medicine, ointment, or makeup to your wound while the skin adhesive is in place. This may loosen the film before your wound is healed.  If you have a dressing over your wound, be careful not to apply tape directly over the skin adhesive. This may pull off the adhesive before the wound is healed.  Do not spend a long time in the sun or use a tanning lamp while the skin adhesive is in place.  The skin adhesive will usually remain in place for 5–10 days and then naturally fall off the skin. Do not pick at the adhesive film.  For skin adhesive strips:      Keep the wound clean and dry.  Do not let the skin adhesive strips get wet.  Bathe carefully to keep the wound and adhesive strips dry. If the wound gets wet, pat it dry with a clean towel right away.  Skin adhesive strips fall off on their own over time. You may trim the strips as the wound heals. Do not remove skin adhesive strips that are still stuck to the wound.  General instructions    Check your wound area every day for signs of infection. Check for:  More redness, swelling, or pain.  Fluid or blood.  Warmth.  Pus or a bad smell.  Take over-the-counter and prescription medicines only as told by your health care provider.  If you were prescribed an antibiotic medicine, take it or apply it as told by your health care provider. Do not stop using the antibiotic even if you start to feel better.  After the laceration has healed:  Know that it can take a year or two for redness or scarring to fade.  Apply sunscreen to the skin of your healed wound to minimize scarring. Ultraviolet (UV) rays can darken scar tissue.  Contact a health care provider if:  You have a fever. A fever is a body temperature that is 100.4°F (38°C) or higher.  You have more redness, swelling, or pain around your wound.  You have fluid or blood coming from your wound.  Your wound feels warm to the touch.  You have pus or a bad smell coming from your wound.  Get help right away if:  You have a red streak going away from your wound.  Summary  You may need treatment for a facial laceration to prevent infection, stop bleeding, help healing, and prevent scarring.  A deep laceration may be closed with stitches (sutures).  Follow your health care provider's wound care instructions carefully.  This information is not intended to replace advice given to you by your health care provider. Make sure you discuss any questions you have with your health care provider.    Choking, Adult  Choking occurs when a food or object gets stuck in the throat or windpipe (trachea) and blocks the airway. If the airway is partly blocked, coughing will usually cause the food or object to come out. If the airway is completely blocked, immediate action is needed to make it come out.    A completely blocked airway can lead to respiratory failure and even death if untreated. The kind of treatment needed depends on the severity of the choking.    Signs of choking  A person with a partial airway blockage may be able to talk and cough.  A person with a complete airway blockage may:  Hold their neck with both hands. This is considered a universal sign of choking.  Be unable to breathe.  Make soft or high-pitched sounds while breathing.  Be unable to cough or cough weakly, ineffectively, or silently.  Be unable to cry, speak, or make sounds.  Turn blue or gray.  For partial airway blockage  If a person has a partial airway blockage and is coughing and able to speak:  Do not interfere. Allow coughing to clear the airway.  Do not let them try to drink until the food or object comes out.  Stay with the person until the food or object comes out. Watch for signs of complete airway blockage. If the person shows signs of complete airway blockage, take action to help them.  For complete airway blockage  A person doing abdominal thrusts on someone who is choking. Close-up shows how to press in and up with the hands.  If a person has a complete airway blockage, their life is in danger. A complete airway blockage causes breathing to stop. It is a medical emergency that requires fast and appropriate action by anyone who is available.    Do abdominal thrusts to save a person who is choking. Abdominal thrusts use pressure to force air from the abdomen into the throat to move the blockage.    CPR for an unconscious person  If the choking person is not breathing and either collapses or is found on the ground:  Shout for help.  If someone responds, tell that person to call 911 and look for an automated external defibrillator (AED).  If no one responds, begin 2 minutes of CPR.  Make sure the person is lying on a firm, flat surface and is facing up. Begin CPR, starting with 30 chest compressions and 2 breaths. Every time you open the airway to give rescue breaths, open the person's mouth. If you can see the food or object and it can be easily pulled out, remove it with your fingers. Do not try to remove the food or object if you cannot see it. This could push it farther into the airway.  After 5 cycles or 2 minutes of CPR, call local emergency services if a call has not already been made.  Continue CPR until the person starts breathing or until help arrives.  If you are choking:  A person using a chair to do abdominal thrusts.  Call 911. Do not worry about communicating what is happening. Do not hang up the phone. Someone may be sent to help you anyway.  Do abdominal thrusts on yourself. To do this, hold a fist against your abdomen and bend over a hard surface, such as a chair. Forcefully push your fist in and up until the food or object comes out.  Prevention  Chew food thoroughly.  Know that older adults are at an increased risk of choking. They should chew smaller bites and cut their food into smaller portions.  Avoid talking or laughing while you are chewing and swallowing.  Be prepared for choking situations. Take a certified first-aid course to learn how to correctly do abdominal thrusts.    Contact a health care provider if:  You have trouble swallowing food.  You continue to have drooling after choking stops.  You continue to have chest pain after choking stops.  Get help right away if:  You have problems breathing after choking stops.  You are confused or keep feeling drowsy.  You have lost consciousness at any point.  You were given CPR.  These symptoms may be an emergency. Get help right away. Call 911.  Do not wait to see if the symptoms will go away.  Do not drive yourself to the hospital.  This information is not intended to replace advice given to you by your health care provider. Make sure you discuss any questions you have with your health care provider. Eat soft items and thin liquids. Alternate food with liquid. Remain upright for at least 30 min after eating. Take small sips and bites.     Call your primary medical doctor and cardiologist tomorrow for follow up visit. Also follow up for ENT for further evaluation. You may use the names in this documentation, and you may also call our referrals coordinator at [phone] Monday - Friday 11 am - 7 pm for assistance in making an appointment.     You wound was repaired by plastics Dr Meza. Please follow up with him next week. You are being prescribed antibiotics (keflex) to help prevent wound infection.     Syncope, Adult  Outline of the head showing blood vessels that supply the brain.  Syncope refers to a condition in which a person temporarily loses consciousness. Syncope may also be called fainting or passing out. It is caused by a sudden decrease in blood flow to the brain. This can happen for a variety of reasons.    Most causes of syncope are not dangerous. It can be triggered by things such as needle sticks, seeing blood, pain, or intense emotion. However, syncope can also be a sign of a serious medical problem, such as a heart abnormality. Other causes can include dehydration, migraines, or taking medicines that lower blood pressure. Your health care provider may do tests to find the reason why you are having syncope.    If you faint, get medical help right away. Call your local emergency services (911 in the [place]    Follow these instructions at home:  Pay attention to any changes in your symptoms. Take these actions to stay safe and to help relieve your symptoms:    Knowing when you may be about to faint    Signs that you may be about to faint include:  Feeling dizzy, weak, light-headed, or like the room is spinning.  Feeling nauseous.  Seeing spots or seeing all white or all black in your field of vision.  Having cold, clammy skin or feeling warm and sweaty.  Hearing ringing in the ears (tinnitus).  If you start to feel like you might faint, sit or lie down right away. If sitting, put your head down between your legs. If lying down, raise (elevate) your feet above the level of your heart.  Breathe deeply and steadily. Wait until all the symptoms have passed.  Have someone stay with you until you feel stable.  Medicines    Take over-the-counter and prescription medicines only as told by your health care provider.  If you are taking blood pressure or heart medicine, get up slowly and take several minutes to sit and then stand. This can reduce dizziness and decrease the risk of syncope.  Lifestyle    Do not drive, use machinery, or play sports until your health care provider says it is okay.  Do not drink alcohol.  Do not use any products that contain nicotine or tobacco. These products include cigarettes, chewing tobacco, and vaping devices, such as e-cigarettes. If you need help quitting, ask your health care provider.  Avoid hot tubs and saunas.  General instructions    Talk with your health care provider about your symptoms. You may need to have testing to understand the cause of your syncope.  Drink enough fluid to keep your urine pale yellow.  Avoid prolonged standing. If you must stand for a long time, do movements such as:  Moving your legs.  Crossing your legs.  Flexing and stretching your leg muscles.  Squatting.  Keep all follow-up visits. This is important.  Contact a health care provider if:  You have episodes of near fainting.  Get help right away if:  You faint.  You hit your head or are injured after fainting.  You have any of these symptoms that may indicate trouble with your heart:  Fast or irregular heartbeats (palpitations).  Unusual pain in your chest, abdomen, or back.  Shortness of breath.  You have a seizure.  You have a severe headache.  You are confused.  You have vision problems.  You have severe weakness or trouble walking.  You are bleeding from your mouth or rectum, or you have black or tarry stool.  These symptoms may represent a serious problem that is an emergency. Do not wait to see if your symptoms will go away. Get medical help right away. Call your local emergency services (911 in the [place] Do not drive yourself to the hospital.    Summary  Syncope refers to a condition in which a person temporarily loses consciousness. Syncope may also be called fainting or passing out. It is caused by a sudden decrease in blood flow to the brain.  Signs that you may be about to faint include dizziness, feeling light-headed, feeling nauseous, sudden vision changes, or cold, clammy skin.  Even though most causes of syncope are not dangerous, syncope can be a sign of a serious medical problem. Get help right away if you faint.  If you start to feel like you might faint, sit or lie down right away. If sitting, put your head down between your legs. If lying down, raise (elevate) your feet above the level of your heart.  This information is not intended to replace advice given to you by your health care provider. Make sure you discuss any questions you have with your health care provider.    Head Injury    WHAT YOU NEED TO KNOW:    A head injury can include your scalp, face, skull, or brain and range from mild to severe. Effects can appear immediately after the injury or develop later. The effects may last a short time or be permanent. Healthcare providers may want to check your recovery over time. Treatment may change as you recover or develop new health problems from the head injury.    DISCHARGE INSTRUCTIONS:    Call your local emergency number (911 in the ), or have someone else call if:    You cannot be woken.    You have a seizure.    You stop responding to others or you faint.    You have blurry or double vision.    Your speech becomes slurred or confused.    You have arm or leg weakness, loss of feeling, or new problems with coordination.    Your pupils are larger than usual, or one pupil is a different size than the other.    You have blood or clear fluid coming out of your ears or nose.  Seek care immediately if:    You have repeated or forceful vomiting.    You feel confused.    Your headache gets worse or becomes severe.    You or someone caring for you notices that you are harder to wake than usual.  Call your doctor if:    Your symptoms last longer than 6 weeks after the injury.    You have questions or concerns about your condition or care.  Medicines:    Acetaminophen decreases pain and fever. It is available without a doctor's order. Ask how much to take and how often to take it. Follow directions. Read the labels of all other medicines you are using to see if they also contain acetaminophen, or ask your doctor or pharmacist. Acetaminophen can cause liver damage if not taken correctly.    Take your medicine as directed. Contact your healthcare provider if you think your medicine is not helping or if you have side effects. Tell your provider if you are allergic to any medicine. Keep a list of the medicines, vitamins, and herbs you take. Include the amounts, and when and why you take them. Bring the list or the pill bottles to follow-up visits. Carry your medicine list with you in case of an emergency.  Self-care:    Rest or do quiet activities. Limit your time watching TV, using the computer, or doing tasks that require a lot of thinking. Slowly return to your normal activities as directed. Do not play sports or do activities that may cause you to get hit in the head. Ask your healthcare provider when you can return to sports.    Apply ice on your head for 15 to 20 minutes every hour or as directed. Use an ice pack, or put crushed ice in a plastic bag. Cover it with a towel before you apply it to your skin. Ice helps prevent tissue damage and decreases swelling and pain.    Have someone stay with you for 24 hours , or as directed. This person can monitor you for problems and call for help if needed. When you are awake, the person should ask you a few questions every few hours to see if you are thinking clearly. An example is to ask your name or address.  Prevent another head injury:    Wear a helmet that fits properly. Do this when you play sports, or ride a bike, scooter, or skateboard. Helmets help decrease your risk for a serious head injury. Talk to your healthcare provider about other ways you can protect yourself if you play sports.    Wear your seatbelt every time you are in a car. This helps lower your risk for a head injury if you are in a car accident.  Follow up with your doctor as directed: Write down your questions so you remember to ask them during your visits.    Facial Laceration  A facial laceration is a cut (laceration) on the face. It is caused by any injury that cuts or tears the skin or tissues on the face. Facial lacerations can bleed and be painful.    You may need medical attention to stop the bleeding, help the wound heal, lower your risk of infection, and prevent scarring. Lacerations usually heal quickly after treatment.    What are the causes?  This condition may be caused by:  A motor vehicle crash.  A sports injury.  A violent attack.  A fall.  What are the signs or symptoms?  Common symptoms of this condition include:  An obvious cut on the face.  Bleeding.  Pain.  Swelling.  Bruising.  A change in the appearance of the face.  How is this diagnosed?  Your health care provider can diagnose a facial laceration by doing a physical exam and asking how the injury happened. Your health care provider may also check for areas of bleeding, tissue damage, nerve injury, and objects (foreign bodies) in your wound.    How is this treated?  Treatment for a facial laceration depends on how severe and deep the wound is. It also depends on the risk for infection. First, your health care provider will clean the wound to prevent infection. Then, your health care provider will decide whether to close the wound. This depends on how deep the laceration is and how long ago your injury happened. If there is an increased risk of infection, the wound will not be closed.  If your wound needs to be closed:  Your health care provider will use stitches (sutures), skin glue (skin adhesive), or skin adhesive strips to repair the laceration.  Your health care provider may numb the area around your wound by injecting a numbing medicine in and around your laceration before doing the sutures.  Torn skin edges or dead skin may be removed.  If sutures are used, the laceration may be closed in layers. Absorbable sutures will be used for deep tissues and muscle. Removable sutures will be used to close the skin.  You may be given:  Pain medicine.  A tetanus shot.  Oral antibiotic medicines.  Antibiotic ointment.  Follow these instructions at home:  Wound care    Follow instructions from your health care provider about how to take care of your wound. Make sure you:  Wash your hands with soap and water for at least 20 seconds before and after you change your bandage (dressing). If soap and water are not available, use hand .  Change your dressing as told by your health care provider.  Leave sutures, skin adhesive, or adhesive strips in place. These skin closures may need to stay in place for 2 weeks or longer. If adhesive strip edges start to loosen and curl up, you may trim the loose edges. Do not remove adhesive strips completely unless your health care provider tells you to do that.  These instructions will vary depending on how the wound was closed.    For sutures:      Keep the wound clean and dry.  If you were given a dressing, change it at least once a day, or as told by your health care provider. Also change the dressing if it gets wet or dirty.  Wash the wound with soap and water two times a day, or as told by your health care provider. Rinse off the soap with water. Pat the wound dry with a clean towel.  After cleaning, apply a thin layer of antibiotic ointment as told by your health care provider. This helps prevent infection and keeps the dressing from sticking to the wound.  You may shower as usual after the first 24 hours. Do not soak the wound until the sutures are removed.  Return to have your sutures removed as told by your health care provider.  Do not wear makeup in the area of the wound until your health care provider has approved.  For skin adhesive:      You may briefly wet your wound in the shower or bath.  Do not soak or scrub the wound.  Do not swim.  Do not sweat heavily until the skin adhesive has fallen off on its own.  After showering or bathing, gently pat the wound dry with a clean towel.  Do not apply liquid medicine, cream medicine, ointment, or makeup to your wound while the skin adhesive is in place. This may loosen the film before your wound is healed.  If you have a dressing over your wound, be careful not to apply tape directly over the skin adhesive. This may pull off the adhesive before the wound is healed.  Do not spend a long time in the sun or use a tanning lamp while the skin adhesive is in place.  The skin adhesive will usually remain in place for 5–10 days and then naturally fall off the skin. Do not pick at the adhesive film.  For skin adhesive strips:      Keep the wound clean and dry.  Do not let the skin adhesive strips get wet.  Bathe carefully to keep the wound and adhesive strips dry. If the wound gets wet, pat it dry with a clean towel right away.  Skin adhesive strips fall off on their own over time. You may trim the strips as the wound heals. Do not remove skin adhesive strips that are still stuck to the wound.  General instructions    Check your wound area every day for signs of infection. Check for:  More redness, swelling, or pain.  Fluid or blood.  Warmth.  Pus or a bad smell.  Take over-the-counter and prescription medicines only as told by your health care provider.  If you were prescribed an antibiotic medicine, take it or apply it as told by your health care provider. Do not stop using the antibiotic even if you start to feel better.  After the laceration has healed:  Know that it can take a year or two for redness or scarring to fade.  Apply sunscreen to the skin of your healed wound to minimize scarring. Ultraviolet (UV) rays can darken scar tissue.  Contact a health care provider if:  You have a fever. A fever is a body temperature that is 100.4°F (38°C) or higher.  You have more redness, swelling, or pain around your wound.  You have fluid or blood coming from your wound.  Your wound feels warm to the touch.  You have pus or a bad smell coming from your wound.  Get help right away if:  You have a red streak going away from your wound.  Summary  You may need treatment for a facial laceration to prevent infection, stop bleeding, help healing, and prevent scarring.  A deep laceration may be closed with stitches (sutures).  Follow your health care provider's wound care instructions carefully.  This information is not intended to replace advice given to you by your health care provider. Make sure you discuss any questions you have with your health care provider.    Choking, Adult  Choking occurs when a food or object gets stuck in the throat or windpipe (trachea) and blocks the airway. If the airway is partly blocked, coughing will usually cause the food or object to come out. If the airway is completely blocked, immediate action is needed to make it come out.    A completely blocked airway can lead to respiratory failure and even death if untreated. The kind of treatment needed depends on the severity of the choking.    Signs of choking  A person with a partial airway blockage may be able to talk and cough.  A person with a complete airway blockage may:  Hold their neck with both hands. This is considered a universal sign of choking.  Be unable to breathe.  Make soft or high-pitched sounds while breathing.  Be unable to cough or cough weakly, ineffectively, or silently.  Be unable to cry, speak, or make sounds.  Turn blue or gray.  For partial airway blockage  If a person has a partial airway blockage and is coughing and able to speak:  Do not interfere. Allow coughing to clear the airway.  Do not let them try to drink until the food or object comes out.  Stay with the person until the food or object comes out. Watch for signs of complete airway blockage. If the person shows signs of complete airway blockage, take action to help them.  For complete airway blockage  A person doing abdominal thrusts on someone who is choking. Close-up shows how to press in and up with the hands.  If a person has a complete airway blockage, their life is in danger. A complete airway blockage causes breathing to stop. It is a medical emergency that requires fast and appropriate action by anyone who is available.    Do abdominal thrusts to save a person who is choking. Abdominal thrusts use pressure to force air from the abdomen into the throat to move the blockage.    CPR for an unconscious person  If the choking person is not breathing and either collapses or is found on the ground:  Shout for help.  If someone responds, tell that person to call 911 and look for an automated external defibrillator (AED).  If no one responds, begin 2 minutes of CPR.  Make sure the person is lying on a firm, flat surface and is facing up. Begin CPR, starting with 30 chest compressions and 2 breaths. Every time you open the airway to give rescue breaths, open the person's mouth. If you can see the food or object and it can be easily pulled out, remove it with your fingers. Do not try to remove the food or object if you cannot see it. This could push it farther into the airway.  After 5 cycles or 2 minutes of CPR, call local emergency services if a call has not already been made.  Continue CPR until the person starts breathing or until help arrives.  If you are choking:  A person using a chair to do abdominal thrusts.  Call 911. Do not worry about communicating what is happening. Do not hang up the phone. Someone may be sent to help you anyway.  Do abdominal thrusts on yourself. To do this, hold a fist against your abdomen and bend over a hard surface, such as a chair. Forcefully push your fist in and up until the food or object comes out.  Prevention  Chew food thoroughly.  Know that older adults are at an increased risk of choking. They should chew smaller bites and cut their food into smaller portions.  Avoid talking or laughing while you are chewing and swallowing.  Be prepared for choking situations. Take a certified first-aid course to learn how to correctly do abdominal thrusts.    Contact a health care provider if:  You have trouble swallowing food.  You continue to have drooling after choking stops.  You continue to have chest pain after choking stops.  Get help right away if:  You have problems breathing after choking stops.  You are confused or keep feeling drowsy.  You have lost consciousness at any point.  You were given CPR.  These symptoms may be an emergency. Get help right away. Call 911.  Do not wait to see if the symptoms will go away.  Do not drive yourself to the hospital.  This information is not intended to replace advice given to you by your health care provider. Make sure you discuss any questions you have with your health care provider.

## 2023-11-02 NOTE — ED PROVIDER NOTE - NS ED ROS FT
Constitutional: No fever or chills.   Eyes: No pain, blurry vision, or discharge.  ENMT: No hearing changes, pain, discharge or infections. No neck pain or stiffness.  Cardiac: No chest pain, SOB or edema. No chest pain with exertion.  Respiratory: No cough or respiratory distress. No hemoptysis. No history of asthma or RAD.  GI: No nausea, vomiting, diarrhea or abdominal pain.  : No dysuria, frequency or burning.  MS: No myalgia, muscle weakness, joint pain or back pain.  Neuro: See HPI. No focal weakness or numbness.   Skin: No skin rash.   Endocrine: No history of thyroid disease or diabetes.  Except as documented in the HPI, all other systems are negative.

## 2023-11-02 NOTE — ED PROVIDER NOTE - CARE PROVIDER_API CALL
George Meza  Plastic Surgery  [address], [address]  [place] [address]  Phone: [phone]  Fax: [phone]  Follow Up Time:     Dharmesh France  Cardiovascular Disease  [address] [address]  Phone: [phone]  Fax: [phone]  Follow Up Time:     Makenzie Miranda  Otolaryngology  [address]  [place] [address]  Phone: [phone]  Fax: [phone]  Follow Up Time:

## 2023-11-02 NOTE — ED PROVIDER NOTE - PROVIDER TOKENS
PROVIDER:[TOKEN:[[id number]]],PROVIDER:[TOKEN:[[id number]]],PROVIDER:[TOKEN:[[id number]]]

## 2023-11-02 NOTE — ED PROVIDER NOTE - WR INTERPRETATION 1
CXR negative -Right hemithorax decreased volume, status post right lower lobectomy, heart size normal, no sig interval change

## 2023-11-02 NOTE — CONSULT NOTE ADULT - SUBJECTIVE AND OBJECTIVE BOX
89 year old who fell sustaining multiple facial lacerations  Pt c/o pain, bleeding, deformity    ED, IM and nursing notes reviewed  case d/w ED and nursing staff    ROS- reviewed and noted  PMH- reviewed and noted  PSH- reviewed and noted  MEDS- reviewed and noted  ALL- reviewed and noted    PE- system specific  LT forehead and eyebrow  5.0 cm full thickness laceration  deep through frontalis with marked maceration  epidermis  macerated with jagged edges  mild edema  moderate ecchymosis  mild tenderness    oblique LT upper eyelid laceration  measured 2.6 cm  deep through orbicularis   superior eyelid connecting to forehead laceration in a T shape  mild edema  moderate ecchymosis    vertical nasal laceration  measures 1.5 cm  dep through procerus  nasal bones straight and non tender

## 2023-11-02 NOTE — CONSULT NOTE ADULT - ASSESSMENT
IMP-  complex forehead laceration, 5.0 cm  complex LT upper eyelid laceration, 2.6 cm  complex nasal laceration, 1.5 cm    PLAN-  debridement forehead laceration, 5.0 cm, with frontalis flap closure  complex repair LT upper eyelid laceration, 2.6 cm  complex repair nasal laceration, 1.5 cm      RTO 10 days  oral antibiotics

## 2023-11-02 NOTE — ED PROVIDER NOTE - PATIENT PORTAL LINK FT
You can access the FollowMyHealth Patient Portal offered by [facility] by registering at the following website: [email or web address]. By joining [employer]’s FollowMyHealth portal, you will also be able to view your health information using other applications (apps) compatible with our system.

## 2023-11-02 NOTE — ED PROVIDER NOTE - OBJECTIVE STATEMENT
Pt w/ PMHx of COPD (does not require home O2, reports no prior hospitalizations or intubations due to an exacerbation), HTN, HLD, BPH, CAD w/o stents,  NSCLC s/p lobectomy 10 years prior (patient does not recall if it was RML or RLL), CKD III (baseline Cr 1.5), presents s/p syncope w/ head injury. He states he was eating a hot dog, small pieces, when he started choking and trying to get it up. He was standing when he felt like he was going to pass out, felt sweaty, w/o CP, SOB, and passed out, falling forward, hitting his head / face. Wife reports he was unconscious for approx 1 min. He brought himself hear w/ his wife, and upon waiting in the waiting room, spit up some food. He recently had a swallowing study which did not show any significant swallowing dysfunction. He does not feel he has FB sensation at this time. Last TDaP 1 year ago. no AC or AP use. Pt and wife both feel he passed out and then hit the floor, and not that he passed out from head injury.

## 2023-11-02 NOTE — ED PROVIDER NOTE - CLINICAL SUMMARY MEDICAL DECISION MAKING FREE TEXT BOX
Pt presents s/p syncopal episode, ? cough-induced, vs aspiration w/ temporary blockage of airway. No resp distress. no residual FB sensation. No hypoxia or inc WOB. Tolerating PO at bedside. head injury s/p syncope a/p hx, and not LOC from head injury. No preceding CP, SOB. There are no EKG changes to suggest ischemia, infarction, or pericarditis. H&P is not c/w dissection, nor PE. Stable CXR findings. No AC or AP use. Monitor in ED. lac repair by plastics. CTH / CS/ MF. Dispo pending w/u and clinical status

## 2023-11-02 NOTE — ED PROVIDER NOTE - PHYSICAL EXAMINATION
Constitutional: Elderly, frail, awake, alert, oriented to person, place, time/situation and in no apparent distress.  Head: jagged, deep, irregular laceration to L frontal region, through the eyebrow.   ENMT: Airway patent. Normal MM. abrasion to L philtrum. abrasion over nasal  bridge. no epistaxis. normal bite. no loose dentition. no craniofacial instability  Eyes: Clear bilaterally, PERRL, EOMI  Cardiac: Normal rate, regular rhythm.  Heart sounds S1, S2.  No murmurs, rubs or gallops.  Respiratory: Breaths sounds equal and clear b/l. No increased WOB, tachypnea, hypoxia, or accessory mm use. Pt speaks in full sentences.   Gastrointestinal: Abd soft, NT, ND, NABS. No guarding, rebound, or rigidity. No pulsatile abdominal masses. No organomegaly appreciated.   Musculoskeletal: Range of motion is not limited. no midline cervical spinal ttp. FROM neck w/o midline pain  Neuro: Alert and oriented x 3, face symmetric and speech fluent. Strength 5/5 x 4 ext and symmetric, nml gross motor movement, nml gait. No focal deficits noted.  Skin: Skin normal color for race, warm, dry and intact. + few, scabbed, crusted lesions to R scalp from recent Shingles  Psych: Alert and oriented to person, place, time/situation. normal mood and affect. no apparent risk to self or others.

## 2023-11-02 NOTE — ED PROVIDER NOTE - CARE PLAN
1 Principal Discharge DX:	Syncope  Secondary Diagnosis:	Head injury  Secondary Diagnosis:	Laceration of forehead, complicated

## 2023-11-02 NOTE — ED ADULT TRIAGE NOTE - ARRIVAL INFO ADDITIONAL COMMENTS
+lac to L upper eye. Pt denies blood thinner use. In triage, pt speaking in full complete sentences.

## 2023-11-02 NOTE — CONSULT NOTE ADULT - TIME BILLING
Pt states she started having body aches last Friday,chills,no fever.  Saturday diarrhea, [place] [facility] just exhausted  Tested positive for covid @ work yesterday  Pt wanted to know if she could have a letter for work to return 11/9/21  She also wanted you to know she took an extra xanax on fri d/t anxiety complexity

## 2023-11-02 NOTE — CONSULT NOTE ADULT - NSCONSULTADDITIONALINFOA_GEN_ALL_CORE
MDM- moderate  1. problems assessed  one undiagnosed new problem with uncertain prognosis  2. data analyzed and reviewed  3 notes from unique sources  assessment requiring independent historian  3. risk of complications  moderate- decision regarding minor surgery with known risk factors

## 2023-11-03 PROCEDURE — 36415 COLL VENOUS BLD VENIPUNCTURE: CPT

## 2023-11-03 PROCEDURE — 71045 X-RAY EXAM CHEST 1 VIEW: CPT

## 2023-11-03 PROCEDURE — 72125 CT NECK SPINE W/O DYE: CPT | Mod: MA

## 2023-11-03 PROCEDURE — 85025 COMPLETE CBC W/AUTO DIFF WBC: CPT

## 2023-11-03 PROCEDURE — 13152 CMPLX RPR E/N/E/L 2.6-7.5 CM: CPT

## 2023-11-03 PROCEDURE — 13132 CMPLX RPR F/C/C/M/N/AX/G/H/F: CPT

## 2023-11-03 PROCEDURE — 70450 CT HEAD/BRAIN W/O DYE: CPT | Mod: MA

## 2023-11-03 PROCEDURE — 84484 ASSAY OF TROPONIN QUANT: CPT

## 2023-11-03 PROCEDURE — 70486 CT MAXILLOFACIAL W/O DYE: CPT | Mod: MA

## 2023-11-03 PROCEDURE — 80048 BASIC METABOLIC PNL TOTAL CA: CPT

## 2023-11-03 PROCEDURE — 99285 EMERGENCY DEPT VISIT HI MDM: CPT | Mod: 25

## 2023-11-03 RX ORDER — CEPHALEXIN 500 MG
500 CAPSULE ORAL ONCE
Refills: 0 | Status: COMPLETED | OUTPATIENT
Start: 2023-11-03 | End: 2023-11-03

## 2023-11-03 RX ORDER — CEPHALEXIN 500 MG
1 CAPSULE ORAL
Qty: 15 | Refills: 0
Start: 2023-11-03 | End: 2023-11-07

## 2023-11-03 RX ADMIN — Medication 500 MILLIGRAM(S): at 00:35

## 2023-11-03 NOTE — ED ADULT NURSE NOTE - OBJECTIVE STATEMENT
Patient is a 89yoM presenting to the ED with lac tot he left eyebrow s/p LOC. Patient was bib wife who states that patient was "choking on his food," then lost consciousness and fell forwards hitting his head. Presents with lac to the eyebrow, no active bleeding.

## 2023-11-13 ENCOUNTER — APPOINTMENT (OUTPATIENT)
Dept: PULMONOLOGY | Facility: CLINIC | Age: 88
End: 2023-11-13
Payer: MEDICARE

## 2023-11-13 VITALS
HEIGHT: 65 IN | DIASTOLIC BLOOD PRESSURE: 78 MMHG | BODY MASS INDEX: 20.49 KG/M2 | OXYGEN SATURATION: 99 % | HEART RATE: 56 BPM | WEIGHT: 123 LBS | SYSTOLIC BLOOD PRESSURE: 118 MMHG | TEMPERATURE: 97.6 F

## 2023-11-13 PROCEDURE — 94010 BREATHING CAPACITY TEST: CPT

## 2023-11-13 PROCEDURE — 99213 OFFICE O/P EST LOW 20 MIN: CPT | Mod: 25

## 2024-01-07 LAB
ALBUMIN SERPL ELPH-MCNC: 3.8 G/DL
ALP BLD-CCNC: 42 U/L
ALT SERPL-CCNC: 14 U/L
ANION GAP SERPL CALC-SCNC: 12 MMOL/L
AST SERPL-CCNC: 13 U/L
BILIRUB SERPL-MCNC: 0.7 MG/DL
BUN SERPL-MCNC: 28 MG/DL
CALCIUM SERPL-MCNC: 9.2 MG/DL
CHLORIDE SERPL-SCNC: 106 MMOL/L
CHOLEST SERPL-MCNC: 161 MG/DL
CK SERPL-CCNC: 40 U/L
CO2 SERPL-SCNC: 21 MMOL/L
CREAT SERPL-MCNC: 1.39 MG/DL
EGFR: 48 ML/MIN/1.73M2
GLUCOSE SERPL-MCNC: 114 MG/DL
HDLC SERPL-MCNC: 46 MG/DL
POTASSIUM SERPL-SCNC: 5.2 MMOL/L
PROT SERPL-MCNC: 5.7 G/DL
SODIUM SERPL-SCNC: 139 MMOL/L
TSH SERPL-ACNC: 1.81 UIU/ML

## 2024-01-19 ENCOUNTER — APPOINTMENT (OUTPATIENT)
Dept: INTERNAL MEDICINE | Facility: CLINIC | Age: 89
End: 2024-01-19
Payer: MEDICARE

## 2024-01-19 ENCOUNTER — NON-APPOINTMENT (OUTPATIENT)
Age: 89
End: 2024-01-19

## 2024-01-19 VITALS
TEMPERATURE: 98.3 F | DIASTOLIC BLOOD PRESSURE: 66 MMHG | OXYGEN SATURATION: 99 % | SYSTOLIC BLOOD PRESSURE: 133 MMHG | HEART RATE: 52 BPM | WEIGHT: 132 LBS | HEIGHT: 64 IN | BODY MASS INDEX: 22.53 KG/M2

## 2024-01-19 DIAGNOSIS — F32.A DEPRESSION, UNSPECIFIED: ICD-10-CM

## 2024-01-19 DIAGNOSIS — Z00.00 ENCOUNTER FOR GENERAL ADULT MEDICAL EXAMINATION W/OUT ABNORMAL FINDINGS: ICD-10-CM

## 2024-01-19 DIAGNOSIS — R13.10 DYSPHAGIA, UNSPECIFIED: ICD-10-CM

## 2024-01-19 PROCEDURE — G0439: CPT

## 2024-01-19 PROCEDURE — 36415 COLL VENOUS BLD VENIPUNCTURE: CPT

## 2024-01-19 NOTE — PHYSICAL EXAM
[Normal] : normal gait, coordination grossly intact, no focal deficits and deep tendon reflexes were 2+ and symmetric [de-identified] : Ecchymosis

## 2024-01-19 NOTE — HEALTH RISK ASSESSMENT
[Fair] :  ~his/her~ mood as fair [No] : No [No falls in past year] : Patient reported no falls in the past year [Former] : Former

## 2024-01-19 NOTE — HISTORY OF PRESENT ILLNESS
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

## 2024-01-19 NOTE — PLAN
[FreeTextEntry1] : Other than pulmonary problems stable examination No change in current medication; EKG noted; All imaging reviewed All labs today

## 2024-01-22 LAB
25(OH)D3 SERPL-MCNC: 84.1 NG/ML
ALBUMIN SERPL ELPH-MCNC: 4.1 G/DL
ALP BLD-CCNC: 42 U/L
ALT SERPL-CCNC: 14 U/L
ANION GAP SERPL CALC-SCNC: 16 MMOL/L
AST SERPL-CCNC: 12 U/L
BILIRUB SERPL-MCNC: 0.7 MG/DL
BUN SERPL-MCNC: 31 MG/DL
CALCIUM SERPL-MCNC: 8.8 MG/DL
CHLORIDE SERPL-SCNC: 104 MMOL/L
CHOLEST SERPL-MCNC: 217 MG/DL
CO2 SERPL-SCNC: 21 MMOL/L
CREAT SERPL-MCNC: 1.45 MG/DL
EGFR: 46 ML/MIN/1.73M2
ESTIMATED AVERAGE GLUCOSE: 120 MG/DL
GLUCOSE SERPL-MCNC: 95 MG/DL
HBA1C MFR BLD HPLC: 5.8 %
HCT VFR BLD CALC: 47.6 %
HDLC SERPL-MCNC: 59 MG/DL
HGB BLD-MCNC: 15.2 G/DL
LDLC SERPL CALC-MCNC: 132 MG/DL
MCHC RBC-ENTMCNC: 31.4 PG
MCHC RBC-ENTMCNC: 31.9 GM/DL
MCV RBC AUTO: 98.3 FL
NONHDLC SERPL-MCNC: 158 MG/DL
PLATELET # BLD AUTO: 150 K/UL
POTASSIUM SERPL-SCNC: 4.4 MMOL/L
PROT SERPL-MCNC: 6.2 G/DL
PSA SERPL-MCNC: 2.37 NG/ML
RBC # BLD: 4.84 M/UL
RBC # FLD: 14.5 %
SODIUM SERPL-SCNC: 141 MMOL/L
T4 FREE SERPL-MCNC: 1.2 NG/DL
TRIGL SERPL-MCNC: 150 MG/DL
TSH SERPL-ACNC: 3.09 UIU/ML
WBC # FLD AUTO: 9.09 K/UL

## 2024-01-25 ENCOUNTER — NON-APPOINTMENT (OUTPATIENT)
Age: 89
End: 2024-01-25

## 2024-01-25 ENCOUNTER — APPOINTMENT (OUTPATIENT)
Dept: HEART AND VASCULAR | Facility: CLINIC | Age: 89
End: 2024-01-25
Payer: MEDICARE

## 2024-01-25 VITALS
HEIGHT: 64 IN | BODY MASS INDEX: 21.51 KG/M2 | OXYGEN SATURATION: 99 % | WEIGHT: 126 LBS | TEMPERATURE: 97.5 F | SYSTOLIC BLOOD PRESSURE: 132 MMHG | HEART RATE: 51 BPM | DIASTOLIC BLOOD PRESSURE: 69 MMHG

## 2024-01-25 PROCEDURE — 99213 OFFICE O/P EST LOW 20 MIN: CPT

## 2024-01-25 PROCEDURE — 36415 COLL VENOUS BLD VENIPUNCTURE: CPT

## 2024-01-25 PROCEDURE — 93000 ELECTROCARDIOGRAM COMPLETE: CPT

## 2024-01-25 NOTE — PHYSICAL EXAM
[de-identified] : Kyphosis [de-identified] : Absent DP B/L [de-identified] : facial ecchymoses

## 2024-01-25 NOTE — REASON FOR VISIT
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  Recently saw Dr Santo for SOB and it was suggested he see a Cardiologist.  He was formerly with Dr Cristi Bryant.  Pt was given prednisone and the SOB has resolved.  Pt walks over a mile daily without CO.  EKG is normal.   A chest CT does report multivessel coronary artery calcification.  4/25/23 No new c/o 10/25/23 In [facility] Sept 5-8 with asthma exacerbation.   Echo with EF 45-50% with akinesia of [place] and ant septum.  Tripped 2 weeks ago, left facial trauma. 1/25/24  Increasing weakness, hard to get out of a chair, partially better on reduced statin.  Had all 3 vaccines this year.

## 2024-01-29 LAB
ALBUMIN MFR SERPL ELPH: 63.6 %
ALBUMIN SERPL-MCNC: 4.1 G/DL
ALBUMIN/GLOB SERPL: 1.8 RATIO
ALPHA1 GLOB MFR SERPL ELPH: 4.1 %
ALPHA1 GLOB SERPL ELPH-MCNC: 0.3 G/DL
ALPHA2 GLOB MFR SERPL ELPH: 12.7 %
ALPHA2 GLOB SERPL ELPH-MCNC: 0.8 G/DL
ANACR T: NEGATIVE
B-GLOBULIN MFR SERPL ELPH: 10.9 %
B-GLOBULIN SERPL ELPH-MCNC: 0.7 G/DL
CK SERPL-CCNC: 31 U/L
DEPRECATED KAPPA LC FREE/LAMBDA SER: 0.98 RATIO
ERYTHROCYTE [SEDIMENTATION RATE] IN BLOOD BY WESTERGREN METHOD: 6 MM/HR
GAMMA GLOB FLD ELPH-MCNC: 0.6 G/DL
GAMMA GLOB MFR SERPL ELPH: 8.7 %
IGA SER QL IEP: 172 MG/DL
IGG SER QL IEP: 580 MG/DL
IGM SER QL IEP: 70 MG/DL
INTERPRETATION SERPL IEP-IMP: NORMAL
KAPPA LC CSF-MCNC: 2.02 MG/DL
KAPPA LC SERPL-MCNC: 1.97 MG/DL
M PROTEIN SPEC IFE-MCNC: NORMAL
PROT SERPL-MCNC: 6.4 G/DL
PROT SERPL-MCNC: 6.4 G/DL
TESTOST SERPL-MCNC: 388 NG/DL

## 2024-02-16 ENCOUNTER — APPOINTMENT (OUTPATIENT)
Dept: PULMONOLOGY | Facility: CLINIC | Age: 89
End: 2024-02-16
Payer: MEDICARE

## 2024-02-16 PROCEDURE — 99213 OFFICE O/P EST LOW 20 MIN: CPT | Mod: 25

## 2024-02-16 PROCEDURE — 94010 BREATHING CAPACITY TEST: CPT

## 2024-02-18 NOTE — PHYSICAL EXAM
[No Acute Distress] : no acute distress [Normal Appearance] : normal appearance [No Neck Mass] : no neck mass [Normal Rate/Rhythm] : normal rate/rhythm [Normal S1, S2] : normal s1, s2 [No Murmurs] : no murmurs [No Abnormalities] : no abnormalities [Benign] : benign [Normal Gait] : normal gait [Normal Color/ Pigmentation] : normal color/ pigmentation [No Focal Deficits] : no focal deficits [Oriented x3] : oriented x3 [TextBox_11] : evidene of fall on face from choking and passing out [TextBox_68] : few rhonchi but quite clear for him

## 2024-02-18 NOTE — REVIEW OF SYSTEMS
[Fatigue] : fatigue [Cough] : no cough [Dyspnea] : dyspnea [Sputum] : no sputum [Wheezing] : wheezing [Negative] : Psychiatric [TextBox_14] : status post fall [TextBox_30] : for him he is good wiht modest dyspnea only [TextBox_134] : seems very depressed since he retired

## 2024-02-18 NOTE — DISCUSSION/SUMMARY
[FreeTextEntry1] : seems to be doing well if he goes without exacerbation after the next few months will drop to 5 mg a day of prednisone continue with trelegy

## 2024-02-18 NOTE — HISTORY OF PRESENT ILLNESS
[TextBox_4] : stable after we commited him to 10mg of prednisone a day he had been gong on large doses repeatedly and has been exacerbation free for several months now but has a feeling of muscle weakness but walks dog and walks miles eveyr day otherwise is doing well relatively speaking

## 2024-03-29 ENCOUNTER — NON-APPOINTMENT (OUTPATIENT)
Age: 89
End: 2024-03-29

## 2024-03-29 ENCOUNTER — APPOINTMENT (OUTPATIENT)
Dept: INTERNAL MEDICINE | Facility: CLINIC | Age: 89
End: 2024-03-29
Payer: MEDICARE

## 2024-03-29 VITALS
HEIGHT: 62 IN | BODY MASS INDEX: 23.92 KG/M2 | OXYGEN SATURATION: 96 % | SYSTOLIC BLOOD PRESSURE: 119 MMHG | WEIGHT: 130 LBS | TEMPERATURE: 97.6 F | DIASTOLIC BLOOD PRESSURE: 62 MMHG | HEART RATE: 52 BPM

## 2024-03-29 DIAGNOSIS — Z01.818 ENCOUNTER FOR OTHER PREPROCEDURAL EXAMINATION: ICD-10-CM

## 2024-03-29 PROCEDURE — 36415 COLL VENOUS BLD VENIPUNCTURE: CPT

## 2024-03-29 PROCEDURE — 99213 OFFICE O/P EST LOW 20 MIN: CPT | Mod: 25

## 2024-03-29 NOTE — PHYSICAL EXAM
[No Acute Distress] : no acute distress [Well Developed] : well developed [Well Nourished] : well nourished [Normal Sclera/Conjunctiva] : normal sclera/conjunctiva [Well-Appearing] : well-appearing [PERRL] : pupils equal round and reactive to light [EOMI] : extraocular movements intact [Normal Outer Ear/Nose] : the outer ears and nose were normal in appearance [No JVD] : no jugular venous distention [Normal Oropharynx] : the oropharynx was normal [No Lymphadenopathy] : no lymphadenopathy [Supple] : supple [Thyroid Normal, No Nodules] : the thyroid was normal and there were no nodules present [No Accessory Muscle Use] : no accessory muscle use [No Respiratory Distress] : no respiratory distress  [Clear to Auscultation] : lungs were clear to auscultation bilaterally [Normal Rate] : normal rate  [No Murmur] : no murmur heard [Normal S1, S2] : normal S1 and S2 [Regular Rhythm] : with a regular rhythm [No Carotid Bruits] : no carotid bruits [No Abdominal Bruit] : a ~M bruit was not heard ~T in the abdomen [No Varicosities] : no varicosities [Pedal Pulses Present] : the pedal pulses are present [No Extremity Clubbing/Cyanosis] : no extremity clubbing/cyanosis [No Palpable Aorta] : no palpable aorta [No Edema] : there was no peripheral edema [Soft] : abdomen soft [Non Tender] : non-tender [No Masses] : no abdominal mass palpated [Non-distended] : non-distended [Normal Bowel Sounds] : normal bowel sounds [No HSM] : no HSM [Normal Posterior Cervical Nodes] : no posterior cervical lymphadenopathy [Normal Anterior Cervical Nodes] : no anterior cervical lymphadenopathy [No CVA Tenderness] : no CVA  tenderness [No Joint Swelling] : no joint swelling [No Spinal Tenderness] : no spinal tenderness [Grossly Normal Strength/Tone] : grossly normal strength/tone [No Rash] : no rash [No Focal Deficits] : no focal deficits [Coordination Grossly Intact] : coordination grossly intact [Normal Affect] : the affect was normal [Deep Tendon Reflexes (DTR)] : deep tendon reflexes were 2+ and symmetric [Normal Gait] : normal gait [Normal Insight/Judgement] : insight and judgment were intact

## 2024-03-29 NOTE — HISTORY OF PRESENT ILLNESS
[Coronary Artery Disease] : coronary artery disease [COPD] : COPD [Smoker] : not a smoker [Asthma] : no asthma [Family Member] : no family member with adverse anesthesia reaction/sudden death [Self] : no previous adverse anesthesia reaction [Chronic Kidney Disease] : no chronic kidney disease [Chronic Anticoagulation] : no chronic anticoagulation [Diabetes] : no diabetes [FreeTextEntry4] : Medication without change   [FreeTextEntry2] : 04/09/2024 [FreeTextEntry1] : Eye lid surgery

## 2024-03-31 LAB
ALBUMIN SERPL ELPH-MCNC: 4 G/DL
ALP BLD-CCNC: 32 U/L
ALT SERPL-CCNC: 15 U/L
ANION GAP SERPL CALC-SCNC: 14 MMOL/L
APPEARANCE: CLEAR
APTT BLD: 25.9 SEC
AST SERPL-CCNC: 17 U/L
BILIRUB SERPL-MCNC: 0.7 MG/DL
BILIRUBIN URINE: NEGATIVE
BLOOD URINE: NEGATIVE
BUN SERPL-MCNC: 34 MG/DL
CALCIUM SERPL-MCNC: 9.4 MG/DL
CHLORIDE SERPL-SCNC: 104 MMOL/L
CO2 SERPL-SCNC: 21 MMOL/L
COLOR: YELLOW
CREAT SERPL-MCNC: 1.56 MG/DL
EGFR: 42 ML/MIN/1.73M2
GLUCOSE QUALITATIVE U: NEGATIVE MG/DL
GLUCOSE SERPL-MCNC: 121 MG/DL
HCT VFR BLD CALC: 46 %
HGB BLD-MCNC: 14.7 G/DL
INR PPP: 0.86 RATIO
KETONES URINE: NEGATIVE MG/DL
LEUKOCYTE ESTERASE URINE: NEGATIVE
MCHC RBC-ENTMCNC: 31.3 PG
MCHC RBC-ENTMCNC: 32 GM/DL
MCV RBC AUTO: 97.9 FL
NITRITE URINE: NEGATIVE
PH URINE: 6
PLATELET # BLD AUTO: 145 K/UL
POTASSIUM SERPL-SCNC: 5.3 MMOL/L
PROT SERPL-MCNC: 5.9 G/DL
PROTEIN URINE: NEGATIVE MG/DL
PT BLD: 9.8 SEC
RBC # BLD: 4.7 M/UL
RBC # FLD: 14.2 %
SODIUM SERPL-SCNC: 138 MMOL/L
SPECIFIC GRAVITY URINE: 1.02
UROBILINOGEN URINE: 0.2 MG/DL
WBC # FLD AUTO: 8.74 K/UL

## 2024-04-02 ENCOUNTER — NON-APPOINTMENT (OUTPATIENT)
Age: 89
End: 2024-04-02

## 2024-04-08 NOTE — ASU PATIENT PROFILE, ADULT - NSICDXPASTSURGICALHX_GEN_ALL_CORE_FT
PAST SURGICAL HISTORY:  History of hip surgery     S/P lobectomy of lung      PAST SURGICAL HISTORY:  History of hip surgery     History of lumbar surgery     S/P lobectomy of lung

## 2024-04-08 NOTE — ASU PATIENT PROFILE, ADULT - NSICDXPASTMEDICALHX_GEN_ALL_CORE_FT
PAST MEDICAL HISTORY:  BPH (benign prostatic hyperplasia)     COPD (chronic obstructive pulmonary disease)     High cholesterol     HTN (hypertension)     Lung cancer      PAST MEDICAL HISTORY:  BPH (benign prostatic hyperplasia)     COPD (chronic obstructive pulmonary disease)     High cholesterol     History of repair of hip fracture     HTN (hypertension)     Lung cancer     Pre-existing type 2 diabetes mellitus

## 2024-04-08 NOTE — ASU PATIENT PROFILE, ADULT - NS PREOP UNDERSTANDS INFO
no solid  from  midnight  can have sips of water up to 4 hours before surgery. wear loose fitting clothes. no lotions perfume/ jewelry or make up./yes

## 2024-04-09 ENCOUNTER — OUTPATIENT (OUTPATIENT)
Dept: OUTPATIENT SERVICES | Facility: HOSPITAL | Age: 89
LOS: 1 days | Discharge: HOME | End: 2024-04-09

## 2024-04-09 VITALS
OXYGEN SATURATION: 97 % | HEIGHT: 66 IN | RESPIRATION RATE: 15 BRPM | SYSTOLIC BLOOD PRESSURE: 134 MMHG | WEIGHT: 132.94 LBS | DIASTOLIC BLOOD PRESSURE: 61 MMHG | HEART RATE: 55 BPM | TEMPERATURE: 98 F

## 2024-04-09 VITALS
RESPIRATION RATE: 16 BRPM | HEART RATE: 57 BPM | OXYGEN SATURATION: 97 % | TEMPERATURE: 98 F | SYSTOLIC BLOOD PRESSURE: 138 MMHG | DIASTOLIC BLOOD PRESSURE: 61 MMHG

## 2024-04-09 DIAGNOSIS — Z98.890 OTHER SPECIFIED POSTPROCEDURAL STATES: Chronic | ICD-10-CM

## 2024-04-09 DIAGNOSIS — Z90.2 ACQUIRED ABSENCE OF LUNG [PART OF]: Chronic | ICD-10-CM

## 2024-04-09 RX ORDER — PREDNISOLONE 5 MG
1 TABLET ORAL
Refills: 0 | DISCHARGE

## 2024-04-09 RX ORDER — ACETAMINOPHEN 500 MG
650 TABLET ORAL EVERY 6 HOURS
Refills: 0 | Status: DISCONTINUED | OUTPATIENT
Start: 2024-04-09 | End: 2024-04-09

## 2024-04-09 RX ORDER — TAMSULOSIN HYDROCHLORIDE 0.4 MG/1
1 CAPSULE ORAL
Refills: 0 | DISCHARGE

## 2024-04-09 RX ORDER — ESCITALOPRAM OXALATE 10 MG/1
1 TABLET, FILM COATED ORAL
Refills: 0 | DISCHARGE

## 2024-04-09 RX ORDER — FLUTICASONE FUROATE, UMECLIDINIUM BROMIDE AND VILANTEROL TRIFENATATE 200; 62.5; 25 UG/1; UG/1; UG/1
1 POWDER RESPIRATORY (INHALATION)
Refills: 0 | DISCHARGE

## 2024-04-09 RX ORDER — ROSUVASTATIN CALCIUM 5 MG/1
1 TABLET ORAL
Refills: 0 | DISCHARGE

## 2024-04-09 RX ORDER — FUROSEMIDE 40 MG
1 TABLET ORAL
Refills: 0 | DISCHARGE

## 2024-04-09 RX ORDER — METOPROLOL TARTRATE 50 MG
1 TABLET ORAL
Refills: 0 | DISCHARGE
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
## 2024-04-09 NOTE — ASU PREOP CHECKLIST - 1.
Dr Bowen Anesthesia department made aware  1: Pt Has Hx Difficult intubation, Difficulty swallowing, Lobectomy, Hx of Falls last one 09/2023 - passed out choking on food hospitalized 4 days at [facility]. Dr Bowen Anesthesia department made aware  1: Pt Has Hx Difficult intubation, Difficulty swallowing, Lobectomy, 2: Hx of Falls last one 09/2023 - passed out choking on food hospitalized 4 days at [facility].  3: Pt admits took Morning meds with sip of Root Beer at 8 am

## 2024-04-09 NOTE — OPERATIVE REPORT - OPERATIVE RPOSRT DETAILS
After the surgical site was marked and identified, the patient was prepped and draped in the sterile fashion for oculoplastic surgery. A drop of tetracaine was administered in both eyes and a corneal shield with ointment was placed in both eyes. The operative area was infiltrated with local anesthesia.    Attention was drawn to the right lower eyelid where a canthotomy was performed with a #15 blade and an inferior cantholysis with Jessica scissors. A subciliary incision was made with Jessica scissors. A skin flap was elevated inferior to the lower lid margin. Hemostasis was maintained with bipolar cautery. A flap of pre-septal orbicularis was elevated, leaving an attachment at the medial end. This was secured to the inferior orbital rim with a double armed 6-0 chromic suture.    The lateral tarsal strip was then fashioned by removing the anterior lamella and mucocutaneous junction of the lateral edge with jessica scissors. A double armed 4-0 polydek suture was passed through the superior and inferior poles of the strip. A pocket was made in the area of the lateral canthus to expose the lateral rim. The two arms of the polydek were passed through the inside of the rim ensuring a bite of periosteum. This was tied down in a permanent fashion. A 5-0 PDS suture was placed in a buried deep interrupted bite to cover the polydek. A 6-0 chromic was used at the lateral canthus to reform the lateral angle. The skin was closed with 6-0 plain gut.    At the end of the case, the corneal shields were removed and ophthalmic ointment was applied. The patient tolerated the procedure well without complication.   After the surgical site was marked and identified, the patient was prepped and draped in the sterile fashion for oculoplastic surgery. A drop of tetracaine was administered in both eyes and a corneal shield with ointment was placed in both eyes. The operative area was infiltrated with local anesthesia.    Attention was drawn to the right lower eyelid where a canthotomy was performed with a #15 blade and an inferior cantholysis with Jessica scissors. A subciliary incision was made with Jessica scissors. A skin flap was elevated inferior to the lower lid margin. Hemostasis was maintained with bipolar cautery. A flap of pre-septal orbicularis oculi muscle was elevated, leaving an attachment at the medial end. This was secured to the inferior orbital rim with a double armed 6-0 chromic suture.    The lateral tarsal strip was then fashioned by removing the anterior lamella and mucocutaneous junction of the lateral edge with jessica scissors. A double armed 4-0 polydek suture was passed through the superior and inferior poles of the strip. A pocket was made in the area of the lateral canthus to expose the lateral rim. The two arms of the polydek were passed through the inside of the rim ensuring a bite of periosteum. This was tied down in a permanent fashion. A 5-0 PDS suture was placed in a buried deep interrupted bite to cover the polydek. A 6-0 chromic was used at the lateral canthus to reform the lateral angle. The subciliary incision was closed with 6-0 plain gut. The lateral canthus was closed with 6-0 chromic gut.    At the end of the case, the corneal shields were removed and ophthalmic ointment was applied. The patient tolerated the procedure well without complication.

## 2024-04-29 ENCOUNTER — APPOINTMENT (OUTPATIENT)
Dept: HEART AND VASCULAR | Facility: CLINIC | Age: 89
End: 2024-04-29
Payer: MEDICARE

## 2024-04-29 VITALS
DIASTOLIC BLOOD PRESSURE: 60 MMHG | OXYGEN SATURATION: 97 % | HEART RATE: 55 BPM | SYSTOLIC BLOOD PRESSURE: 120 MMHG | TEMPERATURE: 98.5 F

## 2024-04-29 VITALS — WEIGHT: 131 LBS | HEIGHT: 62 IN | BODY MASS INDEX: 24.11 KG/M2

## 2024-04-29 DIAGNOSIS — E78.00 PURE HYPERCHOLESTEROLEMIA, UNSPECIFIED: ICD-10-CM

## 2024-04-29 PROCEDURE — 99213 OFFICE O/P EST LOW 20 MIN: CPT

## 2024-04-29 NOTE — ASSESSMENT
[FreeTextEntry1] : CAD- coronary calcification noted on CT. Discussed with pt who wants a conservative approach. LDL 53, A1c 6.0. No testing planned. He was on ASA but he elected to stop it D/T bruising. 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  Increased to 10 alt with 20 mg.  He defers ASA.  He reduced Crestor to 10 d/t weakness.   Probably 2/t the steroid.  CKD- he defers Jardiance, to discuss Kerendia, Cr 1.56.  Prediabetes- manage with diet. A1c 6.0, 5.8, pt likes cake.    Weakness- Pt requests certain labs.   ESR 6.

## 2024-04-29 NOTE — PHYSICAL EXAM
[Well Developed] : well developed [Well Nourished] : well nourished [No Acute Distress] : no acute distress [Normal Conjunctiva] : normal conjunctiva [Normal Venous Pressure] : normal venous pressure [No Carotid Bruit] : no carotid bruit [Normal S1, S2] : normal S1, S2 [No Murmur] : no murmur [No Rub] : no rub [No Gallop] : no gallop [Clear Lung Fields] : clear lung fields [Good Air Entry] : good air entry [No Respiratory Distress] : no respiratory distress  [Soft] : abdomen soft [Non Tender] : non-tender [No Masses/organomegaly] : no masses/organomegaly [Normal Bowel Sounds] : normal bowel sounds [Normal Gait] : normal gait [No Cyanosis] : no cyanosis [No Clubbing] : no clubbing [No Varicosities] : no varicosities [Normal PT B/L] : normal PT B/L [No Rash] : no rash [No Skin Lesions] : no skin lesions [Moves all extremities] : moves all extremities [No Focal Deficits] : no focal deficits [Normal Speech] : normal speech [Alert and Oriented] : alert and oriented [Normal memory] : normal memory [de-identified] : Kyphosis [de-identified] : Absent DP B/L, mild ankle edema R > L [de-identified] : facial ecchymoses

## 2024-04-29 NOTE — REASON FOR VISIT
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  Recently saw Dr Santo for SOB and it was suggested he see a Cardiologist.  He was formerly with Dr Cristi Bryant.  Pt was given prednisone and the SOB has resolved.  Pt walks over a mile daily without CO.  EKG is normal.   A chest CT does report multivessel coronary artery calcification.  4/25/23 No new c/o 10/25/23 In [facility] Sept 5-8 with asthma exacerbation.   Echo with EF 45-50% with akinesia of [place] and ant septum.  Tripped 2 weeks ago, left facial trauma. 1/25/24  Increasing weakness, hard to get out of a chair, partially better on reduced statin.  Had all 3 vaccines this year. 4/29/24 Unchanged

## 2024-04-30 PROBLEM — Z98.890 OTHER SPECIFIED POSTPROCEDURAL STATES: Chronic | Status: ACTIVE | Noted: 2024-04-09

## 2024-04-30 PROBLEM — E11.9 TYPE 2 DIABETES MELLITUS WITHOUT COMPLICATIONS: Chronic | Status: ACTIVE | Noted: 2024-04-09

## 2024-05-13 DIAGNOSIS — B37.0 CANDIDAL STOMATITIS: ICD-10-CM

## 2024-05-13 RX ORDER — FLUCONAZOLE 100 MG/1
100 TABLET ORAL
Qty: 10 | Refills: 1 | Status: ACTIVE | COMMUNITY
Start: 2024-05-13 | End: 1900-01-01

## 2024-05-22 RX ORDER — FLUTICASONE FUROATE, UMECLIDINIUM BROMIDE AND VILANTEROL TRIFENATATE 100; 62.5; 25 UG/1; UG/1; UG/1
100-62.5-25 POWDER RESPIRATORY (INHALATION) DAILY
Qty: 1 | Refills: 11 | Status: ACTIVE | COMMUNITY
Start: 2019-09-10 | End: 1900-01-01

## 2024-05-22 RX ORDER — ALBUTEROL SULFATE 90 UG/1
108 (90 BASE) INHALANT RESPIRATORY (INHALATION)
Qty: 1 | Refills: 11 | Status: ACTIVE | COMMUNITY
Start: 2023-02-28 | End: 1900-01-01

## 2024-06-03 ENCOUNTER — APPOINTMENT (OUTPATIENT)
Dept: PULMONOLOGY | Facility: CLINIC | Age: 89
End: 2024-06-03
Payer: MEDICARE

## 2024-06-03 PROCEDURE — 94010 BREATHING CAPACITY TEST: CPT

## 2024-06-03 PROCEDURE — 99214 OFFICE O/P EST MOD 30 MIN: CPT | Mod: 25

## 2024-06-04 NOTE — HISTORY OF PRESENT ILLNESS
[TextBox_4] : since being placed on a standing dose of prednisone he has done better without exacerbation on azithormycin on prednisone on trelegy and now able to walk a mile  hx of lung cancer in the past otherwise is doing ok at the age of 89

## 2024-06-04 NOTE — REVIEW OF SYSTEMS
[Fatigue] : fatigue [Cough] : no cough [Sputum] : no sputum [Dyspnea] : dyspnea [Wheezing] : wheezing [Negative] : Psychiatric [TextBox_14] : status post fall [TextBox_30] : for him he is good wiht modest dyspnea only [TextBox_134] : seems very depressed since he retired

## 2024-06-04 NOTE — PROCEDURE
[FreeTextEntry1] : his fev 1 is liter, which is good forhim his biggest problem was exacerbations and the addition of the prednsone has helped

## 2024-06-04 NOTE — DISCUSSION/SUMMARY
[FreeTextEntry1] : stable severe copd stay on all meds discussed pros and cons of the use of prednisone and chronic azithromycin

## 2024-06-10 ENCOUNTER — NON-APPOINTMENT (OUTPATIENT)
Age: 89
End: 2024-06-10

## 2024-06-11 ENCOUNTER — APPOINTMENT (OUTPATIENT)
Dept: INTERNAL MEDICINE | Facility: CLINIC | Age: 89
End: 2024-06-11
Payer: MEDICARE

## 2024-06-11 VITALS
WEIGHT: 130 LBS | HEIGHT: 65 IN | OXYGEN SATURATION: 96 % | BODY MASS INDEX: 21.66 KG/M2 | DIASTOLIC BLOOD PRESSURE: 68 MMHG | HEART RATE: 55 BPM | SYSTOLIC BLOOD PRESSURE: 131 MMHG | TEMPERATURE: 97.2 F

## 2024-06-11 DIAGNOSIS — J44.1 CHRONIC OBSTRUCTIVE PULMONARY DISEASE WITH (ACUTE) EXACERBATION: ICD-10-CM

## 2024-06-11 DIAGNOSIS — R73.03 PREDIABETES.: ICD-10-CM

## 2024-06-11 DIAGNOSIS — N18.32 CHRONIC KIDNEY DISEASE, STAGE 3B: ICD-10-CM

## 2024-06-11 DIAGNOSIS — I25.10 ATHEROSCLEROTIC HEART DISEASE OF NATIVE CORONARY ARTERY W/OUT ANGINA PECTORIS: ICD-10-CM

## 2024-06-11 DIAGNOSIS — M62.81 MUSCLE WEAKNESS (GENERALIZED): ICD-10-CM

## 2024-06-11 DIAGNOSIS — I25.84 ATHEROSCLEROTIC HEART DISEASE OF NATIVE CORONARY ARTERY W/OUT ANGINA PECTORIS: ICD-10-CM

## 2024-06-11 PROCEDURE — 99213 OFFICE O/P EST LOW 20 MIN: CPT

## 2024-06-11 NOTE — HISTORY OF PRESENT ILLNESS
[FreeTextEntry1] : Weakness getting up from chair; Takes prednisone 10 mg a day;  With statin muscle weakness  [de-identified] : Pulmonary follow up noted  medication without change

## 2024-06-11 NOTE — HEALTH RISK ASSESSMENT
[No] : No [No falls in past year] : Patient reported no falls in the past year [0] : 2) Feeling down, depressed, or hopeless: Not at all (0) [[id number]] : 0

## 2024-06-17 RX ORDER — PREDNISONE 10 MG/1
10 TABLET ORAL
Qty: 120 | Refills: 3 | Status: ACTIVE | COMMUNITY
Start: 2023-02-28 | End: 1900-01-01

## 2024-07-15 ENCOUNTER — NON-APPOINTMENT (OUTPATIENT)
Age: 89
End: 2024-07-15

## 2024-07-15 ENCOUNTER — APPOINTMENT (OUTPATIENT)
Dept: HEART AND VASCULAR | Facility: CLINIC | Age: 89
End: 2024-07-15
Payer: MEDICARE

## 2024-07-15 VITALS
BODY MASS INDEX: 22.99 KG/M2 | WEIGHT: 138 LBS | OXYGEN SATURATION: 96 % | HEART RATE: 60 BPM | SYSTOLIC BLOOD PRESSURE: 120 MMHG | DIASTOLIC BLOOD PRESSURE: 78 MMHG | TEMPERATURE: 97.6 F | HEIGHT: 65 IN

## 2024-07-15 DIAGNOSIS — E78.00 PURE HYPERCHOLESTEROLEMIA, UNSPECIFIED: ICD-10-CM

## 2024-07-15 DIAGNOSIS — M62.81 MUSCLE WEAKNESS (GENERALIZED): ICD-10-CM

## 2024-07-15 DIAGNOSIS — I25.10 ATHEROSCLEROTIC HEART DISEASE OF NATIVE CORONARY ARTERY W/OUT ANGINA PECTORIS: ICD-10-CM

## 2024-07-15 DIAGNOSIS — I25.84 ATHEROSCLEROTIC HEART DISEASE OF NATIVE CORONARY ARTERY W/OUT ANGINA PECTORIS: ICD-10-CM

## 2024-07-15 DIAGNOSIS — R73.03 PREDIABETES.: ICD-10-CM

## 2024-07-15 PROCEDURE — 99214 OFFICE O/P EST MOD 30 MIN: CPT

## 2024-07-15 PROCEDURE — 93000 ELECTROCARDIOGRAM COMPLETE: CPT

## 2024-07-16 ENCOUNTER — NON-APPOINTMENT (OUTPATIENT)
Age: 89
End: 2024-07-16

## 2024-08-20 ENCOUNTER — APPOINTMENT (OUTPATIENT)
Dept: PULMONOLOGY | Facility: CLINIC | Age: 89
End: 2024-08-20

## 2024-08-30 ENCOUNTER — LABORATORY RESULT (OUTPATIENT)
Age: 89
End: 2024-08-30

## 2024-08-30 ENCOUNTER — APPOINTMENT (OUTPATIENT)
Dept: PULMONOLOGY | Facility: CLINIC | Age: 89
End: 2024-08-30
Payer: MEDICARE

## 2024-08-30 VITALS
HEART RATE: 55 BPM | SYSTOLIC BLOOD PRESSURE: 116 MMHG | BODY MASS INDEX: 22.99 KG/M2 | HEIGHT: 65 IN | TEMPERATURE: 97.6 F | OXYGEN SATURATION: 98 % | WEIGHT: 138 LBS | DIASTOLIC BLOOD PRESSURE: 84 MMHG

## 2024-08-30 PROCEDURE — 99214 OFFICE O/P EST MOD 30 MIN: CPT | Mod: 25

## 2024-08-30 PROCEDURE — 71046 X-RAY EXAM CHEST 2 VIEWS: CPT

## 2024-08-30 PROCEDURE — 94010 BREATHING CAPACITY TEST: CPT

## 2024-08-30 RX ORDER — AMOXICILLIN AND CLAVULANATE POTASSIUM 875; 125 MG/1; MG/1
875-125 TABLET, COATED ORAL
Qty: 14 | Refills: 0 | Status: ACTIVE | COMMUNITY
Start: 2024-08-30 | End: 1900-01-01

## 2024-08-30 RX ORDER — ALBUTEROL SULFATE 90 UG/1
108 (90 BASE) INHALANT RESPIRATORY (INHALATION)
Qty: 1 | Refills: 11 | Status: ACTIVE | COMMUNITY
Start: 2024-08-30 | End: 1900-01-01

## 2024-08-31 LAB
ALBUMIN SERPL ELPH-MCNC: 4.2 G/DL
ALP BLD-CCNC: 37 U/L
ALT SERPL-CCNC: 7 U/L
ANION GAP SERPL CALC-SCNC: 19 MMOL/L
AST SERPL-CCNC: 10 U/L
BILIRUB SERPL-MCNC: 0.6 MG/DL
BUN SERPL-MCNC: 37 MG/DL
CALCIUM SERPL-MCNC: 9.6 MG/DL
CHLORIDE SERPL-SCNC: 103 MMOL/L
CO2 SERPL-SCNC: 20 MMOL/L
CREAT SERPL-MCNC: 1.92 MG/DL
EGFR: 32 ML/MIN/1.73M2
ERYTHROCYTE [SEDIMENTATION RATE] IN BLOOD BY WESTERGREN METHOD: 18 MM/HR
ESTIMATED AVERAGE GLUCOSE: 128 MG/DL
GLUCOSE SERPL-MCNC: 102 MG/DL
HBA1C MFR BLD HPLC: 6.1 %
POTASSIUM SERPL-SCNC: 5 MMOL/L
PROT SERPL-MCNC: 6.3 G/DL
RAPID RVP RESULT: NOT DETECTED
SARS-COV-2 RNA PNL RESP NAA+PROBE: NOT DETECTED
SODIUM SERPL-SCNC: 142 MMOL/L
TESTOST SERPL-MCNC: 235 NG/DL
TSH SERPL-ACNC: 3.2 UIU/ML

## 2024-09-01 NOTE — REASON FOR VISIT
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

## 2024-09-01 NOTE — PHYSICAL EXAM
[No Acute Distress] : no acute distress [Normal Appearance] : normal appearance [No Neck Mass] : no neck mass [Normal Rate/Rhythm] : normal rate/rhythm [Normal S1, S2] : normal s1, s2 [No Murmurs] : no murmurs [No Abnormalities] : no abnormalities [Benign] : benign [Normal Gait] : normal gait [Normal Color/ Pigmentation] : normal color/ pigmentation [No Focal Deficits] : no focal deficits [Oriented x3] : oriented x3 [TextBox_11] : evidene of fall on face from choking and passing out [TextBox_68] : few rhonchi but quite clear for him  austyn patrick

## 2024-09-01 NOTE — HISTORY OF PRESENT ILLNESS
[TextBox_4] : patient with severe copd trelegy, azithormycin and predniosne 10mg daily patient has been walking every day and is no more dyspneic than usual he has been coughing more and bringin up clear sputum has noticed legs swelling saw vascular specialist and lama not have clot or vascular disease nasal swab here pcr negative for virus or covid exam was difficult today because patients poodle was jumping on my leg during the exam dog cannot be left alone

## 2024-09-01 NOTE — REVIEW OF SYSTEMS
[Fatigue] : fatigue [Cough] : cough [Dyspnea] : dyspnea [Sputum] : sputum [Wheezing] : wheezing [Negative] : Psychiatric [TextBox_14] : status post fall [TextBox_30] : for him he is good wiht modest dyspnea only [TextBox_134] : seems very depressed since he retired

## 2024-09-01 NOTE — DISCUSSION/SUMMARY
[FreeTextEntry1] : no clear why he has more edema he has increaed his diuretic to every other day from twice a week blood show mild non anion gap acidosis and slight lfts abnormalitis he will show these to his primary doc

## 2024-09-01 NOTE — PROCEDURE
[FreeTextEntry1] : spioromety unchanged fev unchanged from the past  chest film with no acute infiltrate no evidence for chf

## 2024-09-01 NOTE — REVIEW OF SYSTEMS
[Fatigue] : fatigue [Cough] : cough [Sputum] : sputum [Dyspnea] : dyspnea [Wheezing] : wheezing [Negative] : Psychiatric [TextBox_14] : status post fall [TextBox_30] : for him he is good wiht modest dyspnea only [TextBox_134] : seems very depressed since he retired

## 2024-09-04 LAB
BASOPHILS # BLD AUTO: 0.04 K/UL
BASOPHILS NFR BLD AUTO: 0.3 %
EOSINOPHIL # BLD AUTO: 0.08 K/UL
EOSINOPHIL NFR BLD AUTO: 0.7 %
HCT VFR BLD CALC: 53.4 %
HGB BLD-MCNC: 15.1 G/DL
IMM GRANULOCYTES NFR BLD AUTO: 0.9 %
LYMPHOCYTES # BLD AUTO: 2.01 K/UL
LYMPHOCYTES NFR BLD AUTO: 16.4 %
MAN DIFF?: NORMAL
MCHC RBC-ENTMCNC: 28.3 GM/DL
MCHC RBC-ENTMCNC: 30.9 PG
MCV RBC AUTO: 109.4 FL
MONOCYTES # BLD AUTO: 0.72 K/UL
MONOCYTES NFR BLD AUTO: 5.9 %
NEUTROPHILS # BLD AUTO: 9.27 K/UL
NEUTROPHILS NFR BLD AUTO: 75.8 %
PLATELET # BLD AUTO: 197 K/UL
RBC # BLD: 4.88 M/UL
RBC # FLD: 14.6 %
WBC # FLD AUTO: 12.23 K/UL

## 2024-09-05 LAB
ALBUMIN MFR SERPL ELPH: 61.7 %
ALBUMIN SERPL-MCNC: 3.9 G/DL
ALBUMIN/GLOB SERPL: 1.6 RATIO
ALPHA1 GLOB MFR SERPL ELPH: 4.4 %
ALPHA1 GLOB SERPL ELPH-MCNC: 0.3 G/DL
ALPHA2 GLOB MFR SERPL ELPH: 13.7 %
ALPHA2 GLOB SERPL ELPH-MCNC: 0.9 G/DL
B-GLOBULIN MFR SERPL ELPH: 10.8 %
B-GLOBULIN SERPL ELPH-MCNC: 0.7 G/DL
GAMMA GLOB FLD ELPH-MCNC: 0.6 G/DL
GAMMA GLOB MFR SERPL ELPH: 9.4 %
INTERPRETATION SERPL IEP-IMP: NORMAL
PROT SERPL-MCNC: 6.3 G/DL
PROT SERPL-MCNC: 6.3 G/DL

## 2024-09-11 ENCOUNTER — APPOINTMENT (OUTPATIENT)
Dept: INTERNAL MEDICINE | Facility: CLINIC | Age: 89
End: 2024-09-11
Payer: MEDICARE

## 2024-09-11 VITALS
OXYGEN SATURATION: 97 % | SYSTOLIC BLOOD PRESSURE: 122 MMHG | HEART RATE: 52 BPM | BODY MASS INDEX: 21.66 KG/M2 | HEIGHT: 65 IN | TEMPERATURE: 97.3 F | DIASTOLIC BLOOD PRESSURE: 66 MMHG | WEIGHT: 130 LBS

## 2024-09-11 DIAGNOSIS — I25.10 ATHEROSCLEROTIC HEART DISEASE OF NATIVE CORONARY ARTERY W/OUT ANGINA PECTORIS: ICD-10-CM

## 2024-09-11 DIAGNOSIS — R73.03 PREDIABETES.: ICD-10-CM

## 2024-09-11 DIAGNOSIS — N18.32 CHRONIC KIDNEY DISEASE, STAGE 3B: ICD-10-CM

## 2024-09-11 DIAGNOSIS — J44.1 CHRONIC OBSTRUCTIVE PULMONARY DISEASE WITH (ACUTE) EXACERBATION: ICD-10-CM

## 2024-09-11 PROCEDURE — 99213 OFFICE O/P EST LOW 20 MIN: CPT

## 2024-09-11 NOTE — PHYSICAL EXAM
[Normal] : affect was normal and insight and judgment were intact [de-identified] : Chung [de-identified] : Skin fragile

## 2024-09-11 NOTE — HISTORY OF PRESENT ILLNESS
[FreeTextEntry1] : All notes reviewed Nocturia 3 times  Fragile skin with bleeding   [place] each day  [de-identified] : Diuretics discussed  Got Flu vaccine and Covid vaccinee

## 2024-09-11 NOTE — HEALTH RISK ASSESSMENT
[Fair] :  ~his/her~ mood as fair [No] : No [No falls in past year] : Patient reported no falls in the past year [0] : 2) Feeling down, depressed, or hopeless: Not at all (0) [Former] : Former [[id number]] : 0

## 2024-09-30 NOTE — ED ADULT NURSE REASSESSMENT NOTE - NS ED NURSE REASSESS COMMENT FT1
Plastics MD at bedside for lac repair.
Patient ambulated well without assistance, made aware of the need for f/u with specialist.
[FreeTextEntry1] : Referring GYN - Dr. Elzbieta Webb PCP - Dr. Jenaro Temple GI - none  HPI: Ms. Colon presents for follow up. She has not seen her Gyn or had other assessments, having put her work ahead of herself she says. She is concerned that she may be losing her job. She reports continued and perhaps increased right abdominal discomfort. We disc her GI eval, incl the c-scope results.   Hx: She is a 48 yo  premenopausal female (LMP 6/23/23) who presents for initial consultation at the request of Dr. Webb for the management of a hydrosalpinx. She was seen for annual GYN exam on 4/26/23 at which time she reported intermittent pelvic pain. Imaging was consistent with a right adnexal hydrosalpinx and a clear cyst increased in size since 5/2022. Tumor markers were normal. She was referred here for further management.   She notes vague right sided pelvic discomfort. For about a month she also notes RUQ and right flank discomfort; she notes this has not been disc with her providers. She has been advised to get a c-scope. 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   Disc FHx.   She subseq reports for last 2 yrs in summer, Jun or Jul she experienced 3-4 wks of bleeding. Since last Jun menses q4-6w and about 4 d. She reports no prior biopsy of EM.   LABS on 5/31/23: CA-125 was 8 (ref <38) CEA was 0.6 (ref <3.8) Premenopausal DORY was 0.52 (ref<1.14) DORY CA-125 as 7.6 (ref< 38.1) OVA-1 was 3.2 (ref<4.4)   IMAGING: CT A/P on 8/25/23: LOWER CHEST: Within normal limits. LIVER: Hepatic steatosis.. Hepatomegaly, measuring 18 cm. BILE DUCTS: Normal caliber. GALLBLADDER: Within normal limits. SPLEEN: Within normal limits. PANCREAS: Within normal limits. ADRENALS: Within normal limits. KIDNEYS/URETERS: Within normal limits. VISUALIZED PORTIONS: BOWEL: Within normal limits. PERITONEUM: No ascites. VESSELS: Within normal limits. RETROPERITONEUM/LYMPH NODES: No lymphadenopathy. ABDOMINAL WALL: Small fat-containing umbilical hernia. BONES: Within normal limits. Additional: Cystic type lesions in the presacral space superiorly partially visualized measuring 4.1 x 3.4 cm may represent right ovarian cyst or hydrosalpinx, likely corresponding to previously noted hydrosalpinx on MRI of 6/8/2023. IMPRESSION: Hepatomegaly with hepatic steatosis. Partially visualized cystic hypodensities in the superior presacral space likely corresponding to previously noted hydrosalpinx and/or right ovarian cyst seen on MRI pelvis 6/8/2023. Pelvic ultrasound can be obtained as indicated.   Pelvic MRI on 6/8/23: UTERUS: Measures 11.5 x 6.4 x 6.0 cm. Posterior fundal intramural fibroid measuring 5.1 x 4.5 x 4.8 cm. Right posterior body subserosal fibroid measuring 2.0 x 1.2 x 1.6 cm. Nabothian cysts. ENDOMETRIUM: Within normal limits. Measures up to 4 mm in thickness. JUNCTIONAL ZONE: Within normal limits. RIGHT OVARY: Measures 3.8 x 1.8 x 2.0 cm. Simple appearing right ovarian cyst measuring 2.8 x 1.5 x 1.7 cm. LEFT OVARY: Measures 2.2 x 1.2 x 1.6 cm. Within normal limits. ADNEXA: Fluid-filled tubular structure within the right adnexa measuring up to 3.0 cm in diameter and approximately 12 cm in length, consistent with hydrosalpinx. BLADDER: Within normal limits. LYMPH NODES: No pelvic lymphadenopathy. VISUALIZED PORTIONS: ABDOMINAL ORGANS: Within normal limits. BOWEL: Within normal limits. PERITONEUM: Trace fluid in the right adnexa. VESSELS: Within normal limits. ABDOMINAL WALL: Within normal limits. BONES: Degenerative changes. THIGHS: Partially imaged intramuscular fatty lesion within the left quadriceps, most likely representing a lipoma. IMPRESSION: Fluid-filled tubular structure within the right adnexa, compatible with hydrosalpinx. Simple appearing right ovarian cyst measuring 2.8 cm. Uterine fibroids. Trace fluid within the right adnexa.   TVUS on 5/16/22: AV uterus 6.84 x 6.43 x 4.63 cm. Myoma 5.94 x 5.4 x 4.47 cm. EML 6.06 mm RTO - 2.68 x 2.17 x 1.69 cm. Clear cyst 2.0 cm. Right adnexal fluid elongated collection adjacent to the right ovary 4.6 cm.  LTO - 2.58 x 2.03 x 1.56 cm  TVUS on 5/27/23: AV uterus 6.22 x 6.06 x 5.03 cm. Myoma 5.89 x 5.61 x 4.5 cm. EML 2.86 mm.  RTO - 4.05 x 2.0 x 2.68 cm. Clear cyst 3.1 cm. Right adnexal fluid elongated collection seen adjacent to the ovary 7.2 cm, previously 4.6 cm on 5/16/22 LTO - 1.76 x 1.21 x 1.16 cm.    TVUS on 9/11/20: uterus 11.7 x 6.7 x 7.3 cm. Evidence of a 6.1 x 5.6 x 5.7 cm sized posterior intramural fibroid with secondary distortion of the endometrium. 2 cm nabothian cyst. EML 9 mm.  RTO - 2.4 x 1.7 x 1.9 cm. LTO - 2.0 x 1.4 x 1.7 cm No FF.   PMHx: HTN PSHx: laparoscopic appendectomy Fam Hx: PGM (ovarian cancer)   HCM: PAP on 4/26/23: NEGATIVE, HrHPV negative Mammogram: 4/2022 - BIRADS 1 CBE: 4/2023 SK SBE: performs Colonoscopy: never  COVID-19 vaccine series completed

## 2024-10-14 ENCOUNTER — APPOINTMENT (OUTPATIENT)
Dept: PULMONOLOGY | Facility: CLINIC | Age: 89
End: 2024-10-14
Payer: MEDICARE

## 2024-10-14 VITALS
BODY MASS INDEX: 21.66 KG/M2 | HEART RATE: 60 BPM | OXYGEN SATURATION: 97 % | HEIGHT: 65 IN | TEMPERATURE: 98.3 F | WEIGHT: 130 LBS | SYSTOLIC BLOOD PRESSURE: 100 MMHG | DIASTOLIC BLOOD PRESSURE: 80 MMHG

## 2024-10-14 PROCEDURE — 99214 OFFICE O/P EST MOD 30 MIN: CPT | Mod: 25

## 2024-10-24 ENCOUNTER — APPOINTMENT (OUTPATIENT)
Dept: INTERNAL MEDICINE | Facility: CLINIC | Age: 89
End: 2024-10-24
Payer: MEDICARE

## 2024-10-24 VITALS
OXYGEN SATURATION: 96 % | TEMPERATURE: 98.7 F | SYSTOLIC BLOOD PRESSURE: 110 MMHG | WEIGHT: 130 LBS | DIASTOLIC BLOOD PRESSURE: 63 MMHG | HEIGHT: 65 IN | BODY MASS INDEX: 21.66 KG/M2 | HEART RATE: 51 BPM

## 2024-10-24 DIAGNOSIS — J44.1 CHRONIC OBSTRUCTIVE PULMONARY DISEASE WITH (ACUTE) EXACERBATION: ICD-10-CM

## 2024-10-24 DIAGNOSIS — R73.03 PREDIABETES.: ICD-10-CM

## 2024-10-24 DIAGNOSIS — I25.10 ATHEROSCLEROTIC HEART DISEASE OF NATIVE CORONARY ARTERY W/OUT ANGINA PECTORIS: ICD-10-CM

## 2024-10-24 DIAGNOSIS — N18.32 CHRONIC KIDNEY DISEASE, STAGE 3B: ICD-10-CM

## 2024-10-24 PROCEDURE — 99213 OFFICE O/P EST LOW 20 MIN: CPT | Mod: 25

## 2024-10-24 PROCEDURE — 36415 COLL VENOUS BLD VENIPUNCTURE: CPT

## 2024-10-25 LAB
ALBUMIN SERPL ELPH-MCNC: 3.6 G/DL
ALP BLD-CCNC: 35 U/L
ALT SERPL-CCNC: 6 U/L
ANION GAP SERPL CALC-SCNC: 14 MMOL/L
AST SERPL-CCNC: 10 U/L
BASOPHILS # BLD AUTO: 0.05 K/UL
BASOPHILS NFR BLD AUTO: 0.7 %
BILIRUB SERPL-MCNC: 0.7 MG/DL
BUN SERPL-MCNC: 31 MG/DL
CALCIUM SERPL-MCNC: 9.2 MG/DL
CHLORIDE SERPL-SCNC: 106 MMOL/L
CO2 SERPL-SCNC: 21 MMOL/L
CREAT SERPL-MCNC: 1.54 MG/DL
EGFR: 42 ML/MIN/1.73M2
EOSINOPHIL # BLD AUTO: 0.08 K/UL
EOSINOPHIL NFR BLD AUTO: 1.1 %
ESTIMATED AVERAGE GLUCOSE: 126 MG/DL
GLUCOSE SERPL-MCNC: 94 MG/DL
HBA1C MFR BLD HPLC: 6 %
HCT VFR BLD CALC: 43.9 %
HGB BLD-MCNC: 13.5 G/DL
IMM GRANULOCYTES NFR BLD AUTO: 0.8 %
LYMPHOCYTES # BLD AUTO: 1.95 K/UL
LYMPHOCYTES NFR BLD AUTO: 26.3 %
MAN DIFF?: NORMAL
MCHC RBC-ENTMCNC: 30.8 GM/DL
MCHC RBC-ENTMCNC: 30.8 PG
MCV RBC AUTO: 100 FL
MONOCYTES # BLD AUTO: 0.69 K/UL
MONOCYTES NFR BLD AUTO: 9.3 %
NEUTROPHILS # BLD AUTO: 4.59 K/UL
NEUTROPHILS NFR BLD AUTO: 61.8 %
PLATELET # BLD AUTO: 134 K/UL
POTASSIUM SERPL-SCNC: 4.7 MMOL/L
PROT SERPL-MCNC: 5.5 G/DL
RBC # BLD: 4.39 M/UL
RBC # FLD: 15 %
SODIUM SERPL-SCNC: 141 MMOL/L
WBC # FLD AUTO: 7.42 K/UL

## 2024-11-22 RX ORDER — FLUCONAZOLE 50 MG/1
50 TABLET ORAL
Qty: 7 | Refills: 1 | Status: ACTIVE | COMMUNITY
Start: 2024-11-22 | End: 1900-01-01

## 2024-12-02 ENCOUNTER — INPATIENT (INPATIENT)
Facility: HOSPITAL | Age: 88
LOS: 3 days | Discharge: HOME HEALTH | End: 2024-12-06
Attending: SURGERY | Admitting: SURGERY
Payer: MEDICARE

## 2024-12-02 VITALS
OXYGEN SATURATION: 97 % | DIASTOLIC BLOOD PRESSURE: 78 MMHG | WEIGHT: 134.92 LBS | RESPIRATION RATE: 18 BRPM | SYSTOLIC BLOOD PRESSURE: 161 MMHG | HEART RATE: 54 BPM | TEMPERATURE: 98 F | HEIGHT: 66 IN

## 2024-12-02 DIAGNOSIS — Z98.890 OTHER SPECIFIED POSTPROCEDURAL STATES: Chronic | ICD-10-CM

## 2024-12-02 DIAGNOSIS — Z90.2 ACQUIRED ABSENCE OF LUNG [PART OF]: Chronic | ICD-10-CM

## 2024-12-02 DIAGNOSIS — Z98.890 OTHER SPECIFIED POSTPROCEDURAL STATES: ICD-10-CM

## 2024-12-02 DIAGNOSIS — K40.90 UNILATERAL INGUINAL HERNIA, WITHOUT OBSTRUCTION OR GANGRENE, NOT SPECIFIED AS RECURRENT: ICD-10-CM

## 2024-12-02 DIAGNOSIS — J44.9 CHRONIC OBSTRUCTIVE PULMONARY DISEASE, UNSPECIFIED: ICD-10-CM

## 2024-12-02 DIAGNOSIS — N40.0 BENIGN PROSTATIC HYPERPLASIA WITHOUT LOWER URINARY TRACT SYMPTOMS: ICD-10-CM

## 2024-12-02 DIAGNOSIS — Z01.818 ENCOUNTER FOR OTHER PREPROCEDURAL EXAMINATION: ICD-10-CM

## 2024-12-02 DIAGNOSIS — E78.00 PURE HYPERCHOLESTEROLEMIA, UNSPECIFIED: ICD-10-CM

## 2024-12-02 DIAGNOSIS — I10 ESSENTIAL (PRIMARY) HYPERTENSION: ICD-10-CM

## 2024-12-02 DIAGNOSIS — E11.9 TYPE 2 DIABETES MELLITUS WITHOUT COMPLICATIONS: ICD-10-CM

## 2024-12-02 DIAGNOSIS — C34.90 MALIGNANT NEOPLASM OF UNSPECIFIED PART OF UNSPECIFIED BRONCHUS OR LUNG: ICD-10-CM

## 2024-12-02 LAB
ALBUMIN SERPL ELPH-MCNC: 3.6 G/DL — SIGNIFICANT CHANGE UP (ref 3.3–5)
ALP SERPL-CCNC: 40 U/L — SIGNIFICANT CHANGE UP (ref 40–120)
ALT FLD-CCNC: 9 U/L — LOW (ref 10–45)
ANION GAP SERPL CALC-SCNC: 12 MMOL/L — SIGNIFICANT CHANGE UP (ref 5–17)
APTT BLD: 24.6 SEC — SIGNIFICANT CHANGE UP (ref 24.5–35.6)
APTT BLD: 26.6 SEC — SIGNIFICANT CHANGE UP (ref 24.5–35.6)
AST SERPL-CCNC: 12 U/L — SIGNIFICANT CHANGE UP (ref 10–40)
BASOPHILS # BLD AUTO: 0.04 K/UL — SIGNIFICANT CHANGE UP (ref 0–0.2)
BASOPHILS NFR BLD AUTO: 0.3 % — SIGNIFICANT CHANGE UP (ref 0–2)
BILIRUB SERPL-MCNC: 1 MG/DL — SIGNIFICANT CHANGE UP (ref 0.2–1.2)
BUN SERPL-MCNC: 44 MG/DL — HIGH (ref 7–23)
CALCIUM SERPL-MCNC: 9.2 MG/DL — SIGNIFICANT CHANGE UP (ref 8.4–10.5)
CHLORIDE SERPL-SCNC: 102 MMOL/L — SIGNIFICANT CHANGE UP (ref 96–108)
CK MB CFR SERPL CALC: 4.8 NG/ML — SIGNIFICANT CHANGE UP (ref 0–6.7)
CO2 SERPL-SCNC: 22 MMOL/L — SIGNIFICANT CHANGE UP (ref 22–31)
CREAT SERPL-MCNC: 1.8 MG/DL — HIGH (ref 0.5–1.3)
EGFR: 35 ML/MIN/1.73M2 — LOW
EOSINOPHIL # BLD AUTO: 0.05 K/UL — SIGNIFICANT CHANGE UP (ref 0–0.5)
EOSINOPHIL NFR BLD AUTO: 0.4 % — SIGNIFICANT CHANGE UP (ref 0–6)
GLUCOSE SERPL-MCNC: 137 MG/DL — HIGH (ref 70–99)
HCT VFR BLD CALC: 50.2 % — HIGH (ref 39–50)
HGB BLD-MCNC: 16 G/DL — SIGNIFICANT CHANGE UP (ref 13–17)
IMM GRANULOCYTES NFR BLD AUTO: 1 % — HIGH (ref 0–0.9)
INR BLD: 0.89 — SIGNIFICANT CHANGE UP (ref 0.85–1.16)
INR BLD: 0.93 — SIGNIFICANT CHANGE UP (ref 0.85–1.16)
LACTATE SERPL-SCNC: 1.3 MMOL/L — SIGNIFICANT CHANGE UP (ref 0.5–2)
LACTATE SERPL-SCNC: 1.8 MMOL/L — SIGNIFICANT CHANGE UP (ref 0.5–2)
LYMPHOCYTES # BLD AUTO: 2.54 K/UL — SIGNIFICANT CHANGE UP (ref 1–3.3)
LYMPHOCYTES # BLD AUTO: 21 % — SIGNIFICANT CHANGE UP (ref 13–44)
MCHC RBC-ENTMCNC: 30.9 PG — SIGNIFICANT CHANGE UP (ref 27–34)
MCHC RBC-ENTMCNC: 31.9 G/DL — LOW (ref 32–36)
MCV RBC AUTO: 96.9 FL — SIGNIFICANT CHANGE UP (ref 80–100)
MONOCYTES # BLD AUTO: 0.82 K/UL — SIGNIFICANT CHANGE UP (ref 0–0.9)
MONOCYTES NFR BLD AUTO: 6.8 % — SIGNIFICANT CHANGE UP (ref 2–14)
NEUTROPHILS # BLD AUTO: 8.54 K/UL — HIGH (ref 1.8–7.4)
NEUTROPHILS NFR BLD AUTO: 70.5 % — SIGNIFICANT CHANGE UP (ref 43–77)
NRBC # BLD: 0 /100 WBCS — SIGNIFICANT CHANGE UP (ref 0–0)
PLATELET # BLD AUTO: 146 K/UL — LOW (ref 150–400)
POTASSIUM SERPL-MCNC: 5.1 MMOL/L — SIGNIFICANT CHANGE UP (ref 3.5–5.3)
POTASSIUM SERPL-SCNC: 5.1 MMOL/L — SIGNIFICANT CHANGE UP (ref 3.5–5.3)
PROT SERPL-MCNC: 6.4 G/DL — SIGNIFICANT CHANGE UP (ref 6–8.3)
PROTHROM AB SERPL-ACNC: 10.2 SEC — SIGNIFICANT CHANGE UP (ref 9.9–13.4)
PROTHROM AB SERPL-ACNC: 10.9 SEC — SIGNIFICANT CHANGE UP (ref 9.9–13.4)
RBC # BLD: 5.18 M/UL — SIGNIFICANT CHANGE UP (ref 4.2–5.8)
RBC # FLD: 14.6 % — HIGH (ref 10.3–14.5)
SODIUM SERPL-SCNC: 136 MMOL/L — SIGNIFICANT CHANGE UP (ref 135–145)
TROPONIN T, HIGH SENSITIVITY RESULT: 37 NG/L — SIGNIFICANT CHANGE UP (ref 0–51)
TROPONIN T, HIGH SENSITIVITY RESULT: 39 NG/L — SIGNIFICANT CHANGE UP (ref 0–51)
WBC # BLD: 12.11 K/UL — HIGH (ref 3.8–10.5)
WBC # FLD AUTO: 12.11 K/UL — HIGH (ref 3.8–10.5)

## 2024-12-02 PROCEDURE — 71275 CT ANGIOGRAPHY CHEST: CPT | Mod: 26,MC

## 2024-12-02 PROCEDURE — 99221 1ST HOSP IP/OBS SF/LOW 40: CPT | Mod: GC

## 2024-12-02 PROCEDURE — 99221 1ST HOSP IP/OBS SF/LOW 40: CPT

## 2024-12-02 PROCEDURE — 71045 X-RAY EXAM CHEST 1 VIEW: CPT | Mod: 26

## 2024-12-02 PROCEDURE — 74176 CT ABD & PELVIS W/O CONTRAST: CPT | Mod: 26,MC,XU

## 2024-12-02 PROCEDURE — 74174 CTA ABD&PLVS W/CONTRAST: CPT | Mod: 26,MC

## 2024-12-02 PROCEDURE — 99285 EMERGENCY DEPT VISIT HI MDM: CPT | Mod: FS

## 2024-12-02 RX ORDER — ACETAMINOPHEN 500MG 500 MG/1
1000 TABLET, COATED ORAL ONCE
Refills: 0 | Status: COMPLETED | OUTPATIENT
Start: 2024-12-02 | End: 2024-12-02

## 2024-12-02 RX ORDER — METOPROLOL TARTRATE 100 MG/1
50 TABLET, FILM COATED ORAL DAILY
Refills: 0 | Status: DISCONTINUED | OUTPATIENT
Start: 2024-12-02 | End: 2024-12-03

## 2024-12-02 RX ORDER — ESCITALOPRAM OXALATE 10 MG/1
10 TABLET, FILM COATED ORAL DAILY
Refills: 0 | Status: DISCONTINUED | OUTPATIENT
Start: 2024-12-02 | End: 2024-12-03

## 2024-12-02 RX ORDER — SODIUM CHLORIDE 9 MG/ML
1000 INJECTION, SOLUTION INTRAMUSCULAR; INTRAVENOUS; SUBCUTANEOUS ONCE
Refills: 0 | Status: COMPLETED | OUTPATIENT
Start: 2024-12-02 | End: 2024-12-02

## 2024-12-02 RX ORDER — ROSUVASTATIN CALCIUM 5 MG/1
10 TABLET, FILM COATED ORAL AT BEDTIME
Refills: 0 | Status: DISCONTINUED | OUTPATIENT
Start: 2024-12-02 | End: 2024-12-03

## 2024-12-02 RX ORDER — FLUTICASONE PROPIONATE AND SALMETEROL XINAFOATE 45; 21 UG/1; UG/1
1 AEROSOL, METERED RESPIRATORY (INHALATION)
Refills: 0 | Status: DISCONTINUED | OUTPATIENT
Start: 2024-12-02 | End: 2024-12-03

## 2024-12-02 RX ORDER — ONDANSETRON HYDROCHLORIDE 4 MG/1
4 TABLET, FILM COATED ORAL ONCE
Refills: 0 | Status: COMPLETED | OUTPATIENT
Start: 2024-12-02 | End: 2024-12-02

## 2024-12-02 RX ORDER — TAMSULOSIN HYDROCHLORIDE 0.4 MG/1
0.4 CAPSULE ORAL AT BEDTIME
Refills: 0 | Status: DISCONTINUED | OUTPATIENT
Start: 2024-12-02 | End: 2024-12-03

## 2024-12-02 RX ORDER — HEPARIN SODIUM,PORCINE 1000/ML
5000 VIAL (ML) INJECTION EVERY 8 HOURS
Refills: 0 | Status: DISCONTINUED | OUTPATIENT
Start: 2024-12-02 | End: 2024-12-03

## 2024-12-02 RX ORDER — PREDNISONE 20 MG/1
10 TABLET ORAL DAILY
Refills: 0 | Status: DISCONTINUED | OUTPATIENT
Start: 2024-12-02 | End: 2024-12-03

## 2024-12-02 RX ORDER — FAMOTIDINE 20 MG/1
20 TABLET, FILM COATED ORAL ONCE
Refills: 0 | Status: COMPLETED | OUTPATIENT
Start: 2024-12-02 | End: 2024-12-02

## 2024-12-02 RX ADMIN — SODIUM CHLORIDE 1000 MILLILITER(S): 9 INJECTION, SOLUTION INTRAMUSCULAR; INTRAVENOUS; SUBCUTANEOUS at 09:13

## 2024-12-02 RX ADMIN — Medication 2 PUFF(S): at 21:29

## 2024-12-02 RX ADMIN — ESCITALOPRAM OXALATE 10 MILLIGRAM(S): 10 TABLET, FILM COATED ORAL at 21:25

## 2024-12-02 RX ADMIN — TAMSULOSIN HYDROCHLORIDE 0.4 MILLIGRAM(S): 0.4 CAPSULE ORAL at 21:27

## 2024-12-02 RX ADMIN — FAMOTIDINE 20 MILLIGRAM(S): 20 TABLET, FILM COATED ORAL at 08:02

## 2024-12-02 RX ADMIN — ONDANSETRON HYDROCHLORIDE 4 MILLIGRAM(S): 4 TABLET, FILM COATED ORAL at 08:02

## 2024-12-02 RX ADMIN — PREDNISONE 10 MILLIGRAM(S): 20 TABLET ORAL at 21:38

## 2024-12-02 RX ADMIN — METOPROLOL TARTRATE 50 MILLIGRAM(S): 100 TABLET, FILM COATED ORAL at 23:00

## 2024-12-02 RX ADMIN — ACETAMINOPHEN 500MG 400 MILLIGRAM(S): 500 TABLET, COATED ORAL at 08:03

## 2024-12-02 RX ADMIN — ACETAMINOPHEN 500MG 1000 MILLIGRAM(S): 500 TABLET, COATED ORAL at 08:33

## 2024-12-02 RX ADMIN — ROSUVASTATIN CALCIUM 10 MILLIGRAM(S): 5 TABLET, FILM COATED ORAL at 21:25

## 2024-12-02 RX ADMIN — FLUTICASONE PROPIONATE AND SALMETEROL XINAFOATE 1 DOSE(S): 45; 21 AEROSOL, METERED RESPIRATORY (INHALATION) at 21:30

## 2024-12-02 NOTE — ED ADULT TRIAGE NOTE - CHIEF COMPLAINT QUOTE
Pt arrives cc of upper back pain worsening, pt also reports upper abd pain and nausea. Pt reports pain worse with laying flat

## 2024-12-02 NOTE — H&P ADULT - ASSESSMENT
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 Presents to the ED with c/o upper back pain x 6 days. CT scan showing SBO with transition being in the right inguinal hernia, on exam distended, diffusely tender to palpation. Inguinal hernias completely reduced on exam and no tenderness on the groin region. Repeat CT was done a few hours after and noted repeat inguinal hernias on CT, on exam, patient noted have inguinal hernia that spontaneously reduced when in reverse trendelenberg. Plan to admit patient for operative managmenet of open inguinal hernia with epidural anesthesia.     SBO:  Admit patient to regional under Dr. 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 Presents to the ED with c/o upper back pain x 6 days. CT scan showing SBO with transition being in the right inguinal hernia, on exam distended, diffusely tender to palpation. Inguinal hernias completely reduced on exam and no tenderness on the groin region. Repeat CT was done a few hours after and noted repeat inguinal hernias on CT, on exam, patient noted have inguinal hernia that spontaneously reduced when in reverse trendelenberg. Plan to admit patient for operative managmenet of open inguinal hernia with epidural anesthesia.     SBO:  Admit patient to regional under Dr. Henry   NPO   IVF at 90cc/hr 	  SCDs/IS/OOB  SQH  Pain management with IV Tylenol   Restart appropriate home meds   Hold Lasix for now   EKG  CV consult

## 2024-12-02 NOTE — ED ADULT NURSE REASSESSMENT NOTE - NS ED NURSE REASSESS COMMENT FT1
Report received from BRIGIDO Pearce. Pt resting comfortably in stretcher, respirations even and unlabored. Awaiting xray.

## 2024-12-02 NOTE — ED PROVIDER NOTE - CLINICAL SUMMARY MEDICAL DECISION MAKING FREE TEXT BOX
89 yo M with pmh of COPD (does not require home O2, reports no prior hospitalizations or intubations due to an exacerbation, on daily prednisone), HTN, HLD, BPH, CAD w/o stents,  NSCLC s/p lobectomy 10 years prior (patient does not recall if it was RML or RLL), CKD III c/o upper back pain x 6 days. Pain located between his shoulder blades, described as sharp. Pt states the day before it started he tried to lift his wife off the floor so he thought it was from doing that. Pain worse when trying to lie flat and he is now unable to lie flat due to pain. Tried tylenol with no relief. Pt then started to have some epigastric pain and nausea for the past 3 days. States has had similar pain in the past. Reports 1 episode of diarrhea but no vomiting. Also reports some mild SOB. Denies fever, chills, cough, cp, palpitations, numbness, tingling, focal weakness. VSS. No tenderness to back. Lungs cta b/l. Abd soft 89 yo M with pmh of COPD (does not require home O2, reports no prior hospitalizations or intubations due to an exacerbation, on daily prednisone), HTN, HLD, BPH, CAD w/o stents,  NSCLC s/p lobectomy 10 years prior (patient does not recall if it was RML or RLL), CKD III c/o upper back pain x 6 days. Pain located between his shoulder blades, described as sharp. Pt states the day before it started he tried to lift his wife off the floor so he thought it was from doing that. Pain worse when trying to lie flat and he is now unable to lie flat due to pain. Tried tylenol with no relief. Pt then started to have some epigastric pain and nausea for the past 3 days. States has had similar pain in the past. Reports 1 episode of diarrhea but no vomiting. Also reports some mild SOB. Denies fever, chills, cough, cp, palpitations, numbness, tingling, focal weakness. VSS. No tenderness to back. Lungs cta b/l. Abd soft with mild LUQ ttp. will check labs, CTA c/a/p r/o dissection r/o intraapbd pathology

## 2024-12-02 NOTE — ED ADULT NURSE NOTE - NSFALLHARMRISKINTERV_ED_ALL_ED

## 2024-12-02 NOTE — PROGRESS NOTE ADULT - SUBJECTIVE AND OBJECTIVE BOX
INTERVAL HPI/OVERNIGHT EVENTS:  Patient well known to ;  History of CAD, COPD, S/P lobectomy; BPH  Patient wants his dog daily and has minimal sob with the walk   Meds as outlined   Hor Open Hernia repair tomorrow under spinal       MEDICATIONS  (STANDING):  escitalopram 10 milliGRAM(s) Oral daily  fluticasone propionate/ salmeterol 100-50 MICROgram(s) Diskus 1 Dose(s) Inhalation two times a day  metoprolol succinate ER 50 milliGRAM(s) Oral daily  predniSONE   Tablet 10 milliGRAM(s) Oral daily  rosuvastatin 10 milliGRAM(s) Oral at bedtime  tamsulosin 0.4 milliGRAM(s) Oral at bedtime  tiotropium 2.5 MICROgram(s) Inhaler 2 Puff(s) Inhalation daily    MEDICATIONS  (PRN):      Allergies    No Known Allergies    Intolerances        Vital Signs Last 24 Hrs  T(C): 36.5 (02 Dec 2024 10:22), Max: 36.5 (02 Dec 2024 07:17)  T(F): 97.7 (02 Dec 2024 10:22), Max: 97.7 (02 Dec 2024 07:17)  HR: 70 (02 Dec 2024 18:25) (54 - 70)  BP: 150/65 (02 Dec 2024 18:25) (104/59 - 161/78)  BP(mean): 94 (02 Dec 2024 18:25) (94 - 94)  RR: 17 (02 Dec 2024 18:25) (17 - 18)  SpO2: 96% (02 Dec 2024 18:25) (96% - 97%)    Parameters below as of 02 Dec 2024 18:25  Patient On (Oxygen Delivery Method): room air              Constitutional: Awake ; Chief  complaint back pain     Eyes: WALI    ENMT: Negative    Neck: Supple    Back:  no tenderness     Respiratory:  clear     Cardiovascular: S1 S2    Gastrointestinal:  softly distended     Genitourinary:    Extremities: no edema     Vascular:    Neurological:    Skin:    Lymph Nodes:            LABS:                        16.0   12.11 )-----------( 146      ( 02 Dec 2024 07:46 )             50.2     12-02    136  |  102  |  44[H]  ----------------------------<  137[H]  5.1   |  22  |  1.80[H]    Ca    9.2      02 Dec 2024 07:46    TPro  6.4  /  Alb  3.6  /  TBili  1.0  /  DBili  x   /  AST  12  /  ALT  9[L]  /  AlkPhos  40  12-02    PT/INR - ( 02 Dec 2024 07:46 )   PT: 10.2 sec;   INR: 0.89          PTT - ( 02 Dec 2024 07:46 )  PTT:26.6 sec  Urinalysis Basic - ( 02 Dec 2024 07:46 )    Color: x / Appearance: x / SG: x / pH: x  Gluc: 137 mg/dL / Ketone: x  / Bili: x / Urobili: x   Blood: x / Protein: x / Nitrite: x   Leuk Esterase: x / RBC: x / WBC x   Sq Epi: x / Non Sq Epi: x / Bacteria: x        RADIOLOGY & ADDITIONAL TESTS:

## 2024-12-02 NOTE — ED PROVIDER NOTE - PHYSICAL EXAMINATION
CONSTITUTIONAL: Well-appearing;  in no apparent distress.   HEAD: Normocephalic; atraumatic.   EYES: PERRL; EOM intact; conjunctiva and sclera clear  ENT: normal nose; no rhinorrhea; normal pharynx with no erythema or lesions.   NECK: Supple; non-tender; no LAD  CARDIOVASCULAR: Normal S1, S2; No audible murmurs. Regular rate and rhythm.   RESPIRATORY: Breathing easily; breath sounds clear and equal bilaterally; no wheezes, rhonchi, or rales.  GI: Soft; non-distended; mild LUQ ttp  MSK: FROM at all extremities, normal tone   EXT: 1+ b/l pitting edema   SKIN: Normal for age and race; warm; dry; good turgor; no apparent lesions or rash.   NEURO: A & O x 3; face symmetric; grossly unremarkable.   PSYCHOLOGICAL: The patient’s mood and manner are appropriate.

## 2024-12-02 NOTE — ED PROVIDER NOTE - PROGRESS NOTE DETAILS
CTA No aortic dissection or aneurysm. Small bowel obstruction with transition point at the orifice of the right   inguinal hernia. pt seen by surgery, recommended rpt CT which confirmed an SBO, will admit. back pain improved after medication

## 2024-12-02 NOTE — PATIENT PROFILE ADULT - FALL HARM RISK - HARM RISK INTERVENTIONS

## 2024-12-02 NOTE — ED ADULT NURSE REASSESSMENT NOTE - NS ED NURSE REASSESS COMMENT FT1
RN notified about pt's 20g PIV to R medial site of the arm being infiltrated while receiving contrast media for CT. area around IV noted to be swelling with redness and tender to touch. IV removed by BRIGIDO Dias. Arm ace wrapped and warm compress applied by CHRIS Hoskins at bedside.

## 2024-12-02 NOTE — ED ADULT NURSE NOTE - NSICDXPASTMEDICALHX_GEN_ALL_CORE_FT
PAST MEDICAL HISTORY:  BPH (benign prostatic hyperplasia)     COPD (chronic obstructive pulmonary disease)     High cholesterol     History of repair of hip fracture     HTN (hypertension)     Lung cancer     Pre-existing type 2 diabetes mellitus

## 2024-12-02 NOTE — ED ADULT NURSE NOTE - NSICDXPASTSURGICALHX_GEN_ALL_CORE_FT
PAST SURGICAL HISTORY:  History of hip surgery     History of lumbar surgery     S/P lobectomy of lung

## 2024-12-02 NOTE — ED PROVIDER NOTE - ATTENDING APP SHARED VISIT CONTRIBUTION OF CARE
89 yo M with pmh of COPD (does not require home O2, reports no prior hospitalizations or intubations due to an exacerbation, on daily prednisone), HTN, HLD, BPH, CAD w/o stents,  NSCLC s/p lobectomy 10 years prior (patient does not recall if it was RML or RLL), CKD III c/o upper back pain x 6 days. Pain located between his shoulder blades, described as sharp. Pt states the day before it started he tried to lift his wife off the floor so he thought it was from doing that. Pain worse when trying to lie flat and he is now unable to lie flat due to pain. Tried tylenol with no relief. Pt then started to have some epigastric pain and nausea for the past 3 days. States has had similar pain in the past. Reports 1 episode of diarrhea but no vomiting. Also reports some mild SOB. Denies fever, chills, cough, cp, palpitations, numbness, tingling, focal weakness. Pt walks with cane at baseline and has been able to walk without difficulty.    VSS. No tenderness to back. Lungs cta b/l. Abd soft with mild LUQ ttp. will check labs, CTA c/a/p r/o dissection r/o intraapbd pathology    CTA No aortic dissection or aneurysm. Small bowel obstruction with transition point at the orifice of the right   inguinal hernia. pt seen by surgery, recommended rpt CT which confirmed an SBO, will admit. back pain improved after medication    Addendum to attending statement: I have reviewed the ACP note and agree with the history, exam, and plan of care. I  was available to PA   as a supervising provider if needed. PA given opportunity to ask questions and request further evaluation / care.    Pt with back pain/abd pain.nausea  VSS  No focal abd tenderness in ED  Labs, CT done in ED with findings of SBO -  Surgery requesting repeat CT and also confirmed continued SBO  Pt to be admitted to surgery for further mgmt    Note - pt's IV infiltrated after CT with iv contrast, IV removed and warm packs applied with compression. No NV deficit

## 2024-12-02 NOTE — ED ADULT NURSE NOTE - OBJECTIVE STATEMENT
Pt is a 89 year old male came in complaining of mid upper back pain and abdominal pain that started 2 days ago.     Upon ed arrival, pt is awake, alert, orientedx4 and ambulatory with cane assistance

## 2024-12-02 NOTE — CONSULT NOTE ADULT - ATTENDING COMMENTS
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 Cardiology was consulted for Pre-Operative CV Risk Assessment and optimization    Review of studies:  - ECG 12/2/24: NSR, PACs, NSST changes    - TTE 9/7/23: Mildly reduced left ventricular systolic function, LVEF 45-50%. Mid and apical anterior septum, apical anterior segment, and apex are akinetic. Grade II left ventricular diastolic dysfunction. Normal right ventricular size and systolic function. Aortic sclerosis without significant stenosis. Pulmonary hypertension present, pulmonary artery systolic pressure is 35 mmHg. No pericardial effusion.  - CT chest 9/7/23: VESSELS: Moderate calcified aortic mural plaque. No evidence of aortic aneurysm. Moderate calcified aortic valve and mitral annulus. Moderate coronary artery calcification.    Home meds (cardiac): rosuvastatin 10mg qd, toprol 50mg qd, lasix 20mg 2x/week     Outpatient cardiologist: Dr. France     # Pre-Operative CV Risk Assessment and optimization  # ASCVD  # HFmrEF  - Dr Ohara has ASCVD based on multivessel coronary artery calcification seen on prior CT chest, but has never undergone invasive ischemic evaluation. He has deferred it in the past with his cardiologist as he has remained asymptomatic  - Patient has good performance status, reportedly able to walk 1 Mile without exertional symptoms.   - Echo from last year revealed Mildly Reduced LV function with EF of 45-50% and Grade II DD  - ECG reviewed showing NSR with PAC and no ischemic changes  - Clinically patient is warm, well perfused and compensated in NAD  - He is considered intermediate risk for necessary -intermediate risk procedure. No further CV testing is needed. There are no active CV contraindication to proceeding with hernia repair  - Would continue home Toprol 50 mg po Qd amador-operatively , hold for SBP <100, HR <60  - Cardiology will cont to follow with you, please call with any questions

## 2024-12-02 NOTE — ED PROVIDER NOTE - OBJECTIVE STATEMENT
89 yo M with pmh of COPD (does not require home O2, reports no prior hospitalizations or intubations due to an exacerbation, on daily prednisone), HTN, HLD, BPH, CAD w/o stents,  NSCLC s/p lobectomy 10 years prior (patient does not recall if it was RML or RLL), CKD III c/o upper back pain x 6 days. Pain located between his shoulder blades, described as sharp. Pt states the day before it started he tried to lift his wife off the floor so he thought it was from doing that. Pain worse when trying to lie flat and he is now unable to lie flat due to pain. Tried tylenol with no relief. Pt then started to have some epigastric pain and nausea for the past 3 days. States has had similar pain in the past. Reports 1 episode of diarrhea but no vomiting. Also reports some mild SOB. Denies fever, chills, cough, cp, palpitations, numbness, tingling, focal weakness. Pt walks with cane at baseline and has been able to walk without difficulty.

## 2024-12-02 NOTE — H&P ADULT - HISTORY OF PRESENT ILLNESS
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 Presents to the ED with c/o upper back pain x 6 days.     Pain located between his shoulder blades, described as sharp. Pt states that last week Tuesday, pt tried to lift his wife off the floor so he thought it was from doing that, pt did not have any feeling of popping sensation in his groin. Pain worse when trying to lie flat and he is now unable to lie flat due to pain. Pt then started to have some epigastric discomfort and nausea for that started on Saturday, just prior to nausea had a 1 time episode of diarrhea, no vomiting. Had a BM on Sunday and today which were normal. Endorses to passing flatus. Denies fever, chills, cough, cp, palpitations, numbness, or any recent weight loss. Pt walks with cane at baseline and has been able to walk without difficulty, walks 1 mile a day, does have some SOB with climbing stairs. Patient has had known inguinal hernias but has never had an issue with them, no history of incarceration and have always been reducible.     METS of 2, last ECHO 9/23 EF 45-50% 1      PMH: of COPD (does not require home O2, reports no prior hospitalizations due to an exacerbation, on daily prednisone), HTN, HLD, BPH, CAD w/o stents,  NSCLC s/p lobectomy 10 years prior (patient does not recall if it was RML or RLL), CKD III  PSx: ectropion of R eyelid 4/24; eyelid lacerations 11/23; cataract sx 2019; NSCLC s/p lobectomy ~2013; decompressive allyson hole for subdural 20 years ago (plates from cabinet fell on head); open appendectomy >40 years ago   Meds: Rosuvastatin 10mg daily; Tamsulosin 0.4mg daily; Metorpolol 50mg daily; lasix 20mg daily; prednisone 10mg daily; albuterole prn; trelegy QD; ipratropium albuterol prn; vitamind d3 5000 IU daily   Allergies: NKDA   Social Hx: denies smoking and EOTH   Family Hx: Denies family hx of IBS, Crohn's, UC, or colon cancer.  Last colonoscopy: 10 years ago and normal   Last EGD: unsure         T(C): 36.5 (12-02-24 @ 10:22), Max: 36.5 (12-02-24 @ 07:17)  HR: 56 (12-02-24 @ 10:22) (54 - 66)  BP: 104/59 (12-02-24 @ 10:22) (104/59 - 161/78)  RR: 18 (12-02-24 @ 10:22) (18 - 18)  SpO2: 97% (12-02-24 @ 10:22) (97% - 97%)      General: AAOx3, NAD, laying comfortably in bed  Cardio: S1,S2, normotensive; normal HR   Pulm: Nonlabored breathing  Abdomen: soft; distended; diffusely tender; tympanic to percussion; no inguinal hernias palpable but easily palpable inguinal hernia defects   Extremities: WWP, peripheral pulses appreciated      LABS:                        16.0   12.11 )-----------( 146      ( 02 Dec 2024 07:46 )             50.2     12-02    136  |  102  |  44[H]  ----------------------------<  137[H]  5.1   |  22  |  1.80[H]    Ca    9.2      02 Dec 2024 07:46    TPro  6.4  /  Alb  3.6  /  TBili  1.0  /  DBili  x   /  AST  12  /  ALT  9[L]  /  AlkPhos  40  12-02    PT/INR - ( 02 Dec 2024 07:46 )   PT: 10.2 sec;   INR: 0.89          PTT - ( 02 Dec 2024 07:46 )  PTT:26.6 sec      IMAGING   PROCEDURE:  CT Angiography of the Chest, Abdomen and Pelvis.  Precontrast imaging was performed through the chest followed by arterial   phase imaging of the chest, abdomen and pelvis.  Sagittal and coronal reformats were performed as well as 3D (MIP)   reconstructions.    FINDINGS:  CHEST:  LUNGS AND LARGE AIRWAYS: Patent central airways. Right lower lobectomy.   Scattered small calcified granulomas. No suspicious pulmonary nodules.  PLEURA: No pleural effusion.  VESSELS: No thoracic aortic aneurysm or dissection. No visible pulmonary   emboli.  HEART: Heart size is normal. No pericardial effusion.  MEDIASTINUM AND BRYNN: Mildly distended fluid-filled esophagus, likely due   to reflux. No lymphadenopathy.  CHEST WALL AND LOWER NECK: Within normal limits.    ABDOMEN AND PELVIS:  LIVER: Within normal limits.  BILE DUCTS: Normal caliber.  GALLBLADDER: Within normal limits.  SPLEEN: Within normal limits.  PANCREAS: Within normal limits.  ADRENALS: Within normal limits.  KIDNEYS/URETERS: Several bilateral renal cysts measuring up to 7.7 cm in   the right kidney. Bilateral renal cortical thinning. No hydronephrosis.    BLADDER: Within normal limits. Bladder diverticula.  BOWEL: Dilated proximal small bowel loops with transition point at the   orifice of the right inguinal hernia which contains a loop of small   bowel. Appendix is not visualized. Colonic diverticulosis.  PERITONEUM/RETROPERITONEUM: Small pelvic ascites.  VESSELS: Atherosclerotic changes. No aneurysm. No dissection.  LYMPH NODES: No lymphadenopathy.  ABDOMINAL WALL: Bilateral hernias, right greater than left. Small bowel   loop in the right inguinal hernia and a knuckle of descending colon in   the left inguinal hernia.  BONES: Degenerative changes.    IMPRESSION:    No aortic dissection or aneurysm.    Small bowel obstruction with transition point at the orifice of the right   inguinal hernia.      --- End of Report ---    ECHO 9/23     CONCLUSIONS:     1. Mildly reduced left ventricular systolic function, LVEF 45-50%.   2. Mid and apical anterior septum, apical anterior segment, and apex are   akinetic.   3. Grade II left ventricular diastolic dysfunction.   4. Normal right ventricular size and systolic function.   5. Aortic sclerosis without significant stenosis.   6. Pulmonary hypertension present, pulmonary artery systolic pressure is   35 mmHg.   7. No pericardial effusion.   8. No prior echo is available for comparison.       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 Presents to the ED with c/o upper back pain x 6 days.     Pain located between his shoulder blades, described as sharp. Pt states that last week Tuesday, pt tried to lift his wife off the floor so he thought it was from doing that, pt did not have any feeling of popping sensation in his groin. Pain worse when trying to lie flat and he is now unable to lie flat due to pain. Pt then started to have some epigastric discomfort and nausea for that started on Saturday, just prior to nausea had a 1 time episode of diarrhea, no vomiting. Had a BM on Sunday and today which were normal. Endorses to passing flatus. Denies fever, chills, cough, cp, palpitations, numbness, or any recent weight loss. Pt walks with cane at baseline and has been able to walk without difficulty, walks 1 mile a day, does have some SOB with climbing stairs. Patient has had known inguinal hernias but has never had an issue with them, no history of incarceration and have always been reducible.     METS of 2, last ECHO 9/23 EF 45-50% 1      PMH: of COPD (does not require home O2, reports no prior hospitalizations due to an exacerbation, on daily prednisone), HTN, HLD, BPH, CAD w/o stents,  NSCLC s/p lobectomy 10 years prior (patient does not recall if it was RML or RLL), CKD III  PSx: ectropion of R eyelid 4/24; eyelid lacerations 11/23; cataract sx 2019; NSCLC s/p lobectomy ~2013; decompressive allyson hole for subdural 20 years ago (plates from cabinet fell on head); open appendectomy >40 years ago   Meds: Rosuvastatin 10mg daily; Tamsulosin 0.4mg at night; Metoprolol 50mg daily; Lasix 20mg daily Monday and Thursday; Prednisolone 10mg daily; albuterol prn; Trelegy QD; ipratropium albuterol prn; Vitamin d3 5000 IU daily   Allergies: NKDA   Social Hx: denies smoking and EOTH   Family Hx: Denies family hx of IBS, Crohn's, UC, or colon cancer.  Last colonoscopy: 10 years ago and normal   Last EGD: unsure         T(C): 36.5 (12-02-24 @ 10:22), Max: 36.5 (12-02-24 @ 07:17)  HR: 56 (12-02-24 @ 10:22) (54 - 66)  BP: 104/59 (12-02-24 @ 10:22) (104/59 - 161/78)  RR: 18 (12-02-24 @ 10:22) (18 - 18)  SpO2: 97% (12-02-24 @ 10:22) (97% - 97%)      General: AAOx3, NAD, laying comfortably in bed  Cardio: S1,S2, normotensive; normal HR   Pulm: Nonlabored breathing  Abdomen: soft; distended; diffusely tender; tympanic to percussion; no inguinal hernias palpable but easily palpable inguinal hernia defects   Extremities: WWP, peripheral pulses appreciated      LABS:                        16.0   12.11 )-----------( 146      ( 02 Dec 2024 07:46 )             50.2     12-02    136  |  102  |  44[H]  ----------------------------<  137[H]  5.1   |  22  |  1.80[H]    Ca    9.2      02 Dec 2024 07:46    TPro  6.4  /  Alb  3.6  /  TBili  1.0  /  DBili  x   /  AST  12  /  ALT  9[L]  /  AlkPhos  40  12-02    PT/INR - ( 02 Dec 2024 07:46 )   PT: 10.2 sec;   INR: 0.89          PTT - ( 02 Dec 2024 07:46 )  PTT:26.6 sec      IMAGING   PROCEDURE:  CT Angiography of the Chest, Abdomen and Pelvis.  Precontrast imaging was performed through the chest followed by arterial   phase imaging of the chest, abdomen and pelvis.  Sagittal and coronal reformats were performed as well as 3D (MIP)   reconstructions.    FINDINGS:  CHEST:  LUNGS AND LARGE AIRWAYS: Patent central airways. Right lower lobectomy.   Scattered small calcified granulomas. No suspicious pulmonary nodules.  PLEURA: No pleural effusion.  VESSELS: No thoracic aortic aneurysm or dissection. No visible pulmonary   emboli.  HEART: Heart size is normal. No pericardial effusion.  MEDIASTINUM AND BRYNN: Mildly distended fluid-filled esophagus, likely due   to reflux. No lymphadenopathy.  CHEST WALL AND LOWER NECK: Within normal limits.    ABDOMEN AND PELVIS:  LIVER: Within normal limits.  BILE DUCTS: Normal caliber.  GALLBLADDER: Within normal limits.  SPLEEN: Within normal limits.  PANCREAS: Within normal limits.  ADRENALS: Within normal limits.  KIDNEYS/URETERS: Several bilateral renal cysts measuring up to 7.7 cm in   the right kidney. Bilateral renal cortical thinning. No hydronephrosis.    BLADDER: Within normal limits. Bladder diverticula.  BOWEL: Dilated proximal small bowel loops with transition point at the   orifice of the right inguinal hernia which contains a loop of small   bowel. Appendix is not visualized. Colonic diverticulosis.  PERITONEUM/RETROPERITONEUM: Small pelvic ascites.  VESSELS: Atherosclerotic changes. No aneurysm. No dissection.  LYMPH NODES: No lymphadenopathy.  ABDOMINAL WALL: Bilateral hernias, right greater than left. Small bowel   loop in the right inguinal hernia and a knuckle of descending colon in   the left inguinal hernia.  BONES: Degenerative changes.    IMPRESSION:    No aortic dissection or aneurysm.    Small bowel obstruction with transition point at the orifice of the right   inguinal hernia.      --- End of Report ---    ECHO 9/23     CONCLUSIONS:     1. Mildly reduced left ventricular systolic function, LVEF 45-50%.   2. Mid and apical anterior septum, apical anterior segment, and apex are   akinetic.   3. Grade II left ventricular diastolic dysfunction.   4. Normal right ventricular size and systolic function.   5. Aortic sclerosis without significant stenosis.   6. Pulmonary hypertension present, pulmonary artery systolic pressure is   35 mmHg.   7. No pericardial effusion.   8. No prior echo is available for comparison.

## 2024-12-03 ENCOUNTER — TRANSCRIPTION ENCOUNTER (OUTPATIENT)
Age: 88
End: 2024-12-03

## 2024-12-03 LAB
ANION GAP SERPL CALC-SCNC: 11 MMOL/L — SIGNIFICANT CHANGE UP (ref 5–17)
BLD GP AB SCN SERPL QL: NEGATIVE — SIGNIFICANT CHANGE UP
BLD GP AB SCN SERPL QL: NEGATIVE — SIGNIFICANT CHANGE UP
BUN SERPL-MCNC: 48 MG/DL — HIGH (ref 7–23)
CALCIUM SERPL-MCNC: 8.4 MG/DL — SIGNIFICANT CHANGE UP (ref 8.4–10.5)
CHLORIDE SERPL-SCNC: 109 MMOL/L — HIGH (ref 96–108)
CO2 SERPL-SCNC: 21 MMOL/L — LOW (ref 22–31)
CREAT SERPL-MCNC: 2.06 MG/DL — HIGH (ref 0.5–1.3)
EGFR: 30 ML/MIN/1.73M2 — LOW
GLUCOSE SERPL-MCNC: 128 MG/DL — HIGH (ref 70–99)
HCT VFR BLD CALC: 42.7 % — SIGNIFICANT CHANGE UP (ref 39–50)
HGB BLD-MCNC: 13.4 G/DL — SIGNIFICANT CHANGE UP (ref 13–17)
MAGNESIUM SERPL-MCNC: 2.2 MG/DL — SIGNIFICANT CHANGE UP (ref 1.6–2.6)
MCHC RBC-ENTMCNC: 30.7 PG — SIGNIFICANT CHANGE UP (ref 27–34)
MCHC RBC-ENTMCNC: 31.4 G/DL — LOW (ref 32–36)
MCV RBC AUTO: 97.9 FL — SIGNIFICANT CHANGE UP (ref 80–100)
NRBC # BLD: 0 /100 WBCS — SIGNIFICANT CHANGE UP (ref 0–0)
PHOSPHATE SERPL-MCNC: 4.1 MG/DL — SIGNIFICANT CHANGE UP (ref 2.5–4.5)
PLATELET # BLD AUTO: 134 K/UL — LOW (ref 150–400)
POTASSIUM SERPL-MCNC: 5.3 MMOL/L — SIGNIFICANT CHANGE UP (ref 3.5–5.3)
POTASSIUM SERPL-SCNC: 5.3 MMOL/L — SIGNIFICANT CHANGE UP (ref 3.5–5.3)
RBC # BLD: 4.36 M/UL — SIGNIFICANT CHANGE UP (ref 4.2–5.8)
RBC # FLD: 14.7 % — HIGH (ref 10.3–14.5)
RH IG SCN BLD-IMP: POSITIVE — SIGNIFICANT CHANGE UP
RH IG SCN BLD-IMP: POSITIVE — SIGNIFICANT CHANGE UP
SODIUM SERPL-SCNC: 141 MMOL/L — SIGNIFICANT CHANGE UP (ref 135–145)
WBC # BLD: 6.57 K/UL — SIGNIFICANT CHANGE UP (ref 3.8–10.5)
WBC # FLD AUTO: 6.57 K/UL — SIGNIFICANT CHANGE UP (ref 3.8–10.5)

## 2024-12-03 PROCEDURE — 99232 SBSQ HOSP IP/OBS MODERATE 35: CPT | Mod: GC

## 2024-12-03 DEVICE — MESH HERNIA PERFIX PLUG EXTRA LARGE 1.6 X 2": Type: IMPLANTABLE DEVICE | Status: FUNCTIONAL

## 2024-12-03 RX ORDER — 0.9 % SODIUM CHLORIDE 0.9 %
1000 INTRAVENOUS SOLUTION INTRAVENOUS
Refills: 0 | Status: DISCONTINUED | OUTPATIENT
Start: 2024-12-03 | End: 2024-12-03

## 2024-12-03 RX ORDER — ACETAMINOPHEN 500MG 500 MG/1
1000 TABLET, COATED ORAL EVERY 6 HOURS
Refills: 0 | Status: DISCONTINUED | OUTPATIENT
Start: 2024-12-03 | End: 2024-12-06

## 2024-12-03 RX ORDER — METOPROLOL TARTRATE 100 MG/1
50 TABLET, FILM COATED ORAL DAILY
Refills: 0 | Status: DISCONTINUED | OUTPATIENT
Start: 2024-12-04 | End: 2024-12-06

## 2024-12-03 RX ORDER — PREDNISONE 20 MG/1
10 TABLET ORAL DAILY
Refills: 0 | Status: DISCONTINUED | OUTPATIENT
Start: 2024-12-04 | End: 2024-12-06

## 2024-12-03 RX ORDER — HEPARIN SODIUM,PORCINE 1000/ML
5000 VIAL (ML) INJECTION EVERY 8 HOURS
Refills: 0 | Status: DISCONTINUED | OUTPATIENT
Start: 2024-12-03 | End: 2024-12-05

## 2024-12-03 RX ORDER — HYDROMORPHONE HYDROCHLORIDE 2 MG/1
0.25 TABLET ORAL EVERY 4 HOURS
Refills: 0 | Status: DISCONTINUED | OUTPATIENT
Start: 2024-12-03 | End: 2024-12-04

## 2024-12-03 RX ORDER — FAMOTIDINE 20 MG/1
20 TABLET, FILM COATED ORAL ONCE
Refills: 0 | Status: COMPLETED | OUTPATIENT
Start: 2024-12-03 | End: 2024-12-03

## 2024-12-03 RX ORDER — 0.9 % SODIUM CHLORIDE 0.9 %
1000 INTRAVENOUS SOLUTION INTRAVENOUS
Refills: 0 | Status: DISCONTINUED | OUTPATIENT
Start: 2024-12-03 | End: 2024-12-04

## 2024-12-03 RX ORDER — TAMSULOSIN HYDROCHLORIDE 0.4 MG/1
0.4 CAPSULE ORAL AT BEDTIME
Refills: 0 | Status: DISCONTINUED | OUTPATIENT
Start: 2024-12-03 | End: 2024-12-06

## 2024-12-03 RX ORDER — FLUTICASONE PROPIONATE AND SALMETEROL XINAFOATE 45; 21 UG/1; UG/1
1 AEROSOL, METERED RESPIRATORY (INHALATION)
Refills: 0 | Status: DISCONTINUED | OUTPATIENT
Start: 2024-12-03 | End: 2024-12-06

## 2024-12-03 RX ORDER — HYDROMORPHONE HYDROCHLORIDE 2 MG/1
0.5 TABLET ORAL EVERY 4 HOURS
Refills: 0 | Status: DISCONTINUED | OUTPATIENT
Start: 2024-12-03 | End: 2024-12-04

## 2024-12-03 RX ORDER — ROSUVASTATIN CALCIUM 5 MG/1
10 TABLET, FILM COATED ORAL AT BEDTIME
Refills: 0 | Status: DISCONTINUED | OUTPATIENT
Start: 2024-12-03 | End: 2024-12-06

## 2024-12-03 RX ORDER — ONDANSETRON HYDROCHLORIDE 4 MG/1
4 TABLET, FILM COATED ORAL ONCE
Refills: 0 | Status: COMPLETED | OUTPATIENT
Start: 2024-12-03 | End: 2024-12-03

## 2024-12-03 RX ORDER — ESCITALOPRAM OXALATE 10 MG/1
10 TABLET, FILM COATED ORAL DAILY
Refills: 0 | Status: DISCONTINUED | OUTPATIENT
Start: 2024-12-04 | End: 2024-12-06

## 2024-12-03 RX ADMIN — METOPROLOL TARTRATE 50 MILLIGRAM(S): 100 TABLET, FILM COATED ORAL at 05:31

## 2024-12-03 RX ADMIN — Medication 70 MILLILITER(S): at 18:52

## 2024-12-03 RX ADMIN — Medication 70 MILLILITER(S): at 07:27

## 2024-12-03 RX ADMIN — FLUTICASONE PROPIONATE AND SALMETEROL XINAFOATE 1 DOSE(S): 45; 21 AEROSOL, METERED RESPIRATORY (INHALATION) at 17:49

## 2024-12-03 RX ADMIN — ROSUVASTATIN CALCIUM 10 MILLIGRAM(S): 5 TABLET, FILM COATED ORAL at 21:16

## 2024-12-03 RX ADMIN — FLUTICASONE PROPIONATE AND SALMETEROL XINAFOATE 1 DOSE(S): 45; 21 AEROSOL, METERED RESPIRATORY (INHALATION) at 05:29

## 2024-12-03 RX ADMIN — PREDNISONE 10 MILLIGRAM(S): 20 TABLET ORAL at 05:32

## 2024-12-03 RX ADMIN — TAMSULOSIN HYDROCHLORIDE 0.4 MILLIGRAM(S): 0.4 CAPSULE ORAL at 21:16

## 2024-12-03 RX ADMIN — FAMOTIDINE 20 MILLIGRAM(S): 20 TABLET, FILM COATED ORAL at 14:39

## 2024-12-03 RX ADMIN — Medication 70 MILLILITER(S): at 21:15

## 2024-12-03 NOTE — PROGRESS NOTE ADULT - ASSESSMENT
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 Presents to the ED with c/o upper back pain x 6 days. CT scan showing SBO with transition being in the right inguinal hernia, on exam distended, diffusely tender to palpation. Inguinal hernias completely reduced on exam and no tenderness on the groin region. Repeat CT was done a few hours after and noted repeat inguinal hernias on CT, on exam, patient noted have inguinal hernia that spontaneously reduced when in reverse trendelenberg. Plan to admit patient for operative managmenet of open inguinal hernia with epidural anesthesia.     NPO / IVF at 90cc/hr 	  SCDs/IS/OOB/SQH  Pain/nausea PRNs  appropriate home meds   Hold lasix   AM Labs

## 2024-12-03 NOTE — PROGRESS NOTE ADULT - SUBJECTIVE AND OBJECTIVE BOX
POST-OPERATIVE NOTE    Procedure:open R femoral and recurrent inguinal hernia repair with plug and patch mesh    Diagnosis/Indication:R femoral and recurrent inguinal hernia    Surgeon: Carl COLIN: Pt has no complaints. Denies CP, SOB, calf tenderness. Pain controlled with medication. Denies nausea, vomiting.    O:  T(C): 36.9 (12-03-24 @ 14:25), Max: 36.9 (12-03-24 @ 14:25)  T(F): --  HR: 64 (12-03-24 @ 14:25) (64 - 73)  BP: 124/61 (12-03-24 @ 14:25) (124/61 - 140/63)  RR: 22 (12-03-24 @ 14:25) (15 - 22)  SpO2: 95% (12-03-24 @ 14:25) (94% - 95%)  Wt(kg): --                        13.4   6.57  )-----------( 134      ( 03 Dec 2024 05:30 )             42.7     12-03    141  |  109[H]  |  48[H]  ----------------------------<  128[H]  5.3   |  21[L]  |  2.06[H]    Ca    8.4      03 Dec 2024 05:30  Phos  4.1     12-03  Mg     2.2     12-03    TPro  6.4  /  Alb  3.6  /  TBili  1.0  /  DBili  x   /  AST  12  /  ALT  9[L]  /  AlkPhos  40  12-02      Gen: NAD, resting comfortably in bed  C/V: NSR  Pulm: Nonlabored breathing, no respiratory distress  Abd: soft, NT/ND, incision CDI  Extrem: no calf tenderness, SCDs in place

## 2024-12-03 NOTE — PROGRESS NOTE ADULT - SUBJECTIVE AND OBJECTIVE BOX
INTERVAL HPI/OVERNIGHT EVENTS:    STATUS POST:      POST OPERATIVE DAY #:     SUBJECTIVE:  Flatus: [ ] YES [ ] NO             Bowel Movement: [ ] YES [ ] NO  Pain (0-10):            Pain Control Adequate: [ ] YES [ ] NO  Nausea: [ ] YES [ ] NO            Vomiting: [ ] YES [ ] NO  Diarrhea: [ ] YES [ ] NO         Constipation: [ ] YES [ ] NO     Chest Pain: [ ] YES [ ] NO    SOB:  [ ] YES [ ] NO    MEDICATIONS  (STANDING):  escitalopram 10 milliGRAM(s) Oral daily  fluticasone propionate/ salmeterol 100-50 MICROgram(s) Diskus 1 Dose(s) Inhalation two times a day  heparin   Injectable 5000 Unit(s) SubCutaneous every 8 hours  metoprolol succinate ER 50 milliGRAM(s) Oral daily  predniSONE   Tablet 10 milliGRAM(s) Oral daily  rosuvastatin 10 milliGRAM(s) Oral at bedtime  tamsulosin 0.4 milliGRAM(s) Oral at bedtime  tiotropium 2.5 MICROgram(s) Inhaler 2 Puff(s) Inhalation daily    MEDICATIONS  (PRN):      Vital Signs Last 24 Hrs  T(C): 36.9 (03 Dec 2024 05:11), Max: 36.9 (03 Dec 2024 05:11)  T(F): 98.5 (03 Dec 2024 05:11), Max: 98.5 (03 Dec 2024 05:11)  HR: 64 (03 Dec 2024 04:15) (54 - 70)  BP: 113/58 (03 Dec 2024 04:15) (104/59 - 161/78)  BP(mean): 83 (03 Dec 2024 04:15) (76 - 94)  RR: 17 (03 Dec 2024 04:15) (17 - 18)  SpO2: 95% (03 Dec 2024 04:15) (92% - 97%)    Parameters below as of 03 Dec 2024 04:15  Patient On (Oxygen Delivery Method): room air        PHYSICAL EXAM:      Constitutional: A&Ox3    Breasts:    Respiratory: non labored breathing, no respiratory distress    Cardiovascular: NSR, RRR    Gastrointestinal:                 Incision:    Genitourinary:    Extremities: (-) edema                  I&O's Detail    02 Dec 2024 07:01  -  03 Dec 2024 06:17  --------------------------------------------------------  IN:    Oral Fluid: 50 mL  Total IN: 50 mL    OUT:    Voided (mL): 200 mL  Total OUT: 200 mL    Total NET: -150 mL          LABS:                        16.0   12.11 )-----------( 146      ( 02 Dec 2024 07:46 )             50.2     12-02    136  |  102  |  44[H]  ----------------------------<  137[H]  5.1   |  22  |  1.80[H]    Ca    9.2      02 Dec 2024 07:46    TPro  6.4  /  Alb  3.6  /  TBili  1.0  /  DBili  x   /  AST  12  /  ALT  9[L]  /  AlkPhos  40  12-02    PT/INR - ( 02 Dec 2024 20:40 )   PT: 10.9 sec;   INR: 0.93          PTT - ( 02 Dec 2024 20:40 )  PTT:24.6 sec  Urinalysis Basic - ( 02 Dec 2024 07:46 )    Color: x / Appearance: x / SG: x / pH: x  Gluc: 137 mg/dL / Ketone: x  / Bili: x / Urobili: x   Blood: x / Protein: x / Nitrite: x   Leuk Esterase: x / RBC: x / WBC x   Sq Epi: x / Non Sq Epi: x / Bacteria: x        RADIOLOGY & ADDITIONAL STUDIES:

## 2024-12-03 NOTE — PROGRESS NOTE ADULT - SUBJECTIVE AND OBJECTIVE BOX
INTERVAL HPI/OVERNIGHT EVENTS:  Post op stable  No chief complaint; No pain  Respiratory status stable       MEDICATIONS  (STANDING):  acetaminophen     Tablet .. 1000 milliGRAM(s) Oral every 6 hours  fluticasone propionate/ salmeterol 100-50 MICROgram(s) Diskus 1 Dose(s) Inhalation two times a day  heparin   Injectable 5000 Unit(s) SubCutaneous every 8 hours  lactated ringers. 1000 milliLiter(s) (70 mL/Hr) IV Continuous <Continuous>  rosuvastatin 10 milliGRAM(s) Oral at bedtime  tamsulosin 0.4 milliGRAM(s) Oral at bedtime    MEDICATIONS  (PRN):  HYDROmorphone  Injectable 0.5 milliGRAM(s) IV Push every 4 hours PRN Severe Pain (7 - 10)  HYDROmorphone  Injectable 0.25 milliGRAM(s) IV Push every 4 hours PRN Moderate Pain (4 - 6)      Allergies    No Known Allergies    Intolerances        Vital Signs Last 24 Hrs  T(C): 36.9 (03 Dec 2024 14:25), Max: 36.9 (03 Dec 2024 05:11)  T(F): 98.5 (03 Dec 2024 05:11), Max: 98.5 (03 Dec 2024 05:11)  HR: 64 (03 Dec 2024 16:25) (58 - 73)  BP: 132/65 (03 Dec 2024 16:25) (109/54 - 150/66)  BP(mean): 90 (03 Dec 2024 16:25) (76 - 95)  RR: 17 (03 Dec 2024 16:25) (15 - 30)  SpO2: 96% (03 Dec 2024 16:25) (92% - 96%)    Parameters below as of 03 Dec 2024 16:25  Patient On (Oxygen Delivery Method): room air              Constitutional: Awake     Eyes: WALI    ENMT: Negative    Neck: Supple    Back:  no tenderness     Respiratory: rhonchi otherwise clear      Cardiovascular: S1 S2    Gastrointestinal: soft     Genitourinary:    Extremities:  no edema     Vascular:    Neurological:    Skin:    Lymph Nodes:            12-02 @ 07:01  -  12-03 @ 07:00  --------------------------------------------------------  IN: 50 mL / OUT: 200 mL / NET: -150 mL    12-03 @ 07:01  -  12-03 @ 17:28  --------------------------------------------------------  IN: 140 mL / OUT: 10 mL / NET: 130 mL      LABS:                        13.4   6.57  )-----------( 134      ( 03 Dec 2024 05:30 )             42.7     12-03    141  |  109[H]  |  48[H]  ----------------------------<  128[H]  5.3   |  21[L]  |  2.06[H]    Ca    8.4      03 Dec 2024 05:30  Phos  4.1     12-03  Mg     2.2     12-03    TPro  6.4  /  Alb  3.6  /  TBili  1.0  /  DBili  x   /  AST  12  /  ALT  9[L]  /  AlkPhos  40  12-02    PT/INR - ( 02 Dec 2024 20:40 )   PT: 10.9 sec;   INR: 0.93          PTT - ( 02 Dec 2024 20:40 )  PTT:24.6 sec  Urinalysis Basic - ( 03 Dec 2024 05:30 )    Color: x / Appearance: x / SG: x / pH: x  Gluc: 128 mg/dL / Ketone: x  / Bili: x / Urobili: x   Blood: x / Protein: x / Nitrite: x   Leuk Esterase: x / RBC: x / WBC x   Sq Epi: x / Non Sq Epi: x / Bacteria: x        RADIOLOGY & ADDITIONAL TESTS:

## 2024-12-03 NOTE — PRE-ANESTHESIA EVALUATION ADULT - NSANTHADDINFOFT_GEN_ALL_CORE
patient states he was told that he was difficult to intubate during lobectomy for pulm ca. Has had other surgeries without issues.

## 2024-12-03 NOTE — PROGRESS NOTE ADULT - ASSESSMENT
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 Presents to the ED with c/o upper back pain x 6 days. CT scan showing SBO with transition being in the right inguinal hernia, on exam distended, diffusely tender to palpation. Inguinal hernias completely reduced on exam and no tenderness on the groin region. Repeat CT was done a few hours after and noted repeat inguinal hernias on CT, on exam, patient noted have inguinal hernia that spontaneously reduced when in reverse trendelenberg now s/p open R femoral and recurrent inguinal hernia repair with plug and patch mesh    CLD/IVF  SCDs/IS/OOB/SQH  Pain/nausea PRNs  [facility]  appropriate home meds   Hold lasix   AM Labs

## 2024-12-03 NOTE — BRIEF OPERATIVE NOTE - NSICDXBRIEFPOSTOP_GEN_ALL_CORE_FT
POST-OP DIAGNOSIS:  Right inguinal hernia 03-Dec-2024 12:51:15  Joleen Keyes  Femoral hernia of right side 03-Dec-2024 12:51:24  Joleen Keyes

## 2024-12-03 NOTE — PROGRESS NOTE ADULT - SUBJECTIVE AND OBJECTIVE BOX
SUBJECTIVE: No pain, no N/V, +flatus. Patient seen and examined bedside by chief resident.    heparin   Injectable 5000 Unit(s) SubCutaneous every 8 hours  metoprolol succinate ER 50 milliGRAM(s) Oral daily    MEDICATIONS  (PRN):      I&O's Detail    02 Dec 2024 07:01  -  03 Dec 2024 07:00  --------------------------------------------------------  IN:    Oral Fluid: 50 mL  Total IN: 50 mL    OUT:    Voided (mL): 200 mL  Total OUT: 200 mL    Total NET: -150 mL          Vital Signs Last 24 Hrs  T(C): 36.9 (03 Dec 2024 05:11), Max: 36.9 (03 Dec 2024 05:11)  T(F): 98.5 (03 Dec 2024 05:11), Max: 98.5 (03 Dec 2024 05:11)  HR: 64 (03 Dec 2024 04:15) (56 - 70)  BP: 113/58 (03 Dec 2024 04:15) (104/59 - 157/79)  BP(mean): 83 (03 Dec 2024 04:15) (76 - 94)  RR: 17 (03 Dec 2024 04:15) (17 - 18)  SpO2: 95% (03 Dec 2024 04:15) (92% - 97%)    Parameters below as of 03 Dec 2024 04:15  Patient On (Oxygen Delivery Method): room air        General: NAD, resting comfortably in bed  C/V: NSR  Pulm: Nonlabored breathing, no respiratory distress  Abd: soft, NT/ND, inguinal hernia noted, soft/nontender/nonreducible  Extrem:  SCDs in place    LABS:                        13.4   6.57  )-----------( 134      ( 03 Dec 2024 05:30 )             42.7     12-03    141  |  109[H]  |  48[H]  ----------------------------<  128[H]  5.3   |  21[L]  |  2.06[H]    Ca    8.4      03 Dec 2024 05:30  Phos  4.1     12-03  Mg     2.2     12-03    TPro  6.4  /  Alb  3.6  /  TBili  1.0  /  DBili  x   /  AST  12  /  ALT  9[L]  /  AlkPhos  40  12-02    PT/INR - ( 02 Dec 2024 20:40 )   PT: 10.9 sec;   INR: 0.93          PTT - ( 02 Dec 2024 20:40 )  PTT:24.6 sec  Urinalysis Basic - ( 03 Dec 2024 05:30 )    Color: x / Appearance: x / SG: x / pH: x  Gluc: 128 mg/dL / Ketone: x  / Bili: x / Urobili: x   Blood: x / Protein: x / Nitrite: x   Leuk Esterase: x / RBC: x / WBC x   Sq Epi: x / Non Sq Epi: x / Bacteria: x        RADIOLOGY & ADDITIONAL STUDIES:  CT Abdomen and Pelvis No Cont:   ACC: [id number] EXAM:  CT ABDOMEN AND PELVIS   ORDERED BY: NITIN JAMESON     PROCEDURE DATE:  12/02/2024          INTERPRETATION:  CLINICAL INFORMATION: Evaluate for small bowel   obstruction.    COMPARISON: CT angiogram performed earlier on the same day.    CONTRAST/COMPLICATIONS:  IV Contrast: NONE  Oral Contrast: NONE  .    PROCEDURE:  CT of the Abdomen and Pelvis was performed.  Sagittal and coronal reformats were performed.    FINDINGS:  LOWER CHEST: No acute abnormality.    LIVER: Within normal limits.  BILE DUCTS: Normal caliber.  GALLBLADDER: Within normal limits.  SPLEEN: Within normal limits.  PANCREAS: Partial fatty atrophy.  ADRENALS: Within normal limits.  KIDNEYS/URETERS: Bilateral renal cysts.    BLADDER: Multiple bladder diverticula.  REPRODUCTIVE ORGANS: Prostate is enlarged.    BOWEL: Persistent dilatation of proximal small bowel loops with possible   transition at the orifice of the right inguinal hernia.  PERITONEUM/RETROPERITONEUM: Small ascites with increased density,  probably due to excretion of contrast. Mesenteric edema.  VESSELS: No abdominal aortic aneurysm.  LYMPH NODES: No lymphadenopathy.  ABDOMINAL WALL: Bilateral inguinal hernias again noted containing small   bowel on the right side and a small portion of colon in the left side.  BONES: Degenerative changes. Right hip ORIF.    IMPRESSION:  Persistent small bowel dilatation and mesenteric edema with small bowel   herniation the right inguinal canal, suspicious for a degree of bowel   obstruction. Consider CT with oral contrast.    --- End of Report ---            TAYLOR HARTLEY MD; Attending Radiologist  This document has been electronically signed. Dec  2 2024  4:17PM (12-02-24 @ 15:25)

## 2024-12-03 NOTE — BRIEF OPERATIVE NOTE - NSICDXBRIEFPROCEDURE_GEN_ALL_CORE_FT
PROCEDURES:  Right femoral hernia repair 03-Dec-2024 12:50:30 open Joleen Keyes  Herniorrhaphy, inguinal, recurrent 03-Dec-2024 12:50:56 right inguinal open Joleen Keyes

## 2024-12-03 NOTE — BRIEF OPERATIVE NOTE - OPERATION/FINDINGS
Right inguinal prior incision noted inferior to prior McBurney's incision. Regional block with lido+epi and marcaine mixed. Skin incision, dissected down noting prior mesh and suture palpable. Opened external and dissected fascia off of underlying mesh. No recurrence noted in this site. Spermatic cord isolated and protected medial to mesh. Noted very small fascial defect inferolateral to prior mesh and fascia. Placed a hernia plug into the defect after reducing fat and secured using novafil suture figure 8 to secure. Noted larger hernia defect in femoral space medial to vessels with protruding intraabdominal contents, reduced contents. Placed second hernia plug into this space and secured to fascia using a second novafil. Placed a mesh over and secured inferolaterally and medially with novafil. Closed external using running 2-0 vicryl. Closed Luis's over with 3-0 vicryl. Deep dermal 3-0 vicryl. Running 4-0 monocryl skin closure. Dermabond.

## 2024-12-04 LAB
ANION GAP SERPL CALC-SCNC: 9 MMOL/L — SIGNIFICANT CHANGE UP (ref 5–17)
BUN SERPL-MCNC: 44 MG/DL — HIGH (ref 7–23)
CALCIUM SERPL-MCNC: 8.5 MG/DL — SIGNIFICANT CHANGE UP (ref 8.4–10.5)
CHLORIDE SERPL-SCNC: 109 MMOL/L — HIGH (ref 96–108)
CO2 SERPL-SCNC: 20 MMOL/L — LOW (ref 22–31)
CREAT SERPL-MCNC: 1.84 MG/DL — HIGH (ref 0.5–1.3)
EGFR: 34 ML/MIN/1.73M2 — LOW
GLUCOSE SERPL-MCNC: 120 MG/DL — HIGH (ref 70–99)
HCT VFR BLD CALC: 44.7 % — SIGNIFICANT CHANGE UP (ref 39–50)
HGB BLD-MCNC: 13.4 G/DL — SIGNIFICANT CHANGE UP (ref 13–17)
MAGNESIUM SERPL-MCNC: 2.2 MG/DL — SIGNIFICANT CHANGE UP (ref 1.6–2.6)
MCHC RBC-ENTMCNC: 30 G/DL — LOW (ref 32–36)
MCHC RBC-ENTMCNC: 30.7 PG — SIGNIFICANT CHANGE UP (ref 27–34)
MCV RBC AUTO: 102.3 FL — HIGH (ref 80–100)
NRBC # BLD: 0 /100 WBCS — SIGNIFICANT CHANGE UP (ref 0–0)
PHOSPHATE SERPL-MCNC: 3.5 MG/DL — SIGNIFICANT CHANGE UP (ref 2.5–4.5)
PLATELET # BLD AUTO: 124 K/UL — LOW (ref 150–400)
POTASSIUM SERPL-MCNC: 5.2 MMOL/L — SIGNIFICANT CHANGE UP (ref 3.5–5.3)
POTASSIUM SERPL-SCNC: 5.2 MMOL/L — SIGNIFICANT CHANGE UP (ref 3.5–5.3)
RBC # BLD: 4.37 M/UL — SIGNIFICANT CHANGE UP (ref 4.2–5.8)
RBC # FLD: 14.7 % — HIGH (ref 10.3–14.5)
SODIUM SERPL-SCNC: 138 MMOL/L — SIGNIFICANT CHANGE UP (ref 135–145)
WBC # BLD: 4 K/UL — SIGNIFICANT CHANGE UP (ref 3.8–10.5)
WBC # FLD AUTO: 4 K/UL — SIGNIFICANT CHANGE UP (ref 3.8–10.5)

## 2024-12-04 PROCEDURE — 99233 SBSQ HOSP IP/OBS HIGH 50: CPT

## 2024-12-04 PROCEDURE — 99232 SBSQ HOSP IP/OBS MODERATE 35: CPT | Mod: GC

## 2024-12-04 RX ORDER — FUROSEMIDE 40 MG/1
20 TABLET ORAL ONCE
Refills: 0 | Status: COMPLETED | OUTPATIENT
Start: 2024-12-04 | End: 2024-12-04

## 2024-12-04 RX ORDER — IPRATROPIUM BROMIDE AND ALBUTEROL SULFATE 2.5; .5 MG/3ML; MG/3ML
3 SOLUTION RESPIRATORY (INHALATION) ONCE
Refills: 0 | Status: COMPLETED | OUTPATIENT
Start: 2024-12-04 | End: 2024-12-04

## 2024-12-04 RX ORDER — OXYCODONE HYDROCHLORIDE 30 MG/1
2.5 TABLET ORAL EVERY 6 HOURS
Refills: 0 | Status: DISCONTINUED | OUTPATIENT
Start: 2024-12-04 | End: 2024-12-06

## 2024-12-04 RX ADMIN — FLUTICASONE PROPIONATE AND SALMETEROL XINAFOATE 1 DOSE(S): 45; 21 AEROSOL, METERED RESPIRATORY (INHALATION) at 05:59

## 2024-12-04 RX ADMIN — Medication 2 PUFF(S): at 13:44

## 2024-12-04 RX ADMIN — Medication 5000 UNIT(S): at 21:26

## 2024-12-04 RX ADMIN — PREDNISONE 10 MILLIGRAM(S): 20 TABLET ORAL at 05:06

## 2024-12-04 RX ADMIN — FUROSEMIDE 20 MILLIGRAM(S): 40 TABLET ORAL at 11:32

## 2024-12-04 RX ADMIN — TAMSULOSIN HYDROCHLORIDE 0.4 MILLIGRAM(S): 0.4 CAPSULE ORAL at 21:26

## 2024-12-04 RX ADMIN — METOPROLOL TARTRATE 50 MILLIGRAM(S): 100 TABLET, FILM COATED ORAL at 05:06

## 2024-12-04 RX ADMIN — IPRATROPIUM BROMIDE AND ALBUTEROL SULFATE 3 MILLILITER(S): 2.5; .5 SOLUTION RESPIRATORY (INHALATION) at 11:32

## 2024-12-04 RX ADMIN — ROSUVASTATIN CALCIUM 10 MILLIGRAM(S): 5 TABLET, FILM COATED ORAL at 21:26

## 2024-12-04 RX ADMIN — ESCITALOPRAM OXALATE 10 MILLIGRAM(S): 10 TABLET, FILM COATED ORAL at 11:32

## 2024-12-04 NOTE — PROGRESS NOTE ADULT - SUBJECTIVE AND OBJECTIVE BOX
Cardiology Consult    O/N:  Interval History/HPI: Pt seen and examined at bedside. C/o mild SOB which has improved.       OBJECTIVE  T(C): 36.7 (12-04-24 @ 09:02), Max: 36.9 (12-03-24 @ 22:13)  HR: 66 (12-04-24 @ 13:01) (62 - 66)  BP: 153/75 (12-04-24 @ 13:01) (134/61 - 155/72)  RR: 18 (12-04-24 @ 13:01) (17 - 19)  SpO2: 94% (12-04-24 @ 13:01) (94% - 97%)    12-03-24 @ 07:01  -  12-04-24 @ 07:00  --------------------------------------------------------  IN: 1230 mL / OUT: 360 mL / NET: 870 mL    12-04-24 @ 07:01  -  12-04-24 @ 18:10  --------------------------------------------------------  IN: 345 mL / OUT: 0 mL / NET: 345 mL        PHYSICAL EXAM:  NAD   rrr  scant bibasilar crackles   abd nd  le wwp, no edema      LABS:                        13.4   4.00  )-----------( 124      ( 04 Dec 2024 05:30 )             44.7     12-04    138  |  109[H]  |  44[H]  ----------------------------<  120[H]  5.2   |  20[L]  |  1.84[H]    Ca    8.5      04 Dec 2024 05:30  Phos  3.5     12-04  Mg     2.2     12-04      PT/INR - ( 02 Dec 2024 20:40 )   PT: 10.9 sec;   INR: 0.93          PTT - ( 02 Dec 2024 20:40 )  PTT:24.6 sec  Urinalysis Basic - ( 04 Dec 2024 05:30 )    Color: x / Appearance: x / SG: x / pH: x  Gluc: 120 mg/dL / Ketone: x  / Bili: x / Urobili: x   Blood: x / Protein: x / Nitrite: x   Leuk Esterase: x / RBC: x / WBC x   Sq Epi: x / Non Sq Epi: x / Bacteria: x        RADIOLOGY & ADDITIONAL TESTS:  Reviewed .    MEDICATIONS  (STANDING):  acetaminophen     Tablet .. 1000 milliGRAM(s) Oral every 6 hours  escitalopram 10 milliGRAM(s) Oral daily  fluticasone propionate/ salmeterol 100-50 MICROgram(s) Diskus 1 Dose(s) Inhalation two times a day  heparin   Injectable 5000 Unit(s) SubCutaneous every 8 hours  metoprolol succinate ER 50 milliGRAM(s) Oral daily  predniSONE   Tablet 10 milliGRAM(s) Oral daily  rosuvastatin 10 milliGRAM(s) Oral at bedtime  tamsulosin 0.4 milliGRAM(s) Oral at bedtime  tiotropium 2.5 MICROgram(s) Inhaler 2 Puff(s) Inhalation daily    MEDICATIONS  (PRN):  oxyCODONE    IR 2.5 milliGRAM(s) Oral every 6 hours PRN Severe Pain (7 - 10)

## 2024-12-04 NOTE — CONSULT NOTE ADULT - SUBJECTIVE AND OBJECTIVE BOX
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 Presents to the ED with c/o upper back pain x 6 days. CT scan showing SBO with transition being in the right inguinal hernia, on exam distended, diffusely tender to palpation. Inguinal hernias completely reduced on exam and no tenderness on the groin region. Repeat CT was done a few hours after and noted repeat inguinal hernias on CT, on exam, patient noted have inguinal hernia that spontaneously reduced when in reverse trendelenberg now s/p open R femoral and recurrent inguinal hernia repair with plug and patch mesh (12/3)      consulted for urinary retention. Pt has hx BPH (on flomax) w/ 158g prostate on imaging. He states he has not seen a Urologist in 10 years and doesn't remember who it was. Per primary team, seaman (placed in OR) dc'd this AM. He voided 50cc x 3 today w/ PVR 361cc. Pt denies suprapubic discomfort. States he has never had issues urinating before this episode. Denies fevers, chills, nausea, vomiting, dysuria. + light pink hematuria w/out clots. + ambulating and had regular bowel movement today.    Assessed at bedside. Bedside bladder scan showing >450cc. No suprapubic discomfort.       T(C): 36.5 (12-02-24 @ 10:22), Max: 36.5 (12-02-24 @ 07:17)  HR: 56 (12-02-24 @ 10:22) (54 - 66)  BP: 104/59 (12-02-24 @ 10:22) (104/59 - 161/78)  RR: 18 (12-02-24 @ 10:22) (18 - 18)  SpO2: 97% (12-02-24 @ 10:22) (97% - 97%)      General: AAOx3, NAD, laying comfortably in bed  Cardio: S1,S2, normotensive; normal HR   Pulm: Nonlabored breathing  Abdomen: soft; distended; nontender  : no suprapubic/CVAT      LABS:                        16.0   12.11 )-----------( 146      ( 02 Dec 2024 07:46 )             50.2     12-02    136  |  102  |  44[H]  ----------------------------<  137[H]  5.1   |  22  |  1.80[H]    Ca    9.2      02 Dec 2024 07:46    TPro  6.4  /  Alb  3.6  /  TBili  1.0  /  DBili  x   /  AST  12  /  ALT  9[L]  /  AlkPhos  40  12-02    PT/INR - ( 02 Dec 2024 07:46 )   PT: 10.2 sec;   INR: 0.89          PTT - ( 02 Dec 2024 07:46 )  PTT:26.6 sec      IMAGING   PROCEDURE:  CT Angiography of the Chest, Abdomen and Pelvis.  Precontrast imaging was performed through the chest followed by arterial   phase imaging of the chest, abdomen and pelvis.  Sagittal and coronal reformats were performed as well as 3D (MIP)   reconstructions.    FINDINGS:  CHEST:  LUNGS AND LARGE AIRWAYS: Patent central airways. Right lower lobectomy.   Scattered small calcified granulomas. No suspicious pulmonary nodules.  PLEURA: No pleural effusion.  VESSELS: No thoracic aortic aneurysm or dissection. No visible pulmonary   emboli.  HEART: Heart size is normal. No pericardial effusion.  MEDIASTINUM AND BRYNN: Mildly distended fluid-filled esophagus, likely due   to reflux. No lymphadenopathy.  CHEST WALL AND LOWER NECK: Within normal limits.    ABDOMEN AND PELVIS:  LIVER: Within normal limits.  BILE DUCTS: Normal caliber.  GALLBLADDER: Within normal limits.  SPLEEN: Within normal limits.  PANCREAS: Within normal limits.  ADRENALS: Within normal limits.  KIDNEYS/URETERS: Several bilateral renal cysts measuring up to 7.7 cm in   the right kidney. Bilateral renal cortical thinning. No hydronephrosis.    BLADDER: Within normal limits. Bladder diverticula.  BOWEL: Dilated proximal small bowel loops with transition point at the   orifice of the right inguinal hernia which contains a loop of small   bowel. Appendix is not visualized. Colonic diverticulosis.  PERITONEUM/RETROPERITONEUM: Small pelvic ascites.  VESSELS: Atherosclerotic changes. No aneurysm. No dissection.  LYMPH NODES: No lymphadenopathy.  ABDOMINAL WALL: Bilateral hernias, right greater than left. Small bowel   loop in the right inguinal hernia and a knuckle of descending colon in   the left inguinal hernia.  BONES: Degenerative changes.    IMPRESSION:    No aortic dissection or aneurysm.    Small bowel obstruction with transition point at the orifice of the right   inguinal hernia.      --- End of Report ---    ECHO 9/23     CONCLUSIONS:     1. Mildly reduced left ventricular systolic function, LVEF 45-50%.   2. Mid and apical anterior septum, apical anterior segment, and apex are   akinetic.   3. Grade II left ventricular diastolic dysfunction.   4. Normal right ventricular size and systolic function.   5. Aortic sclerosis without significant stenosis.   6. Pulmonary hypertension present, pulmonary artery systolic pressure is   35 mmHg.   7. No pericardial effusion.   8. No prior echo is available for comparison.       (02 Dec 2024 17:58)      Vital Signs Last 24 Hrs  T(C): 36.7 (04 Dec 2024 09:02), Max: 36.9 (03 Dec 2024 22:13)  T(F): 98.1 (04 Dec 2024 09:02), Max: 98.5 (03 Dec 2024 22:13)  HR: 62 (04 Dec 2024 20:15) (62 - 66)  BP: 130/62 (04 Dec 2024 20:15) (130/62 - 155/72)  BP(mean): 88 (04 Dec 2024 20:15) (88 - 107)  RR: 17 (04 Dec 2024 20:15) (17 - 19)  SpO2: 94% (04 Dec 2024 20:15) (94% - 97%)    Parameters below as of 04 Dec 2024 20:15  Patient On (Oxygen Delivery Method): room air      I&O's Summary    03 Dec 2024 07:01  -  04 Dec 2024 07:00  --------------------------------------------------------  IN: 1230 mL / OUT: 360 mL / NET: 870 mL    04 Dec 2024 07:01  -  04 Dec 2024 21:11  --------------------------------------------------------  IN: 345 mL / OUT: 150 mL / NET: 195 mL        PE:  Gen:  Abd:  :  DWIGHT:    LABS:                        13.4   4.00  )-----------( 124      ( 04 Dec 2024 05:30 )             44.7     12-04    138  |  109[H]  |  44[H]  ----------------------------<  120[H]  5.2   |  20[L]  |  1.84[H]    Ca    8.5      04 Dec 2024 05:30  Phos  3.5     12-04  Mg     2.2     12-04        Cultures      A/P
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 Pain located between his shoulder blades, described as sharp. Pt states that last week Tuesday, pt tried to lift his wife off the floor so he thought it was from doing that, pt did not have any feeling of popping sensation in his groin. Pain worse when trying to lie flat and he is now unable to lie flat due to pain. Pt then started to have some epigastric discomfort and nausea for that started on Saturday, just prior to nausea had a 1 time episode of diarrhea, no vomiting. Had a BM on Sunday and today which were normal. Endorses to passing flatus. Denies fever, chills, cough, cp, palpitations, numbness, or any recent weight loss. Pt walks with cane at baseline and has been able to walk without difficulty, walks 1 mile a day, does have some SOB with climbing stairs. Patient has had known inguinal hernias but has never had an issue with them, no history of incarceration and have always been reducible. In the ED CTA C/A/P showed no aortic dissection or aneurysm but did show small bowel obstruction with transition point at the orifice of the right inguinal hernia. Patient was admitted to surgical service with plans for open inguinal hernia repair in OR tomorrow 12/3 with epidural anesthesia (no general anesthesia). Cardiology consulted for pre-operative assessment/evaluation prior to open inguinal hernia repair in OR tomorrow 12/3 with epidural anesthesia.    SUBJECTIVE / CONSULTANT HPI: Patient seen and examined at bedside. Reports he is feeling ok and denies any current chest pain or SOB. Follows with cardiologist Dr. France as outpatient, last seen in clinic 7/2024. Has history of CAD based off of coronary calcification seen on CT chest- per patient had coronary CCTA done about 1-2 years ago, cannot remember if it was at [facility], however unable to find records or this study, unclear if seen on CTA chest or actual CCTA was done. Patient denies any history of MI, CHF or prior stents. Has never had a LHC before. Has never had a stress test before due to his COPD he is unable to. Does not use O2 at home. Former smoker- quit in the 80's. Uses a cane to ambulate and reports he walks 1-2 miles every day (as long as weather is nice). Reports he can usually do at least 1 mile without having to stop and catch his breath however sometimes has to stop earlier. Admits to occasional chest pain with exertion that resolves with rest (states this happens about 1x/month). Endorses chronic b/l LE edema since being on chronic prednisone for which he takes lasix 2x/week. Denies any orthopnea.   Per Dr. France's documentation a prior chest CT does report multivessel coronary artery calcification, discussed with pt who wants a conservative approach. No testing planned. He was on ASA but he elected to stop it D/T bruising.     PHYSICAL EXAM:    General: elderly male lying in bed in NAD   Neck: no JVD   Cardiovascular: +S1/S2, RRR, no murmurs   Respiratory: CTA B/L  Extremities: WWP; mild ankle edema b/l   Neurological: AAOx3    VITAL SIGNS:  Vital Signs Last 24 Hrs  T(C): 36.6 (02 Dec 2024 22:26), Max: 36.6 (02 Dec 2024 22:26)  T(F): 97.9 (02 Dec 2024 22:26), Max: 97.9 (02 Dec 2024 22:26)  HR: 70 (02 Dec 2024 23:35) (54 - 70)  BP: 136/62 (02 Dec 2024 23:35) (104/59 - 161/78)  BP(mean): 89 (02 Dec 2024 23:35) (84 - 94)  RR: 17 (02 Dec 2024 23:35) (17 - 18)  SpO2: 95% (02 Dec 2024 23:35) (92% - 97%)    Parameters below as of 02 Dec 2024 23:35  Patient On (Oxygen Delivery Method): room air          MEDICATIONS:  MEDICATIONS  (STANDING):  escitalopram 10 milliGRAM(s) Oral daily  fluticasone propionate/ salmeterol 100-50 MICROgram(s) Diskus 1 Dose(s) Inhalation two times a day  heparin   Injectable 5000 Unit(s) SubCutaneous every 8 hours  metoprolol succinate ER 50 milliGRAM(s) Oral daily  predniSONE   Tablet 10 milliGRAM(s) Oral daily  rosuvastatin 10 milliGRAM(s) Oral at bedtime  tamsulosin 0.4 milliGRAM(s) Oral at bedtime  tiotropium 2.5 MICROgram(s) Inhaler 2 Puff(s) Inhalation daily    MEDICATIONS  (PRN):      ALLERGIES:  Allergies    No Known Allergies    Intolerances        LABS:                        16.0   12.11 )-----------( 146      ( 02 Dec 2024 07:46 )             50.2     12-02    136  |  102  |  44[H]  ----------------------------<  137[H]  5.1   |  22  |  1.80[H]    Ca    9.2      02 Dec 2024 07:46    TPro  6.4  /  Alb  3.6  /  TBili  1.0  /  DBili  x   /  AST  12  /  ALT  9[L]  /  AlkPhos  40  12-02    PT/INR - ( 02 Dec 2024 20:40 )   PT: 10.9 sec;   INR: 0.93          PTT - ( 02 Dec 2024 20:40 )  PTT:24.6 sec  Urinalysis Basic - ( 02 Dec 2024 07:46 )    Color: x / Appearance: x / SG: x / pH: x  Gluc: 137 mg/dL / Ketone: x  / Bili: x / Urobili: x   Blood: x / Protein: x / Nitrite: x   Leuk Esterase: x / RBC: x / WBC x   Sq Epi: x / Non Sq Epi: x / Bacteria: x      CAPILLARY BLOOD GLUCOSE          RADIOLOGY & ADDITIONAL TESTS: Reviewed.

## 2024-12-04 NOTE — PROGRESS NOTE ADULT - ASSESSMENT
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 Presents to the ED with c/o upper back pain x 6 days. CT scan showing SBO with transition being in the right inguinal hernia, on exam distended, diffusely tender to palpation. Inguinal hernias completely reduced on exam and no tenderness on the groin region. Repeat CT was done a few hours after and noted repeat inguinal hernias on CT, on exam, patient noted have inguinal hernia that spontaneously reduced when in reverse trendelenberg now s/p open R femoral and recurrent inguinal hernia repair with plug and patch mesh (12/3)     CLD  SCDs/IS/OOB/SQH  Pain/nausea PRNs  appropriate home meds   Hold lasix   AM Labs    Patient refusing SQH-- states he bruises very easily from heparin and would prefer to ambulate at this time

## 2024-12-04 NOTE — CONSULT NOTE ADULT - ASSESSMENT
89M retired physician PM HTN, HLD, COPD, ?non-obstructive CAD (no prior stents), HFmrEF, CKD3, BPH, NSCLC s/p lobectomy presenting to ED with c/o upper back pain and abdominal discomfort, found to have small bowel obstruction with transition point at the orifice of the right inguinal hernia. Patient was admitted to surgical service with plans for open inguinal hernia repair in OR tomorrow 12/3 with epidural anesthesia (no general anesthesia). Cardiology consulted for pre-operative assessment/evaluation prior to open inguinal hernia repair in OR tomorrow 12/3 with epidural anesthesia.    Review of studies:  ECG 12/2/24: NSR, PACs, NSST changes    TTE 9/7/23: Mildly reduced left ventricular systolic function, LVEF 45-50%. Mid and apical anterior septum, apical anterior segment, and apex are akinetic. Grade II left ventricular diastolic dysfunction. Normal right ventricular size and systolic function. Aortic sclerosis without significant stenosis. Pulmonary hypertension present, pulmonary artery systolic pressure is 35 mmHg. No pericardial effusion.  CT chest 9/7/23: VESSELS: Moderate calcified aortic mural plaque. No evidence of aortic aneurysm. Moderate calcified aortic valve and mitral annulus. Moderate coronary artery calcification.    Home meds (cardiac): rosuvastatin 10mg qd, toprol 50mg qd, lasix 20mg 2x/week     Outpatient cardiologist: Dr. France     Recommendations:    #Pre-operative assessment/evaluation   -patient with history of CAD based on multivessel coronary artery calcification seen on prior CT chest, per Dr. France's documentation discussed with pt who wants a conservative approach. No testing planned. He was on ASA but he elected to stop it D/T bruising.  -has never had stress test or LHC   -last TTE showing EF 45-50% with RWMAs and grade 2 LVDD   -ECG unchanged from prior   -on exam patient warm/well-perfused and euvolemic appearing  -METS >4; patient walks 1 mile regularly however does admit to typical exertional angina once a month   -would repeat TTE to assess LV systolic function and wall motion   -would continue home toprol amador-operatively , hold for SBP <100, HR <60  -euvolemic on exam, hold off on any diuresis for now  -given surgery will be with epidural anesthesia (no general anesthesia), lower risk stratification   -patient is intermediate risk for necessary low-intermediate risk procedure   -please ensure patient has outpatient follow up with Dr. France     Recommendations are preliminary pending discussion with attending cardiologist   Recommendations not final until attested by attending   
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 Presents to the ED with c/o upper back pain x 6 days. CT scan showing SBO with transition being in the right inguinal hernia, on exam distended, diffusely tender to palpation. Inguinal hernias completely reduced on exam and no tenderness on the groin region. Repeat CT was done a few hours after and noted repeat inguinal hernias on CT, on exam, patient noted have inguinal hernia that spontaneously reduced when in reverse trendelenberg now s/p open R femoral and recurrent inguinal hernia repair with plug and patch mesh (12/3)      consulted for urinary retention. Pt has hx BPH (on flomax) w/ 158g prostate on imaging. He states he has not seen a Urologist in 10 years and doesn't remember who it was. Per primary team, seaman (placed in OR) dc'd this AM. He voided 50cc x 3 today w/ PVR 361cc. Pt denies suprapubic discomfort. States he has never had issues urinating before this episode. Denies fevers, chills, nausea, vomiting, dysuria. + light pink hematuria w/out clots. + ambulating and had regular bowel movement today.    Assessed at bedside. Bedside bladder scan showing >450cc. No suprapubic discomfort.   Pt is afebrile, hemodynamically stable. Labs show no leukocytosis, Cr 1.84 (doesn't know his baseline but states it has been elevated in the past)    Plan:  -recommend placing seaman catheter at this time and discharge w/ seaman catheter  Pt is refusing seaman catheter at this time. He is also refusing CIC tonight. Risks of refusing catheterization explained to pt including bladder rupture, acute renal failure, UTI w/ prolonged bladder distension. Pt states is aware of the risks but he believes risk of UTI from catheterization outweigh the risks mentioned. Recommendations discussed w/ primary team  -please send UA/ Ucx  -c/w flomax  -if pt is agreeable to seaman catheter, can follow up  clinic for [facility]  [address], [address] Tel [phone]

## 2024-12-04 NOTE — PROGRESS NOTE ADULT - ASSESSMENT
89M retired physician PMH HTN, HLD, COPD, non-obstructive CAD (no prior stents), HFmrEF, CKD3, BPH, NSCLC s/p lobectomy presenting to ED with c/o upper back pain and abdominal discomfort, found to have small bowel obstruction with transition point at the orifice of the right inguinal hernia. Patient was admitted to surgical service with plans for open inguinal hernia repair in OR tomorrow 12/3 with epidural anesthesia (no general anesthesia). Cardiology consulted for pre-operative assessment/evaluation for open inguinal hernia repair.     Review of studies:  ECG 12/2/24: NSR, PACs, NSST changes    TTE 9/7/23: Mildly reduced left ventricular systolic function, LVEF 45-50%. Mid and apical anterior septum, apical anterior segment, and apex are akinetic. Grade II left ventricular diastolic dysfunction. Normal right ventricular size and systolic function. Aortic sclerosis without significant stenosis. Pulmonary hypertension present, pulmonary artery systolic pressure is 35 mmHg. No pericardial effusion.  CT chest 9/7/23: VESSELS: Moderate calcified aortic mural plaque. No evidence of aortic aneurysm. Moderate calcified aortic valve and mitral annulus. Moderate coronary artery calcification.    Home meds (cardiac): rosuvastatin 10mg qd, toprol 50mg qd, lasix 20mg 2x/week     Outpatient cardiologist: Dr. France     Recommendations:    #Pre-operative assessment/evaluation  s/p open inguinal hernia repair 12/3, tolerated procedure well    #SOB  oxygenating well   scant bibasilar crackles on exam, le no edema, wwp  patient is on lasix 20mg 2 x per week at home  s/p lasix 20mg IV x1 12/4  - would obtain CXR  - incentive spirometry, OOBTC    #HFmrEF  Prior CT chest with calcifications in multiple vessels, but has never undergone invasive ischemic evaluation. He has deferred it in the past with his cardiologist as he has remained asymptomatic  - continue home Toprol 50mg qd  - patient did not tolerate losartan as outpatient   - deferred ASA as outpatient   - continue crestor    Please ensure patient has outpatient follow up with Dr. France on discharge.     Krystle Arizmendi

## 2024-12-04 NOTE — PROGRESS NOTE ADULT - SUBJECTIVE AND OBJECTIVE BOX
INTERVAL HPI/OVERNIGHT EVENTS:  Has some respiratory distress this morning requiring albuterol treatment   Presently stable;  Stinson out and no urine as of yet   Needs to get up and ambulate;  On Clear liquids       MEDICATIONS  (STANDING):  acetaminophen     Tablet .. 1000 milliGRAM(s) Oral every 6 hours  escitalopram 10 milliGRAM(s) Oral daily  fluticasone propionate/ salmeterol 100-50 MICROgram(s) Diskus 1 Dose(s) Inhalation two times a day  heparin   Injectable 5000 Unit(s) SubCutaneous every 8 hours  metoprolol succinate ER 50 milliGRAM(s) Oral daily  predniSONE   Tablet 10 milliGRAM(s) Oral daily  rosuvastatin 10 milliGRAM(s) Oral at bedtime  tamsulosin 0.4 milliGRAM(s) Oral at bedtime  tiotropium 2.5 MICROgram(s) Inhaler 2 Puff(s) Inhalation daily    MEDICATIONS  (PRN):  HYDROmorphone  Injectable 0.5 milliGRAM(s) IV Push every 4 hours PRN Severe Pain (7 - 10)  HYDROmorphone  Injectable 0.25 milliGRAM(s) IV Push every 4 hours PRN Moderate Pain (4 - 6)      Allergies    No Known Allergies    Intolerances        Vital Signs Last 24 Hrs  T(C): 36.3 (04 Dec 2024 04:49), Max: 36.9 (03 Dec 2024 14:25)  T(F): 97.3 (04 Dec 2024 04:49), Max: 98.5 (03 Dec 2024 22:13)  HR: 62 (04 Dec 2024 08:35) (62 - 73)  BP: 151/74 (04 Dec 2024 08:35) (124/61 - 155/72)  BP(mean): 106 (04 Dec 2024 08:35) (85 - 106)  RR: 18 (04 Dec 2024 08:35) (15 - 30)  SpO2: 97% (04 Dec 2024 08:35) (94% - 97%)    Parameters below as of 04 Dec 2024 08:35  Patient On (Oxygen Delivery Method): room air              Constitutional: Awake     Eyes: WALI    ENMT: Negative    Neck: Supple    Back:  no tenderness     Respiratory:  rhonchi and no wheezes      Cardiovascular: S1 S2    Gastrointestinal: soft     Genitourinary:    Extremities:  trace edema     Vascular:    Neurological:    Skin:    Lymph Nodes:            12-03 @ 07:01 - 12-04 @ 07:00  --------------------------------------------------------  IN: 1230 mL / OUT: 360 mL / NET: 870 mL    12-04 @ 07:01  -  12-04 @ 10:31  --------------------------------------------------------  IN: 0 mL / OUT: 0 mL / NET: 0 mL      LABS:                        13.4   4.00  )-----------( 124      ( 04 Dec 2024 05:30 )             44.7     12-04    138  |  109[H]  |  44[H]  ----------------------------<  120[H]  5.2   |  20[L]  |  1.84[H]    Ca    8.5      04 Dec 2024 05:30  Phos  3.5     12-04  Mg     2.2     12-04      PT/INR - ( 02 Dec 2024 20:40 )   PT: 10.9 sec;   INR: 0.93          PTT - ( 02 Dec 2024 20:40 )  PTT:24.6 sec  Urinalysis Basic - ( 04 Dec 2024 05:30 )    Color: x / Appearance: x / SG: x / pH: x  Gluc: 120 mg/dL / Ketone: x  / Bili: x / Urobili: x   Blood: x / Protein: x / Nitrite: x   Leuk Esterase: x / RBC: x / WBC x   Sq Epi: x / Non Sq Epi: x / Bacteria: x        RADIOLOGY & ADDITIONAL TESTS:

## 2024-12-04 NOTE — PROGRESS NOTE ADULT - SUBJECTIVE AND OBJECTIVE BOX
SUBJECTIVE: Feeling well, no N/V, perez diet, ambuating w/ cane, passing flatus, pain controlled. Patient seen and examined bedside by chief resident.    heparin   Injectable 5000 Unit(s) SubCutaneous every 8 hours  metoprolol succinate ER 50 milliGRAM(s) Oral daily    MEDICATIONS  (PRN):  HYDROmorphone  Injectable 0.5 milliGRAM(s) IV Push every 4 hours PRN Severe Pain (7 - 10)  HYDROmorphone  Injectable 0.25 milliGRAM(s) IV Push every 4 hours PRN Moderate Pain (4 - 6)      I&O's Detail    03 Dec 2024 07:01  -  04 Dec 2024 07:00  --------------------------------------------------------  IN:    Lactated Ringers: 980 mL    Oral Fluid: 250 mL  Total IN: 1230 mL    OUT:    Indwelling Catheter - Urethral (mL): 360 mL  Total OUT: 360 mL    Total NET: 870 mL          Vital Signs Last 24 Hrs  T(C): 36.3 (04 Dec 2024 04:49), Max: 36.9 (03 Dec 2024 14:25)  T(F): 97.3 (04 Dec 2024 04:49), Max: 98.5 (03 Dec 2024 22:13)  HR: 62 (04 Dec 2024 03:37) (58 - 73)  BP: 155/72 (04 Dec 2024 03:37) (124/61 - 155/72)  BP(mean): 104 (04 Dec 2024 03:37) (82 - 104)  RR: 19 (04 Dec 2024 03:37) (15 - 30)  SpO2: 95% (04 Dec 2024 03:37) (94% - 96%)    Parameters below as of 04 Dec 2024 03:37  Patient On (Oxygen Delivery Method): room air        General: NAD, resting comfortably in bed  C/V: NSR  Pulm: Nonlabored breathing, no respiratory distress  Abd: soft, NT/ND, incision CDI  Extrem:  SCDs in place    LABS:                        13.4   4.00  )-----------( 124      ( 04 Dec 2024 05:30 )             44.7     12-04    138  |  109[H]  |  44[H]  ----------------------------<  120[H]  5.2   |  20[L]  |  1.84[H]    Ca    8.5      04 Dec 2024 05:30  Phos  3.5     12-04  Mg     2.2     12-04    TPro  6.4  /  Alb  3.6  /  TBili  1.0  /  DBili  x   /  AST  12  /  ALT  9[L]  /  AlkPhos  40  12-02    PT/INR - ( 02 Dec 2024 20:40 )   PT: 10.9 sec;   INR: 0.93          PTT - ( 02 Dec 2024 20:40 )  PTT:24.6 sec  Urinalysis Basic - ( 04 Dec 2024 05:30 )    Color: x / Appearance: x / SG: x / pH: x  Gluc: 120 mg/dL / Ketone: x  / Bili: x / Urobili: x   Blood: x / Protein: x / Nitrite: x   Leuk Esterase: x / RBC: x / WBC x   Sq Epi: x / Non Sq Epi: x / Bacteria: x        RADIOLOGY & ADDITIONAL STUDIES:  CT Abdomen and Pelvis No Cont:   ACC: [id number] EXAM:  CT ABDOMEN AND PELVIS   ORDERED BY: NITIN JAMESON     PROCEDURE DATE:  12/02/2024          INTERPRETATION:  CLINICAL INFORMATION: Evaluate for small bowel   obstruction.    COMPARISON: CT angiogram performed earlier on the same day.    CONTRAST/COMPLICATIONS:  IV Contrast: NONE  Oral Contrast: NONE  .    PROCEDURE:  CT of the Abdomen and Pelvis was performed.  Sagittal and coronal reformats were performed.    FINDINGS:  LOWER CHEST: No acute abnormality.    LIVER: Within normal limits.  BILE DUCTS: Normal caliber.  GALLBLADDER: Within normal limits.  SPLEEN: Within normal limits.  PANCREAS: Partial fatty atrophy.  ADRENALS: Within normal limits.  KIDNEYS/URETERS: Bilateral renal cysts.    BLADDER: Multiple bladder diverticula.  REPRODUCTIVE ORGANS: Prostate is enlarged.    BOWEL: Persistent dilatation of proximal small bowel loops with possible   transition at the orifice of the right inguinal hernia.  PERITONEUM/RETROPERITONEUM: Small ascites with increased density,  probably due to excretion of contrast. Mesenteric edema.  VESSELS: No abdominal aortic aneurysm.  LYMPH NODES: No lymphadenopathy.  ABDOMINAL WALL: Bilateral inguinal hernias again noted containing small   bowel on the right side and a small portion of colon in the left side.  BONES: Degenerative changes. Right hip ORIF.    IMPRESSION:  Persistent small bowel dilatation and mesenteric edema with small bowel   herniation the right inguinal canal, suspicious for a degree of bowel   obstruction. Consider CT with oral contrast.    --- End of Report ---            TAYLOR HARTLEY MD; Attending Radiologist  This document has been electronically signed. Dec  2 2024  4:17PM (12-02-24 @ 15:25)

## 2024-12-05 ENCOUNTER — TRANSCRIPTION ENCOUNTER (OUTPATIENT)
Age: 88
End: 2024-12-05

## 2024-12-05 ENCOUNTER — RESULT REVIEW (OUTPATIENT)
Age: 88
End: 2024-12-05

## 2024-12-05 DIAGNOSIS — I51.3 INTRACARDIAC THROMBOSIS, NOT ELSEWHERE CLASSIFIED: ICD-10-CM

## 2024-12-05 LAB
ANION GAP SERPL CALC-SCNC: 11 MMOL/L — SIGNIFICANT CHANGE UP (ref 5–17)
APPEARANCE UR: CLEAR — SIGNIFICANT CHANGE UP
APTT BLD: 25.9 SEC — SIGNIFICANT CHANGE UP (ref 24.5–35.6)
BILIRUB UR-MCNC: NEGATIVE — SIGNIFICANT CHANGE UP
BUN SERPL-MCNC: 48 MG/DL — HIGH (ref 7–23)
CALCIUM SERPL-MCNC: 8.3 MG/DL — LOW (ref 8.4–10.5)
CHLORIDE SERPL-SCNC: 105 MMOL/L — SIGNIFICANT CHANGE UP (ref 96–108)
CO2 SERPL-SCNC: 18 MMOL/L — LOW (ref 22–31)
COLOR SPEC: YELLOW — SIGNIFICANT CHANGE UP
CREAT ?TM UR-MCNC: 69 MG/DL — SIGNIFICANT CHANGE UP
CREAT SERPL-MCNC: 1.94 MG/DL — HIGH (ref 0.5–1.3)
DIFF PNL FLD: ABNORMAL
EGFR: 32 ML/MIN/1.73M2 — LOW
GLUCOSE SERPL-MCNC: 114 MG/DL — HIGH (ref 70–99)
GLUCOSE UR QL: NEGATIVE MG/DL — SIGNIFICANT CHANGE UP
HCT VFR BLD CALC: 41.6 % — SIGNIFICANT CHANGE UP (ref 39–50)
HGB BLD-MCNC: 12.6 G/DL — LOW (ref 13–17)
INR BLD: 0.92 — SIGNIFICANT CHANGE UP (ref 0.85–1.16)
KETONES UR-MCNC: NEGATIVE MG/DL — SIGNIFICANT CHANGE UP
LEUKOCYTE ESTERASE UR-ACNC: NEGATIVE — SIGNIFICANT CHANGE UP
MAGNESIUM SERPL-MCNC: 2.1 MG/DL — SIGNIFICANT CHANGE UP (ref 1.6–2.6)
MCHC RBC-ENTMCNC: 29.6 PG — SIGNIFICANT CHANGE UP (ref 27–34)
MCHC RBC-ENTMCNC: 30.3 G/DL — LOW (ref 32–36)
MCV RBC AUTO: 97.7 FL — SIGNIFICANT CHANGE UP (ref 80–100)
NITRITE UR-MCNC: NEGATIVE — SIGNIFICANT CHANGE UP
NRBC # BLD: 0 /100 WBCS — SIGNIFICANT CHANGE UP (ref 0–0)
OSMOLALITY UR: 521 MOSM/KG — SIGNIFICANT CHANGE UP (ref 300–900)
PH UR: 5.5 — SIGNIFICANT CHANGE UP (ref 5–8)
PHOSPHATE SERPL-MCNC: 2.8 MG/DL — SIGNIFICANT CHANGE UP (ref 2.5–4.5)
PLATELET # BLD AUTO: 105 K/UL — LOW (ref 150–400)
POTASSIUM SERPL-MCNC: 4.9 MMOL/L — SIGNIFICANT CHANGE UP (ref 3.5–5.3)
POTASSIUM SERPL-SCNC: 4.9 MMOL/L — SIGNIFICANT CHANGE UP (ref 3.5–5.3)
POTASSIUM UR-SCNC: 29 MMOL/L — SIGNIFICANT CHANGE UP
PROT ?TM UR-MCNC: 18 MG/DL — HIGH (ref 0–12)
PROT UR-MCNC: 30 MG/DL
PROT/CREAT UR-RTO: 0.3 RATIO — HIGH (ref 0–0.2)
PROTHROM AB SERPL-ACNC: 10.8 SEC — SIGNIFICANT CHANGE UP (ref 9.9–13.4)
RBC # BLD: 4.26 M/UL — SIGNIFICANT CHANGE UP (ref 4.2–5.8)
RBC # FLD: 14.2 % — SIGNIFICANT CHANGE UP (ref 10.3–14.5)
SODIUM SERPL-SCNC: 134 MMOL/L — LOW (ref 135–145)
SODIUM UR-SCNC: 92 MMOL/L — SIGNIFICANT CHANGE UP
SP GR SPEC: 1.02 — SIGNIFICANT CHANGE UP (ref 1–1.03)
UROBILINOGEN FLD QL: 1 MG/DL — SIGNIFICANT CHANGE UP (ref 0.2–1)
UUN UR-MCNC: 760 MG/DL — SIGNIFICANT CHANGE UP
WBC # BLD: 5.12 K/UL — SIGNIFICANT CHANGE UP (ref 3.8–10.5)
WBC # FLD AUTO: 5.12 K/UL — SIGNIFICANT CHANGE UP (ref 3.8–10.5)

## 2024-12-05 PROCEDURE — 99222 1ST HOSP IP/OBS MODERATE 55: CPT

## 2024-12-05 PROCEDURE — 99232 SBSQ HOSP IP/OBS MODERATE 35: CPT | Mod: GC

## 2024-12-05 PROCEDURE — 93306 TTE W/DOPPLER COMPLETE: CPT | Mod: 26

## 2024-12-05 PROCEDURE — 99232 SBSQ HOSP IP/OBS MODERATE 35: CPT

## 2024-12-05 RX ORDER — IPRATROPIUM BROMIDE AND ALBUTEROL SULFATE 2.5; .5 MG/3ML; MG/3ML
3 SOLUTION RESPIRATORY (INHALATION) EVERY 6 HOURS
Refills: 0 | Status: DISCONTINUED | OUTPATIENT
Start: 2024-12-05 | End: 2024-12-06

## 2024-12-05 RX ORDER — HEPARIN SODIUM,PORCINE 1000/ML
1100 VIAL (ML) INJECTION
Qty: 25000 | Refills: 0 | Status: DISCONTINUED | OUTPATIENT
Start: 2024-12-05 | End: 2024-12-06

## 2024-12-05 RX ADMIN — IPRATROPIUM BROMIDE AND ALBUTEROL SULFATE 3 MILLILITER(S): 2.5; .5 SOLUTION RESPIRATORY (INHALATION) at 04:17

## 2024-12-05 RX ADMIN — ROSUVASTATIN CALCIUM 10 MILLIGRAM(S): 5 TABLET, FILM COATED ORAL at 21:36

## 2024-12-05 RX ADMIN — Medication 2 PUFF(S): at 12:01

## 2024-12-05 RX ADMIN — ESCITALOPRAM OXALATE 10 MILLIGRAM(S): 10 TABLET, FILM COATED ORAL at 12:01

## 2024-12-05 RX ADMIN — TAMSULOSIN HYDROCHLORIDE 0.4 MILLIGRAM(S): 0.4 CAPSULE ORAL at 21:36

## 2024-12-05 RX ADMIN — METOPROLOL TARTRATE 50 MILLIGRAM(S): 100 TABLET, FILM COATED ORAL at 06:02

## 2024-12-05 RX ADMIN — PREDNISONE 10 MILLIGRAM(S): 20 TABLET ORAL at 06:02

## 2024-12-05 RX ADMIN — Medication 11 UNIT(S)/HR: at 23:15

## 2024-12-05 NOTE — PROGRESS NOTE ADULT - SUBJECTIVE AND OBJECTIVE BOX
Cardiology Consult      Interval History/HPI: Pt seen and examined at bedside. Currently Denies chest pain, sob, or palpitations.       OBJECTIVE  T(C): 36.3 (12-05-24 @ 08:55), Max: 36.9 (12-04-24 @ 22:22)  HR: 58 (12-05-24 @ 08:55) (58 - 62)  BP: 140/72 (12-05-24 @ 08:55) (130/62 - 151/71)  RR: 18 (12-05-24 @ 08:55) (17 - 19)  SpO2: 94% (12-05-24 @ 08:55) (93% - 96%)    12-04-24 @ 07:01  -  12-05-24 @ 07:00  --------------------------------------------------------  IN: 545 mL / OUT: 545 mL / NET: 0 mL    12-05-24 @ 07:01  -  12-05-24 @ 17:30  --------------------------------------------------------  IN: 240 mL / OUT: 400 mL / NET: -160 mL        PHYSICAL EXAM:  NAD   + expiratory wheezing   rrr  abd soft   le wwp no edema   skin with multiple bruises and healing scabs    LABS:                        12.6   5.12  )-----------( 105      ( 05 Dec 2024 05:30 )             41.6     12-05    134[L]  |  105  |  48[H]  ----------------------------<  114[H]  4.9   |  18[L]  |  1.94[H]    Ca    8.3[L]      05 Dec 2024 05:30  Phos  2.8     12-05  Mg     2.1     12-05        Urinalysis Basic - ( 05 Dec 2024 05:30 )    Color: x / Appearance: x / SG: x / pH: x  Gluc: 114 mg/dL / Ketone: x  / Bili: x / Urobili: x   Blood: x / Protein: x / Nitrite: x   Leuk Esterase: x / RBC: x / WBC x   Sq Epi: x / Non Sq Epi: x / Bacteria: x        RADIOLOGY & ADDITIONAL TESTS:  Reviewed .    MEDICATIONS  (STANDING):  acetaminophen     Tablet .. 1000 milliGRAM(s) Oral every 6 hours  escitalopram 10 milliGRAM(s) Oral daily  fluticasone propionate/ salmeterol 100-50 MICROgram(s) Diskus 1 Dose(s) Inhalation two times a day  heparin   Injectable 5000 Unit(s) SubCutaneous every 8 hours  metoprolol succinate ER 50 milliGRAM(s) Oral daily  predniSONE   Tablet 10 milliGRAM(s) Oral daily  rosuvastatin 10 milliGRAM(s) Oral at bedtime  tamsulosin 0.4 milliGRAM(s) Oral at bedtime  tiotropium 2.5 MICROgram(s) Inhaler 2 Puff(s) Inhalation daily    MEDICATIONS  (PRN):  albuterol/ipratropium for Nebulization 3 milliLiter(s) Nebulizer every 6 hours PRN Shortness of Breath and/or Wheezing  oxyCODONE    IR 2.5 milliGRAM(s) Oral every 6 hours PRN Severe Pain (7 - 10)

## 2024-12-05 NOTE — PROGRESS NOTE ADULT - SUBJECTIVE AND OBJECTIVE BOX
POST-OP DAY: X s/p      SUBJECTIVE: Patient seen and examined bedside by chief resident.    heparin   Injectable 5000 Unit(s) SubCutaneous every 8 hours  metoprolol succinate ER 50 milliGRAM(s) Oral daily    MEDICATIONS  (PRN):  albuterol/ipratropium for Nebulization 3 milliLiter(s) Nebulizer every 6 hours PRN Shortness of Breath and/or Wheezing  oxyCODONE    IR 2.5 milliGRAM(s) Oral every 6 hours PRN Severe Pain (7 - 10)      I&O's Detail    04 Dec 2024 07:01  -  05 Dec 2024 07:00  --------------------------------------------------------  IN:    Oral Fluid: 545 mL  Total IN: 545 mL    OUT:    Voided (mL): 545 mL  Total OUT: 545 mL    Total NET: 0 mL          Vital Signs Last 24 Hrs  T(C): 36.7 (05 Dec 2024 05:30), Max: 36.9 (04 Dec 2024 22:22)  T(F): 98 (05 Dec 2024 05:30), Max: 98.4 (04 Dec 2024 22:22)  HR: 60 (05 Dec 2024 05:30) (60 - 66)  BP: 131/66 (05 Dec 2024 05:30) (130/62 - 153/75)  BP(mean): 95 (05 Dec 2024 03:35) (88 - 107)  RR: 19 (05 Dec 2024 05:30) (17 - 19)  SpO2: 93% (05 Dec 2024 05:30) (93% - 97%)    Parameters below as of 05 Dec 2024 05:30  Patient On (Oxygen Delivery Method): room air        General: NAD, resting comfortably in bed  C/V: NSR  Pulm: Nonlabored breathing, no respiratory distress  Abd: soft, NT/ND  Extrem: WWP, no edema, SCDs in place    LABS:                        13.4   4.00  )-----------( 124      ( 04 Dec 2024 05:30 )             44.7     12-04    138  |  109[H]  |  44[H]  ----------------------------<  120[H]  5.2   |  20[L]  |  1.84[H]    Ca    8.5      04 Dec 2024 05:30  Phos  3.5     12-04  Mg     2.2     12-04        Urinalysis Basic - ( 04 Dec 2024 05:30 )    Color: x / Appearance: x / SG: x / pH: x  Gluc: 120 mg/dL / Ketone: x  / Bili: x / Urobili: x   Blood: x / Protein: x / Nitrite: x   Leuk Esterase: x / RBC: x / WBC x   Sq Epi: x / Non Sq Epi: x / Bacteria: x        RADIOLOGY & ADDITIONAL STUDIES:  CT Abdomen and Pelvis No Cont:   ACC: [id number] EXAM:  CT ABDOMEN AND PELVIS   ORDERED BY: NITIN JAMESON     PROCEDURE DATE:  12/02/2024          INTERPRETATION:  CLINICAL INFORMATION: Evaluate for small bowel   obstruction.    COMPARISON: CT angiogram performed earlier on the same day.    CONTRAST/COMPLICATIONS:  IV Contrast: NONE  Oral Contrast: NONE  .    PROCEDURE:  CT of the Abdomen and Pelvis was performed.  Sagittal and coronal reformats were performed.    FINDINGS:  LOWER CHEST: No acute abnormality.    LIVER: Within normal limits.  BILE DUCTS: Normal caliber.  GALLBLADDER: Within normal limits.  SPLEEN: Within normal limits.  PANCREAS: Partial fatty atrophy.  ADRENALS: Within normal limits.  KIDNEYS/URETERS: Bilateral renal cysts.    BLADDER: Multiple bladder diverticula.  REPRODUCTIVE ORGANS: Prostate is enlarged.    BOWEL: Persistent dilatation of proximal small bowel loops with possible   transition at the orifice of the right inguinal hernia.  PERITONEUM/RETROPERITONEUM: Small ascites with increased density,  probably due to excretion of contrast. Mesenteric edema.  VESSELS: No abdominal aortic aneurysm.  LYMPH NODES: No lymphadenopathy.  ABDOMINAL WALL: Bilateral inguinal hernias again noted containing small   bowel on the right side and a small portion of colon in the left side.  BONES: Degenerative changes. Right hip ORIF.    IMPRESSION:  Persistent small bowel dilatation and mesenteric edema with small bowel   herniation the right inguinal canal, suspicious for a degree of bowel   obstruction. Consider CT with oral contrast.    --- End of Report ---            TAYLOR HARTLEY MD; Attending Radiologist  This document has been electronically signed. Dec  2 2024  4:17PM (12-02-24 @ 15:25)     POST-OP DAY: #3 s/p open R femoral and recurrent inguinal hernia repair with plug and patch mesh     SUBJECTIVE: Patient seen and examined bedside by chief resident reports feeling. Tolerating clears, w/o nasuea or vomiting. PAssing flatus, no bowel movement at this time. Denies chest pain, SOB, HA, dizziness, suprapubic pain, otherwise feels well.     heparin   Injectable 5000 Unit(s) SubCutaneous every 8 hours  metoprolol succinate ER 50 milliGRAM(s) Oral daily    MEDICATIONS  (PRN):  albuterol/ipratropium for Nebulization 3 milliLiter(s) Nebulizer every 6 hours PRN Shortness of Breath and/or Wheezing  oxyCODONE    IR 2.5 milliGRAM(s) Oral every 6 hours PRN Severe Pain (7 - 10)      I&O's Detail    04 Dec 2024 07:01  -  05 Dec 2024 07:00  --------------------------------------------------------  IN:    Oral Fluid: 545 mL  Total IN: 545 mL    OUT:    Voided (mL): 545 mL  Total OUT: 545 mL    Total NET: 0 mL          Vital Signs Last 24 Hrs  T(C): 36.7 (05 Dec 2024 05:30), Max: 36.9 (04 Dec 2024 22:22)  T(F): 98 (05 Dec 2024 05:30), Max: 98.4 (04 Dec 2024 22:22)  HR: 60 (05 Dec 2024 05:30) (60 - 66)  BP: 131/66 (05 Dec 2024 05:30) (130/62 - 153/75)  BP(mean): 95 (05 Dec 2024 03:35) (88 - 107)  RR: 19 (05 Dec 2024 05:30) (17 - 19)  SpO2: 93% (05 Dec 2024 05:30) (93% - 97%)    Parameters below as of 05 Dec 2024 05:30  Patient On (Oxygen Delivery Method): room air        General: NAD, resting comfortably in bed  C/V: NSR  Pulm: Nonlabored breathing, no respiratory distress  Abd: soft, non-tender, non distended. Incisions clean/dry/intact  Extrem: WWP, no edema, SCDs in place    LABS:                        13.4   4.00  )-----------( 124      ( 04 Dec 2024 05:30 )             44.7     12-04    138  |  109[H]  |  44[H]  ----------------------------<  120[H]  5.2   |  20[L]  |  1.84[H]    Ca    8.5      04 Dec 2024 05:30  Phos  3.5     12-04  Mg     2.2     12-04        Urinalysis Basic - ( 04 Dec 2024 05:30 )    Color: x / Appearance: x / SG: x / pH: x  Gluc: 120 mg/dL / Ketone: x  / Bili: x / Urobili: x   Blood: x / Protein: x / Nitrite: x   Leuk Esterase: x / RBC: x / WBC x   Sq Epi: x / Non Sq Epi: x / Bacteria: x        RADIOLOGY & ADDITIONAL STUDIES:  CT Abdomen and Pelvis No Cont:   ACC: [id number] EXAM:  CT ABDOMEN AND PELVIS   ORDERED BY: NITIN JAMESON     PROCEDURE DATE:  12/02/2024          INTERPRETATION:  CLINICAL INFORMATION: Evaluate for small bowel   obstruction.    COMPARISON: CT angiogram performed earlier on the same day.    CONTRAST/COMPLICATIONS:  IV Contrast: NONE  Oral Contrast: NONE  .    PROCEDURE:  CT of the Abdomen and Pelvis was performed.  Sagittal and coronal reformats were performed.    FINDINGS:  LOWER CHEST: No acute abnormality.    LIVER: Within normal limits.  BILE DUCTS: Normal caliber.  GALLBLADDER: Within normal limits.  SPLEEN: Within normal limits.  PANCREAS: Partial fatty atrophy.  ADRENALS: Within normal limits.  KIDNEYS/URETERS: Bilateral renal cysts.    BLADDER: Multiple bladder diverticula.  REPRODUCTIVE ORGANS: Prostate is enlarged.    BOWEL: Persistent dilatation of proximal small bowel loops with possible   transition at the orifice of the right inguinal hernia.  PERITONEUM/RETROPERITONEUM: Small ascites with increased density,  probably due to excretion of contrast. Mesenteric edema.  VESSELS: No abdominal aortic aneurysm.  LYMPH NODES: No lymphadenopathy.  ABDOMINAL WALL: Bilateral inguinal hernias again noted containing small   bowel on the right side and a small portion of colon in the left side.  BONES: Degenerative changes. Right hip ORIF.    IMPRESSION:  Persistent small bowel dilatation and mesenteric edema with small bowel   herniation the right inguinal canal, suspicious for a degree of bowel   obstruction. Consider CT with oral contrast.    --- End of Report ---            TAYLOR HARTLEY MD; Attending Radiologist  This document has been electronically signed. Dec  2 2024  4:17PM (12-02-24 @ 15:25)

## 2024-12-05 NOTE — PHYSICAL THERAPY INITIAL EVALUATION ADULT - PERTINENT HX OF CURRENT PROBLEM, REHAB EVAL
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 Presents to the ED with c/o upper back pain x 6 days. CT scan showing SBO with transition being in the right inguinal hernia, on exam distended, diffusely tender to palpation. Inguinal hernias completely reduced on exam and no tenderness on the groin region. Repeat CT was done a few hours after and noted repeat inguinal hernias on CT, on exam, patient noted have inguinal hernia that spontaneously reduced when in reverse trendelenberg now s/p open R femoral and recurrent inguinal hernia repair with plug and patch mesh

## 2024-12-05 NOTE — DISCHARGE NOTE NURSING/CASE MANAGEMENT/SOCIAL WORK - FINANCIAL ASSISTANCE
[facility] provides services at a reduced cost to those who are determined to be eligible through [facility]’s financial assistance program. Information regarding [facility]’s financial assistance program can be found by going to [email or web address] or by calling [phone].

## 2024-12-05 NOTE — DISCHARGE NOTE NURSING/CASE MANAGEMENT/SOCIAL WORK - PATIENT PORTAL LINK FT
You can access the FollowMyHealth Patient Portal offered by [facility] by registering at the following website: [email or web address]. By joining [facility]’s FollowMyHealth portal, you will also be able to view your health information using other applications (apps) compatible with our system.

## 2024-12-05 NOTE — PROGRESS NOTE ADULT - SUBJECTIVE AND OBJECTIVE BOX
INTERVAL HPI/OVERNIGHT EVENTS:  Events noted;  ECHO noted;  LV thrombus   Otherwise no chief complaint       MEDICATIONS  (STANDING):  acetaminophen     Tablet .. 1000 milliGRAM(s) Oral every 6 hours  escitalopram 10 milliGRAM(s) Oral daily  fluticasone propionate/ salmeterol 100-50 MICROgram(s) Diskus 1 Dose(s) Inhalation two times a day  heparin   Injectable 5000 Unit(s) SubCutaneous every 8 hours  metoprolol succinate ER 50 milliGRAM(s) Oral daily  predniSONE   Tablet 10 milliGRAM(s) Oral daily  rosuvastatin 10 milliGRAM(s) Oral at bedtime  tamsulosin 0.4 milliGRAM(s) Oral at bedtime  tiotropium 2.5 MICROgram(s) Inhaler 2 Puff(s) Inhalation daily    MEDICATIONS  (PRN):  albuterol/ipratropium for Nebulization 3 milliLiter(s) Nebulizer every 6 hours PRN Shortness of Breath and/or Wheezing  oxyCODONE    IR 2.5 milliGRAM(s) Oral every 6 hours PRN Severe Pain (7 - 10)      Allergies    No Known Allergies    Intolerances        Vital Signs Last 24 Hrs  T(C): 36.3 (05 Dec 2024 08:55), Max: 36.9 (04 Dec 2024 22:22)  T(F): 97.4 (05 Dec 2024 08:55), Max: 98.4 (04 Dec 2024 22:22)  HR: 58 (05 Dec 2024 08:55) (58 - 62)  BP: 140/72 (05 Dec 2024 08:55) (130/62 - 151/71)  BP(mean): 95 (05 Dec 2024 03:35) (88 - 102)  RR: 18 (05 Dec 2024 08:55) (17 - 19)  SpO2: 94% (05 Dec 2024 08:55) (93% - 96%)    Parameters below as of 05 Dec 2024 08:55  Patient On (Oxygen Delivery Method): room air              Constitutional:  Awake     Eyes: WALI    ENMT: Negative    Neck: Supple    Back:  no tenderness     Respiratory:  clear    Cardiovascular: S1 S2    Gastrointestinal: soft     Genitourinary:    Extremities:  no edema     Vascular:    Neurological:    Skin:    Lymph Nodes:            12-04 @ 07:01  -  12-05 @ 07:00  --------------------------------------------------------  IN: 545 mL / OUT: 545 mL / NET: 0 mL    12-05 @ 07:01  -  12-05 @ 17:59  --------------------------------------------------------  IN: 240 mL / OUT: 400 mL / NET: -160 mL      LABS:                        12.6   5.12  )-----------( 105      ( 05 Dec 2024 05:30 )             41.6     12-05    134[L]  |  105  |  48[H]  ----------------------------<  114[H]  4.9   |  18[L]  |  1.94[H]    Ca    8.3[L]      05 Dec 2024 05:30  Phos  2.8     12-05  Mg     2.1     12-05        Urinalysis Basic - ( 05 Dec 2024 05:30 )    Color: x / Appearance: x / SG: x / pH: x  Gluc: 114 mg/dL / Ketone: x  / Bili: x / Urobili: x   Blood: x / Protein: x / Nitrite: x   Leuk Esterase: x / RBC: x / WBC x   Sq Epi: x / Non Sq Epi: x / Bacteria: x        RADIOLOGY & ADDITIONAL TESTS:

## 2024-12-05 NOTE — DISCHARGE NOTE NURSING/CASE MANAGEMENT/SOCIAL WORK - NSDCVIVACCINE_GEN_ALL_CORE_FT
Tdap; 02-Mar-2023 20:21; Hafsa Vuong (BRIGIDO); [employer]; [id number] (Exp. Date: 08-Dec-2024); IntraMuscular; Deltoid Left.; 0.5 milliLiter(s); VIS (VIS Published: 09-May-2013, VIS Presented: 02-Mar-2023);

## 2024-12-05 NOTE — PROGRESS NOTE ADULT - ASSESSMENT
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 Presents to the ED with c/o upper back pain x 6 days. CT scan showing SBO with transition being in the right inguinal hernia, on exam distended, diffusely tender to palpation. Inguinal hernias completely reduced on exam and no tenderness on the groin region. Repeat CT was done a few hours after and noted repeat inguinal hernias on CT, on exam, patient noted have inguinal hernia that spontaneously reduced when in reverse trendelenberg now s/p open R femoral and recurrent inguinal hernia repair with plug and patch mesh (12/3)     ADAT  SCDs/IS/OOB/SQH  Pain/nausea PRNs  appropriate home meds   Hold lasix   AM Labs  Dispo: Home PT

## 2024-12-05 NOTE — PHYSICAL THERAPY INITIAL EVALUATION ADULT - ADDITIONAL COMMENTS
Pt. lives with his wife in an elevator access apt. At baseline, ambulates independently with no DME in the home and SC in the community. Pt. reports his wife ambulates with a cane and has frequent falls. Pt. reports that he ambulates 1-2 miles per day while walking his dog with a SC. Of note, pt. reports having trouble standing from crouched position while picking up dog poop, and had to call an ambulance in the past 6 months due to being unable to stand after falling out of bed. Pt. reports he has a RW at home. States that upon discharge he can coordinate a  to eliminate risk while walking dog and picking up poop.

## 2024-12-05 NOTE — PROGRESS NOTE ADULT - ASSESSMENT
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 Patient was admitted to surgical service with plans for open inguinal hernia repair in OR tomorrow 12/3 with epidural anesthesia (no general anesthesia). Cardiology consulted for pre-operative assessment/evaluation for open inguinal hernia repair.     Review of studies:  TTE 12/5/24:  LVEF 35-40% with regional wall motion abnormalities.   ECG 12/2/24: NSR, PACs, NSST changes    TTE 9/7/23: Mildly reduced left ventricular systolic function, LVEF 45-50%. Mid and apical anterior septum, apical anterior segment, and apex are akinetic. Grade II left ventricular diastolic dysfunction. Normal right ventricular size and systolic function. Aortic sclerosis without significant stenosis. Pulmonary hypertension present, pulmonary artery systolic pressure is 35 mmHg. No pericardial effusion.  CT chest 9/7/23: VESSELS: Moderate calcified aortic mural plaque. No evidence of aortic aneurysm. Moderate calcified aortic valve and mitral annulus. Moderate coronary artery calcification.    Home meds (cardiac): rosuvastatin 10mg qd, toprol 50mg qd, lasix 20mg 2x/week     Outpatient cardiologist: Dr. France     Recommendations:    #Pre-operative assessment/evaluation  s/p open inguinal hernia repair 12/3, tolerated procedure well    #HFrEF  #LV thrombus  Patient with CAD seen on prior CT, but deferred ischemic eval   Reports some chest pain while walking, but takes stops/ rests and it resolves   He has deferred ASA in the past due to skin bleeding while on chronic prednisone   Discussed newly worsened EF with WMA and LV thrombus; initially patient deferred invasive testing - saying he would not survive a stress test and would not want a cath. During Dr. Lozano's interview patient reports he is considering ischemic evaluation and will decide tomorrow. He is ok with starting Eliquis for LV thrombus. 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 France on discharge.     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 Patient was admitted to surgical service with plans for open inguinal hernia repair in OR tomorrow 12/3 with epidural anesthesia (no general anesthesia). Cardiology consulted for pre-operative assessment/evaluation for open inguinal hernia repair.     Review of studies:  TTE 12/5/24:  LVEF 35-40% with regional wall motion abnormalities.   ECG 12/2/24: NSR, PACs, NSST changes    TTE 9/7/23: Mildly reduced left ventricular systolic function, LVEF 45-50%. Mid and apical anterior septum, apical anterior segment, and apex are akinetic. Grade II left ventricular diastolic dysfunction. Normal right ventricular size and systolic function. Aortic sclerosis without significant stenosis. Pulmonary hypertension present, pulmonary artery systolic pressure is 35 mmHg. No pericardial effusion.  CT chest 9/7/23: VESSELS: Moderate calcified aortic mural plaque. No evidence of aortic aneurysm. Moderate calcified aortic valve and mitral annulus. Moderate coronary artery calcification.    Home meds (cardiac): rosuvastatin 10mg qd, toprol 50mg qd, lasix 20mg 2x/week     Outpatient cardiologist: Dr. France     Recommendations:    #Pre-operative assessment/evaluation  s/p open inguinal hernia repair 12/3, tolerated procedure well    #HFrEF  #LV thrombus  Patient with CAD seen on prior CT, but deferred ischemic eval   Reports some chest pain while walking, but takes stops/ rests and it resolves   He has deferred ASA in the past due to skin bleeding while on chronic prednisone   Discussed newly worsened EF with WMA and LV thrombus; initially patient deferred invasive testing - saying he would not survive a stress test and would not want a cath. During Dr. Lozano's interview patient reports he is considering ischemic evaluation and will decide tomorrow. He is ok with starting Eliquis for LV thrombus. 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 HR intermittently <60 (sinus bradycardia). Recommend starting imdur 30mg qd given sxs of stable angina  - please send lipid panel  - consider tapering prednisone dose d/t skin sloughing    Plan discussed with Dr. Lozano    Please ensure patient has outpatient follow up with Dr. France on discharge.     Krystle Arizmendi

## 2024-12-06 ENCOUNTER — NON-APPOINTMENT (OUTPATIENT)
Age: 88
End: 2024-12-06

## 2024-12-06 ENCOUNTER — TRANSCRIPTION ENCOUNTER (OUTPATIENT)
Age: 88
End: 2024-12-06

## 2024-12-06 VITALS
TEMPERATURE: 99 F | HEART RATE: 60 BPM | RESPIRATION RATE: 16 BRPM | SYSTOLIC BLOOD PRESSURE: 117 MMHG | OXYGEN SATURATION: 95 % | DIASTOLIC BLOOD PRESSURE: 66 MMHG

## 2024-12-06 LAB
ANION GAP SERPL CALC-SCNC: 9 MMOL/L — SIGNIFICANT CHANGE UP (ref 5–17)
APTT BLD: 20.8 SEC — LOW (ref 24.5–35.6)
APTT BLD: 24.6 SEC — SIGNIFICANT CHANGE UP (ref 24.5–35.6)
APTT BLD: 25 SEC — SIGNIFICANT CHANGE UP (ref 24.5–35.6)
BUN SERPL-MCNC: 42 MG/DL — HIGH (ref 7–23)
CALCIUM SERPL-MCNC: 8.5 MG/DL — SIGNIFICANT CHANGE UP (ref 8.4–10.5)
CHLORIDE SERPL-SCNC: 102 MMOL/L — SIGNIFICANT CHANGE UP (ref 96–108)
CO2 SERPL-SCNC: 24 MMOL/L — SIGNIFICANT CHANGE UP (ref 22–31)
CREAT SERPL-MCNC: 1.8 MG/DL — HIGH (ref 0.5–1.3)
EGFR: 35 ML/MIN/1.73M2 — LOW
GLUCOSE SERPL-MCNC: 112 MG/DL — HIGH (ref 70–99)
HCT VFR BLD CALC: 40.6 % — SIGNIFICANT CHANGE UP (ref 39–50)
HGB BLD-MCNC: 13.2 G/DL — SIGNIFICANT CHANGE UP (ref 13–17)
MAGNESIUM SERPL-MCNC: 2 MG/DL — SIGNIFICANT CHANGE UP (ref 1.6–2.6)
MCHC RBC-ENTMCNC: 30.9 PG — SIGNIFICANT CHANGE UP (ref 27–34)
MCHC RBC-ENTMCNC: 32.5 G/DL — SIGNIFICANT CHANGE UP (ref 32–36)
MCV RBC AUTO: 95.1 FL — SIGNIFICANT CHANGE UP (ref 80–100)
NRBC # BLD: 0 /100 WBCS — SIGNIFICANT CHANGE UP (ref 0–0)
PHOSPHATE SERPL-MCNC: 2.3 MG/DL — LOW (ref 2.5–4.5)
PLATELET # BLD AUTO: 106 K/UL — LOW (ref 150–400)
POTASSIUM SERPL-MCNC: 4.3 MMOL/L — SIGNIFICANT CHANGE UP (ref 3.5–5.3)
POTASSIUM SERPL-SCNC: 4.3 MMOL/L — SIGNIFICANT CHANGE UP (ref 3.5–5.3)
RBC # BLD: 4.27 M/UL — SIGNIFICANT CHANGE UP (ref 4.2–5.8)
RBC # FLD: 14.2 % — SIGNIFICANT CHANGE UP (ref 10.3–14.5)
SODIUM SERPL-SCNC: 135 MMOL/L — SIGNIFICANT CHANGE UP (ref 135–145)
WBC # BLD: 6.7 K/UL — SIGNIFICANT CHANGE UP (ref 3.8–10.5)
WBC # FLD AUTO: 6.7 K/UL — SIGNIFICANT CHANGE UP (ref 3.8–10.5)

## 2024-12-06 PROCEDURE — 99285 EMERGENCY DEPT VISIT HI MDM: CPT

## 2024-12-06 PROCEDURE — 81001 URINALYSIS AUTO W/SCOPE: CPT

## 2024-12-06 PROCEDURE — 84484 ASSAY OF TROPONIN QUANT: CPT

## 2024-12-06 PROCEDURE — 84133 ASSAY OF URINE POTASSIUM: CPT

## 2024-12-06 PROCEDURE — 85730 THROMBOPLASTIN TIME PARTIAL: CPT

## 2024-12-06 PROCEDURE — 80053 COMPREHEN METABOLIC PANEL: CPT

## 2024-12-06 PROCEDURE — C1781: CPT

## 2024-12-06 PROCEDURE — 74176 CT ABD & PELVIS W/O CONTRAST: CPT | Mod: MC

## 2024-12-06 PROCEDURE — 94640 AIRWAY INHALATION TREATMENT: CPT

## 2024-12-06 PROCEDURE — 96374 THER/PROPH/DIAG INJ IV PUSH: CPT

## 2024-12-06 PROCEDURE — 80048 BASIC METABOLIC PNL TOTAL CA: CPT

## 2024-12-06 PROCEDURE — C8929: CPT

## 2024-12-06 PROCEDURE — 99233 SBSQ HOSP IP/OBS HIGH 50: CPT

## 2024-12-06 PROCEDURE — 97161 PT EVAL LOW COMPLEX 20 MIN: CPT

## 2024-12-06 PROCEDURE — 84156 ASSAY OF PROTEIN URINE: CPT

## 2024-12-06 PROCEDURE — 84100 ASSAY OF PHOSPHORUS: CPT

## 2024-12-06 PROCEDURE — 83735 ASSAY OF MAGNESIUM: CPT

## 2024-12-06 PROCEDURE — 86900 BLOOD TYPING SEROLOGIC ABO: CPT

## 2024-12-06 PROCEDURE — 82553 CREATINE MB FRACTION: CPT

## 2024-12-06 PROCEDURE — 96375 TX/PRO/DX INJ NEW DRUG ADDON: CPT

## 2024-12-06 PROCEDURE — 83605 ASSAY OF LACTIC ACID: CPT

## 2024-12-06 PROCEDURE — 82550 ASSAY OF CK (CPK): CPT

## 2024-12-06 PROCEDURE — 84300 ASSAY OF URINE SODIUM: CPT

## 2024-12-06 PROCEDURE — 71275 CT ANGIOGRAPHY CHEST: CPT | Mod: MC

## 2024-12-06 PROCEDURE — 86850 RBC ANTIBODY SCREEN: CPT

## 2024-12-06 PROCEDURE — 86901 BLOOD TYPING SEROLOGIC RH(D): CPT

## 2024-12-06 PROCEDURE — 82570 ASSAY OF URINE CREATININE: CPT

## 2024-12-06 PROCEDURE — 83935 ASSAY OF URINE OSMOLALITY: CPT

## 2024-12-06 PROCEDURE — 99232 SBSQ HOSP IP/OBS MODERATE 35: CPT | Mod: GC

## 2024-12-06 PROCEDURE — 84540 ASSAY OF URINE/UREA-N: CPT

## 2024-12-06 PROCEDURE — 71045 X-RAY EXAM CHEST 1 VIEW: CPT

## 2024-12-06 PROCEDURE — 85610 PROTHROMBIN TIME: CPT

## 2024-12-06 PROCEDURE — 36415 COLL VENOUS BLD VENIPUNCTURE: CPT

## 2024-12-06 PROCEDURE — 85027 COMPLETE CBC AUTOMATED: CPT

## 2024-12-06 PROCEDURE — 74174 CTA ABD&PLVS W/CONTRAST: CPT | Mod: MC

## 2024-12-06 PROCEDURE — 85025 COMPLETE CBC W/AUTO DIFF WBC: CPT

## 2024-12-06 RX ORDER — APIXABAN 2.5 MG/1
1 TABLET, FILM COATED ORAL
Qty: 60 | Refills: 0
Start: 2024-12-06 | End: 2025-01-04

## 2024-12-06 RX ORDER — SODIUM,POTASSIUM PHOSPHATES 278-250MG
2 POWDER IN PACKET (EA) ORAL ONCE
Refills: 0 | Status: COMPLETED | OUTPATIENT
Start: 2024-12-06 | End: 2024-12-06

## 2024-12-06 RX ORDER — HEPARIN SODIUM 5000 [USP'U]/.5ML
30 INJECTION, SOLUTION INTRAVENOUS; SUBCUTANEOUS ONCE
Refills: 0 | Status: COMPLETED | OUTPATIENT
Start: 2024-12-06 | End: 2024-12-06

## 2024-12-06 RX ORDER — ISOSORBIDE MONONITRATE 10 MG
1 TABLET ORAL
Qty: 30 | Refills: 0
Start: 2024-12-06 | End: 2025-01-04

## 2024-12-06 RX ADMIN — Medication 2 PACKET(S): at 07:05

## 2024-12-06 RX ADMIN — PREDNISONE 10 MILLIGRAM(S): 20 TABLET ORAL at 07:05

## 2024-12-06 RX ADMIN — METOPROLOL TARTRATE 50 MILLIGRAM(S): 100 TABLET, FILM COATED ORAL at 07:05

## 2024-12-06 RX ADMIN — Medication 14 UNIT(S)/HR: at 11:45

## 2024-12-06 RX ADMIN — HEPARIN SODIUM 85 MILLIMOLE(S): 5000 INJECTION, SOLUTION INTRAVENOUS; SUBCUTANEOUS at 15:30

## 2024-12-06 RX ADMIN — Medication 2 PUFF(S): at 12:21

## 2024-12-06 RX ADMIN — ESCITALOPRAM OXALATE 10 MILLIGRAM(S): 10 TABLET, FILM COATED ORAL at 12:21

## 2024-12-06 NOTE — PROGRESS NOTE ADULT - ASSESSMENT
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 Presents to the ED with c/o upper back pain x 6 days. CT scan showing SBO with transition being in the right inguinal hernia, on exam distended, diffusely tender to palpation. Inguinal hernias completely reduced on exam and no tenderness on the groin region. Repeat CT was done a few hours after and noted repeat inguinal hernias on CT, on exam, patient noted have inguinal hernia that spontaneously reduced when in reverse trendelenberg now s/p open R femoral and recurrent inguinal hernia repair with plug and patch mesh (12/3)     Regular  SCDs/IS/OOB/SQH  Pain/nausea PRNs  appropriate home meds   Hold lasix   AM Labs  Dispo: Home PT

## 2024-12-06 NOTE — DISCHARGE NOTE PROVIDER - PROVIDER TOKENS
PROVIDER:[TOKEN:[[id number]],FOLLOWUP:[1 week]],PROVIDER:[TOKEN:[[id number]],FOLLOWUP:[1 week]] PROVIDER:[TOKEN:[[id number]],FOLLOWUP:[1 week]],PROVIDER:[TOKEN:[[id number]],FOLLOWUP:[1 week]],PROVIDER:[TOKEN:[[id number]],FOLLOWUP:[1 week]]

## 2024-12-06 NOTE — PROGRESS NOTE ADULT - PROBLEM SELECTOR PROBLEM 4
BPH (benign prostatic hyperplasia)
Detail Level: Detailed
Quality 110: Preventive Care And Screening: Influenza Immunization: Influenza Immunization not Administered because Patient Refused.

## 2024-12-06 NOTE — DIETITIAN INITIAL EVALUATION ADULT - NSFNSGIIOFT_GEN_A_CORE
I&O's Detail    05 Dec 2024 07:01  -  06 Dec 2024 07:00  --------------------------------------------------------  IN:    Heparin: 77 mL    Oral Fluid: 240 mL  Total IN: 317 mL    OUT:    Voided (mL): 800 mL  Total OUT: 800 mL    Total NET: -483 mL      06 Dec 2024 07:01  -  06 Dec 2024 13:05  --------------------------------------------------------  IN:    Heparin: 72 mL    Oral Fluid: 240 mL  Total IN: 312 mL    OUT:    Voided (mL): 425 mL  Total OUT: 425 mL    Total NET: -113 mL

## 2024-12-06 NOTE — PROGRESS NOTE ADULT - PROVIDER SPECIALTY LIST ADULT
Cardiology
Surgery
Cardiology
Cardiology
Surgery
Internal Medicine

## 2024-12-06 NOTE — PROGRESS NOTE ADULT - PROBLEM SELECTOR PROBLEM 8
Pre-existing type 2 diabetes mellitus

## 2024-12-06 NOTE — DIETITIAN INITIAL EVALUATION ADULT - OTHER CALCULATIONS
Based on dosing wt 135 pounds as pt between % ideal body weight (95%). Needs adjusted for post-op healing, advanced age.

## 2024-12-06 NOTE — DISCHARGE NOTE PROVIDER - NSDCMRMEDTOKEN_GEN_ALL_CORE_FT
furosemide 20 mg oral tablet: 1 tab(s) orally every monday and thursday  Lexapro 10 mg oral tablet: 1 orally once a day  prednisoLONE 10 mg oral tablet, disintegratin tab(s) orally  rosuvastatin 10 mg oral capsule: 1 orally once a day  tamsulosin 0.4 mg oral capsule: 1 orally once a day (at bedtime)  Toprol-XL 50 mg oral tablet, extended release: 1 orally once a day  Trelegy Ellipta 100 mcg-62.5 mcg-25 mcg/inh inhalation powder: 1 inhaled once a day   Eliquis 5 mg oral tablet: 1 tab(s) orally every 12 hours  furosemide 20 mg oral tablet: 1 tab(s) orally every monday and thursday  isosorbide mononitrate 30 mg oral tablet, extended release: 1 tab(s) orally once a day For stable angina  Lexapro 10 mg oral tablet: 1 orally once a day  prednisoLONE 10 mg oral tablet, disintegratin tab(s) orally  rosuvastatin 10 mg oral capsule: 1 orally once a day  tamsulosin 0.4 mg oral capsule: 1 orally once a day (at bedtime)  Toprol-XL 50 mg oral tablet, extended release: 1 orally once a day  Trelegy Ellipta 100 mcg-62.5 mcg-25 mcg/inh inhalation powder: 1 inhaled once a day

## 2024-12-06 NOTE — DISCHARGE NOTE PROVIDER - HOSPITAL COURSE
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 CT scan showing SBO with transition being in the right inguinal hernia. Patient is now s/p open R femoral and recurrent inguinal hernia repair with plug and patch mesh (12/3). His postoperative course was complicated by mild fluid retention/pulmonary congestion which was treated with Lasix IV and DuoNebs. Additionally, his TTE was found to have an LV thrombus and wall motion abnormalities with an EF of 35-40%. Patient was subsequently started on a Heparin drip for thrombus treatment. On day of discharge, patient was stable to be discharged home with Eliquis BID and close outpatient follow up.          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 CT scan showing SBO with transition being in the right inguinal hernia. Patient is now s/p open R femoral and recurrent inguinal hernia repair with plug and patch mesh (12/3). His postoperative course was complicated by mild fluid retention/pulmonary congestion which was treated with Lasix IV and DuoNebs. Additionally, his TTE was found to have an LV thrombus and wall motion abnormalities with an EF of 35-40%. Patient was subsequently started on a Heparin drip for thrombus treatment. Diet was advanced to as tolerated without nausea or vomiting. Post-op pain was well controlled on oral medication. Patient is ambulating at baseline, voiding spontaneously, having bowel function. On day of discharge, patient was stable to be discharged home with Eliquis BID and close outpatient follow up with the surgeon, his cardiologist, and Dr. Brandt.

## 2024-12-06 NOTE — PROGRESS NOTE ADULT - PROBLEM/PLAN-5
DISPLAY PLAN FREE TEXT
Detail Level: Zone
Detail Level: Detailed
DISPLAY PLAN FREE TEXT

## 2024-12-06 NOTE — DISCHARGE NOTE PROVIDER - NSDCCPCAREPLAN_GEN_ALL_CORE_FT
PRINCIPAL DISCHARGE DIAGNOSIS  Diagnosis: SBO (small bowel obstruction)  Assessment and Plan of Treatment:       SECONDARY DISCHARGE DIAGNOSES  Diagnosis: COPD (chronic obstructive pulmonary disease)  Assessment and Plan of Treatment:     Diagnosis: Lung cancer  Assessment and Plan of Treatment:     Diagnosis: HTN (hypertension)  Assessment and Plan of Treatment:     Diagnosis: High cholesterol  Assessment and Plan of Treatment:     Diagnosis: BPH (benign prostatic hyperplasia)  Assessment and Plan of Treatment:     Diagnosis: History of repair of hip fracture  Assessment and Plan of Treatment:     Diagnosis: Inguinal hernia  Assessment and Plan of Treatment:     Diagnosis: LV (left ventricular) mural thrombus  Assessment and Plan of Treatment:     Diagnosis: Back pain  Assessment and Plan of Treatment:

## 2024-12-06 NOTE — DISCHARGE NOTE PROVIDER - NSDCCPTREATMENT_GEN_ALL_CORE_FT
PRINCIPAL PROCEDURE  Procedure: Right femoral hernia repair  Findings and Treatment: open      SECONDARY PROCEDURE  Procedure: Herniorrhaphy, inguinal, recurrent  Findings and Treatment: right inguinal open

## 2024-12-06 NOTE — DISCHARGE NOTE PROVIDER - NSDCFUADDAPPT_GEN_ALL_CORE_FT
Please follow up with Dr. Brandt in one week; you may call the office to make an appointment at your earliest convenience [phone].    Please follow up with Dr. Henry or one of his partners in one week; you may call the office to make an appointment at your earliest convenience [phone].   Please follow up with Dr. Brandt in one week; you may call the office to make an appointment at your earliest convenience [phone].    Please follow up with Dr. Henry or one of his partners in one week; you may call the office to make an appointment at your earliest convenience [phone].      Please follow with your cardiologist Dr. France

## 2024-12-06 NOTE — PROGRESS NOTE ADULT - PROBLEM SELECTOR PROBLEM 5
History of repair of hip fracture

## 2024-12-06 NOTE — DIETITIAN INITIAL EVALUATION ADULT - PERTINENT MEDS FT
MEDICATIONS  (STANDING):  acetaminophen     Tablet .. 1000 milliGRAM(s) Oral every 6 hours  escitalopram 10 milliGRAM(s) Oral daily  fluticasone propionate/ salmeterol 100-50 MICROgram(s) Diskus 1 Dose(s) Inhalation two times a day  heparin  Infusion 1100 Unit(s)/Hr (14 mL/Hr) IV Continuous <Continuous>  metoprolol succinate ER 50 milliGRAM(s) Oral daily  predniSONE   Tablet 10 milliGRAM(s) Oral daily  rosuvastatin 10 milliGRAM(s) Oral at bedtime  sodium phosphate 30 milliMole(s)/500 mL IVPB 30 milliMole(s) IV Intermittent once  tamsulosin 0.4 milliGRAM(s) Oral at bedtime  tiotropium 2.5 MICROgram(s) Inhaler 2 Puff(s) Inhalation daily    MEDICATIONS  (PRN):  albuterol/ipratropium for Nebulization 3 milliLiter(s) Nebulizer every 6 hours PRN Shortness of Breath and/or Wheezing  oxyCODONE    IR 2.5 milliGRAM(s) Oral every 6 hours PRN Severe Pain (7 - 10)

## 2024-12-06 NOTE — DIETITIAN INITIAL EVALUATION ADULT - PERSON TAUGHT/METHOD
RD unable to provide nutrition education at this time, will continue to follow and provide education PRN./verbal instruction/patient instructed

## 2024-12-06 NOTE — PROGRESS NOTE ADULT - SUBJECTIVE AND OBJECTIVE BOX
Cardiology Consult      Interval History/HPI: Pt seen and examined at bedside eating breakfast. Discussed invasive w/u for CHF but patient deferring reporting he has been through enough.    OBJECTIVE  T(C): 36.3 (12-06-24 @ 08:40), Max: 37 (12-05-24 @ 18:16)  HR: 65 (12-06-24 @ 08:40) (57 - 65)  BP: 101/55 (12-06-24 @ 08:40) (101/55 - 144/72)  RR: 16 (12-06-24 @ 08:40) (16 - 18)  SpO2: 94% (12-06-24 @ 08:40) (94% - 96%)    12-05-24 @ 07:01  -  12-06-24 @ 07:00  --------------------------------------------------------  IN: 317 mL / OUT: 800 mL / NET: -483 mL    12-06-24 @ 07:01  -  12-06-24 @ 13:31  --------------------------------------------------------  IN: 312 mL / OUT: 425 mL / NET: -113 mL        PHYSICAL EXAM:  NAD   + expiratory wheezing   rrr  abd soft   le wwp no edema   skin with multiple bruises and healing scabs      LABS:                        13.2   6.70  )-----------( 106      ( 06 Dec 2024 05:00 )             40.6     12-06    135  |  102  |  42[H]  ----------------------------<  112[H]  4.3   |  24  |  1.80[H]    Ca    8.5      06 Dec 2024 05:00  Phos  2.3     12-06  Mg     2.0     12-06      PT/INR - ( 05 Dec 2024 21:37 )   PT: 10.8 sec;   INR: 0.92          PTT - ( 06 Dec 2024 10:00 )  PTT:25.0 sec  Urinalysis Basic - ( 06 Dec 2024 05:00 )    Color: x / Appearance: x / SG: x / pH: x  Gluc: 112 mg/dL / Ketone: x  / Bili: x / Urobili: x   Blood: x / Protein: x / Nitrite: x   Leuk Esterase: x / RBC: x / WBC x   Sq Epi: x / Non Sq Epi: x / Bacteria: x        RADIOLOGY & ADDITIONAL TESTS:  Reviewed .    MEDICATIONS  (STANDING):  acetaminophen     Tablet .. 1000 milliGRAM(s) Oral every 6 hours  escitalopram 10 milliGRAM(s) Oral daily  fluticasone propionate/ salmeterol 100-50 MICROgram(s) Diskus 1 Dose(s) Inhalation two times a day  heparin  Infusion 1100 Unit(s)/Hr (14 mL/Hr) IV Continuous <Continuous>  metoprolol succinate ER 50 milliGRAM(s) Oral daily  predniSONE   Tablet 10 milliGRAM(s) Oral daily  rosuvastatin 10 milliGRAM(s) Oral at bedtime  sodium phosphate 30 milliMole(s)/500 mL IVPB 30 milliMole(s) IV Intermittent once  tamsulosin 0.4 milliGRAM(s) Oral at bedtime  tiotropium 2.5 MICROgram(s) Inhaler 2 Puff(s) Inhalation daily    MEDICATIONS  (PRN):  albuterol/ipratropium for Nebulization 3 milliLiter(s) Nebulizer every 6 hours PRN Shortness of Breath and/or Wheezing  oxyCODONE    IR 2.5 milliGRAM(s) Oral every 6 hours PRN Severe Pain (7 - 10)

## 2024-12-06 NOTE — DIETITIAN INITIAL EVALUATION ADULT - ADD RECOMMEND
1. Continue current diet order  2. Consider adding ensure plus high protein 2x/day to optimize PO intake (provides 350 kcal, 20g protein/ shake)   3. Encourage and monitor PO intake, honor preferences as able   >> Consistently meet >75% of estimated needs during admission   4. Monitor wt trends, GI function, skin integrity  5. Monitor lytes, renal indices, blood glucose, LFTs    6. Pain and bowel regimen per team   RD will continue to monitor PO intake, labs, hydration, and wt prn.

## 2024-12-06 NOTE — PROGRESS NOTE ADULT - ASSESSMENT
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 Patient was admitted to surgical service with plans for open inguinal hernia repair in OR tomorrow 12/3 with epidural anesthesia (no general anesthesia). Cardiology consulted for pre-operative assessment/evaluation for open inguinal hernia repair.     Review of studies:  TTE 12/5/24:  LVEF 35-40% with regional wall motion abnormalities.   ECG 12/2/24: NSR, PACs, NSST changes    TTE 9/7/23: Mildly reduced left ventricular systolic function, LVEF 45-50%. Mid and apical anterior septum, apical anterior segment, and apex are akinetic. Grade II left ventricular diastolic dysfunction. Normal right ventricular size and systolic function. Aortic sclerosis without significant stenosis. Pulmonary hypertension present, pulmonary artery systolic pressure is 35 mmHg. No pericardial effusion.  CT chest 9/7/23: VESSELS: Moderate calcified aortic mural plaque. No evidence of aortic aneurysm. Moderate calcified aortic valve and mitral annulus. Moderate coronary artery calcification.    Home meds (cardiac): rosuvastatin 10mg qd, toprol 50mg qd, lasix 20mg 2x/week     Outpatient cardiologist: Dr. France     Recommendations:    #Pre-operative assessment/evaluation  s/p open inguinal hernia repair 12/3, tolerated procedure well    #HFrEF  #LV thrombus  Patient with CAD seen on prior CT, but deferred ischemic eval   Reports some chest pain while walking, but takes stops/ rests and it resolves   He has deferred ASA in the past due to skin bleeding while on chronic prednisone   Discussed newly worsened EF with WMA and LV thrombus; initially patient deferred invasive testing - saying he would not survive a stress test and would not want a cath. During Dr. Lozano's interview patient reports he is considering ischemic evaluation and will decide tomorrow. He is ok with starting Eliquis for LV thrombus. He hopes to decrease prednisone dosing to help with his thin skin  - patient would not like to pursue invasive ischemic evaluation at this time. Please discontinue heparin gtt and start Eliquis 5mg BID.   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 HR intermittently <60 (sinus bradycardia). Recommend starting imdur 30mg qd given sxs of stable angina    Plan discussed with Dr. Lowery  - please send lipid panel  - consider tapering prednisone dose d/t skin sloughing

## 2024-12-06 NOTE — PROGRESS NOTE ADULT - SUBJECTIVE AND OBJECTIVE BOX
INTERVAL HPI/OVERNIGHT EVENTS:  No complaint;  Discussed with staff last night;  On Heparin  Cardiology to follow up today;  Otherwise VSS      MEDICATIONS  (STANDING):  acetaminophen     Tablet .. 1000 milliGRAM(s) Oral every 6 hours  escitalopram 10 milliGRAM(s) Oral daily  fluticasone propionate/ salmeterol 100-50 MICROgram(s) Diskus 1 Dose(s) Inhalation two times a day  heparin  Infusion 1100 Unit(s)/Hr (11 mL/Hr) IV Continuous <Continuous>  metoprolol succinate ER 50 milliGRAM(s) Oral daily  predniSONE   Tablet 10 milliGRAM(s) Oral daily  rosuvastatin 10 milliGRAM(s) Oral at bedtime  tamsulosin 0.4 milliGRAM(s) Oral at bedtime  tiotropium 2.5 MICROgram(s) Inhaler 2 Puff(s) Inhalation daily    MEDICATIONS  (PRN):  albuterol/ipratropium for Nebulization 3 milliLiter(s) Nebulizer every 6 hours PRN Shortness of Breath and/or Wheezing  oxyCODONE    IR 2.5 milliGRAM(s) Oral every 6 hours PRN Severe Pain (7 - 10)      Allergies    No Known Allergies    Intolerances        Vital Signs Last 24 Hrs  T(C): 36.3 (06 Dec 2024 08:40), Max: 37 (05 Dec 2024 18:16)  T(F): 97.3 (06 Dec 2024 08:40), Max: 98.6 (05 Dec 2024 18:16)  HR: 65 (06 Dec 2024 08:40) (57 - 65)  BP: 101/55 (06 Dec 2024 08:40) (101/55 - 144/72)  BP(mean): --  RR: 16 (06 Dec 2024 08:40) (16 - 18)  SpO2: 94% (06 Dec 2024 08:40) (94% - 96%)    Parameters below as of 06 Dec 2024 08:40  Patient On (Oxygen Delivery Method): room air              Constitutional: Awake     Eyes: WALI    ENMT: Negative    Neck: Supple    Back:  no tenderness     Respiratory:  clear    Cardiovascular: S1 S2    Gastrointestinal:  soft     Genitourinary:    Extremities:  no edema     Vascular:    Neurological:    Skin:    Lymph Nodes:            12-05 @ 07:01  -  12-06 @ 07:00  --------------------------------------------------------  IN: 317 mL / OUT: 800 mL / NET: -483 mL    12-06 @ 07:01  -  12-06 @ 10:37  --------------------------------------------------------  IN: 262 mL / OUT: 50 mL / NET: 212 mL      LABS:                        13.2   6.70  )-----------( 106      ( 06 Dec 2024 05:00 )             40.6     12-06    135  |  102  |  42[H]  ----------------------------<  112[H]  4.3   |  24  |  1.80[H]    Ca    8.5      06 Dec 2024 05:00  Phos  2.3     12-06  Mg     2.0     12-06      PT/INR - ( 05 Dec 2024 21:37 )   PT: 10.8 sec;   INR: 0.92          PTT - ( 06 Dec 2024 10:00 )  PTT:25.0 sec  Urinalysis Basic - ( 06 Dec 2024 05:00 )    Color: x / Appearance: x / SG: x / pH: x  Gluc: 112 mg/dL / Ketone: x  / Bili: x / Urobili: x   Blood: x / Protein: x / Nitrite: x   Leuk Esterase: x / RBC: x / WBC x   Sq Epi: x / Non Sq Epi: x / Bacteria: x        RADIOLOGY & ADDITIONAL TESTS:

## 2024-12-06 NOTE — PROGRESS NOTE ADULT - SUBJECTIVE AND OBJECTIVE BOX
POST-OP DAY: #4 s/p open R femoral and recurrent inguinal hernia repair with plug and patch mesh     SUBJECTIVE: Patient seen and examined bedside by chief resident reports feeling. Tolerating regular diet, w/o nausea or vomiting. Admits to passing flatus, no bowel movement at this time. Denies chest pain, SOB, HA, dizziness, suprapubic pain, otherwise feels well.   Yesterday he was started on heparin drip due to LV thrombus and abnormal wall motion found on TTE 11/5.    heparin  Infusion 1100 Unit(s)/Hr IV Continuous <Continuous>  metoprolol succinate ER 50 milliGRAM(s) Oral daily    MEDICATIONS  (PRN):  albuterol/ipratropium for Nebulization 3 milliLiter(s) Nebulizer every 6 hours PRN Shortness of Breath and/or Wheezing  oxyCODONE    IR 2.5 milliGRAM(s) Oral every 6 hours PRN Severe Pain (7 - 10)      I&O's Detail    05 Dec 2024 07:01  -  06 Dec 2024 07:00  --------------------------------------------------------  IN:    Heparin: 77 mL    Oral Fluid: 240 mL  Total IN: 317 mL    OUT:    Voided (mL): 800 mL  Total OUT: 800 mL    Total NET: -483 mL          Vital Signs Last 24 Hrs  T(C): 36.4 (06 Dec 2024 04:32), Max: 37 (05 Dec 2024 18:16)  T(F): 97.6 (06 Dec 2024 04:32), Max: 98.6 (05 Dec 2024 18:16)  HR: 57 (06 Dec 2024 04:32) (57 - 62)  BP: 138/75 (06 Dec 2024 04:32) (138/75 - 144/72)  BP(mean): --  RR: 17 (06 Dec 2024 04:32) (17 - 18)  SpO2: 94% (06 Dec 2024 04:32) (94% - 96%)    Parameters below as of 06 Dec 2024 04:32  Patient On (Oxygen Delivery Method): room air        General: NAD, resting comfortably in bed  C/V: NSR  Pulm: Nonlabored breathing, no respiratory distress  Abd: soft, non-tender, non distended. Incisions clean/dry/intact  Extrem: WWP, no edema, SCDs in place    LABS:                        13.2   6.70  )-----------( 106      ( 06 Dec 2024 05:00 )             40.6     12-06    135  |  102  |  42[H]  ----------------------------<  112[H]  4.3   |  24  |  1.80[H]    Ca    8.5      06 Dec 2024 05:00  Phos  2.3     12-06  Mg     2.0     12-06      PT/INR - ( 05 Dec 2024 21:37 )   PT: 10.8 sec;   INR: 0.92          PTT - ( 06 Dec 2024 05:31 )  PTT:20.8 sec  Urinalysis Basic - ( 06 Dec 2024 05:00 )    Color: x / Appearance: x / SG: x / pH: x  Gluc: 112 mg/dL / Ketone: x  / Bili: x / Urobili: x   Blood: x / Protein: x / Nitrite: x   Leuk Esterase: x / RBC: x / WBC x   Sq Epi: x / Non Sq Epi: x / Bacteria: x        RADIOLOGY & ADDITIONAL STUDIES:

## 2024-12-06 NOTE — DIETITIAN INITIAL EVALUATION ADULT - OTHER INFO
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 Presents to the ED with c/o upper back pain x 6 days. Pain located between his shoulder blades, described as sharp. Pt states that last week Tuesday, pt tried to lift his wife off the floor so he thought it was from doing that, pt did not have any feeling of popping sensation in his groin. Pain worse when trying to lie flat and he is now unable to lie flat due to pain. Pt then started to have some epigastric discomfort and nausea for that started on Saturday, just prior to nausea had a 1 time episode of diarrhea, no vomiting. Had a BM on  and today which were normal. Endorses to passing flatus. Denies fever, chills, cough, cp, palpitations, numbness, or any recent weight loss. Pt walks with cane at baseline and has been able to walk without difficulty, walks 1 mile a day, does have some SOB with climbing stairs. Patient has had known inguinal hernias but has never had an issue with them, no history of incarceration and have always been reducible."    Patient seen at bedside on  for nutrition assessment. Obtained limited diet / wt hx from pt upon RD visit this morning as pt lethargic and dozing off during RD assessment. Reports  appetite but unable to elaborate, as per H&P, complains of nausea since Saturday and one episode of diarrhea, resolved since. Current diet order: regular. Endorses decreased appetite, states he ate pancakes and fruit for breakfast but only able to consume "a few bites", likely meeting <75% estimated nutrient needs while in [facility]. Pt was unable to confirm if any known food allergies, none noted in EMR at this time. Pt unable to confirm if any difficulty chewing/swallowing. Dosing weight: 135 pounds, BMI 21.7, unable to report UBW, ? wt changes. Skin: bruised, blister, surgical incisions, scar. No pressure injuries documented, shannon 17. No edema documented. GI: WNL, last bowel movement documented this morning. Labs: BUN/ Cr elevated; glucose slightly elevated at 112 mg/dL (on steroids); eGFR low; hypophosphatemia noted. Meds: prednisone, NaPhos, oxycodone PRN. Observed with severe clavicle and shoulder wasting only at this time - does not meet criteria for protein-calorie malnutrition as per ASPEN guidelines at this time, but likely at high risk. RD unable to provide nutrition education at this time, will continue to follow and provide education PRN. See nutrition recommendations below.

## 2024-12-06 NOTE — PROGRESS NOTE ADULT - TIME BILLING
Patient seen and examined;  Heparin  Likely Apixaban  Cardiology to follow up  Will discuss with surgery team
Patient seen and examined;  Needs to go back on his usual dose of Prednisone 10 mg a day   Otherwise  stable   will follow
As above
Patient seen and examined;  Stinson out and no urine;  Ambulate  Does not want urological surgery   Advance diet as per surgery
Patient seen and examined ;  Patient is cleared for OR with moderate risk  Pulmonary toilet amador op;  Since on long term steroids would start hydrocortisone 100 mg IV q8h amador op  Metoprolol 2.5 iv q 6h for systolic greater than 180   Will follow
Patient seen and examined;  LV thrombus; Would start Eliquis 5 PO BID  Will attempt to decrease Prednisone dose as outpatient to decrease skin fragility  Will discuss with surgery

## 2024-12-06 NOTE — DIETITIAN INITIAL EVALUATION ADULT - PERTINENT LABORATORY DATA
12-06    135  |  102  |  42[H]  ----------------------------<  112[H]  4.3   |  24  |  1.80[H]    Ca    8.5      06 Dec 2024 05:00  Phos  2.3     12-06  Mg     2.0     12-06

## 2024-12-06 NOTE — DISCHARGE NOTE PROVIDER - CARE PROVIDER_API CALL
Pavithra Brandt J  Critical Care Medicine  [address] [address]  Phone: [phone]  Fax: [phone]  Follow Up Time: 1 week    Isreal Henry  Surgery  [address] [address]  Phone: [phone]  Fax: [phone]  Follow Up Time: 1 week   Pavithra Brandt J  Critical Care Medicine  [address] [address]  Phone: [phone]  Fax: [phone]  Follow Up Time: 1 week    Isreal Henry  Surgery  [address] [address]  Phone: [phone]  Fax: [phone]  Follow Up Time: 1 week    Dharmesh France  Nuclear Cardiology  [address] [address]  Phone: [phone]  Fax: [phone]  Follow Up Time: 1 week

## 2024-12-06 NOTE — DIETITIAN INITIAL EVALUATION ADULT - WEIGHT (LBS)
135 Detail Level: Detailed Quality 130: Documentation Of Current Medications In The Medical Record: Current Medications Documented

## 2024-12-06 NOTE — DISCHARGE NOTE PROVIDER - CARE PROVIDERS DIRECT ADDRESSES
[email or web address]_[email or web address],DirectAddress_Unknown,[email or web address]

## 2024-12-06 NOTE — PROGRESS NOTE ADULT - ATTENDING COMMENTS
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 Cardiology was originally consulted for Pre-Operative CV Risk Assessment and optimization    Review of studies:  - ECG 12/2/24: NSR, PACs, NSST changes    - TTE 9/7/23: Mildly reduced left ventricular systolic function, LVEF 45-50%. Mid and apical anterior septum, apical anterior segment, and apex are akinetic. Grade II left ventricular diastolic dysfunction. Normal right ventricular size and systolic function. Aortic sclerosis without significant stenosis. Pulmonary hypertension present, pulmonary artery systolic pressure is 35 mmHg. No pericardial effusion.  - CT chest 9/7/23: VESSELS: Moderate calcified aortic mural plaque. No evidence of aortic aneurysm. Moderate calcified aortic valve and mitral annulus. Moderate coronary artery calcification.    Home meds (cardiac): Rosuvastatin 10mg qd, Toprol 50mg qd, Lasix 20mg 2x/week     Outpatient cardiologist: Dr. France     # Post-Operative CV Risk Assessment and optimization  # ASCVD  # HFmrEF  - Dr Ohara has ASCVD based on multivessel coronary artery calcification seen on prior CT chest, but has never undergone invasive ischemic evaluation. He has deferred it in the past with his cardiologist as he has remained asymptomatic  - Patient has good performance status, reportedly able to walk 1 Mile without exertional symptoms.   - Echo from last year revealed Mildly Reduced LV function with EF of 45-50% and Grade II DD  - He underwent successful uncomplicated open R femoral and recurrent inguinal hernia repair with plug and patch mesh (12/3)  - Clinically patient is warm, well perfused and compensated; He has decrease bibasilar crackles and no lower extremity edema  - At this time, would dose for Lasix 20 IVP x1 today. Suggest obtaining CXR.   - From a HFmrEF standpoint, Would continue Home Toprol 50 mg po daily. Patient was previously trialed on Losartan as an outpatient however reported subsequent Hypotension. He has since remained solely on Toprol  - Encourage Incentive spirometry, OOBTC, and early ambulation  - Cardiology will cont to follow with you, please call with any questions .
89 yo man PMHx of CAD, HF mildly reduced EF, pulm HTN, and CKD who is s/p R femoral hernia repair.    Assessment  1. CAD w/ angina  2. HF mildly reduced EF -> new drop in EF to 35-40% w/ WMA and LV thrombus    Plan  1. Switch to apixaban 5mg PO BID if no further procedure/surgery planned, will need minimum 3 mo of AC with repeat TTE w/ ultrasound enhancing agent as outpatient, please ensure he has cardiology appt as outpatient for surveillance of LV thrombus  2. Nuclear stress test and [facility] for ischemic workup were extensively discussed at the bedside. Patient declined both studies and understands risk of not proceeding with ischemic workup  3. GDMT: metop succinate 50mg PO QD for now (also antianginal too); given low GFR, hold off on ARNI/MRA/SGLT2i for now    During non face-to-face time, I reviewed relevant portions of the patient’s medical record. During face-to-face time, I took a relevant history and examined the patient. I also explained differential diagnoses, relevant cardiac diagnoses, workup, and management plan, which required a high level of medical decision making. I answered all questions related to the patient's medical conditions.     Binta Lowery M.D.  Attending Cardiologist  [facility]
Patient requests all Lab and Radiology Results on their Discharge Instructions

## 2024-12-06 NOTE — PROGRESS NOTE ADULT - REASON FOR ADMISSION
SBO with inguinal hernia

## 2024-12-07 ENCOUNTER — NON-APPOINTMENT (OUTPATIENT)
Age: 88
End: 2024-12-07

## 2024-12-11 DIAGNOSIS — I13.0 HYPERTENSIVE HEART AND CHRONIC KIDNEY DISEASE WITH HEART FAILURE AND STAGE 1 THROUGH STAGE 4 CHRONIC KIDNEY DISEASE, OR UNSPECIFIED CHRONIC KIDNEY DISEASE: ICD-10-CM

## 2024-12-11 DIAGNOSIS — N18.30 CHRONIC KIDNEY DISEASE, STAGE 3 UNSPECIFIED: ICD-10-CM

## 2024-12-11 DIAGNOSIS — J44.9 CHRONIC OBSTRUCTIVE PULMONARY DISEASE, UNSPECIFIED: ICD-10-CM

## 2024-12-11 DIAGNOSIS — Z85.118 PERSONAL HISTORY OF OTHER MALIGNANT NEOPLASM OF BRONCHUS AND LUNG: ICD-10-CM

## 2024-12-11 DIAGNOSIS — I27.20 PULMONARY HYPERTENSION, UNSPECIFIED: ICD-10-CM

## 2024-12-11 DIAGNOSIS — N40.0 BENIGN PROSTATIC HYPERPLASIA WITHOUT LOWER URINARY TRACT SYMPTOMS: ICD-10-CM

## 2024-12-11 DIAGNOSIS — Z53.29 PROCEDURE AND TREATMENT NOT CARRIED OUT BECAUSE OF PATIENT'S DECISION FOR OTHER REASONS: ICD-10-CM

## 2024-12-11 DIAGNOSIS — K56.609 UNSPECIFIED INTESTINAL OBSTRUCTION, UNSPECIFIED AS TO PARTIAL VERSUS COMPLETE OBSTRUCTION: ICD-10-CM

## 2024-12-11 DIAGNOSIS — I51.3 INTRACARDIAC THROMBOSIS, NOT ELSEWHERE CLASSIFIED: ICD-10-CM

## 2024-12-11 DIAGNOSIS — Z66 DO NOT RESUSCITATE: ICD-10-CM

## 2024-12-11 DIAGNOSIS — E11.22 TYPE 2 DIABETES MELLITUS WITH DIABETIC CHRONIC KIDNEY DISEASE: ICD-10-CM

## 2024-12-11 DIAGNOSIS — I25.10 ATHEROSCLEROTIC HEART DISEASE OF NATIVE CORONARY ARTERY WITHOUT ANGINA PECTORIS: ICD-10-CM

## 2024-12-11 DIAGNOSIS — K41.30 UNILATERAL FEMORAL HERNIA, WITH OBSTRUCTION, WITHOUT GANGRENE, NOT SPECIFIED AS RECURRENT: ICD-10-CM

## 2024-12-11 DIAGNOSIS — I50.22 CHRONIC SYSTOLIC (CONGESTIVE) HEART FAILURE: ICD-10-CM

## 2024-12-11 DIAGNOSIS — Z87.891 PERSONAL HISTORY OF NICOTINE DEPENDENCE: ICD-10-CM

## 2024-12-11 DIAGNOSIS — K40.31 UNILATERAL INGUINAL HERNIA, WITH OBSTRUCTION, WITHOUT GANGRENE, RECURRENT: ICD-10-CM

## 2024-12-11 DIAGNOSIS — Z79.52 LONG TERM (CURRENT) USE OF SYSTEMIC STEROIDS: ICD-10-CM

## 2024-12-11 DIAGNOSIS — E78.00 PURE HYPERCHOLESTEROLEMIA, UNSPECIFIED: ICD-10-CM

## 2024-12-19 DIAGNOSIS — K43.2 INCISIONAL HERNIA W/OUT OBSTRUCTION OR GANGRENE: ICD-10-CM

## 2024-12-26 ENCOUNTER — APPOINTMENT (OUTPATIENT)
Dept: INTERNAL MEDICINE | Facility: CLINIC | Age: 88
End: 2024-12-26

## 2025-01-01 ENCOUNTER — INPATIENT (INPATIENT)
Facility: HOSPITAL | Age: 89
LOS: 5 days | Discharge: HOME HEALTH | DRG: 190 | End: 2025-04-01
Attending: INTERNAL MEDICINE | Admitting: INTERNAL MEDICINE
Payer: MEDICARE

## 2025-01-01 ENCOUNTER — NON-APPOINTMENT (OUTPATIENT)
Age: 89
End: 2025-01-01

## 2025-01-01 ENCOUNTER — INPATIENT (INPATIENT)
Facility: HOSPITAL | Age: 89
LOS: 4 days | End: 2025-04-08
Attending: INTERNAL MEDICINE | Admitting: INTERNAL MEDICINE
Payer: MEDICARE

## 2025-01-01 ENCOUNTER — APPOINTMENT (OUTPATIENT)
Dept: PULMONOLOGY | Facility: CLINIC | Age: 89
End: 2025-01-01

## 2025-01-01 VITALS
SYSTOLIC BLOOD PRESSURE: 134 MMHG | OXYGEN SATURATION: 98 % | DIASTOLIC BLOOD PRESSURE: 75 MMHG | RESPIRATION RATE: 16 BRPM | TEMPERATURE: 97 F | WEIGHT: 134.92 LBS | HEART RATE: 60 BPM

## 2025-01-01 VITALS
TEMPERATURE: 98 F | OXYGEN SATURATION: 96 % | RESPIRATION RATE: 18 BRPM | DIASTOLIC BLOOD PRESSURE: 66 MMHG | HEART RATE: 93 BPM | SYSTOLIC BLOOD PRESSURE: 126 MMHG

## 2025-01-01 VITALS
HEART RATE: 84 BPM | DIASTOLIC BLOOD PRESSURE: 87 MMHG | RESPIRATION RATE: 25 BRPM | OXYGEN SATURATION: 96 % | TEMPERATURE: 97 F | SYSTOLIC BLOOD PRESSURE: 124 MMHG

## 2025-01-01 VITALS
SYSTOLIC BLOOD PRESSURE: 122 MMHG | OXYGEN SATURATION: 96 % | DIASTOLIC BLOOD PRESSURE: 60 MMHG | HEART RATE: 72 BPM | RESPIRATION RATE: 17 BRPM

## 2025-01-01 DIAGNOSIS — Z79.01 LONG TERM (CURRENT) USE OF ANTICOAGULANTS: ICD-10-CM

## 2025-01-01 DIAGNOSIS — D72.829 ELEVATED WHITE BLOOD CELL COUNT, UNSPECIFIED: ICD-10-CM

## 2025-01-01 DIAGNOSIS — J44.1 CHRONIC OBSTRUCTIVE PULMONARY DISEASE WITH (ACUTE) EXACERBATION: ICD-10-CM

## 2025-01-01 DIAGNOSIS — Z66 DO NOT RESUSCITATE: ICD-10-CM

## 2025-01-01 DIAGNOSIS — F41.9 ANXIETY DISORDER, UNSPECIFIED: ICD-10-CM

## 2025-01-01 DIAGNOSIS — Z90.2 ACQUIRED ABSENCE OF LUNG [PART OF]: ICD-10-CM

## 2025-01-01 DIAGNOSIS — E78.5 HYPERLIPIDEMIA, UNSPECIFIED: ICD-10-CM

## 2025-01-01 DIAGNOSIS — Z79.52 LONG TERM (CURRENT) USE OF SYSTEMIC STEROIDS: ICD-10-CM

## 2025-01-01 DIAGNOSIS — Z29.9 ENCOUNTER FOR PROPHYLACTIC MEASURES, UNSPECIFIED: ICD-10-CM

## 2025-01-01 DIAGNOSIS — T14.8XXA OTHER INJURY OF UNSPECIFIED BODY REGION, INITIAL ENCOUNTER: ICD-10-CM

## 2025-01-01 DIAGNOSIS — N40.0 BENIGN PROSTATIC HYPERPLASIA WITHOUT LOWER URINARY TRACT SYMPTOMS: ICD-10-CM

## 2025-01-01 DIAGNOSIS — N17.9 ACUTE KIDNEY FAILURE, UNSPECIFIED: ICD-10-CM

## 2025-01-01 DIAGNOSIS — Z90.2 ACQUIRED ABSENCE OF LUNG [PART OF]: Chronic | ICD-10-CM

## 2025-01-01 DIAGNOSIS — R06.02 SHORTNESS OF BREATH: ICD-10-CM

## 2025-01-01 DIAGNOSIS — I51.3 INTRACARDIAC THROMBOSIS, NOT ELSEWHERE CLASSIFIED: ICD-10-CM

## 2025-01-01 DIAGNOSIS — N18.30 CHRONIC KIDNEY DISEASE, STAGE 3 UNSPECIFIED: ICD-10-CM

## 2025-01-01 DIAGNOSIS — J98.11 ATELECTASIS: ICD-10-CM

## 2025-01-01 DIAGNOSIS — R33.8 OTHER RETENTION OF URINE: ICD-10-CM

## 2025-01-01 DIAGNOSIS — I25.10 ATHEROSCLEROTIC HEART DISEASE OF NATIVE CORONARY ARTERY WITHOUT ANGINA PECTORIS: ICD-10-CM

## 2025-01-01 DIAGNOSIS — I50.22 CHRONIC SYSTOLIC (CONGESTIVE) HEART FAILURE: ICD-10-CM

## 2025-01-01 DIAGNOSIS — Z11.52 ENCOUNTER FOR SCREENING FOR COVID-19: ICD-10-CM

## 2025-01-01 DIAGNOSIS — J96.01 ACUTE RESPIRATORY FAILURE WITH HYPOXIA: ICD-10-CM

## 2025-01-01 DIAGNOSIS — Z85.118 PERSONAL HISTORY OF OTHER MALIGNANT NEOPLASM OF BRONCHUS AND LUNG: ICD-10-CM
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
## 2025-01-01 DIAGNOSIS — Z79.82 LONG TERM (CURRENT) USE OF ASPIRIN: ICD-10-CM

## 2025-01-01 DIAGNOSIS — S81.801A UNSPECIFIED OPEN WOUND, RIGHT LOWER LEG, INITIAL ENCOUNTER: ICD-10-CM

## 2025-01-01 DIAGNOSIS — I25.5 ISCHEMIC CARDIOMYOPATHY: ICD-10-CM

## 2025-01-01 DIAGNOSIS — I50.23 ACUTE ON CHRONIC SYSTOLIC (CONGESTIVE) HEART FAILURE: ICD-10-CM

## 2025-01-01 DIAGNOSIS — Z87.891 PERSONAL HISTORY OF NICOTINE DEPENDENCE: ICD-10-CM

## 2025-01-01 DIAGNOSIS — R41.0 DISORIENTATION, UNSPECIFIED: ICD-10-CM

## 2025-01-01 DIAGNOSIS — J96.21 ACUTE AND CHRONIC RESPIRATORY FAILURE WITH HYPOXIA: ICD-10-CM

## 2025-01-01 DIAGNOSIS — I50.9 HEART FAILURE, UNSPECIFIED: ICD-10-CM
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
## 2025-01-01 DIAGNOSIS — Z98.890 OTHER SPECIFIED POSTPROCEDURAL STATES: Chronic | ICD-10-CM
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
## 2025-01-01 DIAGNOSIS — I13.0 HYPERTENSIVE HEART AND CHRONIC KIDNEY DISEASE WITH HEART FAILURE AND STAGE 1 THROUGH STAGE 4 CHRONIC KIDNEY DISEASE, OR UNSPECIFIED CHRONIC KIDNEY DISEASE: ICD-10-CM

## 2025-01-01 DIAGNOSIS — I25.118 ATHEROSCLEROTIC HEART DISEASE OF NATIVE CORONARY ARTERY WITH OTHER FORMS OF ANGINA PECTORIS: ICD-10-CM

## 2025-01-01 DIAGNOSIS — R45.1 RESTLESSNESS AND AGITATION: ICD-10-CM

## 2025-01-01 DIAGNOSIS — Z79.51 LONG TERM (CURRENT) USE OF INHALED STEROIDS: ICD-10-CM

## 2025-01-01 DIAGNOSIS — M54.9 DORSALGIA, UNSPECIFIED: ICD-10-CM

## 2025-01-01 PROBLEM — Z01.818 ENCOUNTER FOR OTHER PREPROCEDURAL EXAMINATION: Chronic | Status: ACTIVE | Noted: 2024-12-02

## 2025-01-01 PROBLEM — K40.90 UNILATERAL INGUINAL HERNIA, WITHOUT OBSTRUCTION OR GANGRENE, NOT SPECIFIED AS RECURRENT: Chronic | Status: ACTIVE | Noted: 2024-12-02

## 2025-01-01 LAB
ADD ON TEST-SPECIMEN IN LAB: SIGNIFICANT CHANGE UP
ALBUMIN SERPL ELPH-MCNC: 3.6 G/DL — SIGNIFICANT CHANGE UP (ref 3.3–5)
ALP SERPL-CCNC: 57 U/L — SIGNIFICANT CHANGE UP (ref 40–120)
ALT FLD-CCNC: 7 U/L — LOW (ref 10–45)
ANION GAP SERPL CALC-SCNC: 10 MMOL/L — SIGNIFICANT CHANGE UP (ref 5–17)
ANION GAP SERPL CALC-SCNC: 12 MMOL/L — SIGNIFICANT CHANGE UP (ref 5–17)
ANION GAP SERPL CALC-SCNC: 12 MMOL/L — SIGNIFICANT CHANGE UP (ref 5–17)
ANION GAP SERPL CALC-SCNC: 14 MMOL/L — SIGNIFICANT CHANGE UP (ref 5–17)
ANION GAP SERPL CALC-SCNC: 14 MMOL/L — SIGNIFICANT CHANGE UP (ref 5–17)
ANION GAP SERPL CALC-SCNC: 15 MMOL/L — SIGNIFICANT CHANGE UP (ref 5–17)
ANION GAP SERPL CALC-SCNC: 17 MMOL/L — SIGNIFICANT CHANGE UP (ref 5–17)
ANION GAP SERPL CALC-SCNC: 9 MMOL/L — SIGNIFICANT CHANGE UP (ref 5–17)
ANION GAP SERPL CALC-SCNC: 9 MMOL/L — SIGNIFICANT CHANGE UP (ref 5–17)
APPEARANCE UR: ABNORMAL
AST SERPL-CCNC: 13 U/L — SIGNIFICANT CHANGE UP (ref 10–40)
BASE EXCESS BLDA CALC-SCNC: 1.1 MMOL/L — SIGNIFICANT CHANGE UP (ref -2–3)
BASOPHILS # BLD AUTO: 0 K/UL — SIGNIFICANT CHANGE UP (ref 0–0.2)
BASOPHILS # BLD AUTO: 0 K/UL — SIGNIFICANT CHANGE UP (ref 0–0.2)
BASOPHILS # BLD AUTO: 0.01 K/UL — SIGNIFICANT CHANGE UP (ref 0–0.2)
BASOPHILS # BLD AUTO: 0.03 K/UL — SIGNIFICANT CHANGE UP (ref 0–0.2)
BASOPHILS # BLD AUTO: 0.03 K/UL — SIGNIFICANT CHANGE UP (ref 0–0.2)
BASOPHILS NFR BLD AUTO: 0 % — SIGNIFICANT CHANGE UP (ref 0–2)
BASOPHILS NFR BLD AUTO: 0 % — SIGNIFICANT CHANGE UP (ref 0–2)
BASOPHILS NFR BLD AUTO: 0.1 % — SIGNIFICANT CHANGE UP (ref 0–2)
BASOPHILS NFR BLD AUTO: 0.2 % — SIGNIFICANT CHANGE UP (ref 0–2)
BASOPHILS NFR BLD AUTO: 0.3 % — SIGNIFICANT CHANGE UP (ref 0–2)
BILIRUB SERPL-MCNC: 0.7 MG/DL — SIGNIFICANT CHANGE UP (ref 0.2–1.2)
BILIRUB UR-MCNC: NEGATIVE — SIGNIFICANT CHANGE UP
BLD GP AB SCN SERPL QL: NEGATIVE — SIGNIFICANT CHANGE UP
BUN SERPL-MCNC: 16 MG/DL — SIGNIFICANT CHANGE UP (ref 7–23)
BUN SERPL-MCNC: 34 MG/DL — HIGH (ref 7–23)
BUN SERPL-MCNC: 37 MG/DL — HIGH (ref 7–23)
BUN SERPL-MCNC: 39 MG/DL — HIGH (ref 7–23)
BUN SERPL-MCNC: 39 MG/DL — HIGH (ref 7–23)
BUN SERPL-MCNC: 60 MG/DL — HIGH (ref 7–23)
BUN SERPL-MCNC: 66 MG/DL — HIGH (ref 7–23)
BUN SERPL-MCNC: 70 MG/DL — HIGH (ref 7–23)
BUN SERPL-MCNC: 71 MG/DL — HIGH (ref 7–23)
CALCIUM SERPL-MCNC: 8.6 MG/DL — SIGNIFICANT CHANGE UP (ref 8.4–10.5)
CALCIUM SERPL-MCNC: 8.7 MG/DL — SIGNIFICANT CHANGE UP (ref 8.4–10.5)
CALCIUM SERPL-MCNC: 8.8 MG/DL — SIGNIFICANT CHANGE UP (ref 8.4–10.5)
CALCIUM SERPL-MCNC: 8.8 MG/DL — SIGNIFICANT CHANGE UP (ref 8.4–10.5)
CALCIUM SERPL-MCNC: 8.9 MG/DL — SIGNIFICANT CHANGE UP (ref 8.4–10.5)
CALCIUM SERPL-MCNC: 8.9 MG/DL — SIGNIFICANT CHANGE UP (ref 8.4–10.5)
CALCIUM SERPL-MCNC: 9 MG/DL — SIGNIFICANT CHANGE UP (ref 8.4–10.5)
CALCIUM SERPL-MCNC: 9.2 MG/DL — SIGNIFICANT CHANGE UP (ref 8.4–10.5)
CALCIUM SERPL-MCNC: 9.4 MG/DL — SIGNIFICANT CHANGE UP (ref 8.4–10.5)
CHLORIDE SERPL-SCNC: 100 MMOL/L — SIGNIFICANT CHANGE UP (ref 96–108)
CHLORIDE SERPL-SCNC: 101 MMOL/L — SIGNIFICANT CHANGE UP (ref 96–108)
CHLORIDE SERPL-SCNC: 102 MMOL/L — SIGNIFICANT CHANGE UP (ref 96–108)
CHLORIDE SERPL-SCNC: 102 MMOL/L — SIGNIFICANT CHANGE UP (ref 96–108)
CHLORIDE SERPL-SCNC: 103 MMOL/L — SIGNIFICANT CHANGE UP (ref 96–108)
CHLORIDE SERPL-SCNC: 105 MMOL/L — SIGNIFICANT CHANGE UP (ref 96–108)
CHLORIDE SERPL-SCNC: 105 MMOL/L — SIGNIFICANT CHANGE UP (ref 96–108)
CO2 BLDA-SCNC: 26 MMOL/L — HIGH (ref 19–24)
CO2 SERPL-SCNC: 18 MMOL/L — LOW (ref 22–31)
CO2 SERPL-SCNC: 21 MMOL/L — LOW (ref 22–31)
CO2 SERPL-SCNC: 21 MMOL/L — LOW (ref 22–31)
CO2 SERPL-SCNC: 23 MMOL/L — SIGNIFICANT CHANGE UP (ref 22–31)
CO2 SERPL-SCNC: 24 MMOL/L — SIGNIFICANT CHANGE UP (ref 22–31)
CO2 SERPL-SCNC: 24 MMOL/L — SIGNIFICANT CHANGE UP (ref 22–31)
CO2 SERPL-SCNC: 26 MMOL/L — SIGNIFICANT CHANGE UP (ref 22–31)
CO2 SERPL-SCNC: 27 MMOL/L — SIGNIFICANT CHANGE UP (ref 22–31)
CO2 SERPL-SCNC: 28 MMOL/L — SIGNIFICANT CHANGE UP (ref 22–31)
COLOR SPEC: ABNORMAL
CREAT ?TM UR-MCNC: 58 MG/DL — SIGNIFICANT CHANGE UP
CREAT ?TM UR-MCNC: 78 MG/DL — SIGNIFICANT CHANGE UP
CREAT ?TM UR-MCNC: 79 MG/DL — SIGNIFICANT CHANGE UP
CREAT SERPL-MCNC: 0.98 MG/DL — SIGNIFICANT CHANGE UP (ref 0.5–1.3)
CREAT SERPL-MCNC: 1.46 MG/DL — HIGH (ref 0.5–1.3)
CREAT SERPL-MCNC: 1.49 MG/DL — HIGH (ref 0.5–1.3)
CREAT SERPL-MCNC: 1.49 MG/DL — HIGH (ref 0.5–1.3)
CREAT SERPL-MCNC: 1.7 MG/DL — HIGH (ref 0.5–1.3)
CREAT SERPL-MCNC: 1.94 MG/DL — HIGH (ref 0.5–1.3)
CREAT SERPL-MCNC: 1.97 MG/DL — HIGH (ref 0.5–1.3)
CREAT SERPL-MCNC: 2.19 MG/DL — HIGH (ref 0.5–1.3)
CREAT SERPL-MCNC: 2.22 MG/DL — HIGH (ref 0.5–1.3)
CULTURE RESULTS: SIGNIFICANT CHANGE UP
CULTURE RESULTS: SIGNIFICANT CHANGE UP
DIFF PNL FLD: ABNORMAL
EGFR: 27 ML/MIN/1.73M2 — LOW
EGFR: 27 ML/MIN/1.73M2 — LOW
EGFR: 28 ML/MIN/1.73M2 — LOW
EGFR: 28 ML/MIN/1.73M2 — LOW
EGFR: 31 ML/MIN/1.73M2 — LOW
EGFR: 31 ML/MIN/1.73M2 — LOW
EGFR: 32 ML/MIN/1.73M2 — LOW
EGFR: 32 ML/MIN/1.73M2 — LOW
EGFR: 38 ML/MIN/1.73M2 — LOW
EGFR: 38 ML/MIN/1.73M2 — LOW
EGFR: 44 ML/MIN/1.73M2 — LOW
EGFR: 45 ML/MIN/1.73M2 — LOW
EGFR: 45 ML/MIN/1.73M2 — LOW
EGFR: 73 ML/MIN/1.73M2 — SIGNIFICANT CHANGE UP
EGFR: 73 ML/MIN/1.73M2 — SIGNIFICANT CHANGE UP
EOSINOPHIL # BLD AUTO: 0 K/UL — SIGNIFICANT CHANGE UP (ref 0–0.5)
EOSINOPHIL # BLD AUTO: 0.01 K/UL — SIGNIFICANT CHANGE UP (ref 0–0.5)
EOSINOPHIL # BLD AUTO: 0.01 K/UL — SIGNIFICANT CHANGE UP (ref 0–0.5)
EOSINOPHIL # BLD AUTO: 0.04 K/UL — SIGNIFICANT CHANGE UP (ref 0–0.5)
EOSINOPHIL # BLD AUTO: 0.17 K/UL — SIGNIFICANT CHANGE UP (ref 0–0.5)
EOSINOPHIL NFR BLD AUTO: 0 % — SIGNIFICANT CHANGE UP (ref 0–6)
EOSINOPHIL NFR BLD AUTO: 0.1 % — SIGNIFICANT CHANGE UP (ref 0–6)
EOSINOPHIL NFR BLD AUTO: 0.1 % — SIGNIFICANT CHANGE UP (ref 0–6)
EOSINOPHIL NFR BLD AUTO: 0.4 % — SIGNIFICANT CHANGE UP (ref 0–6)
EOSINOPHIL NFR BLD AUTO: 1.5 % — SIGNIFICANT CHANGE UP (ref 0–6)
FLUAV AG NPH QL: SIGNIFICANT CHANGE UP
FLUAV AG NPH QL: SIGNIFICANT CHANGE UP
FLUBV AG NPH QL: SIGNIFICANT CHANGE UP
FLUBV AG NPH QL: SIGNIFICANT CHANGE UP
GAS PNL BLDV: SIGNIFICANT CHANGE UP
GAS PNL BLDV: SIGNIFICANT CHANGE UP
GLUCOSE SERPL-MCNC: 101 MG/DL — HIGH (ref 70–99)
GLUCOSE SERPL-MCNC: 102 MG/DL — HIGH (ref 70–99)
GLUCOSE SERPL-MCNC: 115 MG/DL — HIGH (ref 70–99)
GLUCOSE SERPL-MCNC: 138 MG/DL — HIGH (ref 70–99)
GLUCOSE SERPL-MCNC: 138 MG/DL — HIGH (ref 70–99)
GLUCOSE SERPL-MCNC: 148 MG/DL — HIGH (ref 70–99)
GLUCOSE SERPL-MCNC: 283 MG/DL — HIGH (ref 70–99)
GLUCOSE SERPL-MCNC: 75 MG/DL — SIGNIFICANT CHANGE UP (ref 70–99)
GLUCOSE SERPL-MCNC: 99 MG/DL — SIGNIFICANT CHANGE UP (ref 70–99)
GLUCOSE UR QL: NEGATIVE MG/DL — SIGNIFICANT CHANGE UP
HCO3 BLDA-SCNC: 25 MMOL/L — SIGNIFICANT CHANGE UP (ref 21–28)
HCT VFR BLD CALC: 38.5 % — LOW (ref 39–50)
HCT VFR BLD CALC: 39 % — SIGNIFICANT CHANGE UP (ref 39–50)
HCT VFR BLD CALC: 39.3 % — SIGNIFICANT CHANGE UP (ref 39–50)
HCT VFR BLD CALC: 39.9 % — SIGNIFICANT CHANGE UP (ref 39–50)
HCT VFR BLD CALC: 41.4 % — SIGNIFICANT CHANGE UP (ref 39–50)
HCT VFR BLD CALC: 43.4 % — SIGNIFICANT CHANGE UP (ref 39–50)
HCT VFR BLD CALC: 49.6 % — SIGNIFICANT CHANGE UP (ref 39–50)
HGB BLD-MCNC: 12.4 G/DL — LOW (ref 13–17)
HGB BLD-MCNC: 12.4 G/DL — LOW (ref 13–17)
HGB BLD-MCNC: 12.5 G/DL — LOW (ref 13–17)
HGB BLD-MCNC: 12.9 G/DL — LOW (ref 13–17)
HGB BLD-MCNC: 13 G/DL — SIGNIFICANT CHANGE UP (ref 13–17)
HGB BLD-MCNC: 13.7 G/DL — SIGNIFICANT CHANGE UP (ref 13–17)
HGB BLD-MCNC: 15.3 G/DL — SIGNIFICANT CHANGE UP (ref 13–17)
IMM GRANULOCYTES NFR BLD AUTO: 0.4 % — SIGNIFICANT CHANGE UP (ref 0–0.9)
IMM GRANULOCYTES NFR BLD AUTO: 0.6 % — SIGNIFICANT CHANGE UP (ref 0–0.9)
IMM GRANULOCYTES NFR BLD AUTO: 0.7 % — SIGNIFICANT CHANGE UP (ref 0–0.9)
IMM GRANULOCYTES NFR BLD AUTO: 0.9 % — SIGNIFICANT CHANGE UP (ref 0–0.9)
IMM GRANULOCYTES NFR BLD AUTO: 1.4 % — HIGH (ref 0–0.9)
KETONES UR-MCNC: ABNORMAL MG/DL
LACTATE SERPL-SCNC: 1.7 MMOL/L — SIGNIFICANT CHANGE UP (ref 0.5–2)
LEGIONELLA AG UR QL: NEGATIVE — SIGNIFICANT CHANGE UP
LEUKOCYTE ESTERASE UR-ACNC: ABNORMAL
LYMPHOCYTES # BLD AUTO: 0.55 K/UL — LOW (ref 1–3.3)
LYMPHOCYTES # BLD AUTO: 0.67 K/UL — LOW (ref 1–3.3)
LYMPHOCYTES # BLD AUTO: 0.75 K/UL — LOW (ref 1–3.3)
LYMPHOCYTES # BLD AUTO: 0.82 K/UL — LOW (ref 1–3.3)
LYMPHOCYTES # BLD AUTO: 0.99 K/UL — LOW (ref 1–3.3)
LYMPHOCYTES # BLD AUTO: 1.03 K/UL — SIGNIFICANT CHANGE UP (ref 1–3.3)
LYMPHOCYTES # BLD AUTO: 1.91 K/UL — SIGNIFICANT CHANGE UP (ref 1–3.3)
LYMPHOCYTES # BLD AUTO: 17.8 % — SIGNIFICANT CHANGE UP (ref 13–44)
LYMPHOCYTES # BLD AUTO: 5.2 % — LOW (ref 13–44)
LYMPHOCYTES # BLD AUTO: 5.3 % — LOW (ref 13–44)
LYMPHOCYTES # BLD AUTO: 5.7 % — LOW (ref 13–44)
LYMPHOCYTES # BLD AUTO: 8.4 % — LOW (ref 13–44)
LYMPHOCYTES # BLD AUTO: 8.5 % — LOW (ref 13–44)
LYMPHOCYTES # BLD AUTO: 9 % — LOW (ref 13–44)
MAGNESIUM SERPL-MCNC: 2 MG/DL — SIGNIFICANT CHANGE UP (ref 1.6–2.6)
MAGNESIUM SERPL-MCNC: 2 MG/DL — SIGNIFICANT CHANGE UP (ref 1.6–2.6)
MAGNESIUM SERPL-MCNC: 2.1 MG/DL — SIGNIFICANT CHANGE UP (ref 1.6–2.6)
MAGNESIUM SERPL-MCNC: 2.2 MG/DL — SIGNIFICANT CHANGE UP (ref 1.6–2.6)
MAGNESIUM SERPL-MCNC: 2.3 MG/DL — SIGNIFICANT CHANGE UP (ref 1.6–2.6)
MAGNESIUM SERPL-MCNC: 2.3 MG/DL — SIGNIFICANT CHANGE UP (ref 1.6–2.6)
MCHC RBC-ENTMCNC: 30 PG — SIGNIFICANT CHANGE UP (ref 27–34)
MCHC RBC-ENTMCNC: 30.1 PG — SIGNIFICANT CHANGE UP (ref 27–34)
MCHC RBC-ENTMCNC: 30.1 PG — SIGNIFICANT CHANGE UP (ref 27–34)
MCHC RBC-ENTMCNC: 30.2 PG — SIGNIFICANT CHANGE UP (ref 27–34)
MCHC RBC-ENTMCNC: 30.4 PG — SIGNIFICANT CHANGE UP (ref 27–34)
MCHC RBC-ENTMCNC: 30.6 PG — SIGNIFICANT CHANGE UP (ref 27–34)
MCHC RBC-ENTMCNC: 30.7 PG — SIGNIFICANT CHANGE UP (ref 27–34)
MCHC RBC-ENTMCNC: 30.8 G/DL — LOW (ref 32–36)
MCHC RBC-ENTMCNC: 31.2 G/DL — LOW (ref 32–36)
MCHC RBC-ENTMCNC: 31.6 G/DL — LOW (ref 32–36)
MCHC RBC-ENTMCNC: 31.6 G/DL — LOW (ref 32–36)
MCHC RBC-ENTMCNC: 31.8 G/DL — LOW (ref 32–36)
MCHC RBC-ENTMCNC: 32.5 G/DL — SIGNIFICANT CHANGE UP (ref 32–36)
MCHC RBC-ENTMCNC: 32.6 G/DL — SIGNIFICANT CHANGE UP (ref 32–36)
MCV RBC AUTO: 92.8 FL — SIGNIFICANT CHANGE UP (ref 80–100)
MCV RBC AUTO: 92.8 FL — SIGNIFICANT CHANGE UP (ref 80–100)
MCV RBC AUTO: 94.7 FL — SIGNIFICANT CHANGE UP (ref 80–100)
MCV RBC AUTO: 95.2 FL — SIGNIFICANT CHANGE UP (ref 80–100)
MCV RBC AUTO: 96.3 FL — SIGNIFICANT CHANGE UP (ref 80–100)
MCV RBC AUTO: 98.3 FL — SIGNIFICANT CHANGE UP (ref 80–100)
MCV RBC AUTO: 99.4 FL — SIGNIFICANT CHANGE UP (ref 80–100)
MONOCYTES # BLD AUTO: 0.31 K/UL — SIGNIFICANT CHANGE UP (ref 0–0.9)
MONOCYTES # BLD AUTO: 0.5 K/UL — SIGNIFICANT CHANGE UP (ref 0–0.9)
MONOCYTES # BLD AUTO: 0.73 K/UL — SIGNIFICANT CHANGE UP (ref 0–0.9)
MONOCYTES # BLD AUTO: 0.78 K/UL — SIGNIFICANT CHANGE UP (ref 0–0.9)
MONOCYTES # BLD AUTO: 0.81 K/UL — SIGNIFICANT CHANGE UP (ref 0–0.9)
MONOCYTES # BLD AUTO: 0.9 K/UL — SIGNIFICANT CHANGE UP (ref 0–0.9)
MONOCYTES # BLD AUTO: 1.05 K/UL — HIGH (ref 0–0.9)
MONOCYTES NFR BLD AUTO: 2.7 % — SIGNIFICANT CHANGE UP (ref 2–14)
MONOCYTES NFR BLD AUTO: 5.1 % — SIGNIFICANT CHANGE UP (ref 2–14)
MONOCYTES NFR BLD AUTO: 7 % — SIGNIFICANT CHANGE UP (ref 2–14)
MONOCYTES NFR BLD AUTO: 7 % — SIGNIFICANT CHANGE UP (ref 2–14)
MONOCYTES NFR BLD AUTO: 7.2 % — SIGNIFICANT CHANGE UP (ref 2–14)
MONOCYTES NFR BLD AUTO: 7.5 % — SIGNIFICANT CHANGE UP (ref 2–14)
MONOCYTES NFR BLD AUTO: 7.6 % — SIGNIFICANT CHANGE UP (ref 2–14)
MRSA PCR RESULT.: NEGATIVE — SIGNIFICANT CHANGE UP
NEUTROPHILS # BLD AUTO: 11.03 K/UL — HIGH (ref 1.8–7.4)
NEUTROPHILS # BLD AUTO: 12.05 K/UL — HIGH (ref 1.8–7.4)
NEUTROPHILS # BLD AUTO: 7.92 K/UL — HIGH (ref 1.8–7.4)
NEUTROPHILS # BLD AUTO: 8.28 K/UL — HIGH (ref 1.8–7.4)
NEUTROPHILS # BLD AUTO: 8.29 K/UL — HIGH (ref 1.8–7.4)
NEUTROPHILS # BLD AUTO: 9.41 K/UL — HIGH (ref 1.8–7.4)
NEUTROPHILS # BLD AUTO: 9.97 K/UL — HIGH (ref 1.8–7.4)
NEUTROPHILS NFR BLD AUTO: 73.6 % — SIGNIFICANT CHANGE UP (ref 43–77)
NEUTROPHILS NFR BLD AUTO: 81 % — HIGH (ref 43–77)
NEUTROPHILS NFR BLD AUTO: 85.5 % — HIGH (ref 43–77)
NEUTROPHILS NFR BLD AUTO: 85.5 % — HIGH (ref 43–77)
NEUTROPHILS NFR BLD AUTO: 86 % — HIGH (ref 43–77)
NEUTROPHILS NFR BLD AUTO: 86.1 % — HIGH (ref 43–77)
NEUTROPHILS NFR BLD AUTO: 87.6 % — HIGH (ref 43–77)
NITRITE UR-MCNC: NEGATIVE — SIGNIFICANT CHANGE UP
NRBC BLD AUTO-RTO: 0 /100 WBCS — SIGNIFICANT CHANGE UP (ref 0–0)
NT-PROBNP SERPL-SCNC: 1175 PG/ML — HIGH (ref 0–300)
NT-PROBNP SERPL-SCNC: 898 PG/ML — HIGH (ref 0–300)
OSMOLALITY UR: 546 MOSM/KG — SIGNIFICANT CHANGE UP (ref 300–900)
PCO2 BLDA: 37 MMHG — SIGNIFICANT CHANGE UP (ref 35–48)
PH BLDA: 7.44 — SIGNIFICANT CHANGE UP (ref 7.35–7.45)
PH UR: 5.5 — SIGNIFICANT CHANGE UP (ref 5–8)
PHOSPHATE SERPL-MCNC: 2.6 MG/DL — SIGNIFICANT CHANGE UP (ref 2.5–4.5)
PHOSPHATE SERPL-MCNC: 2.7 MG/DL — SIGNIFICANT CHANGE UP (ref 2.5–4.5)
PHOSPHATE SERPL-MCNC: 3.1 MG/DL — SIGNIFICANT CHANGE UP (ref 2.5–4.5)
PHOSPHATE SERPL-MCNC: 3.2 MG/DL — SIGNIFICANT CHANGE UP (ref 2.5–4.5)
PHOSPHATE SERPL-MCNC: 3.5 MG/DL — SIGNIFICANT CHANGE UP (ref 2.5–4.5)
PLATELET # BLD AUTO: 110 K/UL — LOW (ref 150–400)
PLATELET # BLD AUTO: 122 K/UL — LOW (ref 150–400)
PLATELET # BLD AUTO: 133 K/UL — LOW (ref 150–400)
PLATELET # BLD AUTO: 143 K/UL — LOW (ref 150–400)
PLATELET # BLD AUTO: 146 K/UL — LOW (ref 150–400)
PLATELET # BLD AUTO: 148 K/UL — LOW (ref 150–400)
PLATELET # BLD AUTO: 167 K/UL — SIGNIFICANT CHANGE UP (ref 150–400)
PO2 BLDA: 224 MMHG — HIGH (ref 83–108)
POTASSIUM SERPL-MCNC: 4.2 MMOL/L — SIGNIFICANT CHANGE UP (ref 3.5–5.3)
POTASSIUM SERPL-MCNC: 4.3 MMOL/L — SIGNIFICANT CHANGE UP (ref 3.5–5.3)
POTASSIUM SERPL-MCNC: 4.5 MMOL/L — SIGNIFICANT CHANGE UP (ref 3.5–5.3)
POTASSIUM SERPL-MCNC: 4.6 MMOL/L — SIGNIFICANT CHANGE UP (ref 3.5–5.3)
POTASSIUM SERPL-MCNC: 4.7 MMOL/L — SIGNIFICANT CHANGE UP (ref 3.5–5.3)
POTASSIUM SERPL-MCNC: 4.7 MMOL/L — SIGNIFICANT CHANGE UP (ref 3.5–5.3)
POTASSIUM SERPL-MCNC: 4.8 MMOL/L — SIGNIFICANT CHANGE UP (ref 3.5–5.3)
POTASSIUM SERPL-MCNC: 5.1 MMOL/L — SIGNIFICANT CHANGE UP (ref 3.5–5.3)
POTASSIUM SERPL-MCNC: 5.2 MMOL/L — SIGNIFICANT CHANGE UP (ref 3.5–5.3)
POTASSIUM SERPL-SCNC: 4.2 MMOL/L — SIGNIFICANT CHANGE UP (ref 3.5–5.3)
POTASSIUM SERPL-SCNC: 4.3 MMOL/L — SIGNIFICANT CHANGE UP (ref 3.5–5.3)
POTASSIUM SERPL-SCNC: 4.5 MMOL/L — SIGNIFICANT CHANGE UP (ref 3.5–5.3)
POTASSIUM SERPL-SCNC: 4.6 MMOL/L — SIGNIFICANT CHANGE UP (ref 3.5–5.3)
POTASSIUM SERPL-SCNC: 4.7 MMOL/L — SIGNIFICANT CHANGE UP (ref 3.5–5.3)
POTASSIUM SERPL-SCNC: 4.7 MMOL/L — SIGNIFICANT CHANGE UP (ref 3.5–5.3)
POTASSIUM SERPL-SCNC: 4.8 MMOL/L — SIGNIFICANT CHANGE UP (ref 3.5–5.3)
POTASSIUM SERPL-SCNC: 5.1 MMOL/L — SIGNIFICANT CHANGE UP (ref 3.5–5.3)
POTASSIUM SERPL-SCNC: 5.2 MMOL/L — SIGNIFICANT CHANGE UP (ref 3.5–5.3)
POTASSIUM UR-SCNC: 34 MMOL/L — SIGNIFICANT CHANGE UP
PROCALCITONIN SERPL-MCNC: 0.18 NG/ML — HIGH (ref 0.02–0.1)
PROT ?TM UR-MCNC: 25 MG/DL — HIGH (ref 0–12)
PROT SERPL-MCNC: 6.4 G/DL — SIGNIFICANT CHANGE UP (ref 6–8.3)
PROT UR-MCNC: 30 MG/DL
PROT/CREAT UR-RTO: 0.3 RATIO — HIGH (ref 0–0.2)
RBC # BLD: 4.08 M/UL — LOW (ref 4.2–5.8)
RBC # BLD: 4.12 M/UL — LOW (ref 4.2–5.8)
RBC # BLD: 4.15 M/UL — LOW (ref 4.2–5.8)
RBC # BLD: 4.21 M/UL — SIGNIFICANT CHANGE UP (ref 4.2–5.8)
RBC # BLD: 4.3 M/UL — SIGNIFICANT CHANGE UP (ref 4.2–5.8)
RBC # BLD: 4.56 M/UL — SIGNIFICANT CHANGE UP (ref 4.2–5.8)
RBC # BLD: 4.99 M/UL — SIGNIFICANT CHANGE UP (ref 4.2–5.8)
RBC # FLD: 13.6 % — SIGNIFICANT CHANGE UP (ref 10.3–14.5)
RBC # FLD: 13.8 % — SIGNIFICANT CHANGE UP (ref 10.3–14.5)
RBC # FLD: 13.9 % — SIGNIFICANT CHANGE UP (ref 10.3–14.5)
RBC # FLD: 14 % — SIGNIFICANT CHANGE UP (ref 10.3–14.5)
RBC # FLD: 14 % — SIGNIFICANT CHANGE UP (ref 10.3–14.5)
RH IG SCN BLD-IMP: POSITIVE — SIGNIFICANT CHANGE UP
RSV RNA NPH QL NAA+NON-PROBE: SIGNIFICANT CHANGE UP
RSV RNA NPH QL NAA+NON-PROBE: SIGNIFICANT CHANGE UP
S AUREUS DNA NOSE QL NAA+PROBE: NEGATIVE — SIGNIFICANT CHANGE UP
S PNEUM AG UR QL: NEGATIVE — SIGNIFICANT CHANGE UP
SAO2 % BLDA: 98.4 % — HIGH (ref 94–98)
SARS-COV-2 RNA SPEC QL NAA+PROBE: SIGNIFICANT CHANGE UP
SARS-COV-2 RNA SPEC QL NAA+PROBE: SIGNIFICANT CHANGE UP
SODIUM SERPL-SCNC: 134 MMOL/L — LOW (ref 135–145)
SODIUM SERPL-SCNC: 135 MMOL/L — SIGNIFICANT CHANGE UP (ref 135–145)
SODIUM SERPL-SCNC: 137 MMOL/L — SIGNIFICANT CHANGE UP (ref 135–145)
SODIUM SERPL-SCNC: 138 MMOL/L — SIGNIFICANT CHANGE UP (ref 135–145)
SODIUM SERPL-SCNC: 139 MMOL/L — SIGNIFICANT CHANGE UP (ref 135–145)
SODIUM SERPL-SCNC: 140 MMOL/L — SIGNIFICANT CHANGE UP (ref 135–145)
SODIUM SERPL-SCNC: 143 MMOL/L — SIGNIFICANT CHANGE UP (ref 135–145)
SODIUM UR-SCNC: 59 MMOL/L — SIGNIFICANT CHANGE UP
SODIUM UR-SCNC: 72 MMOL/L — SIGNIFICANT CHANGE UP
SOURCE RESPIRATORY: SIGNIFICANT CHANGE UP
SOURCE RESPIRATORY: SIGNIFICANT CHANGE UP
SP GR SPEC: 1.02 — SIGNIFICANT CHANGE UP (ref 1–1.03)
SPECIMEN SOURCE: SIGNIFICANT CHANGE UP
SPECIMEN SOURCE: SIGNIFICANT CHANGE UP
TROPONIN T, HIGH SENSITIVITY RESULT: 12 NG/L — SIGNIFICANT CHANGE UP (ref 0–51)
TROPONIN T, HIGH SENSITIVITY RESULT: 38 NG/L — SIGNIFICANT CHANGE UP (ref 0–51)
TROPONIN T, HIGH SENSITIVITY RESULT: 82 NG/L — CRITICAL HIGH (ref 0–51)
TROPONIN T, HIGH SENSITIVITY RESULT: 83 NG/L — CRITICAL HIGH (ref 0–51)
UROBILINOGEN FLD QL: 1 MG/DL — SIGNIFICANT CHANGE UP (ref 0.2–1)
UUN UR-MCNC: 882 MG/DL — SIGNIFICANT CHANGE UP
UUN UR-MCNC: 900 MG/DL — SIGNIFICANT CHANGE UP
WBC # BLD: 10.76 K/UL — HIGH (ref 3.8–10.5)
WBC # BLD: 11.39 K/UL — HIGH (ref 3.8–10.5)
WBC # BLD: 11.62 K/UL — HIGH (ref 3.8–10.5)
WBC # BLD: 12.82 K/UL — HIGH (ref 3.8–10.5)
WBC # BLD: 14.09 K/UL — HIGH (ref 3.8–10.5)
WBC # BLD: 9.62 K/UL — SIGNIFICANT CHANGE UP (ref 3.8–10.5)
WBC # BLD: 9.71 K/UL — SIGNIFICANT CHANGE UP (ref 3.8–10.5)
WBC # FLD AUTO: 10.76 K/UL — HIGH (ref 3.8–10.5)
WBC # FLD AUTO: 11.39 K/UL — HIGH (ref 3.8–10.5)
WBC # FLD AUTO: 11.62 K/UL — HIGH (ref 3.8–10.5)
WBC # FLD AUTO: 12.82 K/UL — HIGH (ref 3.8–10.5)
WBC # FLD AUTO: 14.09 K/UL — HIGH (ref 3.8–10.5)
WBC # FLD AUTO: 9.62 K/UL — SIGNIFICANT CHANGE UP (ref 3.8–10.5)
WBC # FLD AUTO: 9.71 K/UL — SIGNIFICANT CHANGE UP (ref 3.8–10.5)

## 2025-01-01 PROCEDURE — 85025 COMPLETE CBC W/AUTO DIFF WBC: CPT

## 2025-01-01 PROCEDURE — 71045 X-RAY EXAM CHEST 1 VIEW: CPT

## 2025-01-01 PROCEDURE — 99223 1ST HOSP IP/OBS HIGH 75: CPT

## 2025-01-01 PROCEDURE — 87899 AGENT NOS ASSAY W/OPTIC: CPT

## 2025-01-01 PROCEDURE — 84132 ASSAY OF SERUM POTASSIUM: CPT

## 2025-01-01 PROCEDURE — 71045 X-RAY EXAM CHEST 1 VIEW: CPT | Mod: 26

## 2025-01-01 PROCEDURE — 82330 ASSAY OF CALCIUM: CPT

## 2025-01-01 PROCEDURE — 84100 ASSAY OF PHOSPHORUS: CPT

## 2025-01-01 PROCEDURE — 72070 X-RAY EXAM THORAC SPINE 2VWS: CPT

## 2025-01-01 PROCEDURE — 83735 ASSAY OF MAGNESIUM: CPT

## 2025-01-01 PROCEDURE — 82962 GLUCOSE BLOOD TEST: CPT

## 2025-01-01 PROCEDURE — 96374 THER/PROPH/DIAG INJ IV PUSH: CPT

## 2025-01-01 PROCEDURE — 99232 SBSQ HOSP IP/OBS MODERATE 35: CPT | Mod: GC

## 2025-01-01 PROCEDURE — 84145 PROCALCITONIN (PCT): CPT

## 2025-01-01 PROCEDURE — 81001 URINALYSIS AUTO W/SCOPE: CPT

## 2025-01-01 PROCEDURE — 83605 ASSAY OF LACTIC ACID: CPT

## 2025-01-01 PROCEDURE — 80053 COMPREHEN METABOLIC PANEL: CPT

## 2025-01-01 PROCEDURE — 96375 TX/PRO/DX INJ NEW DRUG ADDON: CPT

## 2025-01-01 PROCEDURE — 99221 1ST HOSP IP/OBS SF/LOW 40: CPT

## 2025-01-01 PROCEDURE — 80048 BASIC METABOLIC PNL TOTAL CA: CPT

## 2025-01-01 PROCEDURE — 82570 ASSAY OF URINE CREATININE: CPT

## 2025-01-01 PROCEDURE — 86901 BLOOD TYPING SEROLOGIC RH(D): CPT

## 2025-01-01 PROCEDURE — 97161 PT EVAL LOW COMPLEX 20 MIN: CPT

## 2025-01-01 PROCEDURE — 36415 COLL VENOUS BLD VENIPUNCTURE: CPT

## 2025-01-01 PROCEDURE — 93306 TTE W/DOPPLER COMPLETE: CPT | Mod: 26

## 2025-01-01 PROCEDURE — 99291 CRITICAL CARE FIRST HOUR: CPT | Mod: 25

## 2025-01-01 PROCEDURE — 84540 ASSAY OF URINE/UREA-N: CPT

## 2025-01-01 PROCEDURE — 72070 X-RAY EXAM THORAC SPINE 2VWS: CPT | Mod: 26

## 2025-01-01 PROCEDURE — 99291 CRITICAL CARE FIRST HOUR: CPT

## 2025-01-01 PROCEDURE — 99221 1ST HOSP IP/OBS SF/LOW 40: CPT | Mod: GC

## 2025-01-01 PROCEDURE — 84484 ASSAY OF TROPONIN QUANT: CPT

## 2025-01-01 PROCEDURE — 87641 MR-STAPH DNA AMP PROBE: CPT

## 2025-01-01 PROCEDURE — 0225U NFCT DS DNA&RNA 21 SARSCOV2: CPT

## 2025-01-01 PROCEDURE — 97116 GAIT TRAINING THERAPY: CPT

## 2025-01-01 PROCEDURE — 84295 ASSAY OF SERUM SODIUM: CPT

## 2025-01-01 PROCEDURE — 99223 1ST HOSP IP/OBS HIGH 75: CPT | Mod: GC

## 2025-01-01 PROCEDURE — 93005 ELECTROCARDIOGRAM TRACING: CPT

## 2025-01-01 PROCEDURE — 71250 CT THORAX DX C-: CPT | Mod: 26

## 2025-01-01 PROCEDURE — 93308 TTE F-UP OR LMTD: CPT

## 2025-01-01 PROCEDURE — 83880 ASSAY OF NATRIURETIC PEPTIDE: CPT

## 2025-01-01 PROCEDURE — 87637 SARSCOV2&INF A&B&RSV AMP PRB: CPT

## 2025-01-01 PROCEDURE — 96372 THER/PROPH/DIAG INJ SC/IM: CPT

## 2025-01-01 PROCEDURE — 71250 CT THORAX DX C-: CPT | Mod: MC

## 2025-01-01 PROCEDURE — 99232 SBSQ HOSP IP/OBS MODERATE 35: CPT

## 2025-01-01 PROCEDURE — 93010 ELECTROCARDIOGRAM REPORT: CPT

## 2025-01-01 PROCEDURE — 97165 OT EVAL LOW COMPLEX 30 MIN: CPT

## 2025-01-01 PROCEDURE — 82803 BLOOD GASES ANY COMBINATION: CPT

## 2025-01-01 PROCEDURE — 86850 RBC ANTIBODY SCREEN: CPT

## 2025-01-01 PROCEDURE — ZZZZZ: CPT

## 2025-01-01 PROCEDURE — 93010 ELECTROCARDIOGRAM REPORT: CPT | Mod: 76

## 2025-01-01 PROCEDURE — 86900 BLOOD TYPING SEROLOGIC ABO: CPT

## 2025-01-01 PROCEDURE — 97110 THERAPEUTIC EXERCISES: CPT

## 2025-01-01 PROCEDURE — 84156 ASSAY OF PROTEIN URINE: CPT

## 2025-01-01 PROCEDURE — 84133 ASSAY OF URINE POTASSIUM: CPT

## 2025-01-01 PROCEDURE — 94660 CPAP INITIATION&MGMT: CPT

## 2025-01-01 PROCEDURE — 84300 ASSAY OF URINE SODIUM: CPT

## 2025-01-01 PROCEDURE — C8929: CPT

## 2025-01-01 PROCEDURE — 87040 BLOOD CULTURE FOR BACTERIA: CPT

## 2025-01-01 PROCEDURE — 93308 TTE F-UP OR LMTD: CPT | Mod: 26

## 2025-01-01 PROCEDURE — 94640 AIRWAY INHALATION TREATMENT: CPT

## 2025-01-01 PROCEDURE — 87640 STAPH A DNA AMP PROBE: CPT

## 2025-01-01 PROCEDURE — 83935 ASSAY OF URINE OSMOLALITY: CPT

## 2025-01-01 RX ORDER — ASPIRIN 325 MG
1 TABLET ORAL
Qty: 0 | Refills: 0 | DISCHARGE
Start: 2025-01-01

## 2025-01-01 RX ORDER — CEFPODOXIME PROXETIL 200 MG/1
1 TABLET, FILM COATED ORAL
Qty: 6 | Refills: 0
Start: 2025-01-01 | End: 2025-01-01

## 2025-01-01 RX ORDER — PREDNISONE 20 MG/1
2 TABLET ORAL
Qty: 2 | Refills: 0
Start: 2025-01-01 | End: 2025-01-01

## 2025-01-01 RX ORDER — METOPROLOL SUCCINATE 50 MG/1
50 TABLET, EXTENDED RELEASE ORAL DAILY
Refills: 0 | Status: DISCONTINUED | OUTPATIENT
Start: 2025-01-01 | End: 2025-01-01

## 2025-01-01 RX ORDER — LORAZEPAM 4 MG/ML
0.5 VIAL (ML) INJECTION ONCE
Refills: 0 | Status: DISCONTINUED | OUTPATIENT
Start: 2025-01-01 | End: 2025-01-01

## 2025-01-01 RX ORDER — APIXABAN 2.5 MG/1
1 TABLET, FILM COATED ORAL
Qty: 0 | Refills: 0 | DISCHARGE
Start: 2025-01-01

## 2025-01-01 RX ORDER — BUDESONIDE 90 UG/1
0.5 AEROSOL, POWDER RESPIRATORY (INHALATION) ONCE
Refills: 0 | Status: COMPLETED | OUTPATIENT
Start: 2025-01-01 | End: 2025-01-01

## 2025-01-01 RX ORDER — IPRATROPIUM BROMIDE AND ALBUTEROL SULFATE .5; 2.5 MG/3ML; MG/3ML
3 SOLUTION RESPIRATORY (INHALATION) EVERY 6 HOURS
Refills: 0 | Status: DISCONTINUED | OUTPATIENT
Start: 2025-01-01 | End: 2025-01-01

## 2025-01-01 RX ORDER — TRAMADOL HYDROCHLORIDE 50 MG/1
12.5 TABLET, FILM COATED ORAL ONCE
Refills: 0 | Status: DISCONTINUED | OUTPATIENT
Start: 2025-01-01 | End: 2025-01-01

## 2025-01-01 RX ORDER — LOSARTAN POTASSIUM 100 MG/1
1 TABLET, FILM COATED ORAL
Qty: 30 | Refills: 0
Start: 2025-01-01 | End: 2025-04-26

## 2025-01-01 RX ORDER — PREDNISONE 20 MG/1
10 TABLET ORAL EVERY 24 HOURS
Refills: 0 | Status: DISCONTINUED | OUTPATIENT
Start: 2025-01-01 | End: 2025-01-01

## 2025-01-01 RX ORDER — ESCITALOPRAM OXALATE 20 MG/1
10 TABLET ORAL EVERY 24 HOURS
Refills: 0 | Status: DISCONTINUED | OUTPATIENT
Start: 2025-01-01 | End: 2025-01-01

## 2025-01-01 RX ORDER — METHYLPREDNISOLONE ACETATE 80 MG/ML
20 INJECTION, SUSPENSION INTRA-ARTICULAR; INTRALESIONAL; INTRAMUSCULAR; SOFT TISSUE EVERY 24 HOURS
Refills: 0 | Status: DISCONTINUED | OUTPATIENT
Start: 2025-01-01 | End: 2025-01-01

## 2025-01-01 RX ORDER — POLYETHYLENE GLYCOL 3350 17 G/17G
17 POWDER, FOR SOLUTION ORAL DAILY
Refills: 0 | Status: DISCONTINUED | OUTPATIENT
Start: 2025-01-01 | End: 2025-01-01

## 2025-01-01 RX ORDER — MAGNESIUM, ALUMINUM HYDROXIDE 200-200 MG
30 TABLET,CHEWABLE ORAL EVERY 4 HOURS
Refills: 0 | Status: DISCONTINUED | OUTPATIENT
Start: 2025-01-01 | End: 2025-01-01

## 2025-01-01 RX ORDER — SODIUM CHLORIDE 9 G/1000ML
1000 INJECTION, SOLUTION INTRAVENOUS ONCE
Refills: 0 | Status: COMPLETED | OUTPATIENT
Start: 2025-01-01 | End: 2025-01-01

## 2025-01-01 RX ORDER — TAMSULOSIN HYDROCHLORIDE 0.4 MG/1
0.4 CAPSULE ORAL AT BEDTIME
Refills: 0 | Status: DISCONTINUED | OUTPATIENT
Start: 2025-01-01 | End: 2025-01-01

## 2025-01-01 RX ORDER — TRAMADOL HYDROCHLORIDE 50 MG/1
25 TABLET, FILM COATED ORAL ONCE
Refills: 0 | Status: DISCONTINUED | OUTPATIENT
Start: 2025-01-01 | End: 2025-01-01

## 2025-01-01 RX ORDER — QUETIAPINE FUMARATE 25 MG/1
12.5 TABLET ORAL EVERY 24 HOURS
Refills: 0 | Status: DISCONTINUED | OUTPATIENT
Start: 2025-01-01 | End: 2025-01-01

## 2025-01-01 RX ORDER — LOSARTAN POTASSIUM 100 MG/1
25 TABLET, FILM COATED ORAL EVERY 24 HOURS
Refills: 0 | Status: DISCONTINUED | OUTPATIENT
Start: 2025-01-01 | End: 2025-01-01

## 2025-01-01 RX ORDER — APIXABAN 2.5 MG/1
2.5 TABLET, FILM COATED ORAL EVERY 12 HOURS
Refills: 0 | Status: DISCONTINUED | OUTPATIENT
Start: 2025-01-01 | End: 2025-01-01

## 2025-01-01 RX ORDER — LIDOCAINE HYDROCHLORIDE 20 MG/ML
1 JELLY TOPICAL ONCE
Refills: 0 | Status: COMPLETED | OUTPATIENT
Start: 2025-01-01 | End: 2025-01-01

## 2025-01-01 RX ORDER — AZITHROMYCIN 250 MG
500 CAPSULE ORAL ONCE
Refills: 0 | Status: COMPLETED | OUTPATIENT
Start: 2025-01-01 | End: 2025-01-01

## 2025-01-01 RX ORDER — CEFTRIAXONE 500 MG/1
1000 INJECTION, POWDER, FOR SOLUTION INTRAMUSCULAR; INTRAVENOUS ONCE
Refills: 0 | Status: COMPLETED | OUTPATIENT
Start: 2025-01-01 | End: 2025-01-01

## 2025-01-01 RX ORDER — OLANZAPINE 10 MG/1
5 TABLET ORAL ONCE
Refills: 0 | Status: COMPLETED | OUTPATIENT
Start: 2025-01-01 | End: 2025-01-01

## 2025-01-01 RX ORDER — TIOTROPIUM BROMIDE INHALATION SPRAY 3.12 UG/1
2 SPRAY, METERED RESPIRATORY (INHALATION) DAILY
Refills: 0 | Status: DISCONTINUED | OUTPATIENT
Start: 2025-01-01 | End: 2025-01-01

## 2025-01-01 RX ORDER — METOPROLOL SUCCINATE 50 MG/1
50 TABLET, EXTENDED RELEASE ORAL EVERY 24 HOURS
Refills: 0 | Status: DISCONTINUED | OUTPATIENT
Start: 2025-01-01 | End: 2025-01-01

## 2025-01-01 RX ORDER — METHYLPREDNISOLONE ACETATE 80 MG/ML
40 INJECTION, SUSPENSION INTRA-ARTICULAR; INTRALESIONAL; INTRAMUSCULAR; SOFT TISSUE ONCE
Refills: 0 | Status: COMPLETED | OUTPATIENT
Start: 2025-01-01 | End: 2025-01-01

## 2025-01-01 RX ORDER — ISOSORBIDE MONONITRATE 60 MG/1
1 TABLET, EXTENDED RELEASE ORAL
Qty: 30 | Refills: 0
Start: 2025-01-01 | End: 2025-04-26

## 2025-01-01 RX ORDER — ROSUVASTATIN CALCIUM 5 MG/1
10 TABLET, FILM COATED ORAL AT BEDTIME
Refills: 0 | Status: DISCONTINUED | OUTPATIENT
Start: 2025-01-01 | End: 2025-01-01

## 2025-01-01 RX ORDER — TAMSULOSIN HYDROCHLORIDE 0.4 MG/1
0.8 CAPSULE ORAL AT BEDTIME
Refills: 0 | Status: DISCONTINUED | OUTPATIENT
Start: 2025-01-01 | End: 2025-01-01

## 2025-01-01 RX ORDER — AZITHROMYCIN 250 MG
500 CAPSULE ORAL EVERY 24 HOURS
Refills: 0 | Status: DISCONTINUED | OUTPATIENT
Start: 2025-01-01 | End: 2025-01-01

## 2025-01-01 RX ORDER — AZITHROMYCIN 250 MG
500 CAPSULE ORAL
Refills: 0 | Status: DISCONTINUED | OUTPATIENT
Start: 2025-01-01 | End: 2025-01-01

## 2025-01-01 RX ORDER — MELATONIN 5 MG
3 TABLET ORAL AT BEDTIME
Refills: 0 | Status: DISCONTINUED | OUTPATIENT
Start: 2025-01-01 | End: 2025-01-01

## 2025-01-01 RX ORDER — BISACODYL 5 MG
10 TABLET, DELAYED RELEASE (ENTERIC COATED) ORAL ONCE
Refills: 0 | Status: COMPLETED | OUTPATIENT
Start: 2025-01-01 | End: 2025-01-01

## 2025-01-01 RX ORDER — DEXAMETHASONE 0.5 MG/1
6 TABLET ORAL ONCE
Refills: 0 | Status: COMPLETED | OUTPATIENT
Start: 2025-01-01 | End: 2025-01-01

## 2025-01-01 RX ORDER — ONDANSETRON HCL/PF 4 MG/2 ML
4 VIAL (ML) INJECTION EVERY 8 HOURS
Refills: 0 | Status: DISCONTINUED | OUTPATIENT
Start: 2025-01-01 | End: 2025-01-01

## 2025-01-01 RX ORDER — AZITHROMYCIN 250 MG
1 CAPSULE ORAL
Qty: 0 | Refills: 0 | DISCHARGE
Start: 2025-01-01

## 2025-01-01 RX ORDER — QUETIAPINE FUMARATE 25 MG/1
12.5 TABLET ORAL ONCE
Refills: 0 | Status: COMPLETED | OUTPATIENT
Start: 2025-01-01 | End: 2025-01-01

## 2025-01-01 RX ORDER — ASPIRIN 325 MG
81 TABLET ORAL DAILY
Refills: 0 | Status: DISCONTINUED | OUTPATIENT
Start: 2025-01-01 | End: 2025-01-01

## 2025-01-01 RX ORDER — B1/B2/B3/B5/B6/B12/VIT C/FOLIC 500-0.5 MG
1 TABLET ORAL DAILY
Refills: 0 | Status: DISCONTINUED | OUTPATIENT
Start: 2025-01-01 | End: 2025-01-01

## 2025-01-01 RX ORDER — BENZONATATE 100 MG
100 CAPSULE ORAL EVERY 8 HOURS
Refills: 0 | Status: DISCONTINUED | OUTPATIENT
Start: 2025-01-01 | End: 2025-01-01

## 2025-01-01 RX ORDER — IPRATROPIUM BROMIDE AND ALBUTEROL SULFATE .5; 2.5 MG/3ML; MG/3ML
3 SOLUTION RESPIRATORY (INHALATION) ONCE
Refills: 0 | Status: COMPLETED | OUTPATIENT
Start: 2025-01-01 | End: 2025-01-01

## 2025-01-01 RX ORDER — ATORVASTATIN CALCIUM 80 MG/1
1 TABLET, FILM COATED ORAL
Qty: 30 | Refills: 0
Start: 2025-01-01 | End: 2025-04-26

## 2025-01-01 RX ORDER — PREDNISONE 20 MG/1
20 TABLET ORAL DAILY
Refills: 0 | Status: DISCONTINUED | OUTPATIENT
Start: 2025-01-01 | End: 2025-01-01

## 2025-01-01 RX ORDER — ACETYLCYSTEINE 200 MG/ML
4 INHALANT RESPIRATORY (INHALATION) EVERY 6 HOURS
Refills: 0 | Status: DISCONTINUED | OUTPATIENT
Start: 2025-01-01 | End: 2025-01-01

## 2025-01-01 RX ORDER — SENNA 187 MG
2 TABLET ORAL AT BEDTIME
Refills: 0 | Status: DISCONTINUED | OUTPATIENT
Start: 2025-01-01 | End: 2025-01-01

## 2025-01-01 RX ORDER — ATORVASTATIN CALCIUM 80 MG/1
40 TABLET, FILM COATED ORAL AT BEDTIME
Refills: 0 | Status: DISCONTINUED | OUTPATIENT
Start: 2025-01-01 | End: 2025-01-01

## 2025-01-01 RX ORDER — SODIUM CHLORIDE 9 G/1000ML
500 INJECTION, SOLUTION INTRAVENOUS ONCE
Refills: 0 | Status: COMPLETED | OUTPATIENT
Start: 2025-01-01 | End: 2025-01-01

## 2025-01-01 RX ORDER — IPRATROPIUM BROMIDE AND ALBUTEROL SULFATE .5; 2.5 MG/3ML; MG/3ML
3 SOLUTION RESPIRATORY (INHALATION)
Refills: 0 | Status: COMPLETED | OUTPATIENT
Start: 2025-01-01 | End: 2025-01-01

## 2025-01-01 RX ORDER — ISOSORBIDE MONONITRATE 60 MG/1
1 TABLET, EXTENDED RELEASE ORAL
Qty: 0 | Refills: 0 | DISCHARGE
Start: 2025-01-01

## 2025-01-01 RX ORDER — PIPERACILLIN-TAZO-DEXTROSE,ISO 3.375G/5
4.5 IV SOLUTION, PIGGYBACK PREMIX FROZEN(ML) INTRAVENOUS EVERY 8 HOURS
Refills: 0 | Status: DISCONTINUED | OUTPATIENT
Start: 2025-01-01 | End: 2025-01-01

## 2025-01-01 RX ORDER — PREDNISONE 20 MG/1
40 TABLET ORAL EVERY 24 HOURS
Refills: 0 | Status: DISCONTINUED | OUTPATIENT
Start: 2025-01-01 | End: 2025-01-01

## 2025-01-01 RX ORDER — PREDNISONE 20 MG/1
10 TABLET ORAL DAILY
Refills: 0 | Status: DISCONTINUED | OUTPATIENT
Start: 2025-01-01 | End: 2025-01-01

## 2025-01-01 RX ORDER — ISOSORBIDE MONONITRATE 60 MG/1
30 TABLET, EXTENDED RELEASE ORAL DAILY
Refills: 0 | Status: DISCONTINUED | OUTPATIENT
Start: 2025-01-01 | End: 2025-01-01

## 2025-01-01 RX ORDER — METHYLPREDNISOLONE ACETATE 80 MG/ML
40 INJECTION, SUSPENSION INTRA-ARTICULAR; INTRALESIONAL; INTRAMUSCULAR; SOFT TISSUE EVERY 24 HOURS
Refills: 0 | Status: DISCONTINUED | OUTPATIENT
Start: 2025-01-01 | End: 2025-01-01

## 2025-01-01 RX ORDER — CEFTRIAXONE 500 MG/1
1000 INJECTION, POWDER, FOR SOLUTION INTRAMUSCULAR; INTRAVENOUS EVERY 24 HOURS
Refills: 0 | Status: COMPLETED | OUTPATIENT
Start: 2025-01-01 | End: 2025-01-01

## 2025-01-01 RX ORDER — FUROSEMIDE 10 MG/ML
20 INJECTION INTRAMUSCULAR; INTRAVENOUS
Refills: 0 | Status: DISCONTINUED | OUTPATIENT
Start: 2025-01-01 | End: 2025-01-01

## 2025-01-01 RX ORDER — FUROSEMIDE 10 MG/ML
20 INJECTION INTRAMUSCULAR; INTRAVENOUS ONCE
Refills: 0 | Status: COMPLETED | OUTPATIENT
Start: 2025-01-01 | End: 2025-01-01

## 2025-01-01 RX ORDER — TRAMADOL HYDROCHLORIDE AND ACETAMINOPHEN 37.5; 325 MG/1; MG/1
37.5-325 TABLET, FILM COATED ORAL
Qty: 20 | Refills: 0 | Status: ACTIVE | COMMUNITY
Start: 2025-01-01 | End: 1900-01-01

## 2025-01-01 RX ORDER — HEPARIN SODIUM 1000 [USP'U]/ML
5000 INJECTION INTRAVENOUS; SUBCUTANEOUS EVERY 8 HOURS
Refills: 0 | Status: DISCONTINUED | OUTPATIENT
Start: 2025-01-01 | End: 2025-01-01

## 2025-01-01 RX ORDER — LIDOCAINE HYDROCHLORIDE 20 MG/ML
1 JELLY TOPICAL EVERY 24 HOURS
Refills: 0 | Status: DISCONTINUED | OUTPATIENT
Start: 2025-01-01 | End: 2025-01-01

## 2025-01-01 RX ORDER — ACETAMINOPHEN 500 MG/5ML
1000 LIQUID (ML) ORAL ONCE
Refills: 0 | Status: COMPLETED | OUTPATIENT
Start: 2025-01-01 | End: 2025-01-01

## 2025-01-01 RX ORDER — HYDROXYZINE HYDROCHLORIDE 25 MG/1
25 TABLET, FILM COATED ORAL ONCE
Refills: 0 | Status: DISCONTINUED | OUTPATIENT
Start: 2025-01-01 | End: 2025-01-01

## 2025-01-01 RX ORDER — METOPROLOL SUCCINATE 50 MG/1
1 TABLET, EXTENDED RELEASE ORAL
Qty: 30 | Refills: 0
Start: 2025-01-01 | End: 2025-04-30

## 2025-01-01 RX ORDER — ACETAMINOPHEN 500 MG/5ML
650 LIQUID (ML) ORAL EVERY 6 HOURS
Refills: 0 | Status: DISCONTINUED | OUTPATIENT
Start: 2025-01-01 | End: 2025-01-01

## 2025-01-01 RX ORDER — PREDNISONE 20 MG/1
2 TABLET ORAL
Qty: 12 | Refills: 0
Start: 2025-01-01 | End: 2025-01-01

## 2025-01-01 RX ADMIN — ESCITALOPRAM OXALATE 10 MILLIGRAM(S): 20 TABLET ORAL at 00:53

## 2025-01-01 RX ADMIN — IPRATROPIUM BROMIDE AND ALBUTEROL SULFATE 3 MILLILITER(S): .5; 2.5 SOLUTION RESPIRATORY (INHALATION) at 23:55

## 2025-01-01 RX ADMIN — Medication 81 MILLIGRAM(S): at 12:36

## 2025-01-01 RX ADMIN — TAMSULOSIN HYDROCHLORIDE 0.8 MILLIGRAM(S): 0.4 CAPSULE ORAL at 22:52

## 2025-01-01 RX ADMIN — ESCITALOPRAM OXALATE 10 MILLIGRAM(S): 20 TABLET ORAL at 16:52

## 2025-01-01 RX ADMIN — IPRATROPIUM BROMIDE AND ALBUTEROL SULFATE 3 MILLILITER(S): .5; 2.5 SOLUTION RESPIRATORY (INHALATION) at 17:20

## 2025-01-01 RX ADMIN — ESCITALOPRAM OXALATE 10 MILLIGRAM(S): 20 TABLET ORAL at 18:21

## 2025-01-01 RX ADMIN — Medication 1 APPLICATION(S): at 08:23

## 2025-01-01 RX ADMIN — ACETYLCYSTEINE 4 MILLILITER(S): 200 INHALANT RESPIRATORY (INHALATION) at 14:29

## 2025-01-01 RX ADMIN — Medication 25 GRAM(S): at 07:11

## 2025-01-01 RX ADMIN — Medication 100 MILLIGRAM(S): at 15:12

## 2025-01-01 RX ADMIN — ACETYLCYSTEINE 4 MILLILITER(S): 200 INHALANT RESPIRATORY (INHALATION) at 21:36

## 2025-01-01 RX ADMIN — POLYETHYLENE GLYCOL 3350 17 GRAM(S): 17 POWDER, FOR SOLUTION ORAL at 17:20

## 2025-01-01 RX ADMIN — Medication 81 MILLIGRAM(S): at 12:28

## 2025-01-01 RX ADMIN — Medication 4 MILLILITER(S): at 22:00

## 2025-01-01 RX ADMIN — ATORVASTATIN CALCIUM 40 MILLIGRAM(S): 80 TABLET, FILM COATED ORAL at 22:16

## 2025-01-01 RX ADMIN — ISOSORBIDE MONONITRATE 30 MILLIGRAM(S): 60 TABLET, EXTENDED RELEASE ORAL at 11:40

## 2025-01-01 RX ADMIN — APIXABAN 2.5 MILLIGRAM(S): 2.5 TABLET, FILM COATED ORAL at 06:27

## 2025-01-01 RX ADMIN — FUROSEMIDE 20 MILLIGRAM(S): 10 INJECTION INTRAMUSCULAR; INTRAVENOUS at 09:11

## 2025-01-01 RX ADMIN — Medication 4 MILLILITER(S): at 09:33

## 2025-01-01 RX ADMIN — HEPARIN SODIUM 5000 UNIT(S): 1000 INJECTION INTRAVENOUS; SUBCUTANEOUS at 21:36

## 2025-01-01 RX ADMIN — TAMSULOSIN HYDROCHLORIDE 0.4 MILLIGRAM(S): 0.4 CAPSULE ORAL at 21:19

## 2025-01-01 RX ADMIN — Medication 25 GRAM(S): at 22:18

## 2025-01-01 RX ADMIN — Medication 650 MILLIGRAM(S): at 01:00

## 2025-01-01 RX ADMIN — IPRATROPIUM BROMIDE AND ALBUTEROL SULFATE 3 MILLILITER(S): .5; 2.5 SOLUTION RESPIRATORY (INHALATION) at 06:59

## 2025-01-01 RX ADMIN — IPRATROPIUM BROMIDE AND ALBUTEROL SULFATE 3 MILLILITER(S): .5; 2.5 SOLUTION RESPIRATORY (INHALATION) at 00:03

## 2025-01-01 RX ADMIN — Medication 81 MILLIGRAM(S): at 12:16

## 2025-01-01 RX ADMIN — Medication 0.5 MILLIGRAM(S): at 12:13

## 2025-01-01 RX ADMIN — LIDOCAINE HYDROCHLORIDE 1 PATCH: 20 JELLY TOPICAL at 19:53

## 2025-01-01 RX ADMIN — HEPARIN SODIUM 5000 UNIT(S): 1000 INJECTION INTRAVENOUS; SUBCUTANEOUS at 14:29

## 2025-01-01 RX ADMIN — TRAMADOL HYDROCHLORIDE 12.5 MILLIGRAM(S): 50 TABLET, FILM COATED ORAL at 22:33

## 2025-01-01 RX ADMIN — IPRATROPIUM BROMIDE AND ALBUTEROL SULFATE 3 MILLILITER(S): .5; 2.5 SOLUTION RESPIRATORY (INHALATION) at 17:38

## 2025-01-01 RX ADMIN — IPRATROPIUM BROMIDE AND ALBUTEROL SULFATE 3 MILLILITER(S): .5; 2.5 SOLUTION RESPIRATORY (INHALATION) at 22:18

## 2025-01-01 RX ADMIN — TRAMADOL HYDROCHLORIDE 25 MILLIGRAM(S): 50 TABLET, FILM COATED ORAL at 18:13

## 2025-01-01 RX ADMIN — Medication 255 MILLIGRAM(S): at 02:05

## 2025-01-01 RX ADMIN — Medication 10 MILLIGRAM(S): at 08:47

## 2025-01-01 RX ADMIN — LOSARTAN POTASSIUM 25 MILLIGRAM(S): 100 TABLET, FILM COATED ORAL at 17:13

## 2025-01-01 RX ADMIN — POLYETHYLENE GLYCOL 3350 17 GRAM(S): 17 POWDER, FOR SOLUTION ORAL at 12:28

## 2025-01-01 RX ADMIN — FUROSEMIDE 20 MILLIGRAM(S): 10 INJECTION INTRAMUSCULAR; INTRAVENOUS at 02:03

## 2025-01-01 RX ADMIN — PREDNISONE 40 MILLIGRAM(S): 20 TABLET ORAL at 21:20

## 2025-01-01 RX ADMIN — ISOSORBIDE MONONITRATE 30 MILLIGRAM(S): 60 TABLET, EXTENDED RELEASE ORAL at 12:16

## 2025-01-01 RX ADMIN — TIOTROPIUM BROMIDE INHALATION SPRAY 2 PUFF(S): 3.12 SPRAY, METERED RESPIRATORY (INHALATION) at 10:50

## 2025-01-01 RX ADMIN — POLYETHYLENE GLYCOL 3350 17 GRAM(S): 17 POWDER, FOR SOLUTION ORAL at 12:16

## 2025-01-01 RX ADMIN — TAMSULOSIN HYDROCHLORIDE 0.4 MILLIGRAM(S): 0.4 CAPSULE ORAL at 21:18

## 2025-01-01 RX ADMIN — METHYLPREDNISOLONE ACETATE 40 MILLIGRAM(S): 80 INJECTION, SUSPENSION INTRA-ARTICULAR; INTRALESIONAL; INTRAMUSCULAR; SOFT TISSUE at 17:47

## 2025-01-01 RX ADMIN — Medication 2 TABLET(S): at 21:36

## 2025-01-01 RX ADMIN — Medication 2 TABLET(S): at 21:43

## 2025-01-01 RX ADMIN — PREDNISONE 10 MILLIGRAM(S): 20 TABLET ORAL at 06:24

## 2025-01-01 RX ADMIN — TRAMADOL HYDROCHLORIDE 12.5 MILLIGRAM(S): 50 TABLET, FILM COATED ORAL at 23:30

## 2025-01-01 RX ADMIN — Medication 4 MILLILITER(S): at 10:19

## 2025-01-01 RX ADMIN — Medication 25 GRAM(S): at 06:10

## 2025-01-01 RX ADMIN — PREDNISONE 40 MILLIGRAM(S): 20 TABLET ORAL at 21:59

## 2025-01-01 RX ADMIN — IPRATROPIUM BROMIDE AND ALBUTEROL SULFATE 3 MILLILITER(S): .5; 2.5 SOLUTION RESPIRATORY (INHALATION) at 06:30

## 2025-01-01 RX ADMIN — ACETYLCYSTEINE 4 MILLILITER(S): 200 INHALANT RESPIRATORY (INHALATION) at 05:34

## 2025-01-01 RX ADMIN — IPRATROPIUM BROMIDE AND ALBUTEROL SULFATE 3 MILLILITER(S): .5; 2.5 SOLUTION RESPIRATORY (INHALATION) at 22:08

## 2025-01-01 RX ADMIN — TIOTROPIUM BROMIDE INHALATION SPRAY 2 PUFF(S): 3.12 SPRAY, METERED RESPIRATORY (INHALATION) at 19:07

## 2025-01-01 RX ADMIN — ACETYLCYSTEINE 4 MILLILITER(S): 200 INHALANT RESPIRATORY (INHALATION) at 03:35

## 2025-01-01 RX ADMIN — Medication 500 MILLILITER(S): at 16:47

## 2025-01-01 RX ADMIN — Medication 81 MILLIGRAM(S): at 11:30

## 2025-01-01 RX ADMIN — IPRATROPIUM BROMIDE AND ALBUTEROL SULFATE 3 MILLILITER(S): .5; 2.5 SOLUTION RESPIRATORY (INHALATION) at 03:22

## 2025-01-01 RX ADMIN — Medication 650 MILLIGRAM(S): at 16:45

## 2025-01-01 RX ADMIN — HEPARIN SODIUM 5000 UNIT(S): 1000 INJECTION INTRAVENOUS; SUBCUTANEOUS at 15:11

## 2025-01-01 RX ADMIN — IPRATROPIUM BROMIDE AND ALBUTEROL SULFATE 3 MILLILITER(S): .5; 2.5 SOLUTION RESPIRATORY (INHALATION) at 14:25

## 2025-01-01 RX ADMIN — TIOTROPIUM BROMIDE INHALATION SPRAY 2 PUFF(S): 3.12 SPRAY, METERED RESPIRATORY (INHALATION) at 10:20

## 2025-01-01 RX ADMIN — IPRATROPIUM BROMIDE AND ALBUTEROL SULFATE 3 MILLILITER(S): .5; 2.5 SOLUTION RESPIRATORY (INHALATION) at 12:18

## 2025-01-01 RX ADMIN — METOPROLOL SUCCINATE 50 MILLIGRAM(S): 50 TABLET, EXTENDED RELEASE ORAL at 06:12

## 2025-01-01 RX ADMIN — CEFTRIAXONE 100 MILLIGRAM(S): 500 INJECTION, POWDER, FOR SOLUTION INTRAMUSCULAR; INTRAVENOUS at 02:07

## 2025-01-01 RX ADMIN — ROSUVASTATIN CALCIUM 10 MILLIGRAM(S): 5 TABLET, FILM COATED ORAL at 22:01

## 2025-01-01 RX ADMIN — ATORVASTATIN CALCIUM 40 MILLIGRAM(S): 80 TABLET, FILM COATED ORAL at 22:51

## 2025-01-01 RX ADMIN — CEFTRIAXONE 100 MILLIGRAM(S): 500 INJECTION, POWDER, FOR SOLUTION INTRAMUSCULAR; INTRAVENOUS at 02:58

## 2025-01-01 RX ADMIN — TAMSULOSIN HYDROCHLORIDE 0.4 MILLIGRAM(S): 0.4 CAPSULE ORAL at 21:42

## 2025-01-01 RX ADMIN — HEPARIN SODIUM 5000 UNIT(S): 1000 INJECTION INTRAVENOUS; SUBCUTANEOUS at 07:11

## 2025-01-01 RX ADMIN — TRAMADOL HYDROCHLORIDE 25 MILLIGRAM(S): 50 TABLET, FILM COATED ORAL at 16:48

## 2025-01-01 RX ADMIN — IPRATROPIUM BROMIDE AND ALBUTEROL SULFATE 3 MILLILITER(S): .5; 2.5 SOLUTION RESPIRATORY (INHALATION) at 14:45

## 2025-01-01 RX ADMIN — POLYETHYLENE GLYCOL 3350 17 GRAM(S): 17 POWDER, FOR SOLUTION ORAL at 11:41

## 2025-01-01 RX ADMIN — OLANZAPINE 5 MILLIGRAM(S): 10 TABLET ORAL at 15:02

## 2025-01-01 RX ADMIN — Medication 2 TABLET(S): at 22:16

## 2025-01-01 RX ADMIN — ACETYLCYSTEINE 4 MILLILITER(S): 200 INHALANT RESPIRATORY (INHALATION) at 05:17

## 2025-01-01 RX ADMIN — Medication 25 GRAM(S): at 06:22

## 2025-01-01 RX ADMIN — Medication 1 TABLET(S): at 17:13

## 2025-01-01 RX ADMIN — Medication 25 GRAM(S): at 22:08

## 2025-01-01 RX ADMIN — POLYETHYLENE GLYCOL 3350 17 GRAM(S): 17 POWDER, FOR SOLUTION ORAL at 12:30

## 2025-01-01 RX ADMIN — HEPARIN SODIUM 5000 UNIT(S): 1000 INJECTION INTRAVENOUS; SUBCUTANEOUS at 22:51

## 2025-01-01 RX ADMIN — HEPARIN SODIUM 5000 UNIT(S): 1000 INJECTION INTRAVENOUS; SUBCUTANEOUS at 22:43

## 2025-01-01 RX ADMIN — CEFTRIAXONE 100 MILLIGRAM(S): 500 INJECTION, POWDER, FOR SOLUTION INTRAMUSCULAR; INTRAVENOUS at 01:28

## 2025-01-01 RX ADMIN — TAMSULOSIN HYDROCHLORIDE 0.8 MILLIGRAM(S): 0.4 CAPSULE ORAL at 21:35

## 2025-01-01 RX ADMIN — ACETYLCYSTEINE 4 MILLILITER(S): 200 INHALANT RESPIRATORY (INHALATION) at 03:33

## 2025-01-01 RX ADMIN — LIDOCAINE HYDROCHLORIDE 1 PATCH: 20 JELLY TOPICAL at 04:51

## 2025-01-01 RX ADMIN — SODIUM CHLORIDE 166.67 MILLILITER(S): 9 INJECTION, SOLUTION INTRAVENOUS at 14:11

## 2025-01-01 RX ADMIN — ESCITALOPRAM OXALATE 10 MILLIGRAM(S): 20 TABLET ORAL at 17:37

## 2025-01-01 RX ADMIN — Medication 1 APPLICATION(S): at 06:11

## 2025-01-01 RX ADMIN — PREDNISONE 10 MILLIGRAM(S): 20 TABLET ORAL at 21:18

## 2025-01-01 RX ADMIN — Medication 500 MILLIGRAM(S): at 08:34

## 2025-01-01 RX ADMIN — Medication 1 APPLICATION(S): at 07:28

## 2025-01-01 RX ADMIN — TIOTROPIUM BROMIDE INHALATION SPRAY 2 PUFF(S): 3.12 SPRAY, METERED RESPIRATORY (INHALATION) at 09:17

## 2025-01-01 RX ADMIN — ESCITALOPRAM OXALATE 10 MILLIGRAM(S): 20 TABLET ORAL at 17:01

## 2025-01-01 RX ADMIN — HEPARIN SODIUM 5000 UNIT(S): 1000 INJECTION INTRAVENOUS; SUBCUTANEOUS at 22:08

## 2025-01-01 RX ADMIN — IPRATROPIUM BROMIDE AND ALBUTEROL SULFATE 3 MILLILITER(S): .5; 2.5 SOLUTION RESPIRATORY (INHALATION) at 08:33

## 2025-01-01 RX ADMIN — APIXABAN 2.5 MILLIGRAM(S): 2.5 TABLET, FILM COATED ORAL at 17:01

## 2025-01-01 RX ADMIN — IPRATROPIUM BROMIDE AND ALBUTEROL SULFATE 3 MILLILITER(S): .5; 2.5 SOLUTION RESPIRATORY (INHALATION) at 23:58

## 2025-01-01 RX ADMIN — Medication 4 MILLILITER(S): at 09:15

## 2025-01-01 RX ADMIN — METHYLPREDNISOLONE ACETATE 40 MILLIGRAM(S): 80 INJECTION, SUSPENSION INTRA-ARTICULAR; INTRALESIONAL; INTRAMUSCULAR; SOFT TISSUE at 07:40

## 2025-01-01 RX ADMIN — ACETYLCYSTEINE 4 MILLILITER(S): 200 INHALANT RESPIRATORY (INHALATION) at 14:54

## 2025-01-01 RX ADMIN — IPRATROPIUM BROMIDE AND ALBUTEROL SULFATE 3 MILLILITER(S): .5; 2.5 SOLUTION RESPIRATORY (INHALATION) at 11:40

## 2025-01-01 RX ADMIN — ACETYLCYSTEINE 4 MILLILITER(S): 200 INHALANT RESPIRATORY (INHALATION) at 10:19

## 2025-01-01 RX ADMIN — IPRATROPIUM BROMIDE AND ALBUTEROL SULFATE 3 MILLILITER(S): .5; 2.5 SOLUTION RESPIRATORY (INHALATION) at 07:52

## 2025-01-01 RX ADMIN — ACETYLCYSTEINE 4 MILLILITER(S): 200 INHALANT RESPIRATORY (INHALATION) at 08:34

## 2025-01-01 RX ADMIN — IPRATROPIUM BROMIDE AND ALBUTEROL SULFATE 3 MILLILITER(S): .5; 2.5 SOLUTION RESPIRATORY (INHALATION) at 06:14

## 2025-01-01 RX ADMIN — ISOSORBIDE MONONITRATE 30 MILLIGRAM(S): 60 TABLET, EXTENDED RELEASE ORAL at 11:30

## 2025-01-01 RX ADMIN — IPRATROPIUM BROMIDE AND ALBUTEROL SULFATE 3 MILLILITER(S): .5; 2.5 SOLUTION RESPIRATORY (INHALATION) at 10:19

## 2025-01-01 RX ADMIN — Medication 4 MILLILITER(S): at 10:27

## 2025-01-01 RX ADMIN — IPRATROPIUM BROMIDE AND ALBUTEROL SULFATE 3 MILLILITER(S): .5; 2.5 SOLUTION RESPIRATORY (INHALATION) at 10:27

## 2025-01-01 RX ADMIN — ESCITALOPRAM OXALATE 10 MILLIGRAM(S): 20 TABLET ORAL at 17:48

## 2025-01-01 RX ADMIN — IPRATROPIUM BROMIDE AND ALBUTEROL SULFATE 3 MILLILITER(S): .5; 2.5 SOLUTION RESPIRATORY (INHALATION) at 06:07

## 2025-01-01 RX ADMIN — LIDOCAINE HYDROCHLORIDE 1 PATCH: 20 JELLY TOPICAL at 17:12

## 2025-01-01 RX ADMIN — QUETIAPINE FUMARATE 12.5 MILLIGRAM(S): 25 TABLET ORAL at 20:08

## 2025-01-01 RX ADMIN — Medication 100 MILLIGRAM(S): at 22:08

## 2025-01-01 RX ADMIN — LIDOCAINE HYDROCHLORIDE 1 PATCH: 20 JELLY TOPICAL at 16:47

## 2025-01-01 RX ADMIN — SODIUM CHLORIDE 1000 MILLILITER(S): 9 INJECTION, SOLUTION INTRAVENOUS at 13:43

## 2025-01-01 RX ADMIN — ISOSORBIDE MONONITRATE 30 MILLIGRAM(S): 60 TABLET, EXTENDED RELEASE ORAL at 12:36

## 2025-01-01 RX ADMIN — Medication 100 MILLIGRAM(S): at 22:53

## 2025-01-01 RX ADMIN — ESCITALOPRAM OXALATE 10 MILLIGRAM(S): 20 TABLET ORAL at 18:07

## 2025-01-01 RX ADMIN — LIDOCAINE HYDROCHLORIDE 1 PATCH: 20 JELLY TOPICAL at 07:33

## 2025-01-01 RX ADMIN — METOPROLOL SUCCINATE 50 MILLIGRAM(S): 50 TABLET, EXTENDED RELEASE ORAL at 07:12

## 2025-01-01 RX ADMIN — Medication 81 MILLIGRAM(S): at 10:48

## 2025-01-01 RX ADMIN — IPRATROPIUM BROMIDE AND ALBUTEROL SULFATE 3 MILLILITER(S): .5; 2.5 SOLUTION RESPIRATORY (INHALATION) at 22:42

## 2025-01-01 RX ADMIN — TAMSULOSIN HYDROCHLORIDE 0.4 MILLIGRAM(S): 0.4 CAPSULE ORAL at 21:59

## 2025-01-01 RX ADMIN — CEFTRIAXONE 100 MILLIGRAM(S): 500 INJECTION, POWDER, FOR SOLUTION INTRAMUSCULAR; INTRAVENOUS at 02:02

## 2025-01-01 RX ADMIN — Medication 25 GRAM(S): at 22:51

## 2025-01-01 RX ADMIN — IPRATROPIUM BROMIDE AND ALBUTEROL SULFATE 3 MILLILITER(S): .5; 2.5 SOLUTION RESPIRATORY (INHALATION) at 15:05

## 2025-01-01 RX ADMIN — ACETYLCYSTEINE 4 MILLILITER(S): 200 INHALANT RESPIRATORY (INHALATION) at 09:33

## 2025-01-01 RX ADMIN — IPRATROPIUM BROMIDE AND ALBUTEROL SULFATE 3 MILLILITER(S): .5; 2.5 SOLUTION RESPIRATORY (INHALATION) at 17:10

## 2025-01-01 RX ADMIN — TAMSULOSIN HYDROCHLORIDE 0.8 MILLIGRAM(S): 0.4 CAPSULE ORAL at 22:09

## 2025-01-01 RX ADMIN — HEPARIN SODIUM 5000 UNIT(S): 1000 INJECTION INTRAVENOUS; SUBCUTANEOUS at 22:16

## 2025-01-01 RX ADMIN — IPRATROPIUM BROMIDE AND ALBUTEROL SULFATE 3 MILLILITER(S): .5; 2.5 SOLUTION RESPIRATORY (INHALATION) at 03:35

## 2025-01-01 RX ADMIN — ATORVASTATIN CALCIUM 40 MILLIGRAM(S): 80 TABLET, FILM COATED ORAL at 22:43

## 2025-01-01 RX ADMIN — Medication 0.5 MILLIGRAM(S): at 08:33

## 2025-01-01 RX ADMIN — Medication 255 MILLIGRAM(S): at 02:44

## 2025-01-01 RX ADMIN — CEFTRIAXONE 100 MILLIGRAM(S): 500 INJECTION, POWDER, FOR SOLUTION INTRAMUSCULAR; INTRAVENOUS at 03:16

## 2025-01-01 RX ADMIN — IPRATROPIUM BROMIDE AND ALBUTEROL SULFATE 3 MILLILITER(S): .5; 2.5 SOLUTION RESPIRATORY (INHALATION) at 21:35

## 2025-01-01 RX ADMIN — HEPARIN SODIUM 5000 UNIT(S): 1000 INJECTION INTRAVENOUS; SUBCUTANEOUS at 14:54

## 2025-01-01 RX ADMIN — Medication 81 MILLIGRAM(S): at 12:44

## 2025-01-01 RX ADMIN — APIXABAN 2.5 MILLIGRAM(S): 2.5 TABLET, FILM COATED ORAL at 06:59

## 2025-01-01 RX ADMIN — Medication 25 GRAM(S): at 20:05

## 2025-01-01 RX ADMIN — ACETYLCYSTEINE 4 MILLILITER(S): 200 INHALANT RESPIRATORY (INHALATION) at 15:12

## 2025-01-01 RX ADMIN — HEPARIN SODIUM 5000 UNIT(S): 1000 INJECTION INTRAVENOUS; SUBCUTANEOUS at 06:22

## 2025-01-01 RX ADMIN — IPRATROPIUM BROMIDE AND ALBUTEROL SULFATE 3 MILLILITER(S): .5; 2.5 SOLUTION RESPIRATORY (INHALATION) at 09:10

## 2025-01-01 RX ADMIN — IPRATROPIUM BROMIDE AND ALBUTEROL SULFATE 3 MILLILITER(S): .5; 2.5 SOLUTION RESPIRATORY (INHALATION) at 05:56

## 2025-01-01 RX ADMIN — TRAMADOL HYDROCHLORIDE 25 MILLIGRAM(S): 50 TABLET, FILM COATED ORAL at 17:13

## 2025-01-01 RX ADMIN — ACETYLCYSTEINE 4 MILLILITER(S): 200 INHALANT RESPIRATORY (INHALATION) at 22:09

## 2025-01-01 RX ADMIN — METOPROLOL SUCCINATE 50 MILLIGRAM(S): 50 TABLET, EXTENDED RELEASE ORAL at 06:24

## 2025-01-01 RX ADMIN — IPRATROPIUM BROMIDE AND ALBUTEROL SULFATE 3 MILLILITER(S): .5; 2.5 SOLUTION RESPIRATORY (INHALATION) at 17:01

## 2025-01-01 RX ADMIN — IPRATROPIUM BROMIDE AND ALBUTEROL SULFATE 3 MILLILITER(S): .5; 2.5 SOLUTION RESPIRATORY (INHALATION) at 02:46

## 2025-01-01 RX ADMIN — ROSUVASTATIN CALCIUM 10 MILLIGRAM(S): 5 TABLET, FILM COATED ORAL at 21:59

## 2025-01-01 RX ADMIN — Medication 4 MILLILITER(S): at 22:43

## 2025-01-01 RX ADMIN — PREDNISONE 20 MILLIGRAM(S): 20 TABLET ORAL at 21:59

## 2025-01-01 RX ADMIN — Medication 4 MILLILITER(S): at 22:12

## 2025-01-01 RX ADMIN — BUDESONIDE 0.5 MILLIGRAM(S): 90 AEROSOL, POWDER RESPIRATORY (INHALATION) at 03:54

## 2025-01-01 RX ADMIN — ROSUVASTATIN CALCIUM 10 MILLIGRAM(S): 5 TABLET, FILM COATED ORAL at 21:20

## 2025-01-01 RX ADMIN — METHYLPREDNISOLONE ACETATE 20 MILLIGRAM(S): 80 INJECTION, SUSPENSION INTRA-ARTICULAR; INTRALESIONAL; INTRAMUSCULAR; SOFT TISSUE at 06:13

## 2025-01-01 RX ADMIN — ISOSORBIDE MONONITRATE 30 MILLIGRAM(S): 60 TABLET, EXTENDED RELEASE ORAL at 12:27

## 2025-01-01 RX ADMIN — Medication 4 MILLILITER(S): at 22:08

## 2025-01-01 RX ADMIN — FUROSEMIDE 20 MILLIGRAM(S): 10 INJECTION INTRAMUSCULAR; INTRAVENOUS at 07:34

## 2025-01-01 RX ADMIN — Medication 100 MILLIGRAM(S): at 06:33

## 2025-01-01 RX ADMIN — ISOSORBIDE MONONITRATE 30 MILLIGRAM(S): 60 TABLET, EXTENDED RELEASE ORAL at 10:48

## 2025-01-01 RX ADMIN — QUETIAPINE FUMARATE 12.5 MILLIGRAM(S): 25 TABLET ORAL at 18:21

## 2025-01-01 RX ADMIN — ACETYLCYSTEINE 4 MILLILITER(S): 200 INHALANT RESPIRATORY (INHALATION) at 14:12

## 2025-01-01 RX ADMIN — IPRATROPIUM BROMIDE AND ALBUTEROL SULFATE 3 MILLILITER(S): .5; 2.5 SOLUTION RESPIRATORY (INHALATION) at 05:17

## 2025-01-01 RX ADMIN — ATORVASTATIN CALCIUM 40 MILLIGRAM(S): 80 TABLET, FILM COATED ORAL at 21:35

## 2025-01-01 RX ADMIN — IPRATROPIUM BROMIDE AND ALBUTEROL SULFATE 3 MILLILITER(S): .5; 2.5 SOLUTION RESPIRATORY (INHALATION) at 12:16

## 2025-01-01 RX ADMIN — IPRATROPIUM BROMIDE AND ALBUTEROL SULFATE 3 MILLILITER(S): .5; 2.5 SOLUTION RESPIRATORY (INHALATION) at 11:44

## 2025-01-01 RX ADMIN — Medication 2 TABLET(S): at 22:00

## 2025-01-01 RX ADMIN — ATORVASTATIN CALCIUM 40 MILLIGRAM(S): 80 TABLET, FILM COATED ORAL at 22:09

## 2025-01-01 RX ADMIN — TRAMADOL HYDROCHLORIDE 12.5 MILLIGRAM(S): 50 TABLET, FILM COATED ORAL at 19:18

## 2025-01-01 RX ADMIN — Medication 2 TABLET(S): at 22:42

## 2025-01-01 RX ADMIN — IPRATROPIUM BROMIDE AND ALBUTEROL SULFATE 3 MILLILITER(S): .5; 2.5 SOLUTION RESPIRATORY (INHALATION) at 00:55

## 2025-01-01 RX ADMIN — Medication 25 GRAM(S): at 13:04

## 2025-01-01 RX ADMIN — Medication 25 GRAM(S): at 14:54

## 2025-01-01 RX ADMIN — IPRATROPIUM BROMIDE AND ALBUTEROL SULFATE 3 MILLILITER(S): .5; 2.5 SOLUTION RESPIRATORY (INHALATION) at 09:33

## 2025-01-01 RX ADMIN — IPRATROPIUM BROMIDE AND ALBUTEROL SULFATE 3 MILLILITER(S): .5; 2.5 SOLUTION RESPIRATORY (INHALATION) at 12:31

## 2025-01-01 RX ADMIN — IPRATROPIUM BROMIDE AND ALBUTEROL SULFATE 3 MILLILITER(S): .5; 2.5 SOLUTION RESPIRATORY (INHALATION) at 00:50

## 2025-01-01 RX ADMIN — Medication 25 GRAM(S): at 15:12

## 2025-01-01 RX ADMIN — Medication 100 MILLIGRAM(S): at 06:22

## 2025-01-01 RX ADMIN — HEPARIN SODIUM 5000 UNIT(S): 1000 INJECTION INTRAVENOUS; SUBCUTANEOUS at 13:04

## 2025-01-01 RX ADMIN — IPRATROPIUM BROMIDE AND ALBUTEROL SULFATE 3 MILLILITER(S): .5; 2.5 SOLUTION RESPIRATORY (INHALATION) at 00:02

## 2025-01-01 RX ADMIN — ISOSORBIDE MONONITRATE 30 MILLIGRAM(S): 60 TABLET, EXTENDED RELEASE ORAL at 12:44

## 2025-01-01 RX ADMIN — IPRATROPIUM BROMIDE AND ALBUTEROL SULFATE 3 MILLILITER(S): .5; 2.5 SOLUTION RESPIRATORY (INHALATION) at 12:36

## 2025-01-01 RX ADMIN — APIXABAN 2.5 MILLIGRAM(S): 2.5 TABLET, FILM COATED ORAL at 06:14

## 2025-01-01 RX ADMIN — ACETYLCYSTEINE 4 MILLILITER(S): 200 INHALANT RESPIRATORY (INHALATION) at 22:18

## 2025-01-01 RX ADMIN — Medication 25 GRAM(S): at 06:28

## 2025-01-01 RX ADMIN — Medication 25 GRAM(S): at 14:35

## 2025-01-01 RX ADMIN — Medication 81 MILLIGRAM(S): at 17:21

## 2025-01-01 RX ADMIN — Medication 650 MILLIGRAM(S): at 00:01

## 2025-01-01 RX ADMIN — IPRATROPIUM BROMIDE AND ALBUTEROL SULFATE 3 MILLILITER(S): .5; 2.5 SOLUTION RESPIRATORY (INHALATION) at 18:07

## 2025-01-01 RX ADMIN — IPRATROPIUM BROMIDE AND ALBUTEROL SULFATE 3 MILLILITER(S): .5; 2.5 SOLUTION RESPIRATORY (INHALATION) at 14:29

## 2025-01-01 RX ADMIN — Medication 1 TABLET(S): at 12:45

## 2025-01-01 RX ADMIN — ACETYLCYSTEINE 4 MILLILITER(S): 200 INHALANT RESPIRATORY (INHALATION) at 10:27

## 2025-01-01 RX ADMIN — Medication 25 GRAM(S): at 21:35

## 2025-01-01 RX ADMIN — Medication 650 MILLIGRAM(S): at 15:59

## 2025-01-01 RX ADMIN — PREDNISONE 40 MILLIGRAM(S): 20 TABLET ORAL at 22:47

## 2025-01-01 RX ADMIN — Medication 1000 MILLIGRAM(S): at 16:20

## 2025-01-01 RX ADMIN — IPRATROPIUM BROMIDE AND ALBUTEROL SULFATE 3 MILLILITER(S): .5; 2.5 SOLUTION RESPIRATORY (INHALATION) at 17:13

## 2025-01-01 RX ADMIN — Medication 100 MILLIGRAM(S): at 14:35

## 2025-01-01 RX ADMIN — Medication 500 MILLIGRAM(S): at 07:11

## 2025-01-01 RX ADMIN — LOSARTAN POTASSIUM 25 MILLIGRAM(S): 100 TABLET, FILM COATED ORAL at 17:21

## 2025-01-01 RX ADMIN — ESCITALOPRAM OXALATE 10 MILLIGRAM(S): 20 TABLET ORAL at 19:09

## 2025-01-01 RX ADMIN — APIXABAN 2.5 MILLIGRAM(S): 2.5 TABLET, FILM COATED ORAL at 18:07

## 2025-01-01 RX ADMIN — METHYLPREDNISOLONE ACETATE 40 MILLIGRAM(S): 80 INJECTION, SUSPENSION INTRA-ARTICULAR; INTRALESIONAL; INTRAMUSCULAR; SOFT TISSUE at 08:33

## 2025-01-01 RX ADMIN — Medication 81 MILLIGRAM(S): at 12:02

## 2025-01-01 RX ADMIN — IPRATROPIUM BROMIDE AND ALBUTEROL SULFATE 3 MILLILITER(S): .5; 2.5 SOLUTION RESPIRATORY (INHALATION) at 19:13

## 2025-01-01 RX ADMIN — HEPARIN SODIUM 5000 UNIT(S): 1000 INJECTION INTRAVENOUS; SUBCUTANEOUS at 08:33

## 2025-01-01 RX ADMIN — Medication 4 MILLILITER(S): at 19:14

## 2025-01-01 RX ADMIN — ESCITALOPRAM OXALATE 10 MILLIGRAM(S): 20 TABLET ORAL at 17:13

## 2025-01-01 RX ADMIN — PREDNISONE 20 MILLIGRAM(S): 20 TABLET ORAL at 21:42

## 2025-01-01 RX ADMIN — FUROSEMIDE 20 MILLIGRAM(S): 10 INJECTION INTRAMUSCULAR; INTRAVENOUS at 08:33

## 2025-01-01 RX ADMIN — ISOSORBIDE MONONITRATE 30 MILLIGRAM(S): 60 TABLET, EXTENDED RELEASE ORAL at 12:30

## 2025-01-01 RX ADMIN — IPRATROPIUM BROMIDE AND ALBUTEROL SULFATE 3 MILLILITER(S): .5; 2.5 SOLUTION RESPIRATORY (INHALATION) at 14:12

## 2025-01-01 RX ADMIN — Medication 1 APPLICATION(S): at 06:40

## 2025-01-01 RX ADMIN — METOPROLOL SUCCINATE 50 MILLIGRAM(S): 50 TABLET, EXTENDED RELEASE ORAL at 08:37

## 2025-01-01 RX ADMIN — Medication 81 MILLIGRAM(S): at 11:40

## 2025-01-01 RX ADMIN — TAMSULOSIN HYDROCHLORIDE 0.8 MILLIGRAM(S): 0.4 CAPSULE ORAL at 22:17

## 2025-01-01 RX ADMIN — ISOSORBIDE MONONITRATE 30 MILLIGRAM(S): 60 TABLET, EXTENDED RELEASE ORAL at 17:22

## 2025-01-01 RX ADMIN — Medication 25 GRAM(S): at 08:33

## 2025-01-01 RX ADMIN — Medication 100 MILLIGRAM(S): at 21:36

## 2025-01-01 RX ADMIN — TAMSULOSIN HYDROCHLORIDE 0.4 MILLIGRAM(S): 0.4 CAPSULE ORAL at 22:42

## 2025-01-01 RX ADMIN — TIOTROPIUM BROMIDE INHALATION SPRAY 2 PUFF(S): 3.12 SPRAY, METERED RESPIRATORY (INHALATION) at 09:37

## 2025-01-01 RX ADMIN — ESCITALOPRAM OXALATE 10 MILLIGRAM(S): 20 TABLET ORAL at 18:13

## 2025-01-01 RX ADMIN — IPRATROPIUM BROMIDE AND ALBUTEROL SULFATE 3 MILLILITER(S): .5; 2.5 SOLUTION RESPIRATORY (INHALATION) at 05:16

## 2025-01-01 RX ADMIN — ACETYLCYSTEINE 4 MILLILITER(S): 200 INHALANT RESPIRATORY (INHALATION) at 19:19

## 2025-01-01 RX ADMIN — Medication 4 MILLILITER(S): at 08:37

## 2025-01-01 RX ADMIN — ROSUVASTATIN CALCIUM 10 MILLIGRAM(S): 5 TABLET, FILM COATED ORAL at 22:51

## 2025-01-01 RX ADMIN — DEXAMETHASONE 6 MILLIGRAM(S): 0.5 TABLET ORAL at 02:02

## 2025-01-01 RX ADMIN — ACETYLCYSTEINE 4 MILLILITER(S): 200 INHALANT RESPIRATORY (INHALATION) at 09:20

## 2025-01-01 RX ADMIN — METOPROLOL SUCCINATE 50 MILLIGRAM(S): 50 TABLET, EXTENDED RELEASE ORAL at 06:27

## 2025-01-01 RX ADMIN — Medication 500 MILLIGRAM(S): at 11:44

## 2025-01-01 RX ADMIN — HEPARIN SODIUM 5000 UNIT(S): 1000 INJECTION INTRAVENOUS; SUBCUTANEOUS at 06:28

## 2025-01-01 RX ADMIN — ROSUVASTATIN CALCIUM 10 MILLIGRAM(S): 5 TABLET, FILM COATED ORAL at 21:19

## 2025-01-01 RX ADMIN — Medication 500 MILLIGRAM(S): at 07:34

## 2025-01-01 RX ADMIN — Medication 400 MILLIGRAM(S): at 15:44

## 2025-01-01 RX ADMIN — TAMSULOSIN HYDROCHLORIDE 0.4 MILLIGRAM(S): 0.4 CAPSULE ORAL at 22:47

## 2025-01-01 RX ADMIN — Medication 2 TABLET(S): at 21:19

## 2025-01-01 RX ADMIN — IPRATROPIUM BROMIDE AND ALBUTEROL SULFATE 3 MILLILITER(S): .5; 2.5 SOLUTION RESPIRATORY (INHALATION) at 15:12

## 2025-01-01 RX ADMIN — HEPARIN SODIUM 5000 UNIT(S): 1000 INJECTION INTRAVENOUS; SUBCUTANEOUS at 06:12

## 2025-01-01 RX ADMIN — ACETYLCYSTEINE 4 MILLILITER(S): 200 INHALANT RESPIRATORY (INHALATION) at 22:43

## 2025-01-01 RX ADMIN — IPRATROPIUM BROMIDE AND ALBUTEROL SULFATE 3 MILLILITER(S): .5; 2.5 SOLUTION RESPIRATORY (INHALATION) at 12:45

## 2025-01-01 RX ADMIN — ESCITALOPRAM OXALATE 10 MILLIGRAM(S): 20 TABLET ORAL at 17:21

## 2025-01-02 ENCOUNTER — NON-APPOINTMENT (OUTPATIENT)
Age: 89
End: 2025-01-02

## 2025-01-02 ENCOUNTER — APPOINTMENT (OUTPATIENT)
Dept: HEART AND VASCULAR | Facility: CLINIC | Age: 89
End: 2025-01-02
Payer: MEDICARE

## 2025-01-02 VITALS
SYSTOLIC BLOOD PRESSURE: 116 MMHG | HEIGHT: 65 IN | BODY MASS INDEX: 21.66 KG/M2 | WEIGHT: 130.03 LBS | TEMPERATURE: 97.1 F | DIASTOLIC BLOOD PRESSURE: 60 MMHG | OXYGEN SATURATION: 97 % | HEART RATE: 59 BPM

## 2025-01-02 VITALS — DIASTOLIC BLOOD PRESSURE: 58 MMHG | SYSTOLIC BLOOD PRESSURE: 84 MMHG

## 2025-01-02 DIAGNOSIS — I25.10 ATHEROSCLEROTIC HEART DISEASE OF NATIVE CORONARY ARTERY W/OUT ANGINA PECTORIS: ICD-10-CM

## 2025-01-02 DIAGNOSIS — E78.00 PURE HYPERCHOLESTEROLEMIA, UNSPECIFIED: ICD-10-CM

## 2025-01-02 PROBLEM — I51.3 LV (LEFT VENTRICULAR) MURAL THROMBUS: Status: ACTIVE | Noted: 2025-01-02

## 2025-01-02 PROBLEM — I50.20 HEART FAILURE WITH REDUCED EJECTION FRACTION: Status: ACTIVE | Noted: 2025-01-02

## 2025-01-02 PROCEDURE — 36415 COLL VENOUS BLD VENIPUNCTURE: CPT

## 2025-01-02 PROCEDURE — 93000 ELECTROCARDIOGRAM COMPLETE: CPT

## 2025-01-02 PROCEDURE — 99214 OFFICE O/P EST MOD 30 MIN: CPT

## 2025-01-02 RX ORDER — ISOSORBIDE MONONITRATE 30 MG/1
30 TABLET, EXTENDED RELEASE ORAL DAILY
Refills: 0 | Status: DISCONTINUED | COMMUNITY
End: 2025-01-02

## 2025-01-02 RX ORDER — APIXABAN 2.5 MG/1
2.5 TABLET, FILM COATED ORAL
Qty: 180 | Refills: 3 | Status: ACTIVE | COMMUNITY
Start: 2025-01-02 | End: 1900-01-01

## 2025-01-02 RX ORDER — OMEGA-3/DHA/EPA/FISH OIL 500-1000MG
CAPSULE ORAL
Refills: 0 | Status: ACTIVE | COMMUNITY

## 2025-01-02 RX ORDER — BERBERINE CHLOR/SEAWEED/CHROM 500-250 MG
500 CAPSULE ORAL
Refills: 0 | Status: ACTIVE | COMMUNITY

## 2025-01-06 LAB
ALBUMIN SERPL ELPH-MCNC: 3.6 G/DL
ALP BLD-CCNC: 50 U/L
ALT SERPL-CCNC: 19 U/L
ANION GAP SERPL CALC-SCNC: 13 MMOL/L
AST SERPL-CCNC: 16 U/L
BASOPHILS # BLD AUTO: 0.02 K/UL
BASOPHILS NFR BLD AUTO: 0.2 %
BILIRUB SERPL-MCNC: 0.8 MG/DL
BUN SERPL-MCNC: 26 MG/DL
CALCIUM SERPL-MCNC: 8.8 MG/DL
CHLORIDE SERPL-SCNC: 102 MMOL/L
CHOLEST SERPL-MCNC: 154 MG/DL
CO2 SERPL-SCNC: 22 MMOL/L
CREAT SERPL-MCNC: 1.6 MG/DL
EGFR: 40 ML/MIN/1.73M2
EOSINOPHIL # BLD AUTO: 0.03 K/UL
EOSINOPHIL NFR BLD AUTO: 0.3 %
ESTIMATED AVERAGE GLUCOSE: 128 MG/DL
GLUCOSE SERPL-MCNC: 120 MG/DL
HBA1C MFR BLD HPLC: 6.1 %
HCT VFR BLD CALC: 43.3 %
HDLC SERPL-MCNC: 36 MG/DL
HGB BLD-MCNC: 13.3 G/DL
IMM GRANULOCYTES NFR BLD AUTO: 0.8 %
LDLC SERPL CALC-MCNC: 86 MG/DL
LYMPHOCYTES # BLD AUTO: 1.47 K/UL
LYMPHOCYTES NFR BLD AUTO: 15 %
MAN DIFF?: NORMAL
MCHC RBC-ENTMCNC: 30.4 PG
MCHC RBC-ENTMCNC: 30.7 G/DL
MCV RBC AUTO: 99.1 FL
MONOCYTES # BLD AUTO: 0.41 K/UL
MONOCYTES NFR BLD AUTO: 4.2 %
NEUTROPHILS # BLD AUTO: 7.78 K/UL
NEUTROPHILS NFR BLD AUTO: 79.5 %
NONHDLC SERPL-MCNC: 117 MG/DL
NT-PROBNP SERPL-MCNC: 997 PG/ML
PLATELET # BLD AUTO: 151 K/UL
POTASSIUM SERPL-SCNC: 5.3 MMOL/L
PROT SERPL-MCNC: 5.5 G/DL
RBC # BLD: 4.37 M/UL
RBC # FLD: 14.3 %
SODIUM SERPL-SCNC: 138 MMOL/L
TRIGL SERPL-MCNC: 182 MG/DL
WBC # FLD AUTO: 9.79 K/UL

## 2025-01-07 NOTE — ED PROVIDER NOTE - NS ED MD DISPO DISCHARGE
Copied from CRM #[id number]. Topic: MW Referral/Order - MW Referral/Order Request  >> Jan 7, 2025  9:54 AM Day CASTELLANOS wrote:  Iglesia Cates called to request lab order. (If active order is available, go to \"Schedule Lab\"CRM or go to \"Faxing Order/ Referral to Clinical Orgs\" CRM).      Checked Lab Tab under Chart Review in the Contact Hub and confirmed Active order is not available; {Lab Order Request 2:[id number]}   Home

## 2025-01-09 ENCOUNTER — APPOINTMENT (OUTPATIENT)
Dept: INTERNAL MEDICINE | Facility: CLINIC | Age: 89
End: 2025-01-09
Payer: MEDICARE

## 2025-01-09 DIAGNOSIS — R73.03 PREDIABETES.: ICD-10-CM

## 2025-01-09 DIAGNOSIS — K43.2 INCISIONAL HERNIA W/OUT OBSTRUCTION OR GANGRENE: ICD-10-CM

## 2025-01-09 DIAGNOSIS — I50.20 UNSPECIFIED SYSTOLIC (CONGESTIVE) HEART FAILURE: ICD-10-CM

## 2025-01-09 DIAGNOSIS — N18.32 CHRONIC KIDNEY DISEASE, STAGE 3B: ICD-10-CM

## 2025-01-09 DIAGNOSIS — I51.3 INTRACARDIAC THROMBOSIS, NOT ELSEWHERE CLASSIFIED: ICD-10-CM

## 2025-01-09 PROCEDURE — 99212 OFFICE O/P EST SF 10 MIN: CPT

## 2025-01-14 ENCOUNTER — APPOINTMENT (OUTPATIENT)
Dept: PULMONOLOGY | Facility: CLINIC | Age: 89
End: 2025-01-14
Payer: MEDICARE

## 2025-01-14 PROCEDURE — 99213 OFFICE O/P EST LOW 20 MIN: CPT | Mod: 25

## 2025-01-14 PROCEDURE — 94010 BREATHING CAPACITY TEST: CPT

## 2025-01-14 RX ORDER — PREDNISONE 10 MG/1
10 TABLET ORAL
Qty: 90 | Refills: 3 | Status: ACTIVE | COMMUNITY
Start: 2025-01-14 | End: 1900-01-01

## 2025-01-27 ENCOUNTER — RX RENEWAL (OUTPATIENT)
Age: 89
End: 2025-01-27

## 2025-01-29 ENCOUNTER — APPOINTMENT (OUTPATIENT)
Dept: INTERNAL MEDICINE | Facility: CLINIC | Age: 89
End: 2025-01-29
Payer: MEDICARE

## 2025-01-29 VITALS
HEIGHT: 60 IN | DIASTOLIC BLOOD PRESSURE: 57 MMHG | SYSTOLIC BLOOD PRESSURE: 101 MMHG | WEIGHT: 133 LBS | BODY MASS INDEX: 26.11 KG/M2 | TEMPERATURE: 98.1 F

## 2025-01-29 DIAGNOSIS — K43.2 INCISIONAL HERNIA W/OUT OBSTRUCTION OR GANGRENE: ICD-10-CM

## 2025-01-29 DIAGNOSIS — J44.1 CHRONIC OBSTRUCTIVE PULMONARY DISEASE WITH (ACUTE) EXACERBATION: ICD-10-CM

## 2025-01-29 PROCEDURE — 99213 OFFICE O/P EST LOW 20 MIN: CPT

## 2025-02-03 ENCOUNTER — APPOINTMENT (OUTPATIENT)
Dept: HEART AND VASCULAR | Facility: CLINIC | Age: 89
End: 2025-02-03
Payer: MEDICARE

## 2025-02-03 VITALS
DIASTOLIC BLOOD PRESSURE: 76 MMHG | OXYGEN SATURATION: 96 % | HEIGHT: 60 IN | BODY MASS INDEX: 26.7 KG/M2 | SYSTOLIC BLOOD PRESSURE: 102 MMHG | WEIGHT: 136 LBS | HEART RATE: 59 BPM | TEMPERATURE: 98 F

## 2025-02-03 DIAGNOSIS — I51.3 INTRACARDIAC THROMBOSIS, NOT ELSEWHERE CLASSIFIED: ICD-10-CM

## 2025-02-03 DIAGNOSIS — I50.20 UNSPECIFIED SYSTOLIC (CONGESTIVE) HEART FAILURE: ICD-10-CM

## 2025-02-03 DIAGNOSIS — E78.00 PURE HYPERCHOLESTEROLEMIA, UNSPECIFIED: ICD-10-CM

## 2025-02-03 DIAGNOSIS — R73.03 PREDIABETES.: ICD-10-CM

## 2025-02-03 DIAGNOSIS — I25.10 ATHEROSCLEROTIC HEART DISEASE OF NATIVE CORONARY ARTERY W/OUT ANGINA PECTORIS: ICD-10-CM

## 2025-02-03 PROCEDURE — 99214 OFFICE O/P EST MOD 30 MIN: CPT

## 2025-02-11 DIAGNOSIS — S81.802A UNSPECIFIED OPEN WOUND, LEFT LOWER LEG, INITIAL ENCOUNTER: ICD-10-CM

## 2025-02-11 RX ORDER — GAUZE BANDAGE 4" X 4"
4"X4" BANDAGE TOPICAL
Qty: 3 | Refills: 0 | Status: ACTIVE | OUTPATIENT
Start: 2025-02-11

## 2025-02-11 RX ORDER — ADHESIVE TAPE 3"X 2.3 YD
2"X2" TAPE, NON-MEDICATED TOPICAL
Qty: 3 | Refills: 0 | Status: ACTIVE | OUTPATIENT
Start: 2025-02-11

## 2025-02-28 ENCOUNTER — APPOINTMENT (OUTPATIENT)
Dept: INTERNAL MEDICINE | Facility: CLINIC | Age: 89
End: 2025-02-28

## 2025-02-28 ENCOUNTER — EMERGENCY (EMERGENCY)
Facility: HOSPITAL | Age: 89
LOS: 1 days | Discharge: HOME | End: 2025-02-28
Attending: STUDENT IN AN ORGANIZED HEALTH CARE EDUCATION/TRAINING PROGRAM | Admitting: STUDENT IN AN ORGANIZED HEALTH CARE EDUCATION/TRAINING PROGRAM
Payer: MEDICARE

## 2025-02-28 VITALS
RESPIRATION RATE: 16 BRPM | OXYGEN SATURATION: 96 % | SYSTOLIC BLOOD PRESSURE: 109 MMHG | TEMPERATURE: 98 F | DIASTOLIC BLOOD PRESSURE: 55 MMHG

## 2025-02-28 VITALS
SYSTOLIC BLOOD PRESSURE: 158 MMHG | WEIGHT: 134.92 LBS | RESPIRATION RATE: 20 BRPM | HEART RATE: 56 BPM | OXYGEN SATURATION: 98 % | DIASTOLIC BLOOD PRESSURE: 67 MMHG

## 2025-02-28 DIAGNOSIS — Z90.2 ACQUIRED ABSENCE OF LUNG [PART OF]: Chronic | ICD-10-CM

## 2025-02-28 DIAGNOSIS — Z98.890 OTHER SPECIFIED POSTPROCEDURAL STATES: Chronic | ICD-10-CM

## 2025-02-28 LAB
ADD ON TEST-SPECIMEN IN LAB: SIGNIFICANT CHANGE UP
ALBUMIN SERPL ELPH-MCNC: 3.5 G/DL — SIGNIFICANT CHANGE UP (ref 3.3–5)
ALP SERPL-CCNC: 38 U/L — LOW (ref 40–120)
ALT FLD-CCNC: SIGNIFICANT CHANGE UP (ref 10–45)
ANION GAP SERPL CALC-SCNC: 12 MMOL/L — SIGNIFICANT CHANGE UP (ref 5–17)
APTT BLD: 24 SEC — LOW (ref 24.5–35.6)
AST SERPL-CCNC: SIGNIFICANT CHANGE UP (ref 10–40)
BASOPHILS # BLD AUTO: 0.05 K/UL — SIGNIFICANT CHANGE UP (ref 0–0.2)
BASOPHILS NFR BLD AUTO: 0.5 % — SIGNIFICANT CHANGE UP (ref 0–2)
BILIRUB DIRECT SERPL-MCNC: <0.1 MG/DL — SIGNIFICANT CHANGE UP (ref 0–0.3)
BILIRUB INDIRECT FLD-MCNC: SIGNIFICANT CHANGE UP MG/DL (ref 0.2–1)
BILIRUB SERPL-MCNC: 0.6 MG/DL — SIGNIFICANT CHANGE UP (ref 0.2–1.2)
BUN SERPL-MCNC: 39 MG/DL — HIGH (ref 7–23)
CALCIUM SERPL-MCNC: 9 MG/DL — SIGNIFICANT CHANGE UP (ref 8.4–10.5)
CHLORIDE SERPL-SCNC: 104 MMOL/L — SIGNIFICANT CHANGE UP (ref 96–108)
CO2 SERPL-SCNC: 25 MMOL/L — SIGNIFICANT CHANGE UP (ref 22–31)
CREAT SERPL-MCNC: 1.61 MG/DL — HIGH (ref 0.5–1.3)
EGFR: 40 ML/MIN/1.73M2 — LOW
EOSINOPHIL # BLD AUTO: 0.18 K/UL — SIGNIFICANT CHANGE UP (ref 0–0.5)
EOSINOPHIL NFR BLD AUTO: 1.7 % — SIGNIFICANT CHANGE UP (ref 0–6)
GLUCOSE SERPL-MCNC: 110 MG/DL — HIGH (ref 70–99)
HCT VFR BLD CALC: 43.1 % — SIGNIFICANT CHANGE UP (ref 39–50)
HGB BLD-MCNC: 13.5 G/DL — SIGNIFICANT CHANGE UP (ref 13–17)
IMM GRANULOCYTES NFR BLD AUTO: 0.8 % — SIGNIFICANT CHANGE UP (ref 0–0.9)
INR BLD: 0.95 — SIGNIFICANT CHANGE UP (ref 0.85–1.16)
LYMPHOCYTES # BLD AUTO: 2.47 K/UL — SIGNIFICANT CHANGE UP (ref 1–3.3)
LYMPHOCYTES # BLD AUTO: 23.4 % — SIGNIFICANT CHANGE UP (ref 13–44)
MCHC RBC-ENTMCNC: 30.3 PG — SIGNIFICANT CHANGE UP (ref 27–34)
MCHC RBC-ENTMCNC: 31.3 G/DL — LOW (ref 32–36)
MCV RBC AUTO: 96.9 FL — SIGNIFICANT CHANGE UP (ref 80–100)
MONOCYTES # BLD AUTO: 0.91 K/UL — HIGH (ref 0–0.9)
MONOCYTES NFR BLD AUTO: 8.6 % — SIGNIFICANT CHANGE UP (ref 2–14)
NEUTROPHILS # BLD AUTO: 6.87 K/UL — SIGNIFICANT CHANGE UP (ref 1.8–7.4)
NEUTROPHILS NFR BLD AUTO: 65 % — SIGNIFICANT CHANGE UP (ref 43–77)
NRBC BLD AUTO-RTO: 0 /100 WBCS — SIGNIFICANT CHANGE UP (ref 0–0)
NT-PROBNP SERPL-SCNC: 1026 PG/ML — HIGH (ref 0–300)
PLATELET # BLD AUTO: 144 K/UL — LOW (ref 150–400)
POTASSIUM SERPL-MCNC: 4.3 MMOL/L — SIGNIFICANT CHANGE UP (ref 3.5–5.3)
POTASSIUM SERPL-MCNC: SIGNIFICANT CHANGE UP (ref 3.5–5.3)
POTASSIUM SERPL-SCNC: 4.3 MMOL/L — SIGNIFICANT CHANGE UP (ref 3.5–5.3)
POTASSIUM SERPL-SCNC: SIGNIFICANT CHANGE UP (ref 3.5–5.3)
PROT SERPL-MCNC: 6.2 G/DL — SIGNIFICANT CHANGE UP (ref 6–8.3)
PROTHROM AB SERPL-ACNC: 11.1 SEC — SIGNIFICANT CHANGE UP (ref 9.9–13.4)
RBC # BLD: 4.45 M/UL — SIGNIFICANT CHANGE UP (ref 4.2–5.8)
RBC # FLD: 14.2 % — SIGNIFICANT CHANGE UP (ref 10.3–14.5)
SODIUM SERPL-SCNC: 141 MMOL/L — SIGNIFICANT CHANGE UP (ref 135–145)
TROPONIN T, HIGH SENSITIVITY RESULT: 39 NG/L — SIGNIFICANT CHANGE UP (ref 0–51)
TROPONIN T, HIGH SENSITIVITY RESULT: 41 NG/L — SIGNIFICANT CHANGE UP (ref 0–51)
WBC # BLD: 10.56 K/UL — HIGH (ref 3.8–10.5)
WBC # FLD AUTO: 10.56 K/UL — HIGH (ref 3.8–10.5)

## 2025-02-28 PROCEDURE — 84132 ASSAY OF SERUM POTASSIUM: CPT

## 2025-02-28 PROCEDURE — 99284 EMERGENCY DEPT VISIT MOD MDM: CPT | Mod: 25

## 2025-02-28 PROCEDURE — 71045 X-RAY EXAM CHEST 1 VIEW: CPT | Mod: 26

## 2025-02-28 PROCEDURE — 99285 EMERGENCY DEPT VISIT HI MDM: CPT

## 2025-02-28 PROCEDURE — 71045 X-RAY EXAM CHEST 1 VIEW: CPT

## 2025-02-28 PROCEDURE — 71275 CT ANGIOGRAPHY CHEST: CPT | Mod: 26

## 2025-02-28 PROCEDURE — 85610 PROTHROMBIN TIME: CPT

## 2025-02-28 PROCEDURE — 71275 CT ANGIOGRAPHY CHEST: CPT | Mod: MC

## 2025-02-28 PROCEDURE — 96374 THER/PROPH/DIAG INJ IV PUSH: CPT

## 2025-02-28 PROCEDURE — 36415 COLL VENOUS BLD VENIPUNCTURE: CPT

## 2025-02-28 PROCEDURE — 83880 ASSAY OF NATRIURETIC PEPTIDE: CPT

## 2025-02-28 PROCEDURE — 85730 THROMBOPLASTIN TIME PARTIAL: CPT

## 2025-02-28 PROCEDURE — 80076 HEPATIC FUNCTION PANEL: CPT

## 2025-02-28 PROCEDURE — 85025 COMPLETE CBC W/AUTO DIFF WBC: CPT

## 2025-02-28 PROCEDURE — 84484 ASSAY OF TROPONIN QUANT: CPT

## 2025-02-28 PROCEDURE — 80048 BASIC METABOLIC PNL TOTAL CA: CPT

## 2025-02-28 RX ORDER — AMOXICILLIN 500 MG/1
2 CAPSULE ORAL
Qty: 42 | Refills: 0
Start: 2025-02-28 | End: 2025-03-06

## 2025-02-28 RX ORDER — AZITHROMYCIN 250 MG
1 CAPSULE ORAL
Qty: 6 | Refills: 0
Start: 2025-02-28 | End: 2025-03-04

## 2025-02-28 RX ORDER — ACETAMINOPHEN 500 MG/5ML
1000 LIQUID (ML) ORAL ONCE
Refills: 0 | Status: COMPLETED | OUTPATIENT
Start: 2025-02-28 | End: 2025-02-28

## 2025-02-28 RX ADMIN — Medication 400 MILLIGRAM(S): at 09:56

## 2025-02-28 NOTE — ED ADULT NURSE NOTE - NSFALLHARMRISKINTERV_ED_ALL_ED

## 2025-02-28 NOTE — ED PROVIDER NOTE - NSFOLLOWUPINSTRUCTIONS_ED_ALL_ED_FT
Please take tylenol or motrin as needed. Stay well hydrated. Return for worsening symptoms, chest pain, shortness of breath, lightheadedness, dizziness. Otherwise, please follow up with your primary physician and cardiologist as needed.

## 2025-02-28 NOTE — ED PROVIDER NOTE - OBJECTIVE STATEMENT
89M retired physician PMH HTN, HLD, COPD, multivessel CAD (by CT), HFmrEF, LV thrombus on eliquis 2.5 mg BID, CKD3, BPH, NSCLC s/p lobectomy presenting with midsternal cp, pleuritic, radiating to back with sob X 2 days. Pt did have mechanical fall 5-6 days ago, no head trauma. Had some bruising of R breast. No significant pain afterwards. No fever, chills, nausea, vomiting, lightheadedness, dizziness. No leg pain or leg swelling. ROS as above.

## 2025-02-28 NOTE — ED PROVIDER NOTE - CLINICAL SUMMARY MEDICAL DECISION MAKING FREE TEXT BOX
89 yo with cp, exam nonfocal, ekg nonischemic, cannot perc out given pleuritic component, will obtain cta chest, also evaluate lung injury, rib injury, reassess

## 2025-02-28 NOTE — ED PROVIDER NOTE - PATIENT PORTAL LINK FT
You can access the FollowMyHealth Patient Portal offered by [facility] by registering at the following website: [email or web address]. By joining [employer]’s FollowMyHealth portal, you will also be able to view your health information using other applications (apps) compatible with our system.

## 2025-02-28 NOTE — ED ADULT TRIAGE NOTE - CHIEF COMPLAINT QUOTE
pt. hx of COPD, CKD, pt. of MD France, p/w diffuse chest pain since yesterday evening that can be felt in the back since this morning  with + SOB. Pt. audibly wheezing on arrival from exertion of walking to the ED. ekg done. BL BPs done

## 2025-02-28 NOTE — ED PROVIDER NOTE - PHYSICAL EXAMINATION
general: Well appearing, in no acute distress  HEENT: Normocephalic, atraumatic, extraocular movements intact  CV: Regular rate  Pulm: No respiratory distress, no tachypnea, ctab, no wrr  Abd: Flat, no gross distension  Ext: warm and well perfused, 2+ radial pulses bl  Skin: No gross rashes or lesions  Neuro: Alert and oriented, moving all extremities

## 2025-02-28 NOTE — ED ADULT NURSE NOTE - NSICDXPASTMEDICALHX_GEN_ALL_CORE_FT
PAST MEDICAL HISTORY:  BPH (benign prostatic hyperplasia)     COPD (chronic obstructive pulmonary disease)     High cholesterol     History of repair of hip fracture     HTN (hypertension)     Inguinal hernia     Lung cancer     Pre-existing type 2 diabetes mellitus     Preop examination

## 2025-02-28 NOTE — ED ADULT NURSE NOTE - NS ED NURSE DC PT EDUCATION RESOURCES
None needed Bcc Histology Text: There were nodular aggregates of basaloid cells within the dermis accompanied by slit-like retractions.

## 2025-02-28 NOTE — ED ADULT NURSE NOTE - OBJECTIVE STATEMENT
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 Pt reports having a mechanical fall 5 days ago, denies head strike, LOC. Pt denies leg pain, leg swelling, f/c, n/v, dizziness. Pt connected to continuous pulse oximetry and cardiac monitoring. EKG done and signed.  On exam, bruising noted on L breast, bruising with drainage noted on R arm covered in dressing. Pt also reports chronic leg wounds on b/l legs since starting steroids 1.5 years ago.

## 2025-03-03 DIAGNOSIS — R07.89 OTHER CHEST PAIN: ICD-10-CM

## 2025-03-03 DIAGNOSIS — J44.9 CHRONIC OBSTRUCTIVE PULMONARY DISEASE, UNSPECIFIED: ICD-10-CM

## 2025-03-03 DIAGNOSIS — R06.02 SHORTNESS OF BREATH: ICD-10-CM

## 2025-03-03 DIAGNOSIS — I50.20 UNSPECIFIED SYSTOLIC (CONGESTIVE) HEART FAILURE: ICD-10-CM

## 2025-03-03 DIAGNOSIS — S20.01XA CONTUSION OF RIGHT BREAST, INITIAL ENCOUNTER: ICD-10-CM

## 2025-03-03 DIAGNOSIS — N18.30 CHRONIC KIDNEY DISEASE, STAGE 3 UNSPECIFIED: ICD-10-CM

## 2025-03-03 DIAGNOSIS — I25.10 ATHEROSCLEROTIC HEART DISEASE OF NATIVE CORONARY ARTERY WITHOUT ANGINA PECTORIS: ICD-10-CM
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
## 2025-03-03 DIAGNOSIS — N40.0 BENIGN PROSTATIC HYPERPLASIA WITHOUT LOWER URINARY TRACT SYMPTOMS: ICD-10-CM

## 2025-03-03 DIAGNOSIS — E78.5 HYPERLIPIDEMIA, UNSPECIFIED: ICD-10-CM

## 2025-03-03 DIAGNOSIS — I13.0 HYPERTENSIVE HEART AND CHRONIC KIDNEY DISEASE WITH HEART FAILURE AND STAGE 1 THROUGH STAGE 4 CHRONIC KIDNEY DISEASE, OR UNSPECIFIED CHRONIC KIDNEY DISEASE: ICD-10-CM

## 2025-03-03 DIAGNOSIS — Z79.01 LONG TERM (CURRENT) USE OF ANTICOAGULANTS: ICD-10-CM
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
## 2025-03-10 ENCOUNTER — APPOINTMENT (OUTPATIENT)
Dept: HEART AND VASCULAR | Facility: CLINIC | Age: 89
End: 2025-03-10
Payer: MEDICARE

## 2025-03-10 ENCOUNTER — APPOINTMENT (OUTPATIENT)
Dept: PULMONOLOGY | Facility: CLINIC | Age: 89
End: 2025-03-10
Payer: MEDICARE

## 2025-03-10 VITALS — OXYGEN SATURATION: 96 % | DIASTOLIC BLOOD PRESSURE: 60 MMHG | SYSTOLIC BLOOD PRESSURE: 100 MMHG | HEART RATE: 49 BPM

## 2025-03-10 VITALS
WEIGHT: 130 LBS | OXYGEN SATURATION: 94 % | BODY MASS INDEX: 25.52 KG/M2 | HEART RATE: 52 BPM | TEMPERATURE: 97.1 F | HEIGHT: 60 IN | SYSTOLIC BLOOD PRESSURE: 116 MMHG | DIASTOLIC BLOOD PRESSURE: 74 MMHG

## 2025-03-10 VITALS
SYSTOLIC BLOOD PRESSURE: 92 MMHG | HEART RATE: 66 BPM | DIASTOLIC BLOOD PRESSURE: 60 MMHG | OXYGEN SATURATION: 96 % | RESPIRATION RATE: 16 BRPM

## 2025-03-10 DIAGNOSIS — I50.20 UNSPECIFIED SYSTOLIC (CONGESTIVE) HEART FAILURE: ICD-10-CM

## 2025-03-10 DIAGNOSIS — I25.10 ATHEROSCLEROTIC HEART DISEASE OF NATIVE CORONARY ARTERY W/OUT ANGINA PECTORIS: ICD-10-CM

## 2025-03-10 DIAGNOSIS — R73.03 PREDIABETES.: ICD-10-CM

## 2025-03-10 PROCEDURE — 93306 TTE W/DOPPLER COMPLETE: CPT

## 2025-03-10 PROCEDURE — 99214 OFFICE O/P EST MOD 30 MIN: CPT

## 2025-03-10 PROCEDURE — 99214 OFFICE O/P EST MOD 30 MIN: CPT | Mod: 25

## 2025-03-10 PROCEDURE — 71046 X-RAY EXAM CHEST 2 VIEWS: CPT

## 2025-03-10 PROCEDURE — 94010 BREATHING CAPACITY TEST: CPT

## 2025-03-24 ENCOUNTER — APPOINTMENT (OUTPATIENT)
Dept: INTERNAL MEDICINE | Facility: CLINIC | Age: 89
End: 2025-03-24

## 2025-03-24 DIAGNOSIS — I51.3 INTRACARDIAC THROMBOSIS, NOT ELSEWHERE CLASSIFIED: ICD-10-CM

## 2025-03-24 DIAGNOSIS — K43.2 INCISIONAL HERNIA W/OUT OBSTRUCTION OR GANGRENE: ICD-10-CM

## 2025-03-24 DIAGNOSIS — S81.802A UNSPECIFIED OPEN WOUND, LEFT LOWER LEG, INITIAL ENCOUNTER: ICD-10-CM

## 2025-03-24 DIAGNOSIS — I50.20 UNSPECIFIED SYSTOLIC (CONGESTIVE) HEART FAILURE: ICD-10-CM

## 2025-03-24 DIAGNOSIS — F32.A DEPRESSION, UNSPECIFIED: ICD-10-CM

## 2025-03-24 DIAGNOSIS — J44.1 CHRONIC OBSTRUCTIVE PULMONARY DISEASE WITH (ACUTE) EXACERBATION: ICD-10-CM

## 2025-03-24 DIAGNOSIS — N18.32 CHRONIC KIDNEY DISEASE, STAGE 3B: ICD-10-CM

## 2025-03-24 DIAGNOSIS — J42 UNSPECIFIED CHRONIC BRONCHITIS: ICD-10-CM

## 2025-03-24 PROCEDURE — 99213 OFFICE O/P EST LOW 20 MIN: CPT | Mod: 2W

## 2025-03-26 ENCOUNTER — LABORATORY RESULT (OUTPATIENT)
Age: 89
End: 2025-03-26

## 2025-03-26 ENCOUNTER — RESULT REVIEW (OUTPATIENT)
Age: 89
End: 2025-03-26

## 2025-03-26 NOTE — PHYSICAL THERAPY INITIAL EVALUATION ADULT - PHYSICAL ASSIST/NONPHYSICAL ASSIST: SIT/SUPINE, REHAB EVAL
Cardiovascular Medicine      Kvng Frank M.D., Ph.D., [facility]             History of Present Illness  This is a 66 y.o. male with:    1. ASCAD  A. CPS with MPS abnormal  B. [facility], 6/2017   -mLAD: 100% with R-L and L-L collateralization  - considered, but not performed   -pLAD: 50-60%  -D1: 30%  -D2: 80%, small vessel  -RCA: non-obstructive  2. Aortic sclerosis  3. HTN, HLD, Obese, VTE: PE    Jimmie Ortega is a 66 y.o. male who returns to clinic today.  He reported a history of atherosclerotic coronary artery disease including 100% mLAD lesion with collateralization from L-L and R-L. He has had a small D2 that was 80%.  He was having angina which prompted a myocardial perfusion scintigraphy with inducible anterior ischemia.  He underwent invasive coronary angiography with the above findings.   was discussed, but he became asymptomatic with antianginal medications, so he opted for ongoing medication therapy.  He's had no history of stenting or bypass surgery.  He is currently prescribed aspirin, isosorbide mononitrate, Toprol-XL and atorvastatin.  He's had no resting, exertional or nocturnal angina.  Concerning his dyslipidemia he is on atorvastatin.  This is prescribed as primary care provider.  He's tolerating this well.  He is asymptomatic from a cardiovascular standpoint.  He had a 2D TTE prior to his appointment today.  This showed preserved biventricular function and aortic sclerosis without significant regurgitation or stenosis.    Review of Systems - Review of Systems   Cardiovascular: Negative.    Respiratory: Negative.         All other systems were reviewed and were negative.    family history includes Brain cancer in his father; Heart attack in his paternal grandfather; Heart disease in his brother and another family member; Hypertension in an other family member.     reports that he has never smoked. He has never used smokeless tobacco. He reports that he drinks alcohol. He reports  that he does not use drugs.    Allergies   Allergen Reactions   • Morphine Nausea Only         Current Outpatient Medications:   •  albuterol 108 (90 Base) MCG/ACT inhaler, Inhale 2 puffs Every 4 (Four) Hours As Needed for Wheezing., Disp: 1 inhaler, Rfl: 11  •  aspirin 81 MG EC tablet, Take 81 mg by mouth Daily., Disp: , Rfl:   •  atorvastatin (LIPITOR) 20 MG tablet, TAKE 1 TABLET(20 MG) BY MOUTH EVERY NIGHT, Disp: 90 tablet, Rfl: 1  •  azithromycin (ZITHROMAX) 250 MG tablet, Take 2 tablets the first day, then 1 tablet daily for 4 days., Disp: 6 tablet, Rfl: 0  •  cyproheptadine 2 MG/5ML syrup, Take 5-10 mL by mouth Every 8 (Eight) Hours., Disp: 240 mL, Rfl: 12  •  fluticasone (FLONASE) 50 MCG/ACT nasal spray, 2 sprays into the nostril(s) as directed by provider Daily., Disp: 1 bottle, Rfl: 11  •  isosorbide mononitrate (IMDUR) 30 MG 24 hr tablet, TAKE 1 TABLET(30 MG) BY MOUTH DAILY, Disp: 90 tablet, Rfl: 2  •  lisinopril-hydrochlorothiazide (PRINZIDE,ZESTORETIC) 20-12.5 MG per tablet, TAKE 1 TABLET BY MOUTH DAILY, Disp: 90 tablet, Rfl: 3  •  metoprolol succinate XL (TOPROL-XL) 25 MG 24 hr tablet, TAKE 1 TABLET BY MOUTH DAILY, Disp: 90 tablet, Rfl: 2    Physical Exam:  Vitals:    10/16/19 1445   BP: 108/74   Pulse: 91   SpO2: 98%     Body mass index is 34.6 kg/m².   Last Weights:   Wt Readings from Last 3 Encounters:   10/14/19 99.3 kg (219 lb)   09/17/19 103 kg (226 lb 6.4 oz)   06/12/19 102 kg (224 lb)     Pulse Ox: Normal   General: alert, appears stated age and cooperative  Body Habitus: obese  HEENT: Head: Normocephalic, no lesions, without obvious abnormality. No arcus senilis, xanthelasma or xanthomas.  JVP: 5 cm of water at 45 degrees   Volume/Pulsation: Normal   Precordium: Normal impulses. P2 is not palpable.   [place]udson:  normal size and placement Palpable S4: absent.  Heart rate: normal    Heart Rhythm: regular     Heart Sounds: S1: normal intensity  S2: normal intensity  S3: absent   S4: absent  Opening  Snap: absent  A2-OS:  absent.   Pericardial rub: absent    Ejection click: None      Murmurs:  absent   Extremity: moves all extremities equally.       DATA REVIEWED:     EKG: NSR    Results for orders placed in visit on 10/08/18   Adult Transthoracic Echo Complete W/ Cont if Necessary Per Protocol    Narrative · Left ventricular systolic function is normal. Calculated LVEF 70%. Mild   concentric hypertrophy with normal diastolic function.  · Right ventricle systolic function is normal.  · Aortic sclerosis without significant stenosis or regurgitation.              --------------------------------------------------------------------------------------------------  LABS:   Lab Results   Component Value Date    GLUCOSE 99 10/02/2019    BUN 27 (H) 10/02/2019    CREATININE 1.44 (H) 10/02/2019    EGFRIFNONA 49 (L) 10/02/2019    BCR 18.8 10/02/2019    K 4.6 10/02/2019    CO2 22.1 10/02/2019    CALCIUM 9.7 10/02/2019    ALBUMIN 4.80 10/02/2019    AST 19 03/19/2019    ALT 31 03/19/2019     Lab Results   Component Value Date    WBC 7.81 10/02/2019    HGB 14.5 10/02/2019    HCT 42.4 10/02/2019    MCV 87.8 10/02/2019     10/02/2019     No results found for: CHOL, CHLPL, TRIG, HDL, LDL, LDLDIRECT  No results found for: TSH, K5KXAUG, Y1EUYBR, THYROIDAB  No results found for: CKTOTAL, CKMB, CKMBINDEX, TROPONINI, TROPONINT  No results found for: HGBA1C  No results found for: DDIMER  Lab Results   Component Value Date    ALT 31 03/19/2019     No results found for: HGBA1C  Lab Results   Component Value Date    CREATININE 1.44 (H) 10/02/2019     No results found for: IRON, TIBC, FERRITIN  No results found for: INR, PROTIME    Assessment/Plan     1. Coronary artery disease involving native coronary artery of native heart without angina pectoris. No anginal symptoms. The patient has not been revascularized.   -Metoprolol Succinate  -ASA  -Lipitor (atorvastatin)  -Imdur  -Call 911 immediately for concerning symptoms.      2.   Aortic sclerosis. ACC stage B.  There are no surgical indications at this time.  · The patient has been advised to remain in clinical surveillance every 12 months.  · Signs and symptoms of worsening valve disease discussed.  I've asked the patient contact me for an earlier appointment if these develop.  · I've recommended a repeat 2D TTE every 5 years.  · TTE due: 2024.  No indication based on 2017 ACC/AHA guidelines for IE prophylaxis for dental procedures: Optimal oral health is recommended through regular professional dental care and the use of appropriate dental products, such as manual, powered, and ultrasonic toothbrushes; dental floss; and other plaque-removal devices    3. Cardiac Risk Assessments based on 2019 ACCF guidelines:  · A team-based care approach is recommended for the control of risk factors associated with ASCAD.  As such, Jimmie Ortega was requested to have ongoing follow-up with their PCP.  · Continue follow-up visits with PCP for monitoring of labs; diet emphasizing intake of vegetables, fruits, nuts, whole grains and fish is recommended.  · Physical activity recommendations were provided.  · Essential HTN is a significant risk factor for stroke, heart disease and vascular disease. I've recommended the patient continue current medications, if any, as prescribed by the primary care provider. I recommended they have close follow-up for ongoing mgmt of this and the medical comorbidities associated with HTN with their PCP.  They were also provided with information regarding maintaining a healthy weight, heart-healthy dietary pattern DASH information.  Goal blood pressure less than 130/80.   · The patient's BMI is recommended to be calculated at least annually.  The patient's BMI is Body mass index is 34.6 kg/m²..  This places the patient in weight class:  Obese Class I: 30-34.9kg/m2.   Hand out, increase aerobic activity  cut out extra servings, decrease soda or juice intake, eat  breakfast, eat more fruits and vegetables, family to eat at dinner table more often, have 3 meals a day, increase physical activity, increase water intake, keep TV off during meals, plan meals, reduce fast food intake, reduce portion size and reduce screen time; close PCP follow-up.  · Tobacco status assessed at every visits.  The patient's nicotine status: has never smoked    Return in about 1 year (around 10/16/2020).       verbal cues/1 person assist

## 2025-03-26 NOTE — ED PROVIDER NOTE - OBJECTIVE STATEMENT
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  Patient was seen and February with similar complaints ruled out for PE, is actively taking Eliquis, was found to be hypoxic wheezing by EMS given 2 DuoNebs and transported to ED.  Denies fever chills chest pressure abdominal pain nausea vomiting or diarrhea.

## 2025-03-26 NOTE — H&P ADULT - PROBLEM SELECTOR PLAN 1
Likely 2/2 COPD Exacerbation, w/ possible component of CHF exacerbation. Initially required NRB and HFNC in ED, now satting well on NC.   Plan:  - Wean O2  - Ambulatory sats prior to dispo  - F/u PT/OT recs  - COPDe management as below  - CHF mgmt below

## 2025-03-26 NOTE — ED PROVIDER NOTE - CLINICAL SUMMARY MEDICAL DECISION MAKING FREE TEXT BOX
89-year-old male with COPD not on home O2 status post right sided lobectomy  non-small cell lung cancer mild CHF here today with shortness of breath acute on chronic respiratory failure with new hypoxia   POCUS echo difficult windows apical 4 view showing mildly depressed  LV function 40 to 45% lung sliding intact bilaterally no significant B-lines intermittent B-lines to right lung field 1 and 2 1-2   EKG normal sinus rhythm 64 incomplete right bundle branch block no STEMI   will evaluate for COPD pneumonia CHF exacerbation

## 2025-03-26 NOTE — OCCUPATIONAL THERAPY INITIAL EVALUATION ADULT - ADDITIONAL COMMENTS
PTA, pt reporting that he was able to perform ADLs independently "most of the time", and has wife at home at all times to assist, as needed. Pt ambulates with SC, and has walk-in shower with grab bars and shower chair.

## 2025-03-26 NOTE — CONSULT NOTE ADULT - ASSESSMENT
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 ED iso SOB, admitted for AHRF. ICU consulted as patient originally requiring NIV    #AHRF  Patient presented with AHRF SpO2 RR 16 98% on NRB> 95% on HFNC (40/40). During writer interview, patient saturating >95% on 3L NC w no tachypnea. Of note, patient w hx of COPD  CXR w possible RLL consolidation vs pleural effusion vs post lobectomy changes  Patient received lasix 20IVP and duonebx1 w improvement in respiratory status  Patient w inability to lie flat, may be a component of HF (although BNP at baseline) vs pna (patient w elevated WBC, however afebrile)   Plan  - workup and treat for CAP   - c/w supplemental O2, wean as tolerated goal >88% spo2  - order urine leg, strep, extended RVP  -duoneb [id number]- wean to prn as tolerated  - +/- lasix as needed   - OOBTC, incentive spirometry     Given patient no longer requiring NIV, patient without ICU/med telemetry needs  Plan d/w Dr Arenas  ED provider updated

## 2025-03-26 NOTE — H&P ADULT - HISTORY OF PRESENT ILLNESS
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 Patient reports over last couple of days, he has developed increased SOB w associated cough. Tonight, he had worsening sleep quality, prompting him to seek medical attention. Patient reports increased SOB, mostly at night when laying flat w development of productive cough (yellow/white). He states he usually sleeps with 1 pillow at night, however over the course of the past couple of days, he has only been able to sleep sitting up. He also has developed intermittent CP worse w inspiration. In terms of further ROS, patient denies associated fever/chills, wheezing, hemoptysis, recent sick contacts, abdominal symptoms, urinary symptoms, or rash  Of note, upon meeting patient, was placed on 3L NC w O2 sat >95% w no tachypnea  Pulmonologist: Dr Santo  Cardiologist : Dr France    ED Course  - VS: T 97.2, HR 60, /75, RR 16, SpO2 98% on 7L NRB >> 97% on HFNC 40/40  - Labs: WBC 10.56, HGB wnl,   |  BNP - Na 141, K 5.4 >> 5.2, HCO3 25, BUN 39, Cr 1.69, LFTs unremarkable.  |  Troponin, lactate wnl. pro-BNP 1026 (685 in 09/2023)  |  VBG pH 7.3, pCO2 28.  |  RVP neg.   - EKG: Pending  - Imaging: CXR w/ blunted costrophrenic angle on R  - Consults: ICU  - Interventions: CTX 1g, azithro 500 IV, Duonebs x2, budesonide inh x1, decadron 6 IV x1, Lasix 20 x1.

## 2025-03-26 NOTE — PHYSICAL THERAPY INITIAL EVALUATION ADULT - GAIT DEVIATIONS NOTED, PT EVAL
patient ambulated with fairly steady gait 50ft with hand held assist, 50ft with no assist, O2 sats 65-65% on 2L O2 NC throughout/decreased cesar/decreased step length

## 2025-03-26 NOTE — OCCUPATIONAL THERAPY INITIAL EVALUATION ADULT - MODALITIES TREATMENT COMMENTS
Pt able to perform bed mobility with supervision. Pt performed UB dressing while seated at EOB, requiring CGAx1 2/2 impaired dynamic balance and decreased functional reach. Pt performed sit<->stand and ambulation with CGAx1 ( hand held assist ), demo impaired balance with decreased step length. Pt demo no LOB throughout. O2 sat 95-96% t/o on 2L NC. Pt returned to bed and left as found, +all lines, +call ABRAHAM painter, VSS, RN Froilan made aware of outcome.

## 2025-03-26 NOTE — ED PROVIDER NOTE - PROGRESS NOTE DETAILS
proBNP around baseline x-ray with left-sided infiltrate and postoperative changes will cover with Rocephin and azithromycin give gentle Lasix, dexamethasone consult to telemetry for stepdown patient feeling significantly improved on high flow nasal cannula work of breathing is improved PT Feeling improved tolerated being titrated off of high flow now on 6 L nasal cannula seen by telemetry stable for floor

## 2025-03-26 NOTE — H&P ADULT - PROBLEM SELECTOR PLAN 3
Recent EF 35-40% per 12/2024 TTE. Likely ischemic cardiomyopathy given multivessel CAD on admission CT and regional wall motion abnormalities. Addmission pro-BNP >1000 (600s in 2023). | Home GDMT: metoprolol. ?lasix 20mg x2/wk. Per 12/2024 cards note: Holding off RAASi/MRA/SGLT2 for Cr, pt w/ stable angina - recommended starting imdur 20mg  qd as pt declined ischemic w/u.  Plan:  - C/w home rosuvastatin 10mg   - PRN Lasix as appropriate  - Cards consult in AM for GDMT optimization  - Holding home Toprol 50mg qd given s/f CHFe   - Formal med rec in AM

## 2025-03-26 NOTE — PHYSICAL THERAPY INITIAL EVALUATION ADULT - LEVEL OF INDEPENDENCE: GAIT, REHAB EVAL
"Writer returned pt's phone call. Pt states that she can feel the syringe working, but doesn't feel that she is getting her insulin as she is legally blind.  this AM (pt took 30 units of Humalog before lunch)  Typically is ~100-107  BG ""in the 200s\"" during phone call    Pt states that she is unsure if she needs new machine or settings for her CPAP which may be causing hyperglycemia. Pt states that she primes her pen and other than that she cannot smell or feel the wetness of the insulin with other injections. Pt states that she feels good and does not feel as thought she is getting sick or an infection. Pt states that she can go for several days and \""she's just fine,\"" but then other days it will not come down. Pt may need an insulin adjustment as it sounds like highs are having more frequently. Writer to leave message for Ross Kirkpatrick for possible insulin adjustments or discussion tomorrow. Pt verbalized understanding and agreement.        " contact guard

## 2025-03-26 NOTE — H&P ADULT - PROBLEM SELECTOR PLAN 6
F: None  E: Replete PRN  N: DASH  P: Heparin subcu F: None  E: Replete PRN  N: DASH  P: Eliquis 2.5mg BID

## 2025-03-26 NOTE — H&P ADULT - ASSESSMENT
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

## 2025-03-26 NOTE — H&P ADULT - PROBLEM SELECTOR PLAN 2
H/o prior hospitalizations (last in ?2023) for exacerbations, **** intubation hx?  ****. | On home Trelegy Ellipta and prednisone 10, not on home O2.   Plan:  - C/w Duonebs q6h  - C/w azithromycin x5d  - Start prednisone 40mg for total steroid course x5d  - C/w CTX 1g qd x5d for CAP  - C/w incentive spirometry  - IF expectorating, start hypertonic saline inhalations q6h, administered 15min after Duonebs   - Aerobika q6h, administered after Duonebs/hypertonic nebs  - F/u uStrep/Legionella  - F/u MRSA swab  - F/u full RVP (add-on)  - Holding home Trelegy  - Holding  home prednisone 10mg qd  - OOBTC H/o prior hospitalizations (last in ?2023) for exacerbations,  On home Trelegy Ellipta and prednisone 10, not on home O2.   Plan:  - C/w Duonebs q6h  - C/w azithromycin x5d  - Start prednisone 40mg for total steroid course x5d  - C/w CTX 1g qd x5d for CAP  - C/w incentive spirometry  - IF expectorating, start hypertonic saline inhalations q6h, administered 15min after Duonebs   - Aerobika q6h, administered after Duonebs/hypertonic nebs  - F/u uStrep/Legionella  - F/u MRSA swab  - F/u full RVP (add-on)  - Holding home Trelegy  - Holding  home prednisone 10mg qd  - OOBTC

## 2025-03-26 NOTE — PATIENT PROFILE ADULT - FALL HARM RISK - HARM RISK INTERVENTIONS
no Assistance with ambulation/Assistance OOB with selected safe patient handling equipment/Communicate Risk of Fall with Harm to all staff/Discuss with provider need for PT consult/Monitor gait and stability/Provide patient with walking aids - walker, cane, crutches/Reinforce activity limits and safety measures with patient and family/Tailored Fall Risk Interventions/Visual Cue: Yellow wristband and red socks/Bed in lowest position, wheels locked, appropriate side rails in place/Call bell, personal items and telephone in reach/Instruct patient to call for assistance before getting out of bed or chair/Non-slip footwear when patient is out of bed/[place] to call system/Physically safe environment - no spills, clutter or unnecessary equipment/Purposeful Proactive Rounding/Room/bathroom lighting operational, light cord in reach

## 2025-03-26 NOTE — OCCUPATIONAL THERAPY INITIAL EVALUATION ADULT - PHYSICAL ASSIST/NONPHYSICAL ASSIST: SCOOT/BRIDGE, REHAB EVAL
[employer] calling to follow up on auth. Per rep states that auth for procedure done on 10/12/22 was approved but was done past approval dates. Per rep previous auth would need to be withdrawn and a new post procedure auth would need to be started for patient to get procedure coverage. Please advise.   Ph: [phone]   1 person assist

## 2025-03-26 NOTE — ED PROVIDER NOTE - CRITICAL CARE ATTENDING CONTRIBUTION TO CARE
critical Care Procedure Note  Authorized and Performed by:  DO BARBARA Seymour  Total critical care time: Approximately   40 minutes  Due to a high probability of clinically significant, life threatening deterioration, the patient required my highest level of preparedness to intervene emergently and I personally spent this critical care time directly and personally managing the patient. This critical care time included obtaining a history; examining the patient; pulse oximetry; ordering and review of studies; arranging urgent treatment with development of a management plan; evaluation of patient's response to treatment; frequent reassessment; and, discussions with other providers.  This critical care time was performed to assess and manage the high probability of imminent, life-threatening deterioration that could result in multi-organ failure. It was exclusive of separately billable procedures and treating other patients and teaching time.  Please see MDM section and the rest of the note for further information on patient assessment and treatment.

## 2025-03-26 NOTE — PHYSICAL THERAPY INITIAL EVALUATION ADULT - ADDITIONAL COMMENTS
Patient reports he lives with wife in elevator apartment with no MIAH. Patient has been ambulating with SC, reports he performs ADLs independently "most of the time" and wife is able to assist as needed. Patient has walk in shower with grab bars and shower chair

## 2025-03-26 NOTE — ED PROVIDER NOTE - PHYSICAL EXAMINATION
VITAL SIGNS: I have reviewed nursing notes and confirm.  CONSTITUTIONAL:  hypoxic and tachypneic, in  mild respiratory distress  SKIN:  warm and dry, no acute rash.   HEAD:  normocephalic, atraumatic.  EYES: EOM intact; conjunctiva and sclera clear.  ENT: No nasal discharge; airway clear.   NECK: Supple.  CARD: S1, S2, Regular rate and rhythm.   RESP:  Hypoxic on room air increased work of breathing speaking in 4 word sentences diffuse wheezes coarse right-sided rhonchi mild respiratory distress  ABD: Normal bowel sounds; soft; non-distended; non-tender; no guarding/ rebound.  EXT: Normal ROM. No peripheral edema. Pulses intact and equal b/l.  NEURO: Alert, oriented, grossly unremarkable  PSYCH: Cooperative, mood and affect appropriate.

## 2025-03-26 NOTE — ED ADULT NURSE NOTE - OBJECTIVE STATEMENT
Patient pmhx COPD BIBA from home  here for c/o worsening sob x several days. Patient endorsing SOB improving s/p duonebs, oxygen therapy. Patient aaox4, calm, NAD. Speaking full complete sentences. Breathing even, mildly tachypneic, abdominal breathing present. Abdomen soft, nttp. Patient actively denying c/p, palpitations, dizziness. Several dressings for skin tears noted on arms and legs. Patient pending lab results, further medication.

## 2025-03-26 NOTE — OCCUPATIONAL THERAPY INITIAL EVALUATION ADULT - GENERAL OBSERVATIONS, REHAB EVAL
OT IE completed. Orders received, chart reviewed, pt cleared for OT by BRIGIDO Mcgovern. Pt received semi supine in bed, NAD, +heplock, +O2 2L NC. Pt A&Ox4, agreeable to OT, and tolerated session well.

## 2025-03-26 NOTE — PROGRESS NOTE ADULT - SUBJECTIVE AND OBJECTIVE BOX
INTERVAL HPI/OVERNIGHT EVENTS:  All notes reviewed;  Patient with increasing shortness of breath  Had done a Telehealth visit with him 2 days ago and he was at his baseline       MEDICATIONS  (STANDING):  albuterol/ipratropium for Nebulization 3 milliLiter(s) Nebulizer every 6 hours  apixaban 2.5 milliGRAM(s) Oral every 12 hours  predniSONE   Tablet 40 milliGRAM(s) Oral every 24 hours  rosuvastatin 10 milliGRAM(s) Oral at bedtime  tamsulosin 0.4 milliGRAM(s) Oral at bedtime    MEDICATIONS  (PRN):  acetaminophen     Tablet .. 650 milliGRAM(s) Oral every 6 hours PRN Temp greater or equal to 38C (100.4F), Mild Pain (1 - 3)  aluminum hydroxide/magnesium hydroxide/simethicone Suspension 30 milliLiter(s) Oral every 4 hours PRN Dyspepsia  melatonin 3 milliGRAM(s) Oral at bedtime PRN Insomnia  ondansetron Injectable 4 milliGRAM(s) IV Push every 8 hours PRN Nausea and/or Vomiting      Allergies    No Known Allergies    Intolerances        Vital Signs Last 24 Hrs  T(C): 36.2 (26 Mar 2025 12:19), Max: 36.8 (26 Mar 2025 04:30)  T(F): 97.2 (26 Mar 2025 12:19), Max: 98.2 (26 Mar 2025 04:30)  HR: 65 (26 Mar 2025 12:19) (60 - 65)  BP: 127/75 (26 Mar 2025 12:19) (127/75 - 138/88)  BP(mean): 93 (26 Mar 2025 12:19) (93 - 93)  RR: 17 (26 Mar 2025 12:19) (16 - 18)  SpO2: 98% (26 Mar 2025 12:19) (95% - 98%)    Parameters below as of 26 Mar 2025 12:19  Patient On (Oxygen Delivery Method): nasal cannula  O2 Flow (L/min): 2            Constitutional: Awake     Eyes: WALI    ENMT: Negative    Neck: Supple    Back:  no tenderness     Respiratory:  clear with decreased breath sounds     Cardiovascular: S1 S2    Gastrointestinal:  soft     Genitourinary:    Extremities:  open wounds on legs     Vascular:    Neurological: intact     Skin:    Lymph Nodes:            03-25 @ 07:01  -  03-26 @ 07:00  --------------------------------------------------------  IN: 0 mL / OUT: 300 mL / NET: -300 mL      LABS:                        15.3   10.76 )-----------( 167      ( 26 Mar 2025 00:49 )             49.6     03-26    140  |  105  |  39[H]  ----------------------------<  115[H]  5.2   |  18[L]  |  1.70[H]    Ca    9.4      26 Mar 2025 00:49    TPro  6.4  /  Alb  3.6  /  TBili  0.7  /  DBili  x   /  AST  13  /  ALT  7[L]  /  AlkPhos  57  03-26      Urinalysis Basic - ( 26 Mar 2025 00:49 )    Color: x / Appearance: x / SG: x / pH: x  Gluc: 115 mg/dL / Ketone: x  / Bili: x / Urobili: x   Blood: x / Protein: x / Nitrite: x   Leuk Esterase: x / RBC: x / WBC x   Sq Epi: x / Non Sq Epi: x / Bacteria: x        RADIOLOGY & ADDITIONAL TESTS:

## 2025-03-26 NOTE — ED ADULT NURSE NOTE - NSFALLUNIVINTERV_ED_ALL_ED
Bed/Stretcher in lowest position, wheels locked, appropriate side rails in place/Call bell, personal items and telephone in reach/Instruct patient to call for assistance before getting out of bed/chair/stretcher/Non-slip footwear applied when patient is off stretcher/[place] to call system/Physically safe environment - no spills, clutter or unnecessary equipment/Purposeful proactive rounding/Room/bathroom lighting operational, light cord in reach

## 2025-03-26 NOTE — PHYSICAL THERAPY INITIAL EVALUATION ADULT - PERTINENT HX OF CURRENT PROBLEM, REHAB EVAL
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 ED iso SOB, admitted for AHRF 2/2 COPD of unclear etiology.

## 2025-03-26 NOTE — CONSULT NOTE ADULT - SUBJECTIVE AND OBJECTIVE BOX
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 Patient reports over last couple of days, he has developed increased SOB w associated cough. Tonight, he had worsening sleep quality, prompting him to seek medical attention. Patient reports increased SOB, mostly at night when laying flat w development of productive cough (yellow/white). He states he usually sleeps with 1 pillow at night, however over the course of the past couple of days, he has only been able to sleep sitting up. He also has developed intermittent CP worse w inspiration. In terms of further ROS, patient denies associated fever/chills, wheezing, hemoptysis, recent sick contacts, abdominal symptoms, urinary symptoms, or rash  Of note, upon meeting patient, was placed on 3L NC w O2 sat >95% w no tachypnea  Pulmonologist: Dr Santo  Cardiologist : Dr France    In ED  VS: T 97.2, HR 60, /75, RR 16 98% on NRB> 95% on HFNC (40/40)  labs: WBC 10.76, Hgb 15.3, , Na 140, K 5.2, HCO3 18, AG 17, BUN 39, Cr 1.70, BNP (1175 (in Feb 1075), trop T 38  RVP neg    imaging: CXR possible RLL consolidation vs pleural effusion  interventions: duoneb x1, lasix 20IVP, decadron 6mg IVP, CTX 1g, azithromycin 500mg    Allergies  No Known Allergies    PHYSICAL EXAM:  .  VITAL SIGNS:  T(C): 36.2 (03-26-25 @ 00:26), Max: 36.2 (03-26-25 @ 00:26)  T(F): 97.2 (03-26-25 @ 00:26), Max: 97.2 (03-26-25 @ 00:26)  HR: 65 (03-26-25 @ 00:44) (60 - 65)  BP: 134/75 (03-26-25 @ 00:26) (134/75 - 134/75)  BP(mean): --  RR: 18 (03-26-25 @ 01:28) (16 - 18)  SpO2: 97% (03-26-25 @ 01:28) (95% - 98%)  Wt(kg): --    PHYSICAL EXAM:  General: NAD; good concentration; wife at bedside  Neurologic: AAOx4; CNII-XII grossly intact; no focal deficits; speech clear; able to talk in full sentences  Head: NC/AT  Eyes: PERRL, EOMI, no scleral icterus; no nystagmus  ENT: on 3L NC; no nasal discharge; no oropharyngeal erythema or exudates; MMM  Neck: supple  Respiratory: b/l faint inspiratory crackles normal effort; exhalation >inspiratory phase  Cardiac: RRR; +s1 and s2; no MRG; no JVD  Gastrointestinal: nondistended; nontender; normal bowel sounds [id number]  Extremities:  no clubbing, or cyanosis, + LE edema b/l, capillary refill <2 sec b/l  Musculoskeletal: NROM x4; normal tone  Vascular: 2+ radial, femoral, DP/PT pulses B/L    LABS:                15.3   10.76 )-----------( 167      ( 26 Mar 2025 00:49 )             49.6                                               03-26    140  |  105  |  39[H]  ----------------------------<  115[H]  5.2   |  18[L]  |  1.70[H]    Ca    9.4      26 Mar 2025 00:49    TPro  6.4  /  Alb  3.6  /  TBili  0.7  /  DBili  x   /  AST  13  /  ALT  7[L]  /  AlkPhos  57  03-26                                             Urinalysis Basic - ( 26 Mar 2025 00:49 )    Color: x / Appearance: x / SG: x / pH: x  Gluc: 115 mg/dL / Ketone: x  / Bili: x / Urobili: x   Blood: x / Protein: x / Nitrite: x   Leuk Esterase: x / RBC: x / WBC x   Sq Epi: x / Non Sq Epi: x / Bacteria: x                                                  LIVER FUNCTIONS - ( 26 Mar 2025 00:49 )  Alb: 3.6 g/dL / Pro: 6.4 g/dL / ALK PHOS: 57 U/L / ALT: 7 U/L / AST: 13 U/L / GGT: x

## 2025-03-26 NOTE — H&P ADULT - NSHPPHYSICALEXAM_GEN_ALL_CORE
.  VITAL SIGNS:  T(C): 36.2 (03-26-25 @ 00:26), Max: 36.2 (03-26-25 @ 00:26)  T(F): 97.2 (03-26-25 @ 00:26), Max: 97.2 (03-26-25 @ 00:26)  HR: 65 (03-26-25 @ 00:44) (60 - 65)  BP: 134/75 (03-26-25 @ 00:26) (134/75 - 134/75)  BP(mean): --  RR: 18 (03-26-25 @ 01:28) (16 - 18)  SpO2: 97% (03-26-25 @ 01:28) (95% - 98%)  Wt(kg): --    PHYSICAL EXAM:  General: NAD  Neurologic: AAOx4; CNII-XII grossly intact; no focal deficits; speech clear; able to talk in full sentences  Head: NC/AT  Eyes: PERRL, EOMI, no scleral icterus; no nystagmus  ENT: on 3L NC; no nasal discharge; no oropharyngeal erythema or exudates; MMM  Neck: supple  Respiratory: b/l faint inspiratory crackles normal effort; exhalation >inspiratory phase  Cardiac: RRR; +s1 and s2; no MRG; no JVD  Gastrointestinal: nondistended; nontender; normal bowel sounds [id number]  Extremities:  no clubbing, or cyanosis, + LE edema b/l, capillary refill <2 sec b/l  Musculoskeletal: NROM x4; normal tone  Vascular: 2+ radial, femoral, DP/PT pulses B/L

## 2025-03-26 NOTE — OCCUPATIONAL THERAPY INITIAL EVALUATION ADULT - PERTINENT HX OF CURRENT PROBLEM, REHAB EVAL
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

## 2025-03-26 NOTE — H&P ADULT - NSHPLABSRESULTS_GEN_ALL_CORE
15.3   10.76 )-----------( 167      ( 26 Mar 2025 00:49 )             49.6       03-26    140  |  105  |  39[H]  ----------------------------<  115[H]  5.2   |  18[L]  |  1.70[H]    Ca    9.4      26 Mar 2025 00:49    TPro  6.4  /  Alb  3.6  /  TBili  0.7  /  DBili  x   /  AST  13  /  ALT  7[L]  /  AlkPhos  57  03-26         CAPILLARY BLOOD GLUCOSE                         Lactate Trend  03-26 @ 00:48 Lactate:1.7       Urinalysis Basic - ( 26 Mar 2025 00:49 )    Color: x / Appearance: x / SG: x / pH: x  Gluc: 115 mg/dL / Ketone: x  / Bili: x / Urobili: x   Blood: x / Protein: x / Nitrite: x   Leuk Esterase: x / RBC: x / WBC x   Sq Epi: x / Non Sq Epi: x / Bacteria: x              RADIOLOGY, EKG & ADDITIONAL TESTS: Reviewed.

## 2025-03-27 NOTE — PROGRESS NOTE ADULT - PROBLEM SELECTOR PLAN 3
Recent EF 35-40% per 12/2024 TTE. Likely ischemic cardiomyopathy given multivessel CAD on admission CT and regional wall motion abnormalities. Addmission pro-BNP >1000 (600s in 2023). | Home GDMT: metoprolol. ?lasix 20mg x2/wk. Per 12/2024 cards note: Holding off RAASi/MRA/SGLT2 for Cr, pt w/ stable angina - recommended starting imdur 20mg  qd as pt declined ischemic w/u.  Plan:  - C/w home rosuvastatin 10mg   - PRN Lasix as appropriate  - Cards consult in AM for GDMT optimization  - Holding home Toprol 50mg qd given s/f CHFe   - Formal med rec in AM Recent EF 35-40% per 12/2024 TTE. Likely ischemic cardiomyopathy given multivessel CAD on admission CT and regional wall motion abnormalities. Addmission pro-BNP >1000 (600s in 2023). | Home GDMT: metoprolol. ?lasix 20mg x2/wk. Per 12/2024 cards note: Holding off RAASi/MRA/SGLT2 for Cr, pt w/ stable angina - recommended starting imdur 20mg  qd as pt declined ischemic w/u.  Repeat echo with improved EF 40-45%    - C/w home rosuvastatin 10mg   - PRN Lasix as appropriate  - Holding home Toprol 50mg qd given normotension

## 2025-03-27 NOTE — PROGRESS NOTE ADULT - SUBJECTIVE AND OBJECTIVE BOX
INTERVAL HPI/OVERNIGHT EVENTS:  Awake and in good spirits  Respiartory status improved  Off oxygen   Wounds with oozing blood  On antibiotics       MEDICATIONS  (STANDING):  albuterol/ipratropium for Nebulization 3 milliLiter(s) Nebulizer every 6 hours  apixaban 2.5 milliGRAM(s) Oral every 12 hours  azithromycin  IVPB 500 milliGRAM(s) IV Intermittent every 24 hours  cefTRIAXone   IVPB 1000 milliGRAM(s) IV Intermittent every 24 hours  escitalopram 10 milliGRAM(s) Oral every 24 hours  predniSONE   Tablet 40 milliGRAM(s) Oral every 24 hours  rosuvastatin 10 milliGRAM(s) Oral at bedtime  tamsulosin 0.4 milliGRAM(s) Oral at bedtime    MEDICATIONS  (PRN):  acetaminophen     Tablet .. 650 milliGRAM(s) Oral every 6 hours PRN Temp greater or equal to 38C (100.4F), Mild Pain (1 - 3)  aluminum hydroxide/magnesium hydroxide/simethicone Suspension 30 milliLiter(s) Oral every 4 hours PRN Dyspepsia  melatonin 3 milliGRAM(s) Oral at bedtime PRN Insomnia  ondansetron Injectable 4 milliGRAM(s) IV Push every 8 hours PRN Nausea and/or Vomiting      Allergies    No Known Allergies    Intolerances        Vital Signs Last 24 Hrs  T(C): 36.3 (27 Mar 2025 08:47), Max: 36.8 (26 Mar 2025 17:28)  T(F): 97.3 (27 Mar 2025 08:47), Max: 98.2 (26 Mar 2025 17:28)  HR: 59 (27 Mar 2025 08:47) (59 - 76)  BP: 110/62 (27 Mar 2025 08:47) (110/62 - 184/74)  BP(mean): 97 (26 Mar 2025 18:30) (93 - 97)  RR: 18 (27 Mar 2025 08:47) (17 - 19)  SpO2: 94% (27 Mar 2025 08:47) (94% - 98%)    Parameters below as of 27 Mar 2025 08:47  Patient On (Oxygen Delivery Method): room air              Constitutional: Awake     Eyes: WALI    ENMT: Negative    Neck: Supple    Back:  no tenderness     Respiratory:  clear     Cardiovascular: S1 S2    Gastrointestinal:  soft     Genitourinary:    Extremities:  wounds with oozing blood      Vascular:    Neurological:    Skin:    Lymph Nodes:            LABS:                        12.9   11.39 )-----------( 133      ( 27 Mar 2025 05:30 )             41.4     03-27    134[L]  |  100  |  37[H]  ----------------------------<  283[H]  4.5   |  24  |  1.49[H]    Ca    8.6      27 Mar 2025 05:30  Phos  3.5     03-27  Mg     2.1     03-27    TPro  6.4  /  Alb  3.6  /  TBili  0.7  /  DBili  x   /  AST  13  /  ALT  7[L]  /  AlkPhos  57  03-26      Urinalysis Basic - ( 27 Mar 2025 05:30 )    Color: x / Appearance: x / SG: x / pH: x  Gluc: 283 mg/dL / Ketone: x  / Bili: x / Urobili: x   Blood: x / Protein: x / Nitrite: x   Leuk Esterase: x / RBC: x / WBC x   Sq Epi: x / Non Sq Epi: x / Bacteria: x        RADIOLOGY & ADDITIONAL TESTS:

## 2025-03-27 NOTE — PROGRESS NOTE ADULT - PROBLEM SELECTOR PLAN 4
Noted on 12/2024 TTE.  Plan:  - C/w home Eliquis 2.5mg BID Noted on 12/2024 TTE.    - C/w home Eliquis 2.5mg BID

## 2025-03-27 NOTE — PROGRESS NOTE ADULT - SUBJECTIVE AND OBJECTIVE BOX
HOSPITAL COURSE:    INTERVAL HPI/OVERNIGHT EVENTS:    SUBJECTIVE: Patient seen and examined at bedside, resting comfortably in bed, in no acute distress. Patient reports    Vital Signs Last 24 Hrs  T(C): 36.4 (27 Mar 2025 05:26), Max: 36.8 (26 Mar 2025 17:28)  T(F): 97.5 (27 Mar 2025 05:26), Max: 98.2 (26 Mar 2025 17:28)  HR: 62 (27 Mar 2025 08:02) (60 - 76)  BP: 113/63 (27 Mar 2025 08:02) (113/63 - 184/74)  BP(mean): 97 (26 Mar 2025 18:30) (93 - 97)  RR: 19 (27 Mar 2025 08:02) (17 - 19)  SpO2: 95% (27 Mar 2025 08:02) (95% - 98%)    Parameters below as of 27 Mar 2025 08:02  Patient On (Oxygen Delivery Method): room air        PHYSICAL EXAM:  General: in no acute distress  Eyes: PERRL  HENT: Moist mucous membranes  Lungs: CTA B/L. No wheezes, rales, or rhonchi  Cardiovascular: RRR. No murmurs, rubs, or gallops  Abdomen: Soft, non-tender non-distended; No rebound or guarding  Extremities: WWP, no edema  Neurological: Alert and oriented  Skin: Warm and dry, no obvious rash    LABS:                        15.3   10.76 )-----------( 167      ( 26 Mar 2025 00:49 )             49.6     03-27    x   |  100  |  x   ----------------------------<  283[H]  4.5   |  24  |  x     Ca    8.6      27 Mar 2025 05:30  Phos  3.5     03-27  Mg     2.1     03-27    TPro  6.4  /  Alb  3.6  /  TBili  0.7  /  DBili  x   /  AST  13  /  ALT  7[L]  /  AlkPhos  57  03-26   HOSPITAL COURSE: 89M w/ PMHx of COPD (not on home O2, prior hospitalizations for exac.), CAD, HFrEF (EF 35-40% 12/2024), LV thrombus (on Eliquis), CKD, BPH, NSCLC s/p lobectomy, admitted for AHRF 2/2 COPD exacerbation. Started on CTX, azithro, and prednisone. Repeat TTE with mildly reduced EF 40-45%.      INTERVAL HPI/OVERNIGHT EVENTS: TTE with EF improved since last echo.    SUBJECTIVE: Patient seen and examined at bedside, resting comfortably in bed, in no acute distress. Patient reports feeling better this morning. He was taken off of nasal cannula this morning and is breathing well on room air at rest.     Vital Signs Last 24 Hrs  T(C): 36.4 (27 Mar 2025 05:26), Max: 36.8 (26 Mar 2025 17:28)  T(F): 97.5 (27 Mar 2025 05:26), Max: 98.2 (26 Mar 2025 17:28)  HR: 62 (27 Mar 2025 08:02) (60 - 76)  BP: 113/63 (27 Mar 2025 08:02) (113/63 - 184/74)  BP(mean): 97 (26 Mar 2025 18:30) (93 - 97)  RR: 19 (27 Mar 2025 08:02) (17 - 19)  SpO2: 95% (27 Mar 2025 08:02) (95% - 98%)    Parameters below as of 27 Mar 2025 08:02  Patient On (Oxygen Delivery Method): room air      PHYSICAL EXAM:  General: in no acute distress  HEENT: PERRL, Moist mucous membranes  Lungs: BL expiratory wheezing  Cardiovascular: RRR. No murmurs, rubs, or gallops  Abdomen: Soft, non-tender non-distended; No rebound or guarding  Extremities: WWP, trace BL LE edema  Neurological: Alert and oriented x4  Skin: Warm and dry, no obvious rash    LABS:                        15.3   10.76 )-----------( 167      ( 26 Mar 2025 00:49 )             49.6     03-27    x   |  100  |  x   ----------------------------<  283[H]  4.5   |  24  |  x     Ca    8.6      27 Mar 2025 05:30  Phos  3.5     03-27  Mg     2.1     03-27    TPro  6.4  /  Alb  3.6  /  TBili  0.7  /  DBili  x   /  AST  13  /  ALT  7[L]  /  AlkPhos  57  03-26

## 2025-03-27 NOTE — PROGRESS NOTE ADULT - PROBLEM SELECTOR PLAN 1
Likely 2/2 COPD Exacerbation, w/ possible component of CHF exacerbation. Initially required NRB and HFNC in ED, now satting well on NC.   Plan:  - Wean O2  - Ambulatory sats prior to dispo  - F/u PT/OT recs  - COPDe management as below  - CHF mgmt below Likely 2/2 COPD Exacerbation, w/ possible component of CHF exacerbation. Initially required NRB and HFNC in ED, now satting well on NC.     - Weaned to RA  - Ambulatory sats  - PT/OT recs homt PT  - COPD management as below  - CHF mgmt below

## 2025-03-27 NOTE — PROGRESS NOTE ADULT - PROBLEM SELECTOR PLAN 2
H/o prior hospitalizations (last in ?2023) for exacerbations,  On home Trelegy Ellipta and prednisone 10, not on home O2.   Plan:  - C/w Duonebs q6h  - C/w azithromycin x5d  - Start prednisone 40mg for total steroid course x5d  - C/w CTX 1g qd x5d for CAP  - C/w incentive spirometry  - IF expectorating, start hypertonic saline inhalations q6h, administered 15min after Duonebs   - Aerobika q6h, administered after Duonebs/hypertonic nebs  - F/u uStrep/Legionella  - F/u MRSA swab  - F/u full RVP (add-on)  - Holding home Trelegy  - Holding  home prednisone 10mg qd  - OOBTC H/o prior hospitalizations (last in ?2023) for exacerbations,  On home Trelegy Ellipta and prednisone 10, not on home O2.     - C/w Duonebs q6h  - C/w azithromycin x5d  - C/w prednisone 40mg for total steroid course x5d  - C/w CTX 1g qd x5d for CAP  - C/w incentive spirometry  - IF expectorating, start hypertonic saline inhalations q6h, administered 15min after Duonebs   - Aerobika q6h, administered after Duonebs/hypertonic nebs  - Holding home Trelegy  - OOBTC

## 2025-03-28 ENCOUNTER — TRANSCRIPTION ENCOUNTER (OUTPATIENT)
Age: 89
End: 2025-03-28

## 2025-03-28 NOTE — DISCHARGE NOTE PROVIDER - PROVIDER TOKENS
PROVIDER:[TOKEN:[[id number]],SCHEDULEDAPPT:[04/08/2025],SCHEDULEDAPPTTIME:[04:00 PM],ESTABLISHEDPATIENT:[T]],PROVIDER:[TOKEN:[[id number]],SCHEDULEDAPPT:[04/21/2025],SCHEDULEDAPPTTIME:[03:00 PM],ESTABLISHEDPATIENT:[T]]

## 2025-03-28 NOTE — CONSULT NOTE ADULT - ASSESSMENT
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 Eos 0.18 on admission now resolved  procal 0.07, lactate 1.7. BNP 1026-> 1175  urine strep/legionella/MRSA/MSSA negative. Full RVP negative  Bcx 3/26 NTD, scx pending  3/26 TTE LVEF 40-45%, no RWMA. Inferior septum, mid/apical anterior septum, mid/apical inferior wall and apex are hypokinetic. Grade I DD. LV thrombus resolved, LV EF improved compared to last TTE 12/24      #COPD exacerbation  #HFrEF #LV mural thrombus  On admission 7L NRB-> HFNC 40/40 admitted to telemetry  Home meds: trelegy, prednisone 10mg qd. No home O2. On eliquis 2.5 BID for LV mural thrombus since 12/2024.   sp CTX 1g, azithro 500 IV, Duonebs x2, budesonide inh x1, decadron 6 IV x1, Lasix 20 x1.       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 FEV1/FVC 65% (93%pred) showing severe restriction  10/14/24 FVC 1.47 (54%pred), FEV1 0.92 (51%pred), FEV1/FVC 63% (90%pred) moderately severe restriction  Inpatient  99% on 2L NC-> 93-95% on RA-> 93% on 2L NC  3/26 CXR sp RLL lobectomy, RLL atelectasis, improved LLL atelectasis  WBC 10.56-> 12.27 PMN predominance. Eos 0.18 on admission now resolved  procal 0.07, lactate 1.7. BNP 1026-> 1175  urine strep/legionella/MRSA/MSSA negative. Full RVP negative  Bcx 3/26 NTD, scx pending  3/26 TTE LVEF 40-45%, no RWMA. Inferior septum, mid/apical anterior septum, mid/apical inferior wall and apex are hypokinetic. Grade I DD. LV thrombus resolved, LV EF improved compared to last TTE 12/24    #COPD exacerbation  #HFrEF #LV mural thrombus  On admission 7L NRB-> HFNC 40/40 admitted to telemetry  Home meds trelegy, albuterol PRN, azithromycin MWF 500mg every other day, prednisone 10mg daily. No home O2. On eliquis 2.5 BID for LV mural thrombus since 12/2024.   sp CTX 1g, azithro 500 IV, Duonebs x2, budesonide inh x1, decadron 6 IV x1, Lasix 20 x1.       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 FEV1/FVC 65% (93%pred) showing severe restriction  10/14/24 FVC 1.47 (54%pred), FEV1 0.92 (51%pred), FEV1/FVC 63% (90%pred) moderately severe restriction  Inpatient  99% on 2L NC-> 93-95% on RA-> 93% on 2L NC  3/26 CXR sp RLL lobectomy, RLL atelectasis, improved LLL atelectasis  WBC 10.56-> 12.27 PMN predominance. Eos 0.18 on admission now resolved  procal 0.07, lactate 1.7. BNP 1026-> 1175  urine strep/legionella/MRSA/MSSA negative. Full RVP negative  Bcx 3/26 NTD, scx pending  3/26 TTE LVEF 40-45%, no RWMA. Inferior septum, mid/apical anterior septum, mid/apical inferior wall and apex are hypokinetic. Grade I DD. LV thrombus resolved, LV EF improved compared to last TTE 12/24    #COPD exacerbation  #HFrEF #LV mural thrombus now resolved  On admission 7L NRB-> HFNC 40/40 admitted to telemetry  Home meds trelegy, albuterol PRN, azithromycin MWF 500mg every other day, prednisone 10mg daily, furosemide 20mg MWF. No home O2. On eliquis 2.5 BID for LV mural thrombus since 12/2024.   sp CTX 1g, azithro 500 IVx3 days, Duonebs x2, budesonide inh x1, decadron 6 IV x1, Lasix 20 x1. Clear sputum production. Rhonchi in R>L lung. no JVD, 1+ BLE.   Previous CT chest 2/2025 shows GGO throughout and previous RUL consolidation. CXR without infiltrates/consolidation. Procal mildly elevated 0.07. BNP rising.   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 FEV1/FVC 65% (93%pred) showing severe restriction  10/14/24 FVC 1.47 (54%pred), FEV1 0.92 (51%pred), FEV1/FVC 63% (90%pred) moderately severe restriction  Inpatient  99% on 2L NC-> 93-95% on RA-> 93% on 2L NC  3/26 CXR sp RLL lobectomy, RLL atelectasis, improved LLL atelectasis  WBC 10.56-> 12.27 PMN predominance. Eos 0.18 on admission now resolved  procal 0.07, lactate 1.7. BNP 1026-> 1175  urine strep/legionella/MRSA/MSSA negative. Full RVP negative  Bcx 3/26 NTD, scx pending  3/26 TTE LVEF 40-45%, no RWMA. Inferior septum, mid/apical anterior septum, mid/apical inferior wall and apex are hypokinetic. Grade I DD. LV thrombus resolved, LV EF improved compared to last TTE 12/24    #COPD exacerbation  #HFrEF #LV mural thrombus now resolved  On admission 7L NRB-> HFNC 40/40 admitted to telemetry-> now saturating   Home meds trelegy, albuterol PRN, azithromycin MWF 500mg every other day, prednisone 10mg daily, furosemide 20mg MWF. No home O2. On eliquis 2.5 BID for LV mural thrombus since 12/2024 now resolved in recent TTE. Outpatient PFTs 1/2025 demonstrate obstructive pattern.  sp CTX 1g, azithro 500 IVx3 days, Duonebs x2, budesonide inh x1, decadron 6 IV x1, Lasix 20 x1. Clear sputum production. Rhonchi in R>L lung. no JVD, 1+ BLE.   Previous CT chest 2/2025 shows GGO throughout and previous RUL consolidation. CXR without infiltrates/consolidation. Procal mildly elevated 0.07. BNP rising.   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 FEV1/FVC 65% (93%pred) showing severe restriction  10/14/24 FVC 1.47 (54%pred), FEV1 0.92 (51%pred), FEV1/FVC 63% (90%pred) moderately severe restriction  Inpatient  99% on 2L NC-> 93-95% on RA-> 93% on 2L NC  3/26 CXR sp RLL lobectomy, RLL atelectasis, improved LLL atelectasis  WBC 10.56-> 12.27 PMN predominance. Eos 0.18 on admission now resolved  procal 0.07, lactate 1.7. BNP 1026-> 1175  urine strep/legionella/MRSA/MSSA negative. Full RVP negative  Bcx 3/26 NTD, scx pending  3/26 TTE LVEF 40-45%, no RWMA. Inferior septum, mid/apical anterior septum, mid/apical inferior wall and apex are hypokinetic. Grade I DD. LV thrombus resolved, LV EF improved compared to last TTE 12/24    #COPD exacerbation  #HFrEF exacerbation   #LV mural thrombus now resolved  On admission 7L NRB-> HFNC 40/40 admitted to telemetry-> now saturating 94% on RA, 95% on 2LNC  Home meds trelegy, albuterol PRN, azithromycin MWF 500mg every other day, prednisone 10mg daily, furosemide 20mg MWF. No home O2. On eliquis 2.5 BID for LV mural thrombus since 12/2024 now resolved in recent TTE. Outpatient PFTs 1/2025 demonstrate obstructive pattern.  sp CTX 1g, azithro 500 IVx3 days, Duonebs x2, budesonide inh x1, decadron 6 IV x1, Lasix 20 x1. Clear sputum production. Rhonchi in R>L lung. no JVD, 1+ BLE.   Previous CT chest 2/2025 shows GGO throughout and previous RUL consolidation. CXR without infiltrates/consolidation. Procal mildly elevated 0.07. BNP rising.   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 FEV1/FVC 65% (93%pred) showing severe restriction  10/14/24 FVC 1.47 (54%pred), FEV1 0.92 (51%pred), FEV1/FVC 63% (90%pred) moderately severe restriction  Inpatient  99% on 2L NC-> 93-95% on RA-> 93% on 2L NC  3/26 CXR sp RLL lobectomy, RLL atelectasis, improved LLL atelectasis  WBC 10.56-> 12.27 PMN predominance. Eos 0.18 on admission now resolved  procal 0.07, lactate 1.7. BNP 1026-> 1175  urine strep/legionella/MRSA/MSSA negative. Full RVP negative  Bcx 3/26 NTD, scx pending  3/26 TTE LVEF 40-45%, no RWMA. Inferior septum, mid/apical anterior septum, mid/apical inferior wall and apex are hypokinetic. Grade I DD. LV thrombus resolved, LV EF improved compared to last TTE 12/24    #COPD exacerbation  #HFrEF exacerbation   #LV mural thrombus now resolved  On admission 7L NRB-> HFNC 40/40 admitted to telemetry-> now saturating 94% on RA, 95% on 2LNC  Home meds trelegy, albuterol PRN, azithromycin MWF 500mg every other day, prednisone 10mg daily, furosemide 20mg MWF. No home O2. On eliquis 2.5 BID for LV mural thrombus since 12/2024 now resolved in recent TTE. CT chest from last month shows bronchial wall thickening, infiltrate in RUL/RML, GGO throughout Outpatient PFTs 1/2025 demonstrate obstructive pattern.  Outpatient PFTs show obstruction however difficult to assess without full PFTs  sp CTX 1g, azithro 500 IVx3 days, Duonebs x2, budesonide inh x1, decadron 6 IV x1, Lasix 20 x1. Clear sputum production. Rhonchi in R>L lung. no JVD, 1+ BLE.   Previous CT chest 2/2025 shows GGO throughout and previous RUL consolidation. CXR without infiltrates/consolidation. Procal mildly elevated 0.07. BNP rising.   - obtain repeat BNP, please resume home furosemide 20mg MWF, azithromycin 500mg MWF  - please repeat ambulatory and room air sats   - cw CTX 1g q24H, duonebs q6H  - aspiration precautions, can consider SLP eval likely chronically aspirating  - start aerobika TID for airway clearance  - please address constipation (pt reports no BM since admission), continue ICS  - discharge on trelegy, azithromycin MWF, prednisone 10mg qd (after completing 5 day course) outpatient f/u with Dr Santo in two weeks    Plan discussed with Dr Gomes  Recommendations final after attending 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 FEV1/FVC 65% (93%pred)   10/14/24 FVC 1.47 (54%pred), FEV1 0.92 (51%pred), FEV1/FVC 63% (90%pred)    Inpatient  99% on 2L NC-> 93-95% on RA-> 93% on 2L NC  3/26 CXR sp RLL lobectomy, RLL atelectasis, improved LLL atelectasis  WBC 10.56-> 12.27 PMN predominance. Eos 0.18 on admission now resolved  procal 0.07, lactate 1.7. BNP 1026-> 1175  urine strep/legionella/MRSA/MSSA negative. Full RVP negative  Bcx 3/26 NTD, scx pending  3/26 TTE LVEF 40-45%, no RWMA. Inferior septum, mid/apical anterior septum, mid/apical inferior wall and apex are hypokinetic. Grade I DD. LV thrombus resolved, LV EF improved compared to last TTE 12/24    #COPD exacerbation  #HFrEF exacerbation   #LV mural thrombus now resolved  On admission 7L NRB-> HFNC 40/40 admitted to telemetry-> now saturating 94% on RA, 95% on 2LNC  Home meds trelegy, albuterol PRN, azithromycin MWF 500mg every other day, prednisone 10mg daily, furosemide 20mg MWF. No home O2. On eliquis 2.5 BID for LV mural thrombus since 12/2024 now resolved in recent TTE. CT chest from last month shows bronchial wall thickening, infiltrate in RUL/RML, GGO throughout Outpatient PFTs 1/2025 demonstrate obstructive pattern.  Outpatient PFTs show obstruction however difficult to assess without full PFTs  sp CTX 1g, azithro 500 IVx3 days, Duonebs x2, budesonide inh x1, decadron 6 IV x1, Lasix 20 x1. Clear sputum production. Rhonchi in R>L lung. no JVD, 1+ BLE.   Previous CT chest 2/2025 shows GGO throughout and previous RUL consolidation. CXR without infiltrates/consolidation. Procal mildly elevated 0.07. BNP rising.   - obtain repeat BNP, please resume home furosemide 20mg MWF, azithromycin 500mg MWF  - please repeat ambulatory and room air sats   - cw CTX 1g q24H, duonebs q6H  - aspiration precautions, can consider SLP eval likely chronically aspirating  - start aerobika TID for airway clearance  - please address constipation (pt reports no BM since admission), continue ICS  - discharge on trelegy, azithromycin MWF, prednisone 10mg qd (after completing 5 day course) outpatient f/u with Dr Santo in two weeks    Plan discussed with Dr Gomes  Recommendations final after attending 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 FEV1/FVC 65% (93%pred)   10/14/24 FVC 1.47 (54%pred), FEV1 0.92 (51%pred), FEV1/FVC 63% (90%pred)    Inpatient  99% on 2L NC-> 93-95% on RA-> 93% on 2L NC  3/26 CXR sp RLL lobectomy, RLL atelectasis, improved LLL atelectasis  WBC 10.56-> 12.27 PMN predominance. Eos 0.18 on admission now resolved  procal 0.07, lactate 1.7. BNP 1026-> 1175  urine strep/legionella/MRSA/MSSA negative. Full RVP negative  Bcx 3/26 NTD, scx pending  3/26 TTE LVEF 40-45%, no RWMA. Inferior septum, mid/apical anterior septum, mid/apical inferior wall and apex are hypokinetic. Grade I DD. LV thrombus resolved, LV EF improved compared to last TTE 12/24    #HFrEF exacerbation #obstructive lung disease #exsmoker  On admission 7L NRB-> HFNC 40/40 admitted to telemetry-> now saturating 94% on RA, 95% on 2LNC  Home meds trelegy, albuterol PRN, azithromycin MWF 500mg every other day, prednisone 10mg daily, furosemide 20mg MWF. No home O2. On eliquis 2.5 BID for LV mural thrombus since 12/2024 now resolved in recent TTE. CT chest from last month shows bronchial wall thickening, infiltrate in RUL/RML, GGO throughout Outpatient PFTs 1/2025 demonstrate obstructive pattern.  Outpatient PFTs show obstruction however difficult to assess without full PFTs  sp CTX 1g, azithro 500 IVx3 days, Duonebs x2, budesonide inh x1, decadron 6 IV x1, Lasix 20 x1. Clear sputum production. Rhonchi in R>L lung. no JVD, 1+ BLE.   Previous CT chest 2/2025 shows GGO throughout and previous RUL consolidation, no emphysematous changes noted. CXR without infiltrates/consolidation. Procal mildly elevated 0.07. BNP rising.   - obtain repeat BNP, please resume home furosemide 20mg MWF, azithromycin 500mg MWF  - please repeat ambulatory and room air sats   - cw CTX 1g q24H, duonebs q6H  - aspiration precautions, can consider SLP eval likely chronically aspirating  - start aerobika TID for airway clearance  - please address constipation (pt reports no BM since admission), continue ICS  - discharge on trelegy, azithromycin MWF, prednisone 10mg qd (after completing 5 day course) outpatient f/u with Dr Santo in two weeks    Plan discussed with Dr Gomes  Recommendations final after attending 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 FEV1/FVC 65% (93%pred)   10/14/24 FVC 1.47 (54%pred), FEV1 0.92 (51%pred), FEV1/FVC 63% (90%pred)    Inpatient  99% on 2L NC-> 93-95% on RA-> 93% on 2L NC  3/26 CXR sp RLL lobectomy, RLL atelectasis, improved LLL atelectasis  WBC 10.56-> 12.27 PMN predominance. Eos 0.18 on admission now resolved  procal 0.07, lactate 1.7. BNP 1026-> 1175  urine strep/legionella/MRSA/MSSA negative. Full RVP negative  Bcx 3/26 NTD, scx pending  3/26 TTE LVEF 40-45%, no RWMA. Inferior septum, mid/apical anterior septum, mid/apical inferior wall and apex are hypokinetic. Grade I DD. LV thrombus resolved, LV EF improved compared to last TTE 12/24    #HFrEF exacerbation #obstructive lung disease #exsmoker  On admission 7L NRB-> HFNC 40/40 admitted to telemetry-> now saturating 94% on RA, 95% on 2LNC  Home meds trelegy, albuterol PRN, azithromycin MWF 500mg every other day, prednisone 10mg daily, furosemide 20mg MWF. No home O2. On eliquis 2.5 BID for LV mural thrombus since 12/2024 now resolved in recent TTE. CT chest from last month shows bronchial wall thickening, infiltrate in RUL/RML, GGO throughout Outpatient PFTs 1/2025 demonstrate obstructive pattern.  Outpatient PFTs show obstruction however difficult to assess without full PFTs  sp CTX 1g, azithro 500 IVx3 days, Duonebs x2, budesonide inh x1, decadron 6 IV x1, Lasix 20 x1. Clear sputum production. Rhonchi in R>L lung. no JVD, 1+ BLE.   Previous CT chest 2/2025 shows GGO throughout and previous RUL consolidation, no emphysematous changes noted. CXR without infiltrates/consolidation. Procal mildly elevated 0.07. BNP rising.   - obtain repeat BNP, please resume home furosemide 20mg MWF, azithromycin 500mg MWF  - please repeat ambulatory and room air sats- if hypoxic, consider repeat CXR for additional diuresis  - cw CTX 1g q24H, duonebs q6H  - aspiration precautions, can consider SLP eval likely chronically aspirating  - start aerobika TID for airway clearance  - please address constipation (pt reports no BM since admission), continue ICS  - discharge on trelegy, azithromycin MWF, prednisone 10mg qd (after completing 5 day course) outpatient f/u with Dr Santo in two weeks    Plan discussed with Dr Gomes  Recommendations final after attending 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 FEV1/FVC 65% (93%pred)   10/14/24 FVC 1.47 (54%pred), FEV1 0.92 (51%pred), FEV1/FVC 63% (90%pred)    Inpatient  99% on 2L NC-> 93-95% on RA-> 93% on 2L NC  3/26 CXR sp RLL lobectomy, RLL atelectasis, improved LLL atelectasis  WBC 10.56-> 12.27 PMN predominance. Eos 0.18 on admission now resolved  procal 0.07, lactate 1.7. BNP 1026-> 1175  urine strep/legionella/MRSA/MSSA negative. Full RVP negative  Bcx 3/26 NTD, scx pending  3/26 TTE LVEF 40-45%, no RWMA. Inferior septum, mid/apical anterior septum, mid/apical inferior wall and apex are hypokinetic. Grade I DD. LV thrombus resolved, LV EF improved compared to last TTE 12/24    #HFrEF exacerbation #obstructive lung disease #exsmoker  On admission 7L NRB-> HFNC 40/40 admitted to telemetry-> now saturating 94% on RA, 95% on 2LNC  Home meds trelegy, albuterol PRN, azithromycin MWF 500mg every other day, prednisone 10mg daily, furosemide 20mg MWF. No home O2. On eliquis 2.5 BID for LV mural thrombus since 12/2024 now resolved in recent TTE. CT chest from last month shows bronchial wall thickening, infiltrate in RUL/RML, GGO throughout Outpatient PFTs 1/2025 demonstrate obstructive pattern.  Outpatient PFTs show obstruction however difficult to assess without full PFTs  sp CTX 1g, azithro 500 IVx3 days, Duonebs x2, budesonide inh x1, decadron 6 IV x1, Lasix 20 x1. Clear sputum production. Rhonchi in R>L lung. no JVD, 1+ BLE.   Previous CT chest 2/2025 shows GGO throughout and previous RUL consolidation, no emphysematous changes noted. CXR without infiltrates/consolidation. Procal mildly elevated 0.07. BNP rising.   - obtain repeat BNP, please resume home furosemide 20mg MWF, azithromycin 500mg MWF  - please repeat ambulatory sats, satting 92-94% on RA- if hypoxic, consider repeat CXR for additional diuresis  - cw CTX 1g q24H, duonebs q6H  - aspiration precautions, can consider SLP eval likely chronically aspirating  - start aerobika TID for airway clearance  - please address constipation (pt reports no BM since admission), continue ICS  - discharge on trelegy, azithromycin MWF, prednisone 10mg qd (after completing 5 day course) outpatient f/u with Dr Santo in two weeks    Plan discussed with Dr Gomes  Recommendations final after attending attestation

## 2025-03-28 NOTE — DISCHARGE NOTE PROVIDER - NSDCHHNEEDSERVICEOTHER_GEN_ALL_CORE_FT
Patient would benefit from additional home health aide services Patient would benefit from additional home health aide services. Wound care instructions: Soak sites with Vashe wound cleanser then apply Medihoney, cover with telfa and secure with stockinette.

## 2025-03-28 NOTE — DISCHARGE NOTE NURSING/CASE MANAGEMENT/SOCIAL WORK - NSDCPEFALRISK_GEN_ALL_CORE
For information on Fall & Injury Prevention, visit: [email or web address] OR  [email or web address] OR  https://www.cdc.gov/steadi/patient.html

## 2025-03-28 NOTE — PROGRESS NOTE ADULT - PROBLEM SELECTOR PLAN 3
Recent EF 35-40% per 12/2024 TTE. Likely ischemic cardiomyopathy given multivessel CAD on admission CT and regional wall motion abnormalities. Addmission pro-BNP >1000 (600s in 2023). | Home GDMT: metoprolol. ?lasix 20mg x2/wk. Per 12/2024 cards note: Holding off RAASi/MRA/SGLT2 for Cr, pt w/ stable angina - recommended starting imdur 20mg  qd as pt declined ischemic w/u.  Repeat echo with improved EF 40-45%    - C/w home rosuvastatin 10mg   - PRN Lasix as appropriate  - Holding home Toprol 50mg qd given normotension Recent EF 35-40% per 12/2024 TTE. Likely ischemic cardiomyopathy given multivessel CAD on admission CT and regional wall motion abnormalities. Addmission pro-BNP >1000 (600s in 2023). | Home GDMT: metoprolol. ?lasix 20mg x2/wk. Per 12/2024 cards note: Holding off RAASi/MRA/SGLT2 for Cr, pt w/ stable angina - recommended starting imdur 20mg  qd as pt declined ischemic w/u.  Repeat echo with improved EF 40-45%    - C/w home rosuvastatin 10mg   - Holding home Toprol 50mg qd given normotension

## 2025-03-28 NOTE — CONSULT NOTE ADULT - SUBJECTIVE AND OBJECTIVE BOX
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 Patient reports over last couple of days, he has developed increased SOB w associated cough. Tonight, he had worsening sleep quality, prompting him to seek medical attention. Patient reports increased SOB, mostly at night when laying flat w development of productive cough (yellow/white). He states he usually sleeps with 1 pillow at night, however over the course of the past couple of days, he has only been able to sleep sitting up. He also has developed intermittent CP worse w inspiration. In terms of further ROS, patient denies associated fever/chills, wheezing, hemoptysis, recent sick contacts, abdominal symptoms, urinary symptoms, or rash  Of note, upon meeting patient, was placed on 3L NC w O2 sat >95% w no tachypnea  Pulmonologist: Dr Santo  Cardiologist : Dr France    Patient is admitted to medicine for management of COPD exacerbation. Cardiology consulted for management of LV thrombus.   On evaluation, patient states that he started eliquis in December for LV thrombus. He has had some bruising with therapy. He reports some atypical, right sided chest discomfort that is positional.   Otherwise, he denies any dyspnea, palpitations, substernal chest pain.     PAST MEDICAL & SURGICAL HISTORY:  COPD (chronic obstructive pulmonary disease)      Lung cancer      HTN (hypertension)      High cholesterol      BPH (benign prostatic hyperplasia)      History of repair of hip fracture      Pre-existing type 2 diabetes mellitus      Inguinal hernia      Preop examination      S/P lobectomy of lung      History of hip surgery      History of lumbar surgery	    FAMILY HISTORY: unknown    SOCIAL HISTORY: denies smoking, alcohol use, IVDU    ALLERGIES/INTOLERANCES:  No Known Allergies    HOME MEDICATIONS:    INPATIENT MEDICATIONS:    apixaban 2.5 milliGRAM(s) Oral every 12 hours    acetaminophen     Tablet .. 650 milliGRAM(s) Oral every 6 hours PRN  albuterol/ipratropium for Nebulization 3 milliLiter(s) Nebulizer every 6 hours  aluminum hydroxide/magnesium hydroxide/simethicone Suspension 30 milliLiter(s) Oral every 4 hours PRN  cefTRIAXone   IVPB 1000 milliGRAM(s) IV Intermittent every 24 hours  escitalopram 10 milliGRAM(s) Oral every 24 hours  melatonin 3 milliGRAM(s) Oral at bedtime PRN  ondansetron Injectable 4 milliGRAM(s) IV Push every 8 hours PRN  predniSONE   Tablet 40 milliGRAM(s) Oral every 24 hours  rosuvastatin 10 milliGRAM(s) Oral at bedtime  tamsulosin 0.4 milliGRAM(s) Oral at bedtime      REVIEW OF SYSTEMS:    CONSTITUTIONAL: No weakness, F/C, wt loss/gain  EYES: No visual changes/disturbances  ENMT: No dry mouth, no vertigo  NECK: No pain or stiffness  RESPIRATORY: No cough, wheezing, hemoptysis; No shortness of breath  CARDIOVASCULAR: No chest pain, palpitations, lightheadedness/dizziness, LOC, or leg swelling  GASTROINTESTINAL: No abdominal or epigastric pain. No N/V/D/C. No melena, hematochezia, or hematemesis.  GENITOURINARY: No dysuria, increased frequency, hematuria, or incontinence  NEUROLOGICAL: No lightheadedness/dizziness, LOC, headaches, numbness or weakness  MUSCULOSKELETAL: No joint pain or swelling; No muscle, back, or extremity pain  SKIN: No itching, burning, rashes, or lesions   ENDOCRINE: No heat or cold intolerance; No hair loss  HEME/LYMPH: No easy bruising, or bleeding gums  PSYCHIATRIC: No depression, anxiety, mood swings, or difficulty sleeping    [ ] All other review of systems are negative unless indicated above.  [ ] Unable to obtain due to:    PHYSICAL EXAM:    T(C): 36.3 (03-28-25 @ 09:26), Max: 36.6 (03-27-25 @ 20:35)  HR: 81 (03-28-25 @ 09:26) (63 - 82)  BP: 119/67 (03-28-25 @ 09:26) (119/67 - 145/74)  RR: 18 (03-28-25 @ 09:26) (18 - 19)  SpO2: 95% (03-28-25 @ 09:26) (93% - 95%)  Wt(kg): --    GEN: NAD  HEENT: EOMI   RESP: CTA b/l  CV: RRR. Normal S1/S2. No m/r/g. No JVD.   ABD: abdomen soft, NT/ND; no rebound or guarding; +BSx4  EXT: No edema, bruised extremities   NEURO: alert and attentive    TELEMETRY: 	      ECG:  	  	  LABS:                        12.8   12.27 )-----------( 161      ( 28 Mar 2025 11:04 )             39.7     03-28    139  |  99  |  35[H]  ----------------------------<  148[H]  4.3   |  25  |  1.55[H]    Ca    8.9      28 Mar 2025 11:04  Phos  2.9     03-28  Mg     1.9     03-28        Lipid Profile:   HgA1c:   TSH:     CARDIAC MARKERS:          proBNP:     RADIOLOGY:      ASSESSMENT/PLAN:

## 2025-03-28 NOTE — DISCHARGE NOTE PROVIDER - NSDCMRMEDTOKEN_GEN_ALL_CORE_FT
amoxicillin 500 mg oral tablet: 2 tab(s) orally 3 times a day  Azithromycin 5 Day Dose Pack 250 mg oral tablet: 1 tab(s) orally once a day please take 2 tab on day one, 1 tab on day two - five  cefpodoxime 200 mg oral tablet: 1 tab(s) orally every 12 hours meds to beds  Eliquis 5 mg oral tablet: 1 tab(s) orally every 12 hours  furosemide 20 mg oral tablet: 1 tab(s) orally every monday and thursday  isosorbide mononitrate 30 mg oral tablet, extended release: 1 tab(s) orally once a day For stable angina  Lexapro 10 mg oral tablet: 1 orally once a day  prednisoLONE 10 mg oral tablet, disintegratin tab(s) orally  predniSONE 20 mg oral tablet: 2 tab(s) orally every 12 hours meds to beds  rosuvastatin 10 mg oral capsule: 1 orally once a day  tamsulosin 0.4 mg oral capsule: 1 orally once a day (at bedtime)  Toprol-XL 50 mg oral tablet, extended release: 1 orally once a day  Trelegy Ellipta 100 mcg-62.5 mcg-25 mcg/inh inhalation powder: 1 inhaled once a day   aspirin 81 mg oral tablet, chewable: 1 tab(s) orally once a day  azithromycin 500 mg oral tablet: 1 tab(s) orally once a day every  and friday  Eliquis 2.5 mg oral tablet: 1 tab(s) orally 2 times a day  furosemide 20 mg oral tablet: 1 tab(s) orally once a day every  and friday  isosorbide mononitrate 30 mg oral tablet, extended release: 1 tab(s) orally once a day  Lexapro 10 mg oral tablet: 1 orally once a day  prednisoLONE 10 mg oral tablet, disintegratin tab(s) orally  rosuvastatin 10 mg oral capsule: 1 orally once a day  tamsulosin 0.4 mg oral capsule: 1 orally once a day (at bedtime)  Toprol-XL 50 mg oral tablet, extended release: 1 orally once a day  Trelegy Ellipta 100 mcg-62.5 mcg-25 mcg/inh inhalation powder: 1 inhaled once a day   aspirin 81 mg oral tablet, chewable: 1 tab(s) orally once a day  atorvastatin 40 mg oral tablet: 1 tab(s) orally once a day  azithromycin 500 mg oral tablet: 1 tab(s) orally once a day every  and friday  Eliquis 2.5 mg oral tablet: 1 tab(s) orally 2 times a day  furosemide 20 mg oral tablet: 1 tab(s) orally once a day every  and friday  isosorbide mononitrate 30 mg oral tablet, extended release: 1 tab(s) orally once a day  Lexapro 10 mg oral tablet: 1 orally once a day  losartan 25 mg oral tablet: 1 tab(s) orally once a day  prednisoLONE 10 mg oral tablet, disintegratin tab(s) orally  predniSONE 20 mg oral tablet: 2 tab(s) orally once a day  tamsulosin 0.4 mg oral capsule: 1 orally once a day (at bedtime)  Toprol-XL 50 mg oral tablet, extended release: 1 orally once a day  Trelegy Ellipta 100 mcg-62.5 mcg-25 mcg/inh inhalation powder: 1 inhaled once a day   aspirin 81 mg oral tablet, chewable: 1 tab(s) orally once a day  atorvastatin 40 mg oral tablet: 1 tab(s) orally once a day  azithromycin 500 mg oral tablet: 1 tab(s) orally once a day every  and friday  furosemide 20 mg oral tablet: 1 tab(s) orally once a day every  and friday  isosorbide mononitrate 30 mg oral tablet, extended release: 1 tab(s) orally once a day  Lexapro 10 mg oral tablet: 1 orally once a day  losartan 25 mg oral tablet: 1 tab(s) orally once a day  prednisoLONE 10 mg oral tablet, disintegratin tab(s) orally  tamsulosin 0.4 mg oral capsule: 1 orally once a day (at bedtime)  Trelegy Ellipta 100 mcg-62.5 mcg-25 mcg/inh inhalation powder: 1 inhaled once a day   aspirin 81 mg oral tablet, chewable: 1 tab(s) orally once a day  atorvastatin 40 mg oral tablet: 1 tab(s) orally once a day  azithromycin 500 mg oral tablet: 1 tab(s) orally once a day every  and friday  furosemide 20 mg oral tablet: 1 tab(s) orally once a day every  and friday  isosorbide mononitrate 30 mg oral tablet, extended release: 1 tab(s) orally once a day  Lexapro 10 mg oral tablet: 1 orally once a day  losartan 25 mg oral tablet: 1 tab(s) orally once a day  prednisoLONE 10 mg oral tablet, disintegratin tab(s) orally  tamsulosin 0.4 mg oral capsule: 1 orally once a day (at bedtime)  Toprol-XL 50 mg oral tablet, extended release: 1 tab(s) orally once a day  Trelegy Ellipta 100 mcg-62.5 mcg-25 mcg/inh inhalation powder: 1 inhaled once a day

## 2025-03-28 NOTE — CONSULT NOTE ADULT - SUBJECTIVE AND OBJECTIVE BOX
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 Patient reports over last couple of days, he has developed increased SOB w associated cough. Tonight, he had worsening sleep quality, prompting him to seek medical attention. Patient reports increased SOB, mostly at night when laying flat w development of productive cough (yellow/white). He states he usually sleeps with 1 pillow at night, however over the course of the past couple of days, he has only been able to sleep sitting up. He also has developed intermittent CP worse w inspiration. In terms of further ROS, patient denies associated fever/chills, wheezing, hemoptysis, recent sick contacts, abdominal symptoms, urinary symptoms, or rash  Of note, upon meeting patient, was placed on 3L NC w O2 sat >95% w no tachypnea  Pulmonologist: Dr Santo  Cardiologist : Dr France    ED Course  - VS: T 97.2, HR 60, /75, RR 16, SpO2 98% on 7L NRB >> 97% on HFNC 40/40  - Labs: WBC 10.56, HGB wnl,   |  BNP - Na 141, K 5.4 >> 5.2, HCO3 25, BUN 39, Cr 1.69, LFTs unremarkable.  |  Troponin, lactate wnl. pro-BNP 1026 (685 in 09/2023)  |  VBG pH 7.3, pCO2 28.  |  RVP neg.   - EKG: Pending  - Imaging: CXR w/ blunted costrophrenic angle on R  - Consults: ICU  - Interventions: CTX 1g, azithro 500 IV, Duonebs x2, budesonide inh x1, decadron 6 IV x1, Lasix 20 x1.    (26 Mar 2025 05:21)      Pulmonary is consulted for    Follows with  __ outpatient for Pulmonary.     REVIEW OF SYSTEMS:  10 point ROS negative aside from what is mentioned in HPI    PAST MEDICAL & SURGICAL HISTORY:  COPD (chronic obstructive pulmonary disease)      Lung cancer      HTN (hypertension)      High cholesterol      BPH (benign prostatic hyperplasia)      History of repair of hip fracture      Pre-existing type 2 diabetes mellitus      Inguinal hernia      Preop examination      S/P lobectomy of lung      History of hip surgery      History of lumbar surgery          FAMILY HISTORY:      SOCIAL HISTORY:  Smoking Status: [ ] Current, [ ] Former, [ ] Never  Pack Years:    MEDICATIONS:  Pulmonary:  albuterol/ipratropium for Nebulization 3 milliLiter(s) Nebulizer every 6 hours    Antimicrobials:  cefTRIAXone   IVPB 1000 milliGRAM(s) IV Intermittent every 24 hours    Anticoagulants:  aspirin  chewable 81 milliGRAM(s) Oral daily    Onc:    GI/:  aluminum hydroxide/magnesium hydroxide/simethicone Suspension 30 milliLiter(s) Oral every 4 hours PRN  tamsulosin 0.4 milliGRAM(s) Oral at bedtime    Endocrine:  predniSONE   Tablet 40 milliGRAM(s) Oral every 24 hours  rosuvastatin 10 milliGRAM(s) Oral at bedtime    Cardiac:  isosorbide   mononitrate ER Tablet (IMDUR) 30 milliGRAM(s) Oral daily    Other Medications:  acetaminophen     Tablet .. 650 milliGRAM(s) Oral every 6 hours PRN  escitalopram 10 milliGRAM(s) Oral every 24 hours  melatonin 3 milliGRAM(s) Oral at bedtime PRN  ondansetron Injectable 4 milliGRAM(s) IV Push every 8 hours PRN      Allergies    No Known Allergies    Intolerances        Vital Signs Last 24 Hrs  T(C): 36.3 (28 Mar 2025 09:26), Max: 36.6 (27 Mar 2025 20:35)  T(F): 97.3 (28 Mar 2025 09:26), Max: 97.9 (27 Mar 2025 20:35)  HR: 81 (28 Mar 2025 09:26) (63 - 82)  BP: 119/67 (28 Mar 2025 09:26) (119/67 - 145/74)  BP(mean): 92 (27 Mar 2025 20:35) (92 - 92)  RR: 18 (28 Mar 2025 09:26) (18 - 19)  SpO2: 95% (28 Mar 2025 09:26) (93% - 95%)    Parameters below as of 28 Mar 2025 09:26  Patient On (Oxygen Delivery Method): nasal cannula  O2 Flow (L/min): 2            PHYSICAL EXAM:  Constitutional: well-appearing, in no apparent distress  Head: NC/AT  EENT: PERRL, anicteric sclera; oropharynx clear with moist mucous membranes  Neck: supple, ROM intact, no appreciable JVD or LAD  Respiratory: Lungs clear to auscultation bilaterally, no wheezes, rhonchi or rales  Cardiovascular: +S1/S2 intact, regular rhythm and rate. No murmurs appreciated  Gastrointestinal: soft, non-tender, non distended, bowel sounds normoactive   Extremities: no edema, clubbing or cyanosis  Vascular: 2+ radial pulses B/L  Neurological: awake and alert; oriented with no appreciable focal neuro defecits    LABS:      CBC Full  -  ( 28 Mar 2025 11:04 )  WBC Count : 12.27 K/uL  RBC Count : 4.15 M/uL  Hemoglobin : 12.8 g/dL  Hematocrit : 39.7 %  Platelet Count - Automated : 161 K/uL  Mean Cell Volume : 95.7 fl  Mean Cell Hemoglobin : 30.8 pg  Mean Cell Hemoglobin Concentration : 32.2 g/dL  Auto Neutrophil # : x  Auto Lymphocyte # : x  Auto Monocyte # : x  Auto Eosinophil # : x  Auto Basophil # : x  Auto Neutrophil % : x  Auto Lymphocyte % : x  Auto Monocyte % : x  Auto Eosinophil % : x  Auto Basophil % : x    03-28    139  |  99  |  35[H]  ----------------------------<  148[H]  4.3   |  25  |  1.55[H]    Ca    8.9      28 Mar 2025 11:04  Phos  2.9     03-28  Mg     1.9     03-28                        RADIOLOGY & ADDITIONAL STUDIES:    reviewed PULMONARY SERVICE INITIAL CONSULT NOTE    HPI:  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 Patient reports over last couple of days, he has developed increased SOB w associated cough. Tonight, he had worsening sleep quality, prompting him to seek medical attention. Patient reports increased SOB, mostly at night when laying flat w development of productive cough (yellow/white). He states he usually sleeps with 1 pillow at night, however over the course of the past couple of days, he has only been able to sleep sitting up. He also has developed intermittent CP worse w inspiration. In terms of further ROS, patient denies associated fever/chills, wheezing, hemoptysis, recent sick contacts, abdominal symptoms, urinary symptoms, or rash. Of note he had presented to [facility] ED 2/28 for weakness. CTAPE showed no PE ?RUL infiltrate discharged on azithomycin 500mg x3 days, followed up with Dr Santo 3/10 and seemed to be improving    Pulmonary consulted for COPD exacerbation/persistent hypoxia. Pulmonologist: Dr Santo      ED Course  - VS: T 97.2, HR 60, /75, RR 16, SpO2 98% on 7L NRB >> 97% on HFNC 40/40  - Labs: WBC 10.56, HGB wnl,   |  BNP - Na 141, K 5.4 >> 5.2, HCO3 25, BUN 39, Cr 1.69, LFTs unremarkable.  |  Troponin, lactate wnl. pro-BNP 1026 (685 in 09/2023)  |  VBG pH 7.3, pCO2 28.  |  RVP neg.   - EKG: Pending  - Imaging: CXR w/ blunted costrophrenic angle on R  - Consults: ICU  - Interventions: CTX 1g, azithro 500 IV, Duonebs x2, budesonide inh x1, decadron 6 IV x1, Lasix 20 x1.    (26 Mar 2025 05:21)      REVIEW OF SYSTEMS:  10 point ROS negative aside from what is mentioned in HPI    PAST MEDICAL & SURGICAL HISTORY:  COPD (chronic obstructive pulmonary disease)      Lung cancer      HTN (hypertension)      High cholesterol      BPH (benign prostatic hyperplasia)      History of repair of hip fracture      Pre-existing type 2 diabetes mellitus      Inguinal hernia      Preop examination      S/P lobectomy of lung      History of hip surgery      History of lumbar surgery          FAMILY HISTORY:      SOCIAL HISTORY:  Smoking Status: [ ] Current, [ ] Former, [ ] Never  Pack Years:    MEDICATIONS:  Pulmonary:  albuterol/ipratropium for Nebulization 3 milliLiter(s) Nebulizer every 6 hours    Antimicrobials:  cefTRIAXone   IVPB 1000 milliGRAM(s) IV Intermittent every 24 hours    Anticoagulants:  aspirin  chewable 81 milliGRAM(s) Oral daily    Onc:    GI/:  aluminum hydroxide/magnesium hydroxide/simethicone Suspension 30 milliLiter(s) Oral every 4 hours PRN  tamsulosin 0.4 milliGRAM(s) Oral at bedtime    Endocrine:  predniSONE   Tablet 40 milliGRAM(s) Oral every 24 hours  rosuvastatin 10 milliGRAM(s) Oral at bedtime    Cardiac:  isosorbide   mononitrate ER Tablet (IMDUR) 30 milliGRAM(s) Oral daily    Other Medications:  acetaminophen     Tablet .. 650 milliGRAM(s) Oral every 6 hours PRN  escitalopram 10 milliGRAM(s) Oral every 24 hours  melatonin 3 milliGRAM(s) Oral at bedtime PRN  ondansetron Injectable 4 milliGRAM(s) IV Push every 8 hours PRN      Allergies    No Known Allergies    Intolerances        Vital Signs Last 24 Hrs  T(C): 36.3 (28 Mar 2025 09:26), Max: 36.6 (27 Mar 2025 20:35)  T(F): 97.3 (28 Mar 2025 09:26), Max: 97.9 (27 Mar 2025 20:35)  HR: 81 (28 Mar 2025 09:26) (63 - 82)  BP: 119/67 (28 Mar 2025 09:26) (119/67 - 145/74)  BP(mean): 92 (27 Mar 2025 20:35) (92 - 92)  RR: 18 (28 Mar 2025 09:26) (18 - 19)  SpO2: 95% (28 Mar 2025 09:26) (93% - 95%)    Parameters below as of 28 Mar 2025 09:26  Patient On (Oxygen Delivery Method): nasal cannula  O2 Flow (L/min): 2            PHYSICAL EXAM:  Constitutional: well-appearing, in no apparent distress  Head: NC/AT  EENT: PERRL, anicteric sclera; oropharynx clear with moist mucous membranes  Neck: supple, ROM intact, no appreciable JVD or LAD  Respiratory: Lungs clear to auscultation bilaterally, no wheezes, rhonchi or rales  Cardiovascular: +S1/S2 intact, regular rhythm and rate. No murmurs appreciated  Gastrointestinal: soft, non-tender, non distended, bowel sounds normoactive   Extremities: no edema, clubbing or cyanosis  Vascular: 2+ radial pulses B/L  Neurological: awake and alert; oriented with no appreciable focal neuro defecits    LABS:      CBC Full  -  ( 28 Mar 2025 11:04 )  WBC Count : 12.27 K/uL  RBC Count : 4.15 M/uL  Hemoglobin : 12.8 g/dL  Hematocrit : 39.7 %  Platelet Count - Automated : 161 K/uL  Mean Cell Volume : 95.7 fl  Mean Cell Hemoglobin : 30.8 pg  Mean Cell Hemoglobin Concentration : 32.2 g/dL  Auto Neutrophil # : x  Auto Lymphocyte # : x  Auto Monocyte # : x  Auto Eosinophil # : x  Auto Basophil # : x  Auto Neutrophil % : x  Auto Lymphocyte % : x  Auto Monocyte % : x  Auto Eosinophil % : x  Auto Basophil % : x    03-28    139  |  99  |  35[H]  ----------------------------<  148[H]  4.3   |  25  |  1.55[H]    Ca    8.9      28 Mar 2025 11:04  Phos  2.9     03-28  Mg     1.9     03-28                        RADIOLOGY & ADDITIONAL STUDIES:    reviewed PULMONARY SERVICE INITIAL CONSULT NOTE    HPI:  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 Patient reports over last couple of days, he has developed increased SOB w associated cough. Tonight, he had worsening sleep quality, prompting him to seek medical attention. Patient reports increased SOB, mostly at night when laying flat w development of productive cough (yellow/white). He states he usually sleeps with 1 pillow at night, however over the course of the past couple of days, he has only been able to sleep sitting up. He also has developed intermittent CP worse w inspiration. In terms of further ROS, patient denies associated fever/chills, wheezing, hemoptysis, recent sick contacts, abdominal symptoms, urinary symptoms, or rash. Of note he had presented to [facility] ED 2/28 for weakness. CTAPE showed no PE ?RUL infiltrate discharged on azithromycin 500mg x3 days, followed up with Dr Santo 3/10 and seemed to be improving    Pulmonary consulted for COPD exacerbation/persistent hypoxia. Sees Dr Santo outpatient as his pulmonologist. Last seen 3/10 after ED visit.       ED Course  - VS: T 97.2, HR 60, /75, RR 16, SpO2 98% on 7L NRB >> 97% on HFNC 40/40  - Labs: WBC 10.56, HGB wnl,   |  BNP - Na 141, K 5.4 >> 5.2, HCO3 25, BUN 39, Cr 1.69, LFTs unremarkable.  |  Troponin, lactate wnl. pro-BNP 1026 (685 in 09/2023)  |  VBG pH 7.3, pCO2 28.  |  RVP neg.   - EKG: Pending  - Imaging: CXR w/ blunted costrophrenic angle on R  - Consults: ICU  - Interventions: CTX 1g, azithro 500 IV, Duonebs x2, budesonide inh x1, decadron 6 IV x1, Lasix 20 x1.    (26 Mar 2025 05:21)      REVIEW OF SYSTEMS:  10 point ROS negative aside from what is mentioned in HPI    PAST MEDICAL & SURGICAL HISTORY:  COPD (chronic obstructive pulmonary disease)      Lung cancer      HTN (hypertension)      High cholesterol      BPH (benign prostatic hyperplasia)      History of repair of hip fracture      Pre-existing type 2 diabetes mellitus      Inguinal hernia      Preop examination      S/P lobectomy of lung      History of hip surgery      History of lumbar surgery          FAMILY HISTORY:      SOCIAL HISTORY:  Smoking Status:>50PPY history    MEDICATIONS:  Pulmonary:  albuterol/ipratropium for Nebulization 3 milliLiter(s) Nebulizer every 6 hours    Antimicrobials:  cefTRIAXone   IVPB 1000 milliGRAM(s) IV Intermittent every 24 hours    Anticoagulants:  aspirin  chewable 81 milliGRAM(s) Oral daily    Onc:    GI/:  aluminum hydroxide/magnesium hydroxide/simethicone Suspension 30 milliLiter(s) Oral every 4 hours PRN  tamsulosin 0.4 milliGRAM(s) Oral at bedtime    Endocrine:  predniSONE   Tablet 40 milliGRAM(s) Oral every 24 hours  rosuvastatin 10 milliGRAM(s) Oral at bedtime    Cardiac:  isosorbide   mononitrate ER Tablet (IMDUR) 30 milliGRAM(s) Oral daily    Other Medications:  acetaminophen     Tablet .. 650 milliGRAM(s) Oral every 6 hours PRN  escitalopram 10 milliGRAM(s) Oral every 24 hours  melatonin 3 milliGRAM(s) Oral at bedtime PRN  ondansetron Injectable 4 milliGRAM(s) IV Push every 8 hours PRN      Allergies    No Known Allergies    Intolerances        Vital Signs Last 24 Hrs  T(C): 36.3 (28 Mar 2025 09:26), Max: 36.6 (27 Mar 2025 20:35)  T(F): 97.3 (28 Mar 2025 09:26), Max: 97.9 (27 Mar 2025 20:35)  HR: 81 (28 Mar 2025 09:26) (63 - 82)  BP: 119/67 (28 Mar 2025 09:26) (119/67 - 145/74)  BP(mean): 92 (27 Mar 2025 20:35) (92 - 92)  RR: 18 (28 Mar 2025 09:26) (18 - 19)  SpO2: 95% (28 Mar 2025 09:26) (93% - 95%)    Parameters below as of 28 Mar 2025 09:26  Patient On (Oxygen Delivery Method): nasal cannula  O2 Flow (L/min): 2            PHYSICAL EXAM:  Constitutional: well-appearing, in no apparent distress  Head: NC/AT  EENT: PERRL, anicteric sclera; oropharynx clear with moist mucous membranes  Neck: supple, ROM intact, no appreciable JVD  Respiratory: Lungs clear to auscultation bilaterally, bilateral rhonchi R>L, no wheezing   Cardiovascular: +S1/S2 intact, regular rhythm and rate. No murmurs appreciated  Gastrointestinal: soft, non-tender, non distended, bowel sounds normoactive   Extremities: no edema, clubbing or cyanosis  Vascular: 2+ radial pulses B/L  Neurological: awake and alert; oriented with no appreciable focal neuro deficits  Dermatologic: scaly healed wounds on BLE    LABS:      CBC Full  -  ( 28 Mar 2025 11:04 )  WBC Count : 12.27 K/uL  RBC Count : 4.15 M/uL  Hemoglobin : 12.8 g/dL  Hematocrit : 39.7 %  Platelet Count - Automated : 161 K/uL  Mean Cell Volume : 95.7 fl  Mean Cell Hemoglobin : 30.8 pg  Mean Cell Hemoglobin Concentration : 32.2 g/dL  Auto Neutrophil # : x  Auto Lymphocyte # : x  Auto Monocyte # : x  Auto Eosinophil # : x  Auto Basophil # : x  Auto Neutrophil % : x  Auto Lymphocyte % : x  Auto Monocyte % : x  Auto Eosinophil % : x  Auto Basophil % : x    03-28    139  |  99  |  35[H]  ----------------------------<  148[H]  4.3   |  25  |  1.55[H]    Ca    8.9      28 Mar 2025 11:04  Phos  2.9     03-28  Mg     1.9     03-28    RADIOLOGY & ADDITIONAL STUDIES:    reviewed PULMONARY SERVICE INITIAL CONSULT NOTE    HPI:  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 Patient reports over last couple of days, he has developed increased SOB w associated cough. Tonight, he had worsening sleep quality, prompting him to seek medical attention. Patient reports increased SOB, mostly at night when laying flat w development of productive cough (yellow/white). He states he usually sleeps with 1 pillow at night, however over the course of the past couple of days, he has only been able to sleep sitting up. He also has developed intermittent CP worse w inspiration. In terms of further ROS, patient denies associated fever/chills, wheezing, hemoptysis, recent sick contacts, abdominal symptoms, urinary symptoms, or rash. Of note he had presented to [facility] ED 2/28 for weakness. CTAPE showed no PE ?RUL infiltrate discharged on azithromycin 500mg x3 days, followed up with Dr Santo 3/10 and seemed to be improving    Pulmonary consulted for COPD exacerbation/persistent hypoxia. Sees Dr Santo outpatient as his pulmonologist. Reports he has been feeling dyspneic since around a month ago. Previously was able to walk up and down the hallway now feels short of breath even moving in bed. Reports he was using his inhaler more often       ED Course  - VS: T 97.2, HR 60, /75, RR 16, SpO2 98% on 7L NRB >> 97% on HFNC 40/40  - Labs: WBC 10.56, HGB wnl,   |  BNP - Na 141, K 5.4 >> 5.2, HCO3 25, BUN 39, Cr 1.69, LFTs unremarkable.  |  Troponin, lactate wnl. pro-BNP 1026 (685 in 09/2023)  |  VBG pH 7.3, pCO2 28.  |  RVP neg.   - EKG: Pending  - Imaging: CXR w/ blunted costrophrenic angle on R  - Consults: ICU  - Interventions: CTX 1g, azithro 500 IV, Duonebs x2, budesonide inh x1, decadron 6 IV x1, Lasix 20 x1.    (26 Mar 2025 05:21)      REVIEW OF SYSTEMS:  10 point ROS negative aside from what is mentioned in HPI    PAST MEDICAL & SURGICAL HISTORY:  COPD (chronic obstructive pulmonary disease)      Lung cancer      HTN (hypertension)      High cholesterol      BPH (benign prostatic hyperplasia)      History of repair of hip fracture      Pre-existing type 2 diabetes mellitus      Inguinal hernia      Preop examination      S/P lobectomy of lung      History of hip surgery      History of lumbar surgery          FAMILY HISTORY:      SOCIAL HISTORY:  Smoking Status:>50PPY history    MEDICATIONS:  Pulmonary:  albuterol/ipratropium for Nebulization 3 milliLiter(s) Nebulizer every 6 hours    Antimicrobials:  cefTRIAXone   IVPB 1000 milliGRAM(s) IV Intermittent every 24 hours    Anticoagulants:  aspirin  chewable 81 milliGRAM(s) Oral daily    Onc:    GI/:  aluminum hydroxide/magnesium hydroxide/simethicone Suspension 30 milliLiter(s) Oral every 4 hours PRN  tamsulosin 0.4 milliGRAM(s) Oral at bedtime    Endocrine:  predniSONE   Tablet 40 milliGRAM(s) Oral every 24 hours  rosuvastatin 10 milliGRAM(s) Oral at bedtime    Cardiac:  isosorbide   mononitrate ER Tablet (IMDUR) 30 milliGRAM(s) Oral daily    Other Medications:  acetaminophen     Tablet .. 650 milliGRAM(s) Oral every 6 hours PRN  escitalopram 10 milliGRAM(s) Oral every 24 hours  melatonin 3 milliGRAM(s) Oral at bedtime PRN  ondansetron Injectable 4 milliGRAM(s) IV Push every 8 hours PRN      Allergies    No Known Allergies    Intolerances        Vital Signs Last 24 Hrs  T(C): 36.3 (28 Mar 2025 09:26), Max: 36.6 (27 Mar 2025 20:35)  T(F): 97.3 (28 Mar 2025 09:26), Max: 97.9 (27 Mar 2025 20:35)  HR: 81 (28 Mar 2025 09:26) (63 - 82)  BP: 119/67 (28 Mar 2025 09:26) (119/67 - 145/74)  BP(mean): 92 (27 Mar 2025 20:35) (92 - 92)  RR: 18 (28 Mar 2025 09:26) (18 - 19)  SpO2: 95% (28 Mar 2025 09:26) (93% - 95%)    Parameters below as of 28 Mar 2025 09:26  Patient On (Oxygen Delivery Method): nasal cannula  O2 Flow (L/min): 2            PHYSICAL EXAM:  Constitutional: well-appearing, in no apparent distress  Head: NC/AT  EENT: PERRL, anicteric sclera; oropharynx clear with moist mucous membranes  Neck: supple, ROM intact, no appreciable JVD  Respiratory: Lungs clear to auscultation bilaterally, bilateral rhonchi R>L, no wheezing   Cardiovascular: +S1/S2 intact, regular rhythm and rate. No murmurs appreciated  Gastrointestinal: soft, non-tender, non distended, bowel sounds normoactive   Extremities: no edema, clubbing or cyanosis  Vascular: 2+ radial pulses B/L  Neurological: awake and alert; oriented with no appreciable focal neuro deficits  Dermatologic: scaly healed wounds on BLE    LABS:      CBC Full  -  ( 28 Mar 2025 11:04 )  WBC Count : 12.27 K/uL  RBC Count : 4.15 M/uL  Hemoglobin : 12.8 g/dL  Hematocrit : 39.7 %  Platelet Count - Automated : 161 K/uL  Mean Cell Volume : 95.7 fl  Mean Cell Hemoglobin : 30.8 pg  Mean Cell Hemoglobin Concentration : 32.2 g/dL  Auto Neutrophil # : x  Auto Lymphocyte # : x  Auto Monocyte # : x  Auto Eosinophil # : x  Auto Basophil # : x  Auto Neutrophil % : x  Auto Lymphocyte % : x  Auto Monocyte % : x  Auto Eosinophil % : x  Auto Basophil % : x    03-28    139  |  99  |  35[H]  ----------------------------<  148[H]  4.3   |  25  |  1.55[H]    Ca    8.9      28 Mar 2025 11:04  Phos  2.9     03-28  Mg     1.9     03-28    RADIOLOGY & ADDITIONAL STUDIES:    reviewed PULMONARY SERVICE INITIAL CONSULT NOTE    HPI:  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 Patient reports over last couple of days, he has developed increased SOB w associated cough. Tonight, he had worsening sleep quality, prompting him to seek medical attention. Patient reports increased SOB, mostly at night when laying flat w development of productive cough (yellow/white). He states he usually sleeps with 1 pillow at night, however over the course of the past couple of days, he has only been able to sleep sitting up. He also has developed intermittent CP worse w inspiration. In terms of further ROS, patient denies associated fever/chills, wheezing, hemoptysis, recent sick contacts, abdominal symptoms, urinary symptoms, or rash. Of note he had presented to [facility] ED 2/28 for weakness. CTAPE showed no PE ?RUL infiltrate discharged on azithromycin 500mg x3 days, followed up with Dr Santo 3/10 and seemed to be improving    Pulmonary consulted for COPD exacerbation/persistent hypoxia. Sees Dr Santo outpatient as his pulmonologist. Reports he has been feeling dyspneic since around a month ago. Previously was able to walk up and down the hallway now feels short of breath even moving in bed. Reports compliance with trelegy but has been using albuterol 2 puffs before bed due to cf for shortness of breath. Reports stable scant clear sputum production. Denies fever, chills, abdominal pain, diarrhea, LE edema      ED Course  - VS: T 97.2, HR 60, /75, RR 16, SpO2 98% on 7L NRB >> 97% on HFNC 40/40  - Labs: WBC 10.56, HGB wnl,   |  BNP - Na 141, K 5.4 >> 5.2, HCO3 25, BUN 39, Cr 1.69, LFTs unremarkable.  |  Troponin, lactate wnl. pro-BNP 1026 (685 in 09/2023)  |  VBG pH 7.3, pCO2 28.  |  RVP neg.   - EKG: Pending  - Imaging: CXR w/ blunted costrophrenic angle on R  - Consults: ICU  - Interventions: CTX 1g, azithro 500 IV, Duonebs x2, budesonide inh x1, decadron 6 IV x1, Lasix 20 x1.    (26 Mar 2025 05:21)      REVIEW OF SYSTEMS:  10 point ROS negative aside from what is mentioned in HPI    PAST MEDICAL & SURGICAL HISTORY:  COPD (chronic obstructive pulmonary disease)      Lung cancer      HTN (hypertension)      High cholesterol      BPH (benign prostatic hyperplasia)      History of repair of hip fracture      Pre-existing type 2 diabetes mellitus      Inguinal hernia      Preop examination      S/P lobectomy of lung      History of hip surgery      History of lumbar surgery          FAMILY HISTORY:      SOCIAL HISTORY:  Smoking Status:>50PPY history    MEDICATIONS:  Pulmonary:  albuterol/ipratropium for Nebulization 3 milliLiter(s) Nebulizer every 6 hours    Antimicrobials:  cefTRIAXone   IVPB 1000 milliGRAM(s) IV Intermittent every 24 hours    Anticoagulants:  aspirin  chewable 81 milliGRAM(s) Oral daily    Onc:    GI/:  aluminum hydroxide/magnesium hydroxide/simethicone Suspension 30 milliLiter(s) Oral every 4 hours PRN  tamsulosin 0.4 milliGRAM(s) Oral at bedtime    Endocrine:  predniSONE   Tablet 40 milliGRAM(s) Oral every 24 hours  rosuvastatin 10 milliGRAM(s) Oral at bedtime    Cardiac:  isosorbide   mononitrate ER Tablet (IMDUR) 30 milliGRAM(s) Oral daily    Other Medications:  acetaminophen     Tablet .. 650 milliGRAM(s) Oral every 6 hours PRN  escitalopram 10 milliGRAM(s) Oral every 24 hours  melatonin 3 milliGRAM(s) Oral at bedtime PRN  ondansetron Injectable 4 milliGRAM(s) IV Push every 8 hours PRN      Allergies    No Known Allergies    Intolerances        Vital Signs Last 24 Hrs  T(C): 36.3 (28 Mar 2025 09:26), Max: 36.6 (27 Mar 2025 20:35)  T(F): 97.3 (28 Mar 2025 09:26), Max: 97.9 (27 Mar 2025 20:35)  HR: 81 (28 Mar 2025 09:26) (63 - 82)  BP: 119/67 (28 Mar 2025 09:26) (119/67 - 145/74)  BP(mean): 92 (27 Mar 2025 20:35) (92 - 92)  RR: 18 (28 Mar 2025 09:26) (18 - 19)  SpO2: 95% (28 Mar 2025 09:26) (93% - 95%)    Parameters below as of 28 Mar 2025 09:26  Patient On (Oxygen Delivery Method): nasal cannula  O2 Flow (L/min): 2            PHYSICAL EXAM:  Constitutional: well-appearing, in no apparent distress  Head: NC/AT  EENT: PERRL, anicteric sclera; oropharynx clear with moist mucous membranes  Neck: supple, ROM intact, no appreciable JVD  Respiratory: Lungs clear to auscultation bilaterally, bilateral rhonchi R>L, no wheezing   Cardiovascular: +S1/S2 intact, regular rhythm and rate. No murmurs appreciated  Gastrointestinal: soft, non-tender, non distended, bowel sounds normoactive   Extremities: no edema, clubbing or cyanosis  Vascular: 2+ radial pulses B/L  Neurological: awake and alert; oriented with no appreciable focal neuro deficits  Dermatologic: scaly healed wounds on BLE    LABS:      CBC Full  -  ( 28 Mar 2025 11:04 )  WBC Count : 12.27 K/uL  RBC Count : 4.15 M/uL  Hemoglobin : 12.8 g/dL  Hematocrit : 39.7 %  Platelet Count - Automated : 161 K/uL  Mean Cell Volume : 95.7 fl  Mean Cell Hemoglobin : 30.8 pg  Mean Cell Hemoglobin Concentration : 32.2 g/dL  Auto Neutrophil # : x  Auto Lymphocyte # : x  Auto Monocyte # : x  Auto Eosinophil # : x  Auto Basophil # : x  Auto Neutrophil % : x  Auto Lymphocyte % : x  Auto Monocyte % : x  Auto Eosinophil % : x  Auto Basophil % : x    03-28    139  |  99  |  35[H]  ----------------------------<  148[H]  4.3   |  25  |  1.55[H]    Ca    8.9      28 Mar 2025 11:04  Phos  2.9     03-28  Mg     1.9     03-28    RADIOLOGY & ADDITIONAL STUDIES:    reviewed

## 2025-03-28 NOTE — PROGRESS NOTE ADULT - SUBJECTIVE AND OBJECTIVE BOX
INTERVAL HPI/OVERNIGHT EVENTS:  Awake and asked to be seen by pulmonary doctor   Glucose noted increased;  ECHO noted;  Thrombus has resolved  Will discuss with cardiology since Eliquis has been an issue causing all sorts of skin bleeding          MEDICATIONS  (STANDING):  albuterol/ipratropium for Nebulization 3 milliLiter(s) Nebulizer every 6 hours  apixaban 2.5 milliGRAM(s) Oral every 12 hours  azithromycin   Tablet 500 milliGRAM(s) Oral once  cefTRIAXone   IVPB 1000 milliGRAM(s) IV Intermittent every 24 hours  escitalopram 10 milliGRAM(s) Oral every 24 hours  predniSONE   Tablet 40 milliGRAM(s) Oral every 24 hours  rosuvastatin 10 milliGRAM(s) Oral at bedtime  tamsulosin 0.4 milliGRAM(s) Oral at bedtime    MEDICATIONS  (PRN):  acetaminophen     Tablet .. 650 milliGRAM(s) Oral every 6 hours PRN Temp greater or equal to 38C (100.4F), Mild Pain (1 - 3)  aluminum hydroxide/magnesium hydroxide/simethicone Suspension 30 milliLiter(s) Oral every 4 hours PRN Dyspepsia  melatonin 3 milliGRAM(s) Oral at bedtime PRN Insomnia  ondansetron Injectable 4 milliGRAM(s) IV Push every 8 hours PRN Nausea and/or Vomiting      Allergies    No Known Allergies    Intolerances        Vital Signs Last 24 Hrs  T(C): 36.3 (28 Mar 2025 09:26), Max: 36.6 (27 Mar 2025 20:35)  T(F): 97.3 (28 Mar 2025 09:26), Max: 97.9 (27 Mar 2025 20:35)  HR: 81 (28 Mar 2025 09:26) (63 - 82)  BP: 119/67 (28 Mar 2025 09:26) (119/67 - 145/74)  BP(mean): 92 (27 Mar 2025 20:35) (92 - 92)  RR: 18 (28 Mar 2025 09:26) (18 - 20)  SpO2: 95% (28 Mar 2025 09:26) (93% - 95%)    Parameters below as of 28 Mar 2025 09:26  Patient On (Oxygen Delivery Method): nasal cannula  O2 Flow (L/min): 2            Constitutional: Awake     Eyes: WALI    ENMT: Negative    Neck: Supple    Back:  no tenderness     Respiratory:  clear     Cardiovascular: S1 S2    Gastrointestinal: soft     Genitourinary:    Extremities:  skin tears with oozing of blood     Vascular:    Neurological:    Skin:    Lymph Nodes:            LABS:                        12.9   11.39 )-----------( 133      ( 27 Mar 2025 05:30 )             41.4     03-27    134[L]  |  100  |  37[H]  ----------------------------<  283[H]  4.5   |  24  |  1.49[H]    Ca    8.6      27 Mar 2025 05:30  Phos  3.5     03-27  Mg     2.1     03-27        Urinalysis Basic - ( 27 Mar 2025 05:30 )    Color: x / Appearance: x / SG: x / pH: x  Gluc: 283 mg/dL / Ketone: x  / Bili: x / Urobili: x   Blood: x / Protein: x / Nitrite: x   Leuk Esterase: x / RBC: x / WBC x   Sq Epi: x / Non Sq Epi: x / Bacteria: x        RADIOLOGY & ADDITIONAL TESTS:

## 2025-03-28 NOTE — PROGRESS NOTE ADULT - PROBLEM SELECTOR PLAN 1
Likely 2/2 COPD Exacerbation, w/ possible component of CHF exacerbation. Initially required NRB and HFNC in ED, now satting well on NC.     - Weaned to RA  - Ambulatory sats  - PT/OT recs homt PT  - COPD management as below  - CHF mgmt below Likely 2/2 COPD Exacerbation, w/ possible component of CHF exacerbation. Initially required NRB and HFNC in ED, now satting well on NC.     - Weaned to RA with stable sats but feeling short of breath off NC  - F/u pulm recs  - PT/OT recs home PT  - COPD management as below  - CHF mgmt below

## 2025-03-28 NOTE — DISCHARGE NOTE PROVIDER - NSDCFUADDAPPT_GEN_ALL_CORE_FT
Please follow up with your PCP, Dr. Pavithra Brandt within 2 weeks of discharge:  [phone]  [address] [place] [id number]

## 2025-03-28 NOTE — DISCHARGE NOTE PROVIDER - HOSPITAL COURSE
#Discharge: do not delete    Patient is __ yo M/F with past medical history of _____ presented with _____, found to have _____ (one liner)    Hospital course (by problem):     Patient was discharged to: (home/[facility]/acute rehab/hospice, etc, and with what services – home health PT/RN? Home O2?)    New medications:   Changes to old medications:  Medications that were stopped:    Items to follow up as outpatient:    Physical exam at the time of discharge:       HOSPITAL COURSE: 89M w/ PMHx of COPD (not on home O2, prior hospitalizations for exac.), CAD, HFrEF (EF 35-40% 12/2024), LV thrombus (on Eliquis), CKD, BPH, NSCLC s/p lobectomy, admitted for AHRF 2/2 COPD exacerbation. Started on CTX, azithro, and prednisone. Repeat TTE with mildly reduced EF 40-45%.      Problem List/Main Diagnoses:     New medications/therapies:   Labs to be followed outpatient:   Exam to be followed outpatient:     Discharge plan: discharge to              HOSPITAL COURSE: 89M w/ PMHx of COPD (not on home O2, prior hospitalizations for exac.), CAD, HFrEF (EF 35-40% 12/2024), LV thrombus (on Eliquis), CKD, BPH, NSCLC s/p lobectomy, admitted for AHRF 2/2 COPD exacerbation. Started on CTX, azithro, and prednisone. Repeat TTE with mildly reduced EF 40-45%.      Problem List/Main Diagnoses:   #Acute hypoxic respiratory failure: Likely 2/2 COPD Exacerbation, w/ possible component of CHF exacerbation. Initially required NRB and HFNC in ED, now satting well on NC. Weaned to RA with stable sats but feeling short of breath off NC. Per pulm recs, restarted on home lasix 20 M/W/F and azithromycin 500 M/W/F for prophylaxis.     #COPD with exacerbation: H/o prior hospitalizations (last in ?2023) for exacerbations. On home Trelegy Ellipta and prednisone 10, not on home O2. Tx with duonebs q6h, prednisone 40mg x5 days, azithromycin 500mg x3 days, CTX 1g qd x5 days, incentive spirometry, aerobika for airway clearance. Per pulm recs, restarted on home lasix 20 M/W/F and azithromycin 500mg M/W/F for prophylaxis. Holding home trelegy. Follow up with Dr. Santo in 2 weeks and discharge on home trelegy, azithromycin 500 M/W/F, prednisone 10mg qd (after completing 5 day course of 40 qd).     #Chronic CHF: Recent EF 35-40% per 12/2024 TTE. Likely ischemic cardiomyopathy given multivessel CAD on admission CT and regional wall motion abnormalities. Addmission pro-BNP >1000 (600s in 2023). | Home GDMT: toprol 50, lasix 20mg M/W/F. Per 12/2024 cards note: Holding off RAASi/MRA/SGLT2 for Cr, pt w/ stable angina - recommended starting imdur 20mg  qd as pt declined ischemic w/u. Repeat echo with improved EF 40-45%. Continue home toprol, per cardiology add aspirin 81 qd, atorvastatin 40 qd, imdur 30 qd, and once creatinine back to 1.5 can add losartan 25mg qd.     #LV (left ventricular) mural thrombus: Noted on 12/2024 TTE. Continue home Eliquis 2.5mg BID. Cardiology consulted.     #BPH (benign prostatic hyperplasia): C/w home tamsulosin.    New medications/therapies: None  Labs to be followed outpatient: None  Exam to be followed outpatient: pulm, PCP, cards    Discharge plan: discharge to              HOSPITAL COURSE: 89M w/ PMHx of COPD (not on home O2, prior hospitalizations for exac.), CAD, HFrEF (EF 35-40% 12/2024), LV thrombus (on Eliquis), CKD, BPH, NSCLC s/p lobectomy, admitted for AHRF 2/2 COPD exacerbation. Started on CTX, azithro, and prednisone. Repeat TTE with mildly reduced EF 40-45%.      Problem List/Main Diagnoses:   #Acute hypoxic respiratory failure: Likely 2/2 COPD Exacerbation, w/ possible component of CHF exacerbation. Initially required NRB and HFNC in ED, now satting well on NC. Weaned to RA with stable sats but feeling short of breath off NC. Per pulm recs, restarted on home lasix 20 M/W/F and azithromycin 500 M/W/F for prophylaxis.     #COPD with exacerbation: H/o prior hospitalizations (last in ?2023) for exacerbations. On home Trelegy Ellipta and prednisone 10, not on home O2. Tx with duonebs q6h, prednisone 40mg x5 days, azithromycin 500mg x3 days, CTX 1g qd x5 days, incentive spirometry, aerobika for airway clearance. Per pulm recs, restarted on home lasix 20 M/W/F and azithromycin 500mg M/W/F for prophylaxis. Holding home trelegy. Follow up with Dr. Santo in 2 weeks and discharge on home trelegy, azithromycin 500 M/W/F, prednisone 10mg qd (after completing 5 day course of 40 qd).     #Chronic CHF: Recent EF 35-40% per 12/2024 TTE. Likely ischemic cardiomyopathy given multivessel CAD on admission CT and regional wall motion abnormalities. Addmission pro-BNP >1000 (600s in 2023). | Home GDMT: toprol 50, lasix 20mg M/W/F. Per 12/2024 cards note: Holding off RAASi/MRA/SGLT2 for Cr, pt w/ stable angina - recommended starting imdur 20mg  qd as pt declined ischemic w/u. Repeat echo with improved EF 40-45%. Continue home toprol and aspirin 81 qd, per cardiology add atorvastatin 40 qd, imdur 30 qd, and once creatinine back to 1.5 can add losartan 25mg qd.     #LV (left ventricular) mural thrombus: Noted on 12/2024 TTE. Continue home Eliquis 2.5mg BID. Cardiology consulted.     #BPH (benign prostatic hyperplasia): C/w home tamsulosin.    New medications/therapies: atorvastatin 40 qd, imdur 30 qd, losartan 25 qd, prednisone 40 qd for ONE DAY  Labs to be followed outpatient: None  Exam to be followed outpatient: pulm, PCP, cards    Discharge plan: discharge to home             HOSPITAL COURSE: 89M w/ PMHx of COPD (not on home O2, prior hospitalizations for exac.), CAD, HFrEF (EF 35-40% 12/2024), LV thrombus (on Eliquis), CKD, BPH, NSCLC s/p lobectomy, admitted for AHRF 2/2 COPD exacerbation. Started on CTX, azithro, and prednisone. Repeat TTE with mildly reduced EF 40-45%.      Problem List/Main Diagnoses:   #Acute hypoxic respiratory failure: Likely 2/2 COPD Exacerbation, w/ possible component of CHF exacerbation. Initially required NRB and HFNC in ED, now satting well on NC. Weaned to RA with stable sats but feeling short of breath off NC. Per pulm recs, restarted on home lasix 20 M/W/F and azithromycin 500 M/W/F for prophylaxis. Patient is deconditioned and would benefit from additional home health aide services in addition to home PT.    #COPD with exacerbation: H/o prior hospitalizations (last in ?2023) for exacerbations. On home Trelegy Ellipta and prednisone 10, not on home O2. Tx with duonebs q6h, prednisone 40mg x5 days, azithromycin 500mg x3 days, CTX 1g qd x5 days, incentive spirometry, aerobika for airway clearance. Per pulm recs, restarted on home lasix 20 M/W/F and azithromycin 500mg M/W/F for prophylaxis. Holding home trelegy. Follow up with Dr. Santo in 2 weeks and discharge on home trelegy, azithromycin 500 M/W/F, prednisone 10mg qd (after completing 5 day course of 40 qd).     #Chronic CHF: Recent EF 35-40% per 12/2024 TTE. Likely ischemic cardiomyopathy given multivessel CAD on admission CT and regional wall motion abnormalities. Addmission pro-BNP >1000 (600s in 2023). | Home GDMT: toprol 50, lasix 20mg M/W/F. Per 12/2024 cards note: Holding off RAASi/MRA/SGLT2 for Cr, pt w/ stable angina - recommended starting imdur 20mg  qd as pt declined ischemic w/u. Repeat echo with improved EF 40-45%. Continue home toprol and aspirin 81 qd, per cardiology add atorvastatin 40 qd, imdur 30 qd, and once creatinine back to 1.5 can add losartan 25mg qd.     #LV (left ventricular) mural thrombus: Noted on 12/2024 TTE. Continue home Eliquis 2.5mg BID. Cardiology consulted.     #BPH (benign prostatic hyperplasia): C/w home tamsulosin.    New medications/therapies: atorvastatin 40 qd, imdur 30 qd, losartan 25 qd, prednisone 40 qd for ONE DAY  Labs to be followed outpatient: None  Exam to be followed outpatient: pulm, PCP, cards    Discharge plan: discharge to home             HOSPITAL COURSE: 89M w/ PMHx of COPD (not on home O2, prior hospitalizations for exac.), CAD, HFrEF (EF 35-40% 12/2024), LV thrombus (on Eliquis), CKD, BPH, NSCLC s/p lobectomy, admitted for AHRF 2/2 COPD exacerbation. Finished course of CTX, azithro, and prednisone 40. Repeat TTE with mildly reduced EF 40-45% (improved from last TTE). Restarted home lasix 20mg M/W/F and azithromycin 500mg M/W/F for prophylaxis, and prednisone 10mg qd.    Problem List/Main Diagnoses:   #Acute hypoxic respiratory failure: Likely 2/2 COPD Exacerbation, w/ possible component of CHF exacerbation. Initially required NRB and HFNC in ED, now satting well on NC. Weaned to RA with stable sats but feeling short of breath off NC. Per pulm recs, restarted on home lasix 20 M/W/F and azithromycin 500 M/W/F for prophylaxis. Patient is deconditioned and would benefit from additional home health aide services in addition to home PT.    #COPD with exacerbation: H/o prior hospitalizations (last in ?2023) for exacerbations. On home Trelegy Ellipta and prednisone 10, not on home O2. Tx with duonebs q6h, prednisone 40mg x5 days, azithromycin 500mg x3 days, CTX 1g qd x5 days, incentive spirometry, aerobika for airway clearance. Per pulm recs, restarted on home lasix 20 M/W/F and azithromycin 500mg M/W/F for prophylaxis. Holding home trelegy. Follow up with Dr. Santo in 2 weeks and discharge on home trelegy, azithromycin 500 M/W/F, prednisone 10mg qd (after completing 5 day course of 40 qd).     #Chronic CHF: Recent EF 35-40% per 12/2024 TTE. Likely ischemic cardiomyopathy given multivessel CAD on admission CT and regional wall motion abnormalities. Addmission pro-BNP >1000 (600s in 2023). | Home GDMT: toprol 50, lasix 20mg M/W/F. Per 12/2024 cards note: Holding off RAASi/MRA/SGLT2 for Cr, pt w/ stable angina - recommended starting imdur 20mg  qd as pt declined ischemic w/u. Repeat echo with improved EF 40-45%. Continue home toprol and aspirin 81 qd, per cardiology add atorvastatin 40 qd, imdur 30 qd, and losartan 25mg qd.     #LV (left ventricular) mural thrombus: Noted on 12/2024 TTE. Continue home Eliquis 2.5mg BID. Cardiology consulted.     #BPH (benign prostatic hyperplasia): C/w home tamsulosin.    New medications/therapies: atorvastatin 40 qd, imdur 30 qd, losartan 25 qd  Labs to be followed outpatient: None  Exam to be followed outpatient: pulm, PCP, cards    Discharge plan: discharge to home             HOSPITAL COURSE: 89M w/ PMHx of COPD (not on home O2, prior hospitalizations for exac.), CAD, HFrEF (EF 35-40% 12/2024), LV thrombus (on Eliquis), CKD, BPH, NSCLC s/p lobectomy, admitted for AHRF 2/2 COPD exacerbation. Finished course of CTX, azithro, and prednisone 40. Repeat TTE with mildly reduced EF 40-45% (improved from last TTE). Restarted home lasix 20mg M/W/F and azithromycin 500mg M/W/F for prophylaxis, and prednisone 10mg qd. Patient complaining of thoracic back pain, due to recent fall and steroid use causing increased risk of fractures, obtained thoracic spine XR, which was negative for fractures.    Problem List/Main Diagnoses:   #Acute hypoxic respiratory failure: Likely 2/2 COPD Exacerbation, w/ possible component of CHF exacerbation. Initially required NRB and HFNC in ED, now satting well on NC. Weaned to RA with stable sats but feeling short of breath off NC. Per pulm recs, restarted on home lasix 20 M/W/F and azithromycin 500 M/W/F for prophylaxis. Patient is deconditioned and would benefit from additional home health aide services in addition to home PT.     #COPD with exacerbation: H/o prior hospitalizations (last in ?2023) for exacerbations. On home Trelegy Ellipta and prednisone 10, not on home O2. Tx with duonebs q6h, prednisone 40mg x5 days, azithromycin 500mg x3 days, CTX 1g qd x5 days, incentive spirometry, aerobika for airway clearance. Per pulm recs, restarted on home lasix 20 M/W/F and azithromycin 500mg M/W/F for prophylaxis. Holding home trelegy. Follow up with Dr. Santo in 2 weeks and discharge on home trelegy, azithromycin 500 M/W/F, prednisone 10mg qd (after completing 5 day course of 40 qd).     #Chronic CHF: Recent EF 35-40% per 12/2024 TTE. Likely ischemic cardiomyopathy given multivessel CAD on admission CT and regional wall motion abnormalities. Addmission pro-BNP >1000 (600s in 2023). | Home GDMT: toprol 50, lasix 20mg M/W/F. Per 12/2024 cards note: Holding off RAASi/MRA/SGLT2 for Cr, pt w/ stable angina - recommended starting imdur 20mg  qd as pt declined ischemic w/u. Repeat echo with improved EF 40-45%. Continue home toprol and aspirin 81 qd, per cardiology add atorvastatin 40 qd, imdur 30 qd, and losartan 25mg qd.     #LV (left ventricular) mural thrombus: Noted on 12/2024 TTE. Continue home Eliquis 2.5mg BID. Cardiology consulted.    #BPH (benign prostatic hyperplasia): C/w home tamsulosin.    #Right leg wound: Wound care instructions: Soak sites with Vashe wound cleanser then apply Medihoney, cover with telfa and secure with stockinette.    New medications/therapies: atorvastatin 40 qd, imdur 30 qd, losartan 25 qd  Labs to be followed outpatient: None  Exam to be followed outpatient: pulm, PCP, cards    Discharge plan: discharge to home

## 2025-03-28 NOTE — DISCHARGE NOTE PROVIDER - NSDCCPCAREPLAN_GEN_ALL_CORE_FT
PRINCIPAL DISCHARGE DIAGNOSIS  Diagnosis: COPD exacerbation  Assessment and Plan of Treatment: You came into the hospital with shortness of breath and cough and were found to have a COPD exacerbation. You were started on antibiotics and steroids and were given supplemental oxygen therapy. The pulmonary team evaluated you and recommended that you continue your home Trelegy daily, prednisone 10mg daily, lasix 20mg monday/wednesday/friday and azithromycin 500mg monday/qednesday/friday. Please follow up with your PCP outpatient for continued care.     PRINCIPAL DISCHARGE DIAGNOSIS  Diagnosis: COPD exacerbation  Assessment and Plan of Treatment: You came into the hospital with shortness of breath and cough and were found to have a COPD exacerbation. You were started on antibiotics and steroids and were given supplemental oxygen therapy. The pulmonary team evaluated you and recommended that you continue your home Trelegy daily, prednisone 10mg daily, lasix 20mg monday/wednesday/friday and azithromycin 500mg monday/qednesday/friday. Please follow up with your PCP outpatient for continued care.      SECONDARY DISCHARGE DIAGNOSES  Diagnosis: Chronic HFrEF (heart failure with reduced ejection fraction)  Assessment and Plan of Treatment: You have a history of heart failure and you take metoprolol at home for this problem. While you were in the hospital, an echocardiogram was performed which showed a slightly improved ejection fraction since your last echo (40-45%). The cardiology team evaluated you and started you on some new medications for your heart failure. Please continue to take your metoprolol succinate 50mg daily, atorvastatin 40mg daily, aspirin 81mg daily, lasix 20mg monday/wednesday/friday, imdur 30mg daily, and losartan 25mg daily. Please follow up with cardiology outpatient.     PRINCIPAL DISCHARGE DIAGNOSIS  Diagnosis: COPD exacerbation  Assessment and Plan of Treatment: You came into the hospital with shortness of breath and cough and were found to have a COPD exacerbation. You were started on antibiotics and steroids and were given supplemental oxygen therapy. The pulmonary team evaluated you and recommended that you continue your home Trelegy daily, prednisone 10mg daily, lasix 20mg monday/wednesday/friday and azithromycin 500mg monday/wednesday/friday. Please follow up with your PCP and pulmonologist outpatient for continued care.      SECONDARY DISCHARGE DIAGNOSES  Diagnosis: Chronic HFrEF (heart failure with reduced ejection fraction)  Assessment and Plan of Treatment: You have a history of heart failure and you take metoprolol at home for this problem. While you were in the hospital, an echocardiogram was performed which showed a slightly improved ejection fraction since your last echo (40-45%). The cardiology team evaluated you and started you on some new medications for your heart failure. Please continue to take your atorvastatin 40mg daily, aspirin 81mg daily, lasix 20mg monday/wednesday/friday, imdur 30mg daily, and losartan 25mg daily. Please follow up with cardiology outpatient.     PRINCIPAL DISCHARGE DIAGNOSIS  Diagnosis: COPD exacerbation  Assessment and Plan of Treatment: You came into the hospital with shortness of breath and cough and were found to have a COPD exacerbation. You were started on antibiotics and steroids and were given supplemental oxygen therapy. The pulmonary team evaluated you and recommended that you continue your home Trelegy daily, prednisone 10mg daily, lasix 20mg monday/wednesday/friday and azithromycin 500mg monday/wednesday/friday. Please follow up with your PCP and pulmonologist outpatient for continued care.      SECONDARY DISCHARGE DIAGNOSES  Diagnosis: Chronic HFrEF (heart failure with reduced ejection fraction)  Assessment and Plan of Treatment: You have a history of heart failure and you take metoprolol at home for this problem. While you were in the hospital, an echocardiogram was performed which showed a slightly improved ejection fraction since your last echo (40-45%). The cardiology team evaluated you and started you on some new medications for your heart failure. Please continue to take your atorvastatin 40mg daily, aspirin 81mg daily, lasix 20mg monday/wednesday/friday, imdur 30mg daily, and losartan 25mg daily. Please follow up with cardiology outpatient.    Diagnosis: Leg wound, right  Assessment and Plan of Treatment: You have a wound on your right leg. The wound care team evaluated you while you were in the hospital and determined the following regimen to care for your wounds: Soak sites with Vashe wound cleanser then apply Medihoney, cover with telfa and secure with stockinette. Please continue to use this regimen to take care of your wounds at home.

## 2025-03-28 NOTE — PROGRESS NOTE ADULT - SUBJECTIVE AND OBJECTIVE BOX
HOSPITAL COURSE: 89M w/ PMHx of COPD (not on home O2, prior hospitalizations for exac.), CAD, HFrEF (EF 35-40% 12/2024), LV thrombus (on Eliquis), CKD, BPH, NSCLC s/p lobectomy, admitted for AHRF 2/2 COPD exacerbation. Started on CTX, azithro, and prednisone. Repeat TTE with mildly reduced EF 40-45%.      INTERVAL HPI/OVERNIGHT EVENTS: TTE with EF improved since last echo.    SUBJECTIVE: Patient seen and examined at bedside, resting comfortably in bed, in no acute distress. Patient reports feeling better this morning. He was taken off of nasal cannula this morning and is breathing well on room air at rest.     Vital Signs Last 24 Hrs  T(C): 36.3 (28 Mar 2025 09:26), Max: 36.6 (27 Mar 2025 20:35)  T(F): 97.3 (28 Mar 2025 09:26), Max: 97.9 (27 Mar 2025 20:35)  HR: 81 (28 Mar 2025 09:26) (63 - 82)  BP: 119/67 (28 Mar 2025 09:26) (119/67 - 145/74)  BP(mean): 92 (27 Mar 2025 20:35) (92 - 92)  RR: 18 (28 Mar 2025 09:26) (18 - 19)  SpO2: 95% (28 Mar 2025 09:26) (93% - 95%)    Parameters below as of 28 Mar 2025 09:26  Patient On (Oxygen Delivery Method): nasal cannula  O2 Flow (L/min): 2      PHYSICAL EXAM:  General: in no acute distress  HEENT: PERRL, Moist mucous membranes  Lungs: BL expiratory wheezing  Cardiovascular: RRR. No murmurs, rubs, or gallops  Abdomen: Soft, non-tender non-distended; No rebound or guarding  Extremities: WWP, trace BL LE edema  Neurological: Alert and oriented x4  Skin: Warm and dry, no obvious rash    .  LABS:                         12.8   12.27 )-----------( 161      ( 28 Mar 2025 11:04 )             39.7     03-28    139  |  99  |  35[H]  ----------------------------<  148[H]  4.3   |  25  |  1.55[H]    Ca    8.9      28 Mar 2025 11:04  Phos  2.9     03-28  Mg     1.9     03-28        Urinalysis Basic - ( 28 Mar 2025 11:04 )    Color: x / Appearance: x / SG: x / pH: x  Gluc: 148 mg/dL / Ketone: x  / Bili: x / Urobili: x   Blood: x / Protein: x / Nitrite: x   Leuk Esterase: x / RBC: x / WBC x   Sq Epi: x / Non Sq Epi: x / Bacteria: x                RADIOLOGY, EKG & ADDITIONAL TESTS: Reviewed.  HOSPITAL COURSE: 89M w/ PMHx of COPD (not on home O2, prior hospitalizations for exac.), CAD, HFrEF (EF 35-40% 12/2024), LV thrombus (on Eliquis), CKD, BPH, NSCLC s/p lobectomy, admitted for AHRF 2/2 COPD exacerbation. Started on CTX, azithro, and prednisone. Repeat TTE with mildly reduced EF 40-45%.      INTERVAL HPI/OVERNIGHT EVENTS: TTE with EF improved since last echo.    SUBJECTIVE: Patient seen and examined at bedside, resting comfortably in bed, in no acute distress. Patient reports that he has been feeling very short of breath when he takes off his nasal cannula. He is aware that his o2 sats have been okay when he is off the oxygen, but he doesn't feel comfortable going home with his shortness of breath and is requesting a pulm consult.    Vital Signs Last 24 Hrs  T(C): 36.3 (28 Mar 2025 09:26), Max: 36.6 (27 Mar 2025 20:35)  T(F): 97.3 (28 Mar 2025 09:26), Max: 97.9 (27 Mar 2025 20:35)  HR: 81 (28 Mar 2025 09:26) (63 - 82)  BP: 119/67 (28 Mar 2025 09:26) (119/67 - 145/74)  BP(mean): 92 (27 Mar 2025 20:35) (92 - 92)  RR: 18 (28 Mar 2025 09:26) (18 - 19)  SpO2: 95% (28 Mar 2025 09:26) (93% - 95%)    Parameters below as of 28 Mar 2025 09:26  Patient On (Oxygen Delivery Method): nasal cannula  O2 Flow (L/min): 2      PHYSICAL EXAM:  General: in no acute distress  HEENT: PERRL, Moist mucous membranes  Lungs: BL expiratory wheezing  Cardiovascular: RRR. No murmurs, rubs, or gallops  Abdomen: Soft, non-tender non-distended; No rebound or guarding  Extremities: WWP, trace BL LE edema  Neurological: Alert and oriented x4  Skin: Warm and dry, no obvious rash    .  LABS:                         12.8   12.27 )-----------( 161      ( 28 Mar 2025 11:04 )             39.7     03-28    139  |  99  |  35[H]  ----------------------------<  148[H]  4.3   |  25  |  1.55[H]    Ca    8.9      28 Mar 2025 11:04  Phos  2.9     03-28  Mg     1.9     03-28        Urinalysis Basic - ( 28 Mar 2025 11:04 )    Color: x / Appearance: x / SG: x / pH: x  Gluc: 148 mg/dL / Ketone: x  / Bili: x / Urobili: x   Blood: x / Protein: x / Nitrite: x   Leuk Esterase: x / RBC: x / WBC x   Sq Epi: x / Non Sq Epi: x / Bacteria: x                RADIOLOGY, EKG & ADDITIONAL TESTS: Reviewed.

## 2025-03-28 NOTE — DISCHARGE NOTE NURSING/CASE MANAGEMENT/SOCIAL WORK - PATIENT PORTAL LINK FT
You can access the FollowMyHealth Patient Portal offered by [facility] by registering at the following website: [email or web address]. By joining [employer]’s FollowMyHealth portal, you will also be able to view your health information using other applications (apps) compatible with our system.

## 2025-03-28 NOTE — DISCHARGE NOTE PROVIDER - CARE PROVIDER_API CALL
Ji Santo  Pulmonary Disease  [address]  [place] [address]  Phone: [phone]  Fax: [phone]  Established Patient  Scheduled Appointment: 04/08/2025 04:00 PM    Dharmesh France  Nuclear Cardiology  [address] [address]  Phone: [phone]  Fax: [phone]  Established Patient  Scheduled Appointment: 04/21/2025 03:00 PM

## 2025-03-28 NOTE — DISCHARGE NOTE PROVIDER - CARE PROVIDERS DIRECT ADDRESSES
[Negative] : Allergic/Immunologic ,[email or web address]

## 2025-03-28 NOTE — PROGRESS NOTE ADULT - ASSESSMENT
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

## 2025-03-28 NOTE — DISCHARGE NOTE NURSING/CASE MANAGEMENT/SOCIAL WORK - FINANCIAL ASSISTANCE
[facility] provides services at a reduced cost to those who are determined to be eligible through [facility]’s financial assistance program. Information regarding [facility]’s financial assistance program can be found by going to [email or web address] or by calling [phone].

## 2025-03-28 NOTE — CONSULT NOTE ADULT - ASSESSMENT
89YO male with HTN, HLD, COPD, coronary calcifications on CT, HFrEF w/ history of LV thrombus, CKD, BPH, NSCLS s/p lobectomy, who presents with dyspnea, admitted for management of COPD exacerbation. Cardiology consulted for management of LV thrombus.     Review of studies:  ECG 3/2025: NSR with non specific ST-T changes  12/2024: LV 35-40% with regional wall motion abnormalities, LV apical thrombus measuring 1.2 x 0.6 cm, normal RV, mild to moderate TR, PASP 26  3/2024: LV 40-45% with regional wall motion abnormalities, no LV thrombus present, normal RV, no significant valvular disease    #LV thrombus  Patient with history of LV thrombus and moderate LV systolic dysfunction - was started on eliquis and has completed 3 months of therapy.   Repeat TTE this admission showing resolution of LV thrombus with mild improvement in LV systolic function.    - Can defer restarting anticoagulation at this time   - Patient should have a repeat TTE in 3 months to evaluate for resolution or recurrence of LV thrombus     #HFmrEF  Patient with mild LV systolic dysfunction, but well compensated on exam. Suspect patient has ischemic heart disease given coronary calcifications on non gated CT,  but has never had ischemic evaluation, given recurrent falls and tendency to bruise.    - Restart home toprol 50mg QD   - Once sCr back to 1.5, can start losartan 25mg QD    #Coronary calcifications  Patient with known moderate coronary calcifications with LV dysfunction - suspect he likely has CAD.   ECG without any acute ischemic changes   - Would start aspirin 81mg QD and atorvastatin 40mg QD   - Add imdur 30mg QD for anti anginal    - Will defer any ischemic evaluation this hospitalization    Patient should follow up with his cardiologist Dr. France upon dc  Case d/w attending

## 2025-03-28 NOTE — PROGRESS NOTE ADULT - PROBLEM SELECTOR PLAN 2
H/o prior hospitalizations (last in ?2023) for exacerbations,  On home Trelegy Ellipta and prednisone 10, not on home O2.     - C/w Duonebs q6h  - C/w azithromycin x5d  - C/w prednisone 40mg for total steroid course x5d  - C/w CTX 1g qd x5d for CAP  - C/w incentive spirometry  - IF expectorating, start hypertonic saline inhalations q6h, administered 15min after Duonebs   - Aerobika q6h, administered after Duonebs/hypertonic nebs  - Holding home Trelegy  - OOBTC H/o prior hospitalizations (last in ?2023) for exacerbations,  On home Trelegy Ellipta and prednisone 10, not on home O2.     - C/w Duonebs q6h  - C/w azithromycin x3d  - C/w prednisone 40mg for total steroid course x5d  - C/w CTX 1g qd x5d for CAP  - C/w incentive spirometry  - Aerobika q6h, administered after Duonebs/hypertonic nebs  - Holding home Trelegy  - OOBTC

## 2025-03-28 NOTE — CONSULT NOTE ADULT - TIME BILLING
As above
I have spent 56 minutes of time on the encounter which excludes teaching and separately reported services

## 2025-03-29 NOTE — PROGRESS NOTE ADULT - PROBLEM SELECTOR PLAN 2
H/o prior hospitalizations (last in ?2023) for exacerbations,  On home Trelegy Ellipta and prednisone 10, not on home O2.     - C/w Duonebs q6h  - C/w azithromycin x3d  - C/w prednisone 40mg for total steroid course x5d  - C/w CTX 1g qd x5d for CAP  - C/w incentive spirometry  - Aerobika q6h, administered after Duonebs/hypertonic nebs  - Holding home Trelegy  - OOBTC H/o prior hospitalizations (last in ?2023) for exacerbations,  On home Trelegy Ellipta and prednisone 10, not on home O2.     - C/w Duonebs q6h  - C/w azithromycin x3d  - C/w prednisone 40mg for total steroid course x5d> dec dose tro pred 20mg (pt c/o tremors with steroids)  - C/w CTX 1g qd x5d for CAP  - C/w incentive spirometry  - Aerobika q6h, administered after Duonebs/hypertonic nebs  - Holding home Trelegy  - OOBTC

## 2025-03-29 NOTE — PROGRESS NOTE ADULT - SUBJECTIVE AND OBJECTIVE BOX
INTERVAL HPI/OVERNIGHT EVENTS:  Still with some shortness of breath  Pulmonary consult noted;  He states steroid dose is too much ; Causes him tremors   Will reduce dose;  Continue antibiotics   He is not ready for discharge ;   Does not have oxygen at home       MEDICATIONS  (STANDING):  albuterol/ipratropium for Nebulization 3 milliLiter(s) Nebulizer every 6 hours  aspirin  chewable 81 milliGRAM(s) Oral daily  azithromycin   Tablet 500 milliGRAM(s) Oral <User Schedule>  cefTRIAXone   IVPB 1000 milliGRAM(s) IV Intermittent every 24 hours  escitalopram 10 milliGRAM(s) Oral every 24 hours  furosemide    Tablet 20 milliGRAM(s) Oral <User Schedule>  isosorbide   mononitrate ER Tablet (IMDUR) 30 milliGRAM(s) Oral daily  polyethylene glycol 3350 17 Gram(s) Oral daily  predniSONE   Tablet 40 milliGRAM(s) Oral every 24 hours  rosuvastatin 10 milliGRAM(s) Oral at bedtime  senna 2 Tablet(s) Oral at bedtime  tamsulosin 0.4 milliGRAM(s) Oral at bedtime    MEDICATIONS  (PRN):  acetaminophen     Tablet .. 650 milliGRAM(s) Oral every 6 hours PRN Temp greater or equal to 38C (100.4F), Mild Pain (1 - 3)  aluminum hydroxide/magnesium hydroxide/simethicone Suspension 30 milliLiter(s) Oral every 4 hours PRN Dyspepsia  melatonin 3 milliGRAM(s) Oral at bedtime PRN Insomnia  ondansetron Injectable 4 milliGRAM(s) IV Push every 8 hours PRN Nausea and/or Vomiting      Allergies    No Known Allergies    Intolerances        Vital Signs Last 24 Hrs  T(C): 36.6 (29 Mar 2025 09:04), Max: 36.8 (28 Mar 2025 15:31)  T(F): 97.8 (29 Mar 2025 09:04), Max: 98.2 (28 Mar 2025 15:31)  HR: 81 (29 Mar 2025 09:04) (81 - 92)  BP: 117/65 (29 Mar 2025 09:04) (107/71 - 123/52)  BP(mean): --  RR: 18 (29 Mar 2025 09:04) (17 - 18)  SpO2: 96% (29 Mar 2025 09:04) (93% - 99%)    Parameters below as of 29 Mar 2025 09:04  Patient On (Oxygen Delivery Method): nasal cannula  O2 Flow (L/min): 2            Constitutional: Awake     Eyes: WALI    ENMT: Negative    Neck: Supple    Back:  no tenderness     Respiratory:  decreased breath sounds     Cardiovascular: S1 S2    Gastrointestinal: soft     Genitourinary:    Extremities:  no edema     Vascular:    Neurological:    Skin:    Lymph Nodes:            03-28 @ 07:01  -  03-29 @ 07:00  --------------------------------------------------------  IN: 125 mL / OUT: 550 mL / NET: -425 mL      LABS:                        12.8   12.27 )-----------( 161      ( 28 Mar 2025 11:04 )             39.7     03-28    139  |  99  |  35[H]  ----------------------------<  148[H]  4.3   |  25  |  1.55[H]    Ca    8.9      28 Mar 2025 11:04  Phos  2.9     03-28  Mg     1.9     03-28        Urinalysis Basic - ( 28 Mar 2025 11:04 )    Color: x / Appearance: x / SG: x / pH: x  Gluc: 148 mg/dL / Ketone: x  / Bili: x / Urobili: x   Blood: x / Protein: x / Nitrite: x   Leuk Esterase: x / RBC: x / WBC x   Sq Epi: x / Non Sq Epi: x / Bacteria: x        RADIOLOGY & ADDITIONAL TESTS:

## 2025-03-29 NOTE — PROGRESS NOTE ADULT - PROBLEM SELECTOR PLAN 3
Recent EF 35-40% per 12/2024 TTE. Likely ischemic cardiomyopathy given multivessel CAD on admission CT and regional wall motion abnormalities. Addmission pro-BNP >1000 (600s in 2023). | Home GDMT: metoprolol. ?lasix 20mg x2/wk. Per 12/2024 cards note: Holding off RAASi/MRA/SGLT2 for Cr, pt w/ stable angina - recommended starting imdur 20mg  qd as pt declined ischemic w/u.  Repeat echo with improved EF 40-45%    - C/w home rosuvastatin 10mg   - Holding home Toprol 50mg qd given normotension

## 2025-03-29 NOTE — PROGRESS NOTE ADULT - SUBJECTIVE AND OBJECTIVE BOX
*****INCOMPLETE NOTE*****    INTERVAL HPI/OVERNIGHT EVENTS:    SUBJECTIVE: Patient seen and examined at bedside, resting comfortably in bed, and does not appear to be in any acute distress. Patient reports    Vital Signs Last 24 Hrs  T(C): 36.6 (29 Mar 2025 09:04), Max: 36.8 (28 Mar 2025 15:31)  T(F): 97.8 (29 Mar 2025 09:04), Max: 98.2 (28 Mar 2025 15:31)  HR: 81 (29 Mar 2025 09:04) (81 - 92)  BP: 117/65 (29 Mar 2025 09:04) (107/71 - 123/52)  BP(mean): --  RR: 18 (29 Mar 2025 09:04) (17 - 18)  SpO2: 96% (29 Mar 2025 09:04) (93% - 99%)    Parameters below as of 29 Mar 2025 09:04  Patient On (Oxygen Delivery Method): nasal cannula  O2 Flow (L/min): 2      PHYSICAL EXAM:  General: in no acute distress  Eyes: EOMI intact bilaterally. Anicteric sclerae, moist conjunctivae  HENT: Moist mucous membranes  Neck: Trachea midline, supple  Lungs: CTA B/L. No wheezes, rales, or rhonchi  Cardiovascular: RRR. No murmurs, rubs, or gallops  Abdomen: Soft, non-tender non-distended; No rebound or guarding  Extremities: WWP, No clubbing, cyanosis or edema  Neurological: Alert and oriented  Skin: Warm and dry. No obvious rash     LABS:                        12.8   12.27 )-----------( 161      ( 28 Mar 2025 11:04 )             39.7     03-28    139  |  99  |  35[H]  ----------------------------<  148[H]  4.3   |  25  |  1.55[H]    Ca    8.9      28 Mar 2025 11:04  Phos  2.9     03-28  Mg     1.9     03-28     INTERVAL HPI/OVERNIGHT EVENTS: [email or web address]    SUBJECTIVE: Patient seen and examined at bedside, resting comfortably in bed, and does not appear to be in any acute distress. Patient reports SOB, on 2L NC.    Vital Signs Last 24 Hrs  T(C): 36.6 (29 Mar 2025 09:04), Max: 36.8 (28 Mar 2025 15:31)  T(F): 97.8 (29 Mar 2025 09:04), Max: 98.2 (28 Mar 2025 15:31)  HR: 81 (29 Mar 2025 09:04) (81 - 92)  BP: 117/65 (29 Mar 2025 09:04) (107/71 - 123/52)  BP(mean): --  RR: 18 (29 Mar 2025 09:04) (17 - 18)  SpO2: 96% (29 Mar 2025 09:04) (93% - 99%)    Parameters below as of 29 Mar 2025 09:04  Patient On (Oxygen Delivery Method): nasal cannula  O2 Flow (L/min): 2      PHYSICAL EXAM:  General: in no acute distress  Eyes: EOMI intact bilaterally. Anicteric sclerae, moist conjunctivae  HENT: Moist mucous membranes  Neck: Trachea midline, supple  Lungs: CTA B/L. No wheezes, rales, or rhonchi  Cardiovascular: RRR. No murmurs, rubs, or gallops  Abdomen: Soft, non-tender non-distended; No rebound or guarding  Extremities: WWP, No clubbing, cyanosis or edema  Neurological: Alert and oriented  Skin: Warm and dry. No obvious rash     LABS:                        12.8   12.27 )-----------( 161      ( 28 Mar 2025 11:04 )             39.7     03-28    139  |  99  |  35[H]  ----------------------------<  148[H]  4.3   |  25  |  1.55[H]    Ca    8.9      28 Mar 2025 11:04  Phos  2.9     03-28  Mg     1.9     03-28

## 2025-03-29 NOTE — PROGRESS NOTE ADULT - PROBLEM SELECTOR PLAN 1
Likely 2/2 COPD Exacerbation, w/ possible component of CHF exacerbation. Initially required NRB and HFNC in ED, now satting well on NC.     - Weaned to RA with stable sats but feeling short of breath off NC  - F/u pulm recs  - PT/OT recs home PT  - COPD management as below  - CHF mgmt below

## 2025-03-29 NOTE — PROGRESS NOTE ADULT - ASSESSMENT
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

## 2025-03-31 NOTE — PROGRESS NOTE ADULT - ASSESSMENT
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 ED iso SOB, admitted for AHRF 2/2 COPD of unclear etiology

## 2025-03-31 NOTE — PROGRESS NOTE ADULT - PROBLEM SELECTOR PLAN 3
Recent EF 35-40% per 12/2024 TTE. Likely ischemic cardiomyopathy given multivessel CAD on admission CT and regional wall motion abnormalities. Addmission pro-BNP >1000 (600s in 2023). | Home GDMT: metoprolol. ?lasix 20mg x2/wk. Per 12/2024 cards note: Holding off RAASi/MRA/SGLT2 for Cr, pt w/ stable angina - recommended starting imdur 20mg  qd as pt declined ischemic w/u.  Repeat echo with improved EF 40-45%    - c/w losartan 25mg qd  - c/w lasix 20mg M/W/F  - c/w Imdur 30mg qd  - home rosuvastatin 10mg

## 2025-03-31 NOTE — ADVANCED PRACTICE NURSE CONSULT - RECOMMEDATIONS
Soak sites with Vashe wound cleanser then apply Medihoney, cover with telfa and secure with stockinette. Total time spent on encounter=35 minutes.

## 2025-03-31 NOTE — DIETITIAN INITIAL EVALUATION ADULT - PERTINENT MEDS FT
MEDICATIONS  (STANDING):  albuterol/ipratropium for Nebulization 3 milliLiter(s) Nebulizer every 6 hours  aspirin  chewable 81 milliGRAM(s) Oral daily  azithromycin   Tablet 500 milliGRAM(s) Oral <User Schedule>  escitalopram 10 milliGRAM(s) Oral every 24 hours  furosemide    Tablet 20 milliGRAM(s) Oral <User Schedule>  isosorbide   mononitrate ER Tablet (IMDUR) 30 milliGRAM(s) Oral daily  losartan 25 milliGRAM(s) Oral every 24 hours  polyethylene glycol 3350 17 Gram(s) Oral daily  predniSONE   Tablet 10 milliGRAM(s) Oral every 24 hours  rosuvastatin 10 milliGRAM(s) Oral at bedtime  senna 2 Tablet(s) Oral at bedtime  tamsulosin 0.4 milliGRAM(s) Oral at bedtime    MEDICATIONS  (PRN):  acetaminophen     Tablet .. 650 milliGRAM(s) Oral every 6 hours PRN Temp greater or equal to 38C (100.4F), Mild Pain (1 - 3)  aluminum hydroxide/magnesium hydroxide/simethicone Suspension 30 milliLiter(s) Oral every 4 hours PRN Dyspepsia  melatonin 3 milliGRAM(s) Oral at bedtime PRN Insomnia  ondansetron Injectable 4 milliGRAM(s) IV Push every 8 hours PRN Nausea and/or Vomiting

## 2025-03-31 NOTE — PROGRESS NOTE ADULT - PROBLEM SELECTOR PROBLEM 4
LV (left ventricular) mural thrombus

## 2025-03-31 NOTE — PROGRESS NOTE ADULT - PROBLEM SELECTOR PROBLEM 1
Acute hypoxic respiratory failure

## 2025-03-31 NOTE — ADVANCED PRACTICE NURSE CONSULT - ASSESSMENT
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 Pt reports taking prednisone and having issues with frequent skin tears and ecchymosis. Recent fall resulting in wounds to bilat kneecaps. Left knee wound, measures approx 3 x 4 cm, still with torn skin at periwound, red/pink wound bed, small amount of serosanguinous drainage, no s/s of infection. Right knee wound measures approx 3 x 3 cm, 100% of wound bed is red/pink, also with minimal serosanguinous drainage. Right upper arm with intact black scab, no drainage, 2 sites to right tibia with dry brown scabs. All sites soaked with Vashe wound cleanser then Medihoney applied, covered with telfa and stockinette. Extra supplies at bedside for discharge.

## 2025-03-31 NOTE — DIETITIAN INITIAL EVALUATION ADULT - NSFNSGIIOFT_GEN_A_CORE
Pt denied nausea/vomiting/diarrhea, however endorsed constipation, stated last BM PTA - mentioned he is on bowel regimen.

## 2025-03-31 NOTE — DIETITIAN INITIAL EVALUATION ADULT - ADD RECOMMEND
1. Continue with DASH/TLC diet  - Encourage adequate PO and protein intake  - Honor food preferences, as medically able  2. Recommend MVI for general nutrient coverage  3. Recommend SLP consult in setting of pt report worsening dysphagia  4. Appreciate weekly weight trends  5. Continue with current bowel regimen, prn  6. Align nutrition with goals of care at all times

## 2025-03-31 NOTE — PROGRESS NOTE ADULT - TIME BILLING
Patient seen and examined;  Home tomorrow;  Will arrange for home oxygen   Discharge tomorrow
Patient seen and examined;  OOB  Physical therapy  Will reduce dose of steroids
Patient seen and examined;  Agree with present plans   Please have wound care see patient   Also continue anticoagulation   He did well with physical therapy today
Patient seen and examined;  Pulmonary to see :  Cardiology follow up regarding the LV thrombus   Will need help at home   Physical therapy
Patient seen and examined;  OOB; Physical therapy  Wound care to see;  Continue antibiotics

## 2025-03-31 NOTE — PROGRESS NOTE ADULT - SUBJECTIVE AND OBJECTIVE BOX
Pulmonology Progress Note (INCOMPLETE)    SUBJECTIVE / OVERNIGHT EVENTS:  O/N events:  Patient was seen and examined at bedside.  Otherwise negative ROS    MEDICATIONS  (STANDING):  albuterol/ipratropium for Nebulization 3 milliLiter(s) Nebulizer every 6 hours  aspirin  chewable 81 milliGRAM(s) Oral daily  azithromycin   Tablet 500 milliGRAM(s) Oral <User Schedule>  escitalopram 10 milliGRAM(s) Oral every 24 hours  furosemide    Tablet 20 milliGRAM(s) Oral <User Schedule>  isosorbide   mononitrate ER Tablet (IMDUR) 30 milliGRAM(s) Oral daily  losartan 25 milliGRAM(s) Oral every 24 hours  metoprolol succinate ER 50 milliGRAM(s) Oral every 24 hours  polyethylene glycol 3350 17 Gram(s) Oral daily  predniSONE   Tablet 20 milliGRAM(s) Oral daily  rosuvastatin 10 milliGRAM(s) Oral at bedtime  senna 2 Tablet(s) Oral at bedtime  tamsulosin 0.4 milliGRAM(s) Oral at bedtime    MEDICATIONS  (PRN):  acetaminophen     Tablet .. 650 milliGRAM(s) Oral every 6 hours PRN Temp greater or equal to 38C (100.4F), Mild Pain (1 - 3)  aluminum hydroxide/magnesium hydroxide/simethicone Suspension 30 milliLiter(s) Oral every 4 hours PRN Dyspepsia  melatonin 3 milliGRAM(s) Oral at bedtime PRN Insomnia  ondansetron Injectable 4 milliGRAM(s) IV Push every 8 hours PRN Nausea and/or Vomiting    CAPILLARY BLOOD GLUCOSE            OBJECTIVE:  Vital Signs Last 24 Hrs  T(C): 36.4 (31 Mar 2025 05:29), Max: 36.8 (30 Mar 2025 16:57)  T(F): 97.5 (31 Mar 2025 05:29), Max: 98.3 (30 Mar 2025 16:57)  HR: 82 (31 Mar 2025 07:30) (76 - 86)  BP: 148/72 (31 Mar 2025 07:30) (143/75 - 175/67)  BP(mean): 108 (30 Mar 2025 16:57) (108 - 108)  RR: 18 (31 Mar 2025 05:29) (17 - 18)  SpO2: 94% (31 Mar 2025 05:29) (91% - 95%)    Parameters below as of 31 Mar 2025 05:29  Patient On (Oxygen Delivery Method): room air        PHYSICAL EXAM:  General: AAOx3, NAD, euvolemic  Head: NC/AT; MMM; PERRL  Neck: Supple; no JVD  Respiratory: CTAB; no wheezes/rales/rhonchi  Cardiovascular: Regular rhythm/rate; S1/S2+, no m/r/g auscultated  Gastrointestinal: Soft; NTND; bowel sounds normal and present in all 4 quadrants  :   Extremities: WWP; no b/l U/E edema, no b/l L/E edema  Neurological: CNII-XII grossly intact; no obvious focal deficits  I&O's Summary    30 Mar 2025 07:01  -  31 Mar 2025 07:00  --------------------------------------------------------  IN: 0 mL / OUT: 175 mL / NET: -175 mL        LABS:                        12.4   9.71  )-----------( 143      ( 31 Mar 2025 08:31 )             39.3     03-30    139  |  102  |  34[H]  ----------------------------<  138[H]  4.6   |  28  |  1.46[H]    Ca    9.2      30 Mar 2025 05:30            Urinalysis Basic - ( 30 Mar 2025 05:30 )    Color: x / Appearance: x / SG: x / pH: x  Gluc: 138 mg/dL / Ketone: x  / Bili: x / Urobili: x   Blood: x / Protein: x / Nitrite: x   Leuk Esterase: x / RBC: x / WBC x   Sq Epi: x / Non Sq Epi: x / Bacteria: x            RADIOLOGY & ADDITIONAL TESTS:  Imaging from Last 24 Hours: Pulmonology Progress Note     SUBJECTIVE / OVERNIGHT EVENTS:  O/N events: GEOFFREYO  Patient was seen and examined at bedside reports dyspnea has improved about 50% since last Friday however still becomes dyspneic when ambulating  Otherwise negative ROS    MEDICATIONS  (STANDING):  albuterol/ipratropium for Nebulization 3 milliLiter(s) Nebulizer every 6 hours  aspirin  chewable 81 milliGRAM(s) Oral daily  azithromycin   Tablet 500 milliGRAM(s) Oral <User Schedule>  escitalopram 10 milliGRAM(s) Oral every 24 hours  furosemide    Tablet 20 milliGRAM(s) Oral <User Schedule>  isosorbide   mononitrate ER Tablet (IMDUR) 30 milliGRAM(s) Oral daily  losartan 25 milliGRAM(s) Oral every 24 hours  metoprolol succinate ER 50 milliGRAM(s) Oral every 24 hours  polyethylene glycol 3350 17 Gram(s) Oral daily  predniSONE   Tablet 20 milliGRAM(s) Oral daily  rosuvastatin 10 milliGRAM(s) Oral at bedtime  senna 2 Tablet(s) Oral at bedtime  tamsulosin 0.4 milliGRAM(s) Oral at bedtime    MEDICATIONS  (PRN):  acetaminophen     Tablet .. 650 milliGRAM(s) Oral every 6 hours PRN Temp greater or equal to 38C (100.4F), Mild Pain (1 - 3)  aluminum hydroxide/magnesium hydroxide/simethicone Suspension 30 milliLiter(s) Oral every 4 hours PRN Dyspepsia  melatonin 3 milliGRAM(s) Oral at bedtime PRN Insomnia  ondansetron Injectable 4 milliGRAM(s) IV Push every 8 hours PRN Nausea and/or Vomiting    CAPILLARY BLOOD GLUCOSE            OBJECTIVE:  Vital Signs Last 24 Hrs  T(C): 36.4 (31 Mar 2025 05:29), Max: 36.8 (30 Mar 2025 16:57)  T(F): 97.5 (31 Mar 2025 05:29), Max: 98.3 (30 Mar 2025 16:57)  HR: 82 (31 Mar 2025 07:30) (76 - 86)  BP: 148/72 (31 Mar 2025 07:30) (143/75 - 175/67)  BP(mean): 108 (30 Mar 2025 16:57) (108 - 108)  RR: 18 (31 Mar 2025 05:29) (17 - 18)  SpO2: 94% (31 Mar 2025 05:29) (91% - 95%)    Parameters below as of 31 Mar 2025 05:29  Patient On (Oxygen Delivery Method): room air        PHYSICAL EXAM:  General: AAOx3, NAD, euvolemic  Head: NC/AT; MMM; PERRL  Neck: Supple; no JVD  Respiratory: rhonchi in bilateral lower lungs  Cardiovascular: Regular rhythm/rate; S1/S2+, no m/r/g auscultated  Gastrointestinal: Soft; NTND; bowel sounds normal and present in all 4 quadrants  Extremities: WWP; no b/l U/E edema, no b/l L/E edema  Neurological: CNII-XII grossly intact; no obvious focal deficits  I&O's Summary    30 Mar 2025 07:01  -  31 Mar 2025 07:00  --------------------------------------------------------  IN: 0 mL / OUT: 175 mL / NET: -175 mL        LABS:                        12.4   9.71  )-----------( 143      ( 31 Mar 2025 08:31 )             39.3     03-30    139  |  102  |  34[H]  ----------------------------<  138[H]  4.6   |  28  |  1.46[H]    Ca    9.2      30 Mar 2025 05:30            Urinalysis Basic - ( 30 Mar 2025 05:30 )    Color: x / Appearance: x / SG: x / pH: x  Gluc: 138 mg/dL / Ketone: x  / Bili: x / Urobili: x   Blood: x / Protein: x / Nitrite: x   Leuk Esterase: x / RBC: x / WBC x   Sq Epi: x / Non Sq Epi: x / Bacteria: x            RADIOLOGY & ADDITIONAL TESTS:  Imaging from Last 24 Hours:

## 2025-03-31 NOTE — PROGRESS NOTE ADULT - PROVIDER SPECIALTY LIST ADULT
Internal Medicine
Pulmonology
Internal Medicine

## 2025-03-31 NOTE — PROGRESS NOTE ADULT - PROBLEM SELECTOR PROBLEM 3
Chronic CHF

## 2025-03-31 NOTE — PROGRESS NOTE ADULT - ATTENDING COMMENTS
Completed antibiotics x 5 days  C/w outpatient regimen with azithromycin 3 times a week, triple inhaler therapy and daily low dose prednisone, outpatient follow up with pulm- Dr. Santo  Out of bed to chair, PT as tolerated  Complains of upper thoracic back pain, mentions that he had a fall recently.  Pain reproducible, discussed with the primary team, Lidocaine patch started, can continue as needed Tylenol  Pulmonary medicine will sign off, please reach out to us with questions.  Thank you for allowing us to participate in the care of your patient.

## 2025-03-31 NOTE — PROGRESS NOTE ADULT - PROBLEM SELECTOR PLAN 2
H/o prior hospitalizations (last in ?2023) for exacerbations,  On home Trelegy Ellipta and prednisone 10, not on home O2.   s/p azithromycin x3days, prednisone 40-->20mg x5days, CTX 1g x5days    - C/w Duonebs q6h  - C/w prednisone 10mg qd home med  - c/w azithromycin 500mg M/W/F home med  - C/w incentive spirometry  - Aerobika q6h

## 2025-03-31 NOTE — PROGRESS NOTE ADULT - SUBJECTIVE AND OBJECTIVE BOX
HOSPITAL COURSE: HOSPITAL COURSE: 89M w/ PMHx of COPD (not on home O2, prior hospitalizations for exac.), CAD, HFrEF (EF 35-40% 12/2024), LV thrombus (on Eliquis), CKD, BPH, NSCLC s/p lobectomy, admitted for AHRF 2/2 COPD exacerbation. Started on CTX, azithro, and prednisone. Repeat TTE with mildly reduced EF 40-45%. Weaned off oxygen.    INTERVAL HPI/OVERNIGHT EVENTS: GEOFFREY    SUBJECTIVE: Patient seen and examined at bedside, resting comfortably in bed, in no acute distress. Patient reports feeling better this morning, slight shortness of breath. Still has not had a BM. Denies other acute complaints.    Vital Signs Last 24 Hrs  T(C): 36.3 (31 Mar 2025 08:25), Max: 36.8 (30 Mar 2025 16:57)  T(F): 97.4 (31 Mar 2025 08:25), Max: 98.3 (30 Mar 2025 16:57)  HR: 86 (31 Mar 2025 08:25) (76 - 86)  BP: 137/72 (31 Mar 2025 08:25) (137/72 - 165/79)  BP(mean): 94 (31 Mar 2025 08:25) (94 - 108)  RR: 18 (31 Mar 2025 08:25) (18 - 18)  SpO2: 97% (31 Mar 2025 08:25) (91% - 97%)    Parameters below as of 31 Mar 2025 08:25  Patient On (Oxygen Delivery Method): room air      PHYSICAL EXAM:  General: in no acute distress  HEENT: PERRL, Moist mucous membranes  Lungs: mild BL expiratory wheezing  Cardiovascular: RRR. No murmurs, rubs, or gallops  Abdomen: Soft, non-tender non-distended; No rebound or guarding  Extremities: WWP, trace BL LE edema  Neurological: Alert and oriented x4  Skin: Warm and dry, no obvious rash    LABS:                        12.4   9.71  )-----------( 143      ( 31 Mar 2025 08:31 )             39.3     03-31    135  |  100  |  39[H]  ----------------------------<  138[H]  5.1   |  23  |  1.49[H]    Ca    8.9      31 Mar 2025 08:31  Phos  2.6     03-31  Mg     2.0     03-31

## 2025-03-31 NOTE — DIETITIAN INITIAL EVALUATION ADULT - PROBLEM SELECTOR PLAN 2
H/o prior hospitalizations (last in ?2023) for exacerbations,  On home Trelegy Ellipta and prednisone 10, not on home O2.   Plan:  - C/w Duonebs q6h  - C/w azithromycin x5d  - Start prednisone 40mg for total steroid course x5d  - C/w CTX 1g qd x5d for CAP  - C/w incentive spirometry  - IF expectorating, start hypertonic saline inhalations q6h, administered 15min after Duonebs   - Aerobika q6h, administered after Duonebs/hypertonic nebs  - F/u uStrep/Legionella  - F/u MRSA swab  - F/u full RVP (add-on)  - Holding home Trelegy  - Holding  home prednisone 10mg qd  - OOBTC

## 2025-03-31 NOTE — DIETITIAN INITIAL EVALUATION ADULT - PERTINENT LABORATORY DATA
03-31    135  |  100  |  39[H]  ----------------------------<  138[H]  5.1   |  23  |  1.49[H]    Ca    8.9      31 Mar 2025 08:31  Phos  2.6     03-31  Mg     2.0     03-31

## 2025-03-31 NOTE — PROGRESS NOTE ADULT - SUBJECTIVE AND OBJECTIVE BOX
INTERVAL HPI/OVERNIGHT EVENTS:  Feeling better;  Less shortness of breath   Would like home oxygen ; Will arrange       MEDICATIONS  (STANDING):  albuterol/ipratropium for Nebulization 3 milliLiter(s) Nebulizer every 6 hours  aspirin  chewable 81 milliGRAM(s) Oral daily  azithromycin   Tablet 500 milliGRAM(s) Oral <User Schedule>  escitalopram 10 milliGRAM(s) Oral every 24 hours  furosemide    Tablet 20 milliGRAM(s) Oral <User Schedule>  isosorbide   mononitrate ER Tablet (IMDUR) 30 milliGRAM(s) Oral daily  losartan 25 milliGRAM(s) Oral every 24 hours  polyethylene glycol 3350 17 Gram(s) Oral daily  predniSONE   Tablet 10 milliGRAM(s) Oral every 24 hours  rosuvastatin 10 milliGRAM(s) Oral at bedtime  senna 2 Tablet(s) Oral at bedtime  tamsulosin 0.4 milliGRAM(s) Oral at bedtime    MEDICATIONS  (PRN):  acetaminophen     Tablet .. 650 milliGRAM(s) Oral every 6 hours PRN Temp greater or equal to 38C (100.4F), Mild Pain (1 - 3)  aluminum hydroxide/magnesium hydroxide/simethicone Suspension 30 milliLiter(s) Oral every 4 hours PRN Dyspepsia  melatonin 3 milliGRAM(s) Oral at bedtime PRN Insomnia  ondansetron Injectable 4 milliGRAM(s) IV Push every 8 hours PRN Nausea and/or Vomiting      Allergies    No Known Allergies    Intolerances        Vital Signs Last 24 Hrs  T(C): 36.4 (31 Mar 2025 05:29), Max: 36.8 (30 Mar 2025 16:57)  T(F): 97.5 (31 Mar 2025 05:29), Max: 98.3 (30 Mar 2025 16:57)  HR: 82 (31 Mar 2025 07:30) (76 - 86)  BP: 148/72 (31 Mar 2025 07:30) (143/75 - 165/79)  BP(mean): 108 (30 Mar 2025 16:57) (108 - 108)  RR: 18 (31 Mar 2025 05:29) (18 - 18)  SpO2: 94% (31 Mar 2025 05:29) (91% - 95%)    Parameters below as of 31 Mar 2025 05:29  Patient On (Oxygen Delivery Method): room air              Constitutional: Awake     Eyes: WALI    ENMT: Negative    Neck: Supple    Back:  no tenderness     Respiratory:  clear     Cardiovascular: S1 S2    Gastrointestinal:  soft     Genitourinary:    Extremities:  wounds look improved;  Should be less bleeding now that off Apixaban     Vascular:    Neurological:    Skin:    Lymph Nodes:            03-30 @ 07:01  -  03-31 @ 07:00  --------------------------------------------------------  IN: 0 mL / OUT: 175 mL / NET: -175 mL      LABS:                        12.4   9.71  )-----------( 143      ( 31 Mar 2025 08:31 )             39.3     03-31    135  |  100  |  39[H]  ----------------------------<  138[H]  5.1   |  23  |  1.49[H]    Ca    8.9      31 Mar 2025 08:31  Phos  2.6     03-31  Mg     2.0     03-31        Urinalysis Basic - ( 31 Mar 2025 08:31 )    Color: x / Appearance: x / SG: x / pH: x  Gluc: 138 mg/dL / Ketone: x  / Bili: x / Urobili: x   Blood: x / Protein: x / Nitrite: x   Leuk Esterase: x / RBC: x / WBC x   Sq Epi: x / Non Sq Epi: x / Bacteria: x        RADIOLOGY & ADDITIONAL TESTS:

## 2025-03-31 NOTE — PROGRESS NOTE ADULT - PROBLEM SELECTOR PLAN 4
Noted on 12/2024 TTE.    - Monitor off eliquis as pt has completed 3 months and LV thrombus resolved

## 2025-03-31 NOTE — PROGRESS NOTE ADULT - PROBLEM SELECTOR PROBLEM 2
COPD with exacerbation

## 2025-03-31 NOTE — PROGRESS NOTE ADULT - PROBLEM SELECTOR PROBLEM 6
Multiple open wounds, uncomplicated
Preventive measure
Multiple open wounds, uncomplicated
Preventive measure
Multiple open wounds, uncomplicated
Preventive measure
Preventive measure

## 2025-03-31 NOTE — PROGRESS NOTE ADULT - PROBLEM SELECTOR PLAN 6
F: None  E: Replete PRN  N: DASH  P: Eliquis 2.5mg BID
F: None  E: Replete PRN  N: DASH  P: Eliquis 2.5mg BID
F: None  E: Replete PRN  N: DASH  P: aspirin 81 qd
F: None  E: Replete PRN  N: DASH  P: Eliquis 2.5mg BID

## 2025-03-31 NOTE — PROGRESS NOTE ADULT - ASSESSMENT
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 FEV1/FVC 65% (93%pred)   10/14/24 FVC 1.47 (54%pred), FEV1 0.92 (51%pred), FEV1/FVC 63% (90%pred)    Inpatient  99% on 2L NC-> 93-95% on RA-> 93% on 2L NC  3/26 CXR sp RLL lobectomy, RLL atelectasis, improved LLL atelectasis  WBC 10.56-> 12.27 PMN predominance. Eos 0.18 on admission now resolved  procal 0.07, lactate 1.7. BNP 1026-> 1175  urine strep/legionella/MRSA/MSSA negative. Full RVP negative  Bcx 3/26 NTD, scx pending  3/26 TTE LVEF 40-45%, no RWMA. Inferior septum, mid/apical anterior septum, mid/apical inferior wall and apex are hypokinetic. Grade I DD. LV thrombus resolved, LV EF improved compared to last TTE 12/24    #HFrEF exacerbation #obstructive lung disease #exsmoker  On admission 7L NRB-> HFNC 40/40 admitted to telemetry-> now saturating 94% on RA, 95% on 2LNC  Home meds trelegy, albuterol PRN, azithromycin MWF 500mg every other day, prednisone 10mg daily, furosemide 20mg MWF. No home O2. On eliquis 2.5 BID for LV mural thrombus since 12/2024 now resolved in recent TTE. CT chest from last month shows bronchial wall thickening, infiltrate in RUL/RML, GGO throughout Outpatient PFTs 1/2025 demonstrate obstructive pattern.  Outpatient PFTs show obstruction however difficult to assess without full PFTs  sp CTX 1g, azithro 500 IVx3 days, Duonebs x2, budesonide inh x1, decadron 6 IV x1, Lasix 20 x1. Clear sputum production. Rhonchi in R>L lung. no JVD, 1+ BLE.   Previous CT chest 2/2025 shows GGO throughout and previous RUL consolidation, no emphysematous changes noted. CXR without infiltrates/consolidation. Procal mildly elevated 0.07. BNP rising.   - obtain repeat BNP, please resume home furosemide 20mg MWF, azithromycin 500mg MWF  - please repeat ambulatory sats, satting 92-94% on RA- if hypoxic, consider repeat CXR for additional diuresis  - cw CTX 1g q24H, duonebs q6H  - aspiration precautions, can consider SLP eval likely chronically aspirating  - start aerobika TID for airway clearance  - please address constipation (pt reports no BM since admission), continue ICS  - discharge on trelegy, azithromycin MWF, prednisone 10mg qd (after completing 5 day course) outpatient f/u with Dr Santo in two weeks    Plan discussed with Dr Gomes  Recommendations final after attending 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 FEV1/FVC 65% (93%pred)   10/14/24 FVC 1.47 (54%pred), FEV1 0.92 (51%pred), FEV1/FVC 63% (90%pred)    Inpatient  99% on 2L NC-> 93-95% on RA-> 93% on 2L NC  3/26 CXR sp RLL lobectomy, RLL atelectasis, improved LLL atelectasis  WBC 10.56-> 12.27 PMN predominance. Eos 0.18 on admission now resolved  procal 0.07, lactate 1.7. BNP 1026-> 1175  urine strep/legionella/MRSA/MSSA negative. Full RVP negative  Bcx 3/26 NTD, scx pending  3/26 TTE LVEF 40-45%, no RWMA. Inferior septum, mid/apical anterior septum, mid/apical inferior wall and apex are hypokinetic. Grade I DD. LV thrombus resolved, LV EF improved compared to last TTE 12/24    #HFrEF exacerbation #obstructive lung disease #exsmoker  On admission 7L NRB-> HFNC 40/40 admitted to telemetry-> now saturating 94% on RA, 95% on 2LNC  Home meds trelegy, albuterol PRN, azithromycin MWF 500mg every other day, prednisone 10mg daily, furosemide 20mg MWF. No home O2. On eliquis 2.5 BID for LV mural thrombus since 12/2024 now resolved in recent TTE. CT chest from last month shows bronchial wall thickening, infiltrate in RUL/RML, GGO throughout Outpatient PFTs 1/2025 demonstrate obstructive pattern.  Outpatient PFTs show obstruction however difficult to assess without full PFTs  sp CTX 1g, azithro 500 IVx3 days, Duonebs x2, budesonide inh x1, decadron 6 IV x1, Lasix 20 x1. Clear sputum production. Rhonchi in R>L lung. no JVD, 1+ BLE.   Previous CT chest 2/2025 shows GGO throughout and previous RUL consolidation, no emphysematous changes noted. CXR without infiltrates/consolidation. Procal mildly elevated 0.07. BNP rising.   - BNP elevating during admission, please continue home furosemide 20mg MWF, azithromycin 500mg MWF  - repeat ambulatory sats 94% on RA however reporting dyspnea otherwise satting 92-94% on RA- if hypoxic, consider repeat CXR for additional diuresis  - cw CTX 1g q24H, duonebs q6H  - aspiration precautions, can consider SLP eval likely chronically aspirating  - start aerobika TID for airway clearance  - please address constipation (pt reports no BM since admission), continue ICS  - discharge on trelegy, azithromycin MWF, prednisone 10mg qd (after completing 5 day course) outpatient f/u with Dr Santo in two weeks    Plan discussed with Dr Gomes  Recommendations final after attending attestation

## 2025-03-31 NOTE — DIETITIAN INITIAL EVALUATION ADULT - OTHER INFO
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 Patient reports over last couple of days, he has developed increased SOB w associated cough. Tonight, he had worsening sleep quality, prompting him to seek medical attention. Patient reports increased SOB, mostly at night when laying flat w development of productive cough (yellow/white). He states he usually sleeps with 1 pillow at night, however over the course of the past couple of days, he has only been able to sleep sitting up. He also has developed intermittent CP worse w inspiration. In terms of further ROS, patient denies associated fever/chills, wheezing, hemoptysis, recent sick contacts, abdominal symptoms, urinary symptoms, or rash Of note, upon meeting patient, was placed on 3L NC w O2 sat >95% w no tachypnea.    Met with pt this AM at bedside. Pt was seated upright, in chair, and able to articulate his nutrition hx well. No known food allergies nor food intolerances reported. Pt reported good appetite and PO intake at baseline, which persists, denied following any specific diet PTA. RD observed 75% breakfast tray eaten. Pt denied chewing difficulties, however endorsed swallowing difficulties and mentioned hx of dysphagia, which he believes is worsening (RD to articulate to team). 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 Patient reports over last couple of days, he has developed increased SOB w associated cough. Tonight, he had worsening sleep quality, prompting him to seek medical attention. Patient reports increased SOB, mostly at night when laying flat w development of productive cough (yellow/white). He states he usually sleeps with 1 pillow at night, however over the course of the past couple of days, he has only been able to sleep sitting up. He also has developed intermittent CP worse w inspiration. In terms of further ROS, patient denies associated fever/chills, wheezing, hemoptysis, recent sick contacts, abdominal symptoms, urinary symptoms, or rash Of note, upon meeting patient, was placed on 3L NC w O2 sat >95% w no tachypnea.    Met with pt this AM at bedside. Pt was seated upright, in chair, and able to articulate his nutrition hx well. No known food allergies nor food intolerances reported. Pt reported good appetite and PO intake at baseline, which persists, denied following any specific diet PTA. RD observed 75% breakfast tray eaten. Pt denied chewing difficulties, however endorsed swallowing difficulties and mentioned hx of dysphagia, which he believes is worsening (RD to articulate to team). Pt denied nausea/vomiting/diarrhea, however endorsed constipation, stated last BM PTA - mentioned he is on bowel regimen. Pt reported height 5'6" and weight ~135 pounds, denied recent weight loss/gain. RD reviewed DASH/TLC diet (low Na and cholesterol); RD also reviewed protein sources (chicken, fish, beans, nut, etc.) and encouraged adequate protein consumption in order to regain strength, maintain muscle mass and reach adequate nutritional status. Pt was accepting to RD suggestion and asking appropriate questions. Nutrition focused physical exam revealed moderate muscle and fat wasting, likely secondary to age-related sarcopenia.

## 2025-03-31 NOTE — PROGRESS NOTE ADULT - PROBLEM SELECTOR PLAN 1
Likely 2/2 COPD Exacerbation, w/ possible component of CHF exacerbation. Initially required NRB and HFNC in ED, now satting well on NC.     - Weaned to RA with stable sats  - F/u pulm recs  - PT/OT recs home PT  - COPD management as below  - CHF mgmt below

## 2025-03-31 NOTE — DIETITIAN INITIAL EVALUATION ADULT - ORAL INTAKE PTA/DIET HISTORY
No known food allergies nor food intolerances reported. Pt reported good appetite and PO intake at baseline, denied following any specific diet PTA.

## 2025-04-03 NOTE — ED ADULT TRIAGE NOTE - CHIEF COMPLAINT QUOTE
BIBA from home c/o difficulty breathing and dyspnea on exertion x days. d/c from [facility] on Tuesday. Per EMS was 83% on RA and arrives to ED on NRB. Pt AAOx3. speaking in short sentences.  +Crackles. Also pt c/o of urination retention since last night was supposed to go to Dr. Brandt's office for Stinson catheter today. Denies cp, f/c at home, abd or bladder pain, nvd, fall/trauma. pt states he has not been able to walk due to his breathing today. Clinical upg to MD Russell EKG in prog. Hx of PE not on Eliquis per spouse. BIBA from home c/o difficulty breathing and dyspnea on exertion x days. d/c from [facility] on Tuesday. Per EMS was 83% on RA and arrives to ED on NRB. Pt AAOx3. speaking in short sentences.  +Crackles. Also pt c/o of urination retention since last night was supposed to go to Dr. Brandt's office for Stinson catheter today. Denies cp, f/c at home, abd or bladder pain, nvd, fall/trauma. pt states he has not been able to walk due to his breathing today. Clinical upg to MD Russell EKG in prog. Hx of LV thrombus current not on Eliquis per spouse.

## 2025-04-03 NOTE — ED PROVIDER NOTE - CARE PLAN
Principal Discharge DX:	Shortness of breath  Secondary Diagnosis:	COPD exacerbation  Secondary Diagnosis:	BERKLEY (acute kidney injury)   1 Principal Discharge DX:	Shortness of breath  Secondary Diagnosis:	COPD exacerbation  Secondary Diagnosis:	BERKLEY (acute kidney injury)  Secondary Diagnosis:	Acute hypoxic respiratory failure

## 2025-04-03 NOTE — ED ADULT NURSE REASSESSMENT NOTE - NS ED NURSE REASSESS COMMENT FT1
pt with lexapro 10mg PO order for 6pm, pt states "I take it at bedtime, usually around 9pm." lexapro rescheduled for 9pm

## 2025-04-03 NOTE — H&P ADULT - ASSESSMENT
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     NEURO:  #AOx4  - No acute interventions    #Anxiety  Multi year history.   Patient endorses feeling anxious on the bipap.   Home Med: Lexapro 10mg qd  - Continue home lexapro 10mg qd  - consider low dose lorazepam 0.5mg w reassessment to assist w anxiety if needed     CARDIOVASCULAR:  #HFmrEF  Chronic CHF.  Labs remarkable for BNP of 898 down from levels during prior admission last week.   Troponin of 82 -> 83.   Bedside pocus euvolemic.   EKG non ischemic, sinus. Echo 3/2025 EF 40-45%  Home Meds: losartan 25mg qd, lasix 20mg M/W/F, toprol 50mg qd, imdur 30mg qd  - continue home Lasix 20mg M/W/F  - C/w home toprol 50 qd, imdur 30 qd  - C/w supplemental O2, wean as able  - Will hold losartan to avoid HoTN  - Continue to measure strict I/O and daily weights  - repeat EKG and trops in AM    #HTN  Muti year history of hypertension.   On admission, presented with BP normotensive.   Well controlled on home losartan 25mg qd.   - hold home losartan at this time  - CTM for CP/CT    #HLD  Multi year history of HLD. Well controlled on home atorvastatin 40mg qhs.  - c/w home atorvastatin 40mg qhs  - encourage healthy eating as outpatient and follow up with outpatient PCP Dr Brandt    #History of LV thrombus   - History of LV thrombus, now resolved   - No need for AC. Eliquis has been discontinued     #CAD   - C/w ASA 81mg qd     PULM:  #AHRF  Patient presented with ahrf SpO2 in the ED, initial O2 status documented as 96% on non rebreather mask. Tachypneic to 25. Has hx of copd not on home O2. Pt currently satting 97% on bipap.  CXR: No consolidation pleural effusion or pneumothorax. Hypoinflation. Postop change. Minimal bibasilar focal atelectasis.  - check urine Legionella and strep  - full RVP  - check MRSA swab  - c/w: home azithromycin 500mg M/W/F  - start pseudomonas dosed zosyn 4.5 g q8hr  - c/w o2: wean as tolerated/goal >88%  - OOBTC, inventive spirometry  - duonebs q6 hr    #COPD  #COPD Exacerbation  #NSCLC  Pt presents w/ SOB as above. Initial O2 status documented as 96% on non rebreather mask. Pt currently satting 97% on bipap. History of COPD not on home O2.   On home medications: Trelegy, azithromycin 500mg M/W/F  - f/u full RVP  - titrate O2 to maintain SpO2 88%-92%  - c trelegy  - c azithromycin M/W/F  - Solumedrol 40mg qd while NPO   - start Pseudomonal risk Zosyn 4.5g IV q8 hr  - standing duonebs q6h  - bedside spirometry  - early mobility w/ PT, OOB to chair daily  - Pulm consult in AM     GI:  - monitor BMs  - bowel reg    :  #BPH  Known, multi year history of BPH.   Well controlled on home flomax however of recent has noted he has had a harder time peeing.  Currently with urinary signs of retention  - c tamsulosin 0.4mg qhs  - bladder scan stat and q 6 hr  - f/u UA/Ucx  - encourage to avoid drinking excessive fluids late at night    RENAL:  #CKD3  #BERKLEY on CKD  On admission Cr: 2.19 (baseline closer to 1.5).  Differential likely postobstructive given no UOP since yesterday with a history of BPH. Oral intake has been at baseline per patient. However, no renal studies at this time to further elucidate etiology.   - Urine studies stat  - repeat bmp 10pm  - BS q6 hr  - if failing to produce UOP, place Indwelling Stinson   - I/O strict  - Avoid nephrotoxins  - Continue to trend Cr    ENDO:   #NPO  - fingersticks q6hr while NPO    ID:   #Leukocytosis  #R/o PNA  Presented with a WBC count of 11.62, neutrophil leukocyte predominance.  Denies systemic symptoms such as fever, chills, nausea, vomiting.  Does have a small R sided infiltrate on imaging thus cannot r/o overlying pneumonia. In addition, patient on steroids thus could be contributing as well.   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     NEURO:  #AOx4  - No acute interventions    #Anxiety  Multi year history.   Patient endorses feeling anxious on the bipap.   Home Med: Lexapro 10mg qd  - Continue home lexapro 10mg qd  - consider low dose lorazepam 0.5mg w reassessment to assist w anxiety if needed     CARDIOVASCULAR:  #HFmrEF  Chronic CHF.  Labs remarkable for BNP of 898 down from levels during prior admission last week.   Troponin of 82 -> 83.   Bedside pocus euvolemic.   EKG non ischemic, sinus. Echo 3/2025 EF 40-45%  Home Meds: losartan 25mg qd, lasix 20mg M/W/F, toprol 50mg qd, imdur 30mg qd  - continue home Lasix 20mg M/W/F  - C/w home toprol 50 qd, imdur 30 qd  - C/w supplemental O2, wean as able  - Will hold losartan to avoid HoTN  - Continue to measure strict I/O and daily weights  - repeat EKG and trops in AM    #HTN  Muti year history of hypertension.   On admission, presented with BP normotensive.   Well controlled on home losartan 25mg qd.   - hold home losartan at this time  - CTM for CP/CT    #HLD  Multi year history of HLD. Well controlled on home atorvastatin 40mg qhs.  - c/w home atorvastatin 40mg qhs  - encourage healthy eating as outpatient and follow up with outpatient PCP Dr Brandt    #History of LV thrombus   - History of LV thrombus, now resolved   - No need for AC. Eliquis has been discontinued     #CAD   - C/w ASA 81mg qd     PULM:  #AHRF  Patient presented with ahrf SpO2 in the ED, initial O2 status documented as 96% on non rebreather mask. Tachypneic to 25. Has hx of copd not on home O2. Pt currently satting 97% on bipap.  CXR: No consolidation pleural effusion or pneumothorax. Hypoinflation. Postop change. Minimal bibasilar focal atelectasis.  - check urine Legionella and strep  - full RVP  - check MRSA swab  - c/w: home azithromycin 500mg M/W/F  - start pseudomonas dosed zosyn 4.5 g q8hr  - c/w o2: wean as tolerated/goal >88%  - OOBTC, inventive spirometry  - duonebs q6 hr    #COPD  #COPD Exacerbation  #NSCLC  Pt presents w/ SOB as above. Initial O2 status documented as 96% on non rebreather mask. Pt currently satting 97% on bipap. History of COPD not on home O2.   On home medications: Trelegy, azithromycin 500mg M/W/F  - f/u full RVP  - titrate O2 to maintain SpO2 88%-92%  - hold trelegy, duonebs q6hr  - c azithromycin M/W/F  - Solumedrol 40mg qd while NPO   - start Pseudomonal risk Zosyn 4.5g IV q8 hr  - standing duonebs q6h  - bedside spirometry  - early mobility w/ PT, OOB to chair daily  - Pulm consult in AM     GI:  - monitor BMs  - bowel reg    :  #BPH  Known, multi year history of BPH.   Well controlled on home flomax however of recent has noted he has had a harder time peeing.  Currently with urinary signs of retention  - c tamsulosin 0.4mg qhs  - bladder scan stat and q 6 hr  - f/u UA/Ucx  - encourage to avoid drinking excessive fluids late at night    RENAL:  #CKD3  #BERKLEY on CKD  On admission Cr: 2.19 (baseline closer to 1.5).  Differential likely postobstructive given no UOP since yesterday with a history of BPH. Oral intake has been at baseline per patient. However, no renal studies at this time to further elucidate etiology.   - Urine studies stat  - repeat bmp 10pm  - BS q6 hr  - if failing to produce UOP, place Indwelling Stinson   - I/O strict  - Avoid nephrotoxins  - Continue to trend Cr    ENDO:   #NPO  - fingersticks q6hr while NPO    ID:   #Leukocytosis  #R/o PNA  Presented with a WBC count of 11.62, neutrophil leukocyte predominance.  Denies systemic symptoms such as fever, chills, nausea, vomiting.  Does have a small R sided infiltrate on imaging thus cannot r/o overlying pneumonia. In addition, patient on steroids thus could be contributing as well.   - Pseudomonal Zosyn 4.5 g q8hr for now given recent admission and with recent failure to improve w/ CTX/Azithro  - send sputum culture   - Urine legionella/ strep   - MRSA Nares  - f/u urine culture  - check pro ruy    HEME/ONC:  - Active T and S    PREVENTIVE:   Fluids: NI  Diet: NPO while on bipap  E: Replete K>4, Mg>2   DVT ppx: heparin subq   GI ppx: NI  Activity: bedrest

## 2025-04-03 NOTE — H&P ADULT - NSHPLABSRESULTS_GEN_ALL_CORE
13.7   11.62 )-----------( 148      ( 03 Apr 2025 13:45 )             43.4       04-03    138  |  100  |  71[H]  ----------------------------<  75  4.7   |  24  |  2.19[H]    Ca    9.0      03 Apr 2025 13:45  Mg     2.3     04-03           CAPILLARY BLOOD GLUCOSE                         Lactate Trend      Urinalysis Basic - ( 03 Apr 2025 13:45 )    Color: x / Appearance: x / SG: x / pH: x  Gluc: 75 mg/dL / Ketone: x  / Bili: x / Urobili: x   Blood: x / Protein: x / Nitrite: x   Leuk Esterase: x / RBC: x / WBC x   Sq Epi: x / Non Sq Epi: x / Bacteria: x          Culture Results:   No growth at 5 days (03-26 @ 00:33)  Culture Results:   No growth at 5 days (03-26 @ 00:33)      RADIOLOGY, EKG & ADDITIONAL TESTS: Reviewed.

## 2025-04-03 NOTE — H&P ADULT - NSHPPHYSICALEXAM_GEN_ALL_CORE
Constitutional: elderly, NAD  HEENT: NC/AT, PERRL/EOMI, anicteric sclera, MMM  Respiratory: expiratory wheeze b/l, no rales  Cardiac: RRR; no rubs no gallops   Gastrointestinal: soft, NT/ND; no rebound or guarding  Back: spine midline, no CVAT B/L  Extremities: WWP, no clubbing or cyanosis  Vascular: 2+ radial & DP pulses  Dermatologic: skin warm, dry and intact  Neurologic: AAOx3, no focal deficits  Psychiatric: affect and characteristics of appearance, verbalizations, behaviors are appropriate

## 2025-04-03 NOTE — ED ADULT NURSE NOTE - OBJECTIVE STATEMENT
Pt is a 89y/o M w/ PMHx COPD (not on home O2), CAD, HFrEF (EF 35-40% 12/2024), LV thrombus (on Eliquis), CKD, BPH, NSCLC s/p lobectomy, presenting to the ED w/ c/o of SOB/MUNGUIA, frequent cough, weakness, decreased PO intake since d/c from hosp on Monday. Wife also endores pt has not urinated since yesterday. Pt was recently admitted to hosp for COPD exacerbation, PNA. Wife @ bedside also endorses recent falls @ home, pt w/ multiple skin tears and generalized bruising. Pt UPGRADED to Dr. Russell, Dr. Russell @ bedside. Pt A/Ox3, speaking in clear/complete sentences. Respirations slightly tachypneic, pt remains on 15L NRB from EMS w/ saturation > 95%. Frequent, wet-sounding cough noted. Pt ambulates w/ cane @ home. Pt placed in gown, on continuous cardiac monitor and pulse ox. IV placed, labs drawn. EKG completed. Pending further workup.

## 2025-04-03 NOTE — ED PROVIDER NOTE - CLINICAL SUMMARY MEDICAL DECISION MAKING FREE TEXT BOX
Pt p/w worsening SOB, cough, hypoxia, BIBA on NRB, per EMS 83% on RA at home. Diffuse rhonchi w/ prolonged exp phase. Attempted deep suctioning w/o sig mucous. Weaned to 6L and started on nebs. CXR w/o infiltrate /effusion / CHF. Will get CT chest for further eval.   While in the ED, pt reports he feels better on NRB mask. No inc WOB. Trial of HFNC w/ inc WOB and hypoxia. Changed to BiPAP w/ improvement in WOB and hypoxia. CT chest performed, awaiting results. Seen by MICU team, admit to medicine

## 2025-04-03 NOTE — CONSULT NOTE ADULT - ASSESSMENT
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 Patient now representing w AHRF, likely 2/2 COPD, likely component of PNA.   NEURO #Anxiety   - Continue w home lexapro   - consider low dose lorazepam .5mg w reassessment to assist w anxiety if needed   CV   #HFrEF - Patient w history of HFrEF -   Cw home meds  - C/w imdur 30mg qd   - c/w Toprol 50mg qd   - Hold Losartan for now, given BERKLEY   - C/w Lasix 20mg M/W/F     #History of LV thrombus   - History of LV thrombus, now resolved   - No need for AC. Eliquis has been discontinued   #CAD   - C/w ASA 81mg qd     PULM  #COPD exacerbation. Patient w recent admission for COPD exacerbation, reports he never fully recovered from last discharge CT Chest c/w COPD w traction bronchiectasis, and RLL infiltrate   - Duonebs q4h   - Solumedrol 40mg qd while NPO   - Pulm consult in AM   - C/w Azithromycin 500mg M/W/F   - C/w home Trelegy     #PNA   - Patient w small R sided infiltrate, afebrile w mild leukocytosis.   - Pseudomonal Zosyn for now given recent admission. Failure to improve w/ CTX/Azithro   - SCx if producing   - Urine legionella/ strep   - MRSA Nares     GI   - Monitor BM   - NPO q6 while NPO     RENAL   #BERKLEY on CKD, seems likely postobstructive given no UOP since y/day w hx of BPH   - Urine studies - if failing to produce UOP, place Indwelling Stinson   - I/O   - repeat BMP 10PM   - Avoid nephrotoxins ID     #PNA   - Plan as above for PNA   - RVP, f/u results     ENDOCRINE   - FSG q6 while NPO     HEME   #Leukocytosis, pt p/w mild leukocytosis to 11.62k consider 2/2 to PNA v steroid use   - Tx PNA as above   - Trend CBC w diff   - Active T and S    F: No need for additional IVF   E: Replete K>4, Mg>2   N: NPO for now     DISPO: [id number] Discussed w Dr. 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 Patient now representing w AHRF, likely 2/2 COPD, likely component of PNA.   NEURO #Anxiety   - Continue w home lexapro   - consider low dose lorazepam .5mg w reassessment to assist w anxiety if needed   CV   #HFrEF - Patient w history of HFrEF -   Cw home meds  - C/w imdur 30mg qd   - c/w Toprol 50mg qd   - Hold Losartan for now, given BERKLEY   - C/w Lasix 20mg M/W/F     #History of LV thrombus   - History of LV thrombus, now resolved   - No need for AC. Eliquis has been discontinued   #CAD   - C/w ASA 81mg qd     PULM  #COPD exacerbation. Patient w recent admission for COPD exacerbation, reports he never fully recovered from last discharge CT Chest c/w COPD w traction bronchiectasis, and RLL infiltrate   - Duonebs q4h   - Solumedrol 40mg qd while NPO   -  Pulm consult in AM   - C/w Azithromycin 500mg M/W/F   - Can hold off on advair for now as per PCCM fellow    #PNA   - Patient w small R sided infiltrate, afebrile w mild leukocytosis.   - Pseudomonal Zosyn for now given recent admission. Failure to improve w/ CTX/Azithro   - SCx if producing   - Urine legionella/ strep   -  MRSA nares, if + add vancomycin     GI   - Monitor BM   - NPO q6 while NPO     RENAL   #BERKLEY on CKD, seems likely postobstructive given no UOP since y/day w hx of BPH   - Urine studies - if failing to produce UOP, place Indwelling Stinson   - I/O   - repeat BMP 10PM   - Avoid nephrotoxins   - Renal US    ID  #PNA   - Plan as above for PNA   - RVP, f/u results     ENDOCRINE   - FSG q6 while NPO     HEME   #Leukocytosis, pt p/w mild leukocytosis to 11.62k consider 2/2 to PNA v steroid use   - Tx PNA as above   - Trend CBC w diff   - Active T and S    F: No need for additional IVF   E: Replete K>4, Mg>2   N: NPO for now     DISPO: [id number] Discussed w Dr. Brandt

## 2025-04-03 NOTE — ED PROVIDER NOTE - PHYSICAL EXAMINATION
Constitutional: Elderly, frail, awake, alert, oriented to person, place, time/situation and in no apparent distress.  ENMT: Airway patent. Normal MM  Eyes: Clear bilaterally  Cardiac: Normal rate, regular rhythm.  Heart sounds S1, S2.  No murmurs, rubs or gallops.  Respiratory: Diffuse rhonchi, diminished. No increased WOB, tachypnea, or accessory mm use. Pt speaks in full sentences.   Gastrointestinal: Abd soft, NT, ND, NABS. No guarding, rebound, or rigidity. No pulsatile abdominal masses.   Musculoskeletal: Range of motion is not limited  Neuro: Alert and oriented x 3, face symmetric and speech fluent. Strength 5/5 x 4 ext and symmetric, nml gross motor movement, nml gait. No focal deficits noted.  Skin: Skin normal color for race, warm, dry and intact. No evidence of rash. senile purpura  Psych: Alert and oriented to person, place, time/situation. normal mood and affect. no apparent risk to self or others.

## 2025-04-03 NOTE — ED PROVIDER NOTE - OBJECTIVE STATEMENT
Pt w/ PMHx of COPD (not on home O2, prior hospitalizations for exac.), CAD, HFrEF (EF 35-40% 12/2024), LV thrombus (on Eliquis), CKD, BPH, NSCLC s/p lobectomy, admitted too [facility] 3/26-4/1for AHRF 2/2 COPD exacerbation. Finished course of CTX, azithro, and prednisone 40. Repeat TTE with mildly reduced EF 40-45% (improved from last TTE). Restarted home lasix 20mg M/W/F and azithromycin 500mg M/W/F for prophylaxis, and prednisone 10mg qd. Patient complaining of thoracic back pain, due to recent fall and steroid use causing increased risk of fractures, obtained thoracic spine XR, which was negative for fractures.  Initially required NRB and HFNC in ED, weaned to NC, eventually discharged off O2  Verbalizes DNR / DNI but trial of noninvasive  Lasix MWF, last yesterday  Unable to urinate since yesterday. Increased SOB, hypoxia today. No f/c. No CP Pt w/ PMHx of COPD (not on home O2, prior hospitalizations for exac.), CAD, HFrEF (EF 35-40% 12/2024), LV thrombus (on Eliquis), CKD, BPH, NSCLC s/p lobectomy, admitted too [facility] 3/26-4/1for AHRF 2/2 COPD exacerbation. Finished course of CTX, azithro, and prednisone 40. Repeat TTE with mildly reduced EF 40-45% (improved from last TTE). Restarted home lasix 20mg M/W/F and azithromycin 500mg M/W/F for prophylaxis, and prednisone 10mg qd. Patient complaining of thoracic back pain, due to recent fall and steroid use causing increased risk of fractures, obtained thoracic spine XR, which was negative for fractures. Initially required NRB and HFNC in ED, weaned to NC, eventually discharged off O2  Today, he p/w inability to urinate since yesterday, as well as increased SOB, hypoxia today, increased cough, but unable to produce sputum. No f/c. No CP. BIBA on NRB, per EMS 83% on RA at home  Verbalizes DNR / DNI but trial of noninvasive

## 2025-04-03 NOTE — ED PROVIDER NOTE - PROGRESS NOTE DETAILS
Was weaned to 6L NC w/ SP02 94% although pt felt he was breathing better w/ NRB mask. Called for high flow, however pt became more SOB, w/ SP02 into the 80s w/ good waveform. Switched to BiPAP w/ improvement. SIVAN filled out DNR / DNI w/ trial of noninvasive. D/w Dr Brandt whom will come and see the pt. MICU consulted, anticipate admission to stepdown Admit to [facility] Seen by MICU team, Admit to [facility], Dr Wagner

## 2025-04-03 NOTE — ED ADULT NURSE NOTE - NS ED NURSE TRANSPORT WITH
Pharmacist Renal Dose Adjustment Note    Tammy Multani is a 65 y.o. female being treated with the medication famotidine    Patient Data:    Vital Signs (Most Recent):  Temp: 97.8 °F (36.6 °C) (10/12/24 1134)  Pulse: 85 (10/12/24 1134)  Resp: 20 (10/12/24 1134)  BP: 132/60 (10/12/24 1134)  SpO2: 99 % (10/12/24 0733) Vital Signs (72h Range):  Temp:  [97.2 °F (36.2 °C)-98.7 °F (37.1 °C)]   Pulse:  []   Resp:  [16-20]   BP: (106-180)/(53-97)   SpO2:  [87 %-100 %]      Recent Labs   Lab 10/08/24  0217 10/11/24  1301 10/12/24  0229   CREATININE 1.0 1.6* 1.6*     Serum creatinine: 1.6 mg/dL (H) 10/12/24 0229  Estimated creatinine clearance: 32.7 mL/min (A)    Famotidine 20 mg PO BID will be changed to famotidine 20 mg PO QD    Pharmacist's Name: Dorothy Walters  Pharmacist's Extension: [phone]     Cardiac Monitor/Defib/ACLS/Rescue Kit/O2/BVM/bipap

## 2025-04-03 NOTE — ED ADULT NURSE REASSESSMENT NOTE - NS ED NURSE REASSESS COMMENT FT1
pt desat to 86% on HFNC appeared tachypneic and had a purulent wet cough. Increased work of breathing. RT and MD at bedside, pt switched to BIPAP and feeling more comfortable O2 sat increased and pt does not appear as labored. Second U/S IV placed. Pt goals of care and wishes determined as he is AOX4. SIVAN in chart. Plan for pt to still go to CT. Spouse at bedside aware of plan of care. Safety maintained, all needs met.

## 2025-04-03 NOTE — ED ADULT NURSE NOTE - CHIEF COMPLAINT QUOTE
BIBA from home c/o difficulty breathing and dyspnea on exertion x days. d/c from [facility] on Tuesday. Per EMS was 83% on RA and arrives to ED on NRB. Pt AAOx3. speaking in short sentences.  +Crackles. Also pt c/o of urination retention since last night was supposed to go to Dr. Brandt's office for Stinson catheter today. Denies cp, f/c at home, abd or bladder pain, nvd, fall/trauma. pt states he has not been able to walk due to his breathing today. Clinical upg to MD Russell EKG in prog. Hx of LV thrombus current not on Eliquis per spouse.

## 2025-04-03 NOTE — H&P ADULT - HISTORY OF PRESENT ILLNESS
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 The patient had a recent admission and was discharged on 3/31/25 after he was treated for a COPD exacerbation with prednisone, antibiotics, and nebulizer treatment. He was discharged on 10mg prednisone and azithromycin 500mg M/W/F. He now re-presents to the ED due to shortness of breath, and increased coughing. He reports he initially felt improved from discharge, but then started to feel worse after which he called Dr. Brandt.     In terms of further ROS, patient denies associated fever/chills, wheezing, hemoptysis, recent sick contacts, abdominal symptoms, urinary symptoms, or rash    In the ED:  *VS: 124/87, HR 84, RR 25, T 97.4, 96% on 15 L NRBM-> attempted to place HFNC but had increased WOB so patient was placed on BiPAP  *Labs: WBC 11.62k, HgB 13.7, Platelets 148k. SNa 138, K 4.7. BUN/Cr 71/2.19. .   *Imaging:   CXR with No consolidation pleural effusion or pneumothorax. Unremarkable cardiac silhouette. No acute bone abnormality. Hypoinflation. Postop change. Minimal bibasilar focal atelectasis.  CT chest done pending read  *EKG: sinus, qtc 470  *Medications: NS 500cc x 1, duoneb x 3, tramadol 25mg x 1, lidocaine patch.  *Consults: none

## 2025-04-03 NOTE — CONSULT NOTE ADULT - SUBJECTIVE AND OBJECTIVE BOX
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 Patient had recent admission and was discharged on 3/31/25, he was treated for a COPD exacerbation with prednisone, antibiotics, and nebulizer treatment. He was discharged on 10mg prednisone , and azithromycin 500mg M/W/F. He now re-presents to the ED due to shortness of breath, and increased cough. He reports he initially felt improved from discharge, but then started to feel worse.  In terms of further ROS, patient denies associated fever/chills, wheezing, hemoptysis, recent sick contacts, abdominal symptoms, urinary symptoms, or rash      Pulmonologist: Dr Santo  Cardiologist : Dr France    In ED  VS: 124/87, HR 84, RR 25, T 97.4, 96% on 15 L NRBM-> attempted to place HFNC but had increased WOB so patient was placed on BiPAP  labs: WBC 11.62k, HgB 13.7, Platelets 148k. SNa 138, K 4.7. BUN/Cr 71/2.19. .   imaging: CT chest done->  interventions: NS 500cc x 1, duoneb x 3, tramadol 25mg x 1, lidocaine patch.      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 Patient had recent admission and was discharged on 3/31/25, he was treated for a COPD exacerbation with prednisone, antibiotics, and nebulizer treatment. He was discharged on 10mg prednisone , and azithromycin 500mg M/W/F. He now re-presents to the ED due to shortness of breath, and increased cough. He reports he initially felt improved from discharge, but then started to feel worse. He reports he was never back at his baseline. He reports otherwise that he feels slightly better with the BiPAP for breathing. He endorses significant anxiety. He reports he has not urinated since last night which is unusual for him, but he has the urge to go now.  In terms of further ROS, patient denies associated fever/chills, wheezing, hemoptysis, recent sick contacts, abdominal symptoms, urinary symptoms, or rash      Pulmonologist: Dr Santo  Cardiologist : Dr France    In ED  VS: 124/87, HR 84, RR 25, T 97.4, 96% on 15 L NRBM-> attempted to place HFNC but had increased WOB so patient was placed on BiPAP  labs: WBC 11.62k, HgB 13.7, Platelets 148k. SNa 138, K 4.7. BUN/Cr 71/2.19. .  imaging: CT chest done->  interventions: NS 500cc x 1, duoneb x 3, tramadol 25mg x 1, lidocaine patch.    Vital Signs Last 12 Hrs  T(F): 98 (04-03-25 @ 14:45), Max: 98 (04-03-25 @ 14:45)  HR: 76 (04-03-25 @ 17:48) (74 - 84)  BP: 104/64 (04-03-25 @ 17:48) (104/64 - 148/65)  BP(mean): --  RR: 25 (04-03-25 @ 17:48) (22 - 28)  SpO2: 97% (04-03-25 @ 17:48) (95% - 99%)    PHYSICAL EXAM:  Constitutional: NAD, on BiPAP, SCM use, mildly iWOB.  HEENT: NC/AT, PERRLA  Neck: Supple, no JVD  Respiratory: Prolonged expiratory phase. No r/r.  Cardiovascular: RRR, normal S1 and S2, no m/r/g.  Gastrointestinal: +BS, soft NTND  Extremities: cold extremities. No pitting edema.  Vascular: Pulses equal and strong throughout.    LABS:    13.7  11.62 )-----------( 148 ( 03 Apr 2025 13:45 )  43.4    04-03    138 | 100 | 71[H]  ----------------------------< 75  4.7 | 24 | 2.19[H]    Ca 9.0 03 Apr 2025 13:45  Mg 2.3 04-03        Urinalysis Basic - ( 03 Apr 2025 13:45 )    Color: x / Appearance: x / SG: x / pH: x  Gluc: 75 mg/dL / Ketone: x / Bili: x / Urobili: x  Blood: x / Protein: x / Nitrite: x  Leuk Esterase: x / RBC: x / WBC x  Sq Epi: x / Non Sq Epi: x / Bacteria: x                RADIOLOGY, EKG & ADDITIONAL TESTS:

## 2025-04-03 NOTE — ED PROVIDER NOTE - CADM POA URETHRAL CATHETER
3/5/2020        Zuleyma Palomares     : 2003  [address]  [address]        To Whom It May Concern,    This is to certify that Vini Sharif was seen in the clinic on 3/5/2020. Please excuse Vini Sharif from school  3/5/2020  thru 3/8/2020. REMARKS: Return to school 3/9/2020        SIGNATURE:_________________________________________, 3/5/2020           Citlalli Mora NP                                .    [facility], [address]  [address] [facility] [address]  Dept Phone: [phone] No

## 2025-04-03 NOTE — ED ADULT NURSE NOTE - NSFALLHARMRISKINTERV_ED_ALL_ED

## 2025-04-04 NOTE — PROGRESS NOTE ADULT - SUBJECTIVE AND OBJECTIVE BOX
HOSPITAL COURSE:     OVERNIGHT EVENTS:    SUBJECTIVE: Pt seen and evaluated bedside. _ .    ROS: otherwise negative      PHYSICAL EXAM:  Constitutional: resting comfortably in bed; NAD  Head: NC/AT  Eyes: EOMI, anicteric sclera  ENT: no nasal discharge; MMM  Neck: supple  Respiratory: CTA B/L; no W/R/R  Cardiac: RRR; no M/R/G  Gastrointestinal: soft, NT/ND; no rebound or guarding  Extremities: WWP, no clubbing or cyanosis; no peripheral edema  Musculoskeletal: NROM x4; no joint swelling, tenderness or erythema  Dermatologic: skin warm, dry and intact; no rashes, wounds, or scars  Neurologic: AAOx3; no focal deficits  Psychiatric: affect and characteristics of appearance, verbalizations, behaviors are appropriate        RADIOLOGY & ADDITIONAL TESTS: Reviewed   HOSPITAL COURSE:  Mayda is a 89 yo M w PMHx HTN, HLD, COPD, 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 The patient had a recent admission and was discharged on 3/31/25 after he was treated for a COPD exacerbation with prednisone, antibiotics, and nebulizer treatment. He was discharged on 10mg prednisone and azithromycin 500mg M/W/F. He now re-presents to the ED due to shortness of breath, and increased coughing. He reports he initially felt improved from discharge, but then started to feel worse after which he called Dr. Brandt.     OVERNIGHT EVENTS:    SUBJECTIVE: Pt seen and evaluated bedside. _ .    ROS: otherwise negative      PHYSICAL EXAM:  Constitutional: resting comfortably in bed; NAD  Head: NC/AT  Eyes: EOMI, anicteric sclera  ENT: no nasal discharge; MMM  Neck: supple  Respiratory: CTA B/L; no W/R/R  Cardiac: RRR; no M/R/G  Gastrointestinal: soft, NT/ND; no rebound or guarding  Extremities: WWP, no clubbing or cyanosis; no peripheral edema  Musculoskeletal: NROM x4; no joint swelling, tenderness or erythema  Dermatologic: skin warm, dry and intact; no rashes, wounds, or scars  Neurologic: AAOx3; no focal deficits  Psychiatric: affect and characteristics of appearance, verbalizations, behaviors are appropriate        RADIOLOGY & ADDITIONAL TESTS: Reviewed   HOSPITAL COURSE:  Satmartinez is a 89 yo M w PMHx HTN, HLD, COPD, 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 The patient had a recent admission and was discharged on 3/31/25 after he was treated for a COPD exacerbation with prednisone, antibiotics, and nebulizer treatment. He was discharged on 10mg prednisone and azithromycin 500mg M/W/F. He now re-presents to the ED due to shortness of breath, and increased coughing. He reports he initially felt improved from discharge, but then started to feel worse after which he called Dr. Brandt. Currently on BiPAP per his request, on zosyn/azith/solumedrol for COPD exacerbation.    OVERNIGHT EVENTS: Straight cath >700cc uop    SUBJECTIVE: Pt seen and evaluated bedside. Currently on BiPAP, reporting difficulty breathing (Sats 97%) and anxiety (tachypneic) but otherwise denies any chest pain, abdominal pain, N/V/D.     ROS: otherwise negative      PHYSICAL EXAM:  Constitutional: elderly, on BiPAP, anxious and tachypneic  HEENT: NC/AT, EOMI, anicteric sclera, MMM  Respiratory: expiratory wheeze b/l, no rales  Cardiac: RRR; no rubs no gallops   Gastrointestinal: soft, NT/ND; no rebound or guarding  Extremities: WWP, no clubbing or cyanosis  Neurologic: AAOx3, no focal deficits  Psychiatric: affect and characteristics of appearance, verbalizations, behaviors are appropriate        RADIOLOGY & ADDITIONAL TESTS: Reviewed

## 2025-04-05 NOTE — PROVIDER CONTACT NOTE (OTHER) - ACTION/TREATMENT ORDERED:
zyprexa 5mg will be ordered. pt re-directed. Zyprexa 5mg IM will be ordered. pt re-directed. Zyprexa 5mg IM will be ordered. pt will be on enhanced

## 2025-04-05 NOTE — PROGRESS NOTE ADULT - SUBJECTIVE AND OBJECTIVE BOX
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 The patient had a recent admission and was discharged on 3/31/25 after he was treated for a COPD exacerbation with prednisone, antibiotics, and nebulizer treatment. He was discharged on 10mg prednisone and azithromycin 500mg M/W/F. Represented to the ED due to shortness of breath, and increased coughing. He reports he initially felt improved from discharge, but then started to feel worse after which he called Dr. Brandt. On zosyn/azith/solumedrol for COPD exacerbation.    OVERNIGHT EVENTS: placed 20g IV in RUE, pt put on BiPAP for 30 min for comfort per RN, BS >300 overnight with intermittent voids up to 150 cc between BS    SUBJECTIVE: Pt seen and evaluated bedside, A&Ox3 this AM however reporting hallucinations and difficulty sleeping overnight due to IV placement. On nasal cannula this AM, requesting BiPAP for comfort. Denies any chest pain, SOB, abdominal pain. Reports worsening anxiety.     ROS: otherwise negative      PHYSICAL EXAM:  Constitutional: On nasal cannula, speaking in full sentences and saturating >97%  HEENT: NC/AT, EOMI, anicteric sclera, MMM  Respiratory: expiratory wheeze b/l  Cardiac: RRR  Gastrointestinal: soft, NT/ND  Extremities: WWP, no clubbing or cyanosis  Neurologic: AAOx3, no focal deficits  Psychiatric: affect and characteristics of appearance, verbalizations, behaviors are appropriate        RADIOLOGY & ADDITIONAL TESTS: Reviewed

## 2025-04-05 NOTE — PROVIDER CONTACT NOTE (OTHER) - ASSESSMENT
pt AOX2/3. forgetful. pt on RA. NSR-SB. pt AOX2/3, confused and forgetful. pt on RA. NSR-SB. pt AOX2/3, confused/delirious and forgetful. pt on RA. NSR-SB.

## 2025-04-05 NOTE — PROVIDER CONTACT NOTE (OTHER) - REASON
pt agitated, trying to get off of bed. pt stating "I am going home" pt agitated, trying to get off from bed. pt stating "I am going home"

## 2025-04-05 NOTE — PROVIDER CONTACT NOTE (OTHER) - SITUATION
RN walked to the room and pt found sitting at the edge of bed. bed alarm going on. pt was putting back on bed and RN was pushed away by pt. patient very agitated. MD Wilson notified and at bedside. RN walked to the room and pt found sitting at the edge of bed. bed alarm going on. pt was putting back on bed and RN was pushed away by pt. patient very agitated. MD Harrison notified and at bedside.

## 2025-04-06 NOTE — PROGRESS NOTE ADULT - SUBJECTIVE AND OBJECTIVE BOX
Patient is a 89y old  Male who presents with a chief complaint of     OVERNIGHT EVENTS: patient was delirious and anxious, requiring seroquel 12.5 mg. additionally had increased WOB requiring BiPAP, normal ABG.    SUBJECTIVE: Patient continues to be markedly more confused this AM, difficult to entirely understand when interviewed     ROS: otherwise negative      T(C): 36.2 (04-06-25 @ 14:10), Max: 36.8 (04-05-25 @ 22:45)  HR: 68 (04-06-25 @ 13:50) (64 - 76)  BP: 142/75 (04-06-25 @ 11:50) (100/55 - 165/88)  RR: 18 (04-06-25 @ 11:50) (18 - 24)  SpO2: 91% (04-06-25 @ 13:50) (91% - 100%)  Wt(kg): --Vital Signs Last 24 Hrs  T(C): 36.2 (06 Apr 2025 14:10), Max: 36.8 (05 Apr 2025 22:45)  T(F): 97.2 (06 Apr 2025 14:10), Max: 98.2 (05 Apr 2025 22:45)  HR: 68 (06 Apr 2025 13:50) (64 - 76)  BP: 142/75 (06 Apr 2025 11:50) (100/55 - 165/88)  BP(mean): 100 (06 Apr 2025 11:50) (72 - 120)  RR: 18 (06 Apr 2025 11:50) (18 - 24)  SpO2: 91% (06 Apr 2025 13:50) (91% - 100%)    Parameters below as of 06 Apr 2025 11:50  Patient On (Oxygen Delivery Method): room air        PHYSICAL EXAM:  Constitutional: Markedly confused with diffuse ecchymoses  HEENT: NC/AT, EOMI, anicteric sclera, MMM  Respiratory: CTAB, normal WOB  Cardiac: RRR  Gastrointestinal: soft, NT/ND  Extremities: WWP, no clubbing or cyanosis  Neurologic: AAOx3 with prompting, no focal deficits  Psychiatric: patient acutely agitated and anxious, able to be oriented with prompting but confusion is actively worsening      LABS:                        13.0   14.09 )-----------( 122      ( 06 Apr 2025 05:30 )             39.9     04-06    138  |  103  |  66[H]  ----------------------------<  101[H]  4.7   |  21[L]  |  1.94[H]    Ca    8.7      06 Apr 2025 05:30  Phos  3.2     04-06  Mg     2.2     04-06          Urinalysis Basic - ( 06 Apr 2025 05:30 )    Color: x / Appearance: x / SG: x / pH: x  Gluc: 101 mg/dL / Ketone: x  / Bili: x / Urobili: x   Blood: x / Protein: x / Nitrite: x   Leuk Esterase: x / RBC: x / WBC x   Sq Epi: x / Non Sq Epi: x / Bacteria: x      CAPILLARY BLOOD GLUCOSE      POCT Blood Glucose.: 141 mg/dL (06 Apr 2025 12:16)  POCT Blood Glucose.: 138 mg/dL (05 Apr 2025 16:28)      ABG - ( 06 Apr 2025 06:55 )  pH, Arterial: 7.44  pH, Blood: x     /  pCO2: 37    /  pO2: 224   / HCO3: 25    / Base Excess: 1.1   /  SaO2: 98.4              Urinalysis Basic - ( 06 Apr 2025 05:30 )    Color: x / Appearance: x / SG: x / pH: x  Gluc: 101 mg/dL / Ketone: x  / Bili: x / Urobili: x   Blood: x / Protein: x / Nitrite: x   Leuk Esterase: x / RBC: x / WBC x   Sq Epi: x / Non Sq Epi: x / Bacteria: x        MEDICATIONS  (STANDING):  acetylcysteine 10%  Inhalation 4 milliLiter(s) Inhalation every 6 hours  albuterol/ipratropium for Nebulization 3 milliLiter(s) Nebulizer every 6 hours  aspirin  chewable 81 milliGRAM(s) Oral daily  atorvastatin 40 milliGRAM(s) Oral at bedtime  azithromycin   Tablet 500 milliGRAM(s) Oral <User Schedule>  benzonatate 100 milliGRAM(s) Oral every 8 hours  chlorhexidine 2% Cloths 1 Application(s) Topical <User Schedule>  escitalopram 10 milliGRAM(s) Oral every 24 hours  furosemide    Tablet 20 milliGRAM(s) Oral <User Schedule>  heparin   Injectable 5000 Unit(s) SubCutaneous every 8 hours  isosorbide   mononitrate ER Tablet (IMDUR) 30 milliGRAM(s) Oral daily  metoprolol succinate ER 50 milliGRAM(s) Oral daily  piperacillin/tazobactam IVPB.. 4.5 Gram(s) IV Intermittent every 8 hours  polyethylene glycol 3350 17 Gram(s) Oral daily  QUEtiapine 12.5 milliGRAM(s) Oral every 24 hours  senna 2 Tablet(s) Oral at bedtime  sodium chloride 3%  Inhalation 4 milliLiter(s) Inhalation every 12 hours  tamsulosin 0.8 milliGRAM(s) Oral at bedtime  tiotropium 2.5 MICROgram(s) Inhaler 2 Puff(s) Inhalation daily    MEDICATIONS  (PRN):  acetaminophen     Tablet .. 650 milliGRAM(s) Oral every 6 hours PRN Temp greater or equal to 38C (100.4F), Mild Pain (1 - 3)      RADIOLOGY & ADDITIONAL TESTS: Reviewed

## 2025-04-07 NOTE — CONSULT NOTE ADULT - SUBJECTIVE AND OBJECTIVE BOX
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 The patient had a recent admission and was discharged on 3/31/25 after he was treated for a COPD exacerbation with prednisone, antibiotics, and nebulizer treatment. He was discharged on 10mg prednisone and azithromycin 500mg M/W/F. He now re-presents to the ED due to shortness of breath, and increased coughing. He reports he initially felt improved from discharge, but then started to feel worse after which he called Dr. Brandt.     In terms of further ROS, patient denies associated fever/chills, wheezing, hemoptysis, recent sick contacts, abdominal symptoms, urinary symptoms, or rash    In the ED:  *VS: 124/87, HR 84, RR 25, T 97.4, 96% on 15 L NRBM-> attempted to place HFNC but had increased WOB so patient was placed on BiPAP  *Labs: WBC 11.62k, HgB 13.7, Platelets 148k. SNa 138, K 4.7. BUN/Cr 71/2.19. .   *Imaging:   CXR with No consolidation pleural effusion or pneumothorax. Unremarkable cardiac silhouette. No acute bone abnormality. Hypoinflation. Postop change. Minimal bibasilar focal atelectasis.  CT chest done pending read  *EKG: sinus, qtc 470  *Medications: NS 500cc x 1, duoneb x 3, tramadol 25mg x 1, lidocaine patch.  *Consults: none  (03 Apr 2025 18:01)      PAST MEDICAL & SURGICAL HISTORY:  COPD (chronic obstructive pulmonary disease)      Lung cancer      HTN (hypertension)      High cholesterol      BPH (benign prostatic hyperplasia)      History of repair of hip fracture      Pre-existing type 2 diabetes mellitus      Inguinal hernia      Preop examination      S/P lobectomy of lung      History of hip surgery      History of lumbar surgery          FAMILY HISTORY:   Reviewed; no history of     in mother or father    Opiate Naive (Y/N):   iStop reviewed (Y/N): Yes.   No Rx found on iStop review (Ref#:           Items that are not checked are not present  PSYCHOSOCIAL ASSESSMENT:  - Significant other/partner: [  ]  Children: [  ]  - Living Situation: Home [  ] Long term care [  ]  Rehab[  ]  Other[  ]  - Support system: strong[  ] adequate[  ] inadequate[  ]  - Yarsani/Spiritual practice:  - Role of organized [employer] important [  ] some [  ] unable to assess dt pt mentation [  ]  - Coping: well[  ]  with difficulty[  ]  poor coping[  ]  unable to assess dt pt mentation [  ]    ADVANCE DIRECTIVES:    - MOLST[  ]     - Living Will [  ]     DECISION MAKER(s):  - Health Care Proxy(s) [  ]  Surrogate(s)[  ] Guardian[  ]           Name(s)/Phone Number(s):     BASELINE (I)ADLs (prior to admission):  - [place]:  Total[  ] Moderate[  ] Dependent[  ]    Allergies    No Known Allergies    Intolerances        Medications:    REVIEWED    24 hour PRN use: REVIEWED    PRESENT SYMPTOMS:   [ ]Patient unable to self report   Source if other than patient:  [ ]Family   [ ]Team     Pain [  ]  Location :        Quality:  Radiation:  Timing:  Aggravating factors:  Minimal acceptable level (0-10 scale):   Severity in last 24h (0-10 scale) :  Current score (0-10 scale):  Improves with:     PAIN AD Score:   [email or web address] (press ctrl +  left click to view)    If [  ], pt denies symptom.   Dyspnea:         [  ]  Anxiety:           [  ]  Difficulty sleeping: [  ]  Fatigue:           [  ]  Nausea:           [  ]  Loss of appetite:     [  ]  Dysphagia: [  ]  Constipation:   [  ]        LBM   Other Symptoms:    All other review of systems negative [  ]    ECOG Performance:       Current Palliative Performance Scale/Karnofsky Score:    %  Preadmit Karnofsky:   %          PEx:  General:   alert  oriented x       lethargic, distressed, cachexia,  nonverbal,  unarousable, verbal  Behavioral: Anxiety  Delirium Cooperative  HEENT:  No temporal wasting,  No dry mouth,  ET tube/trach  RESP: Reg rhythm, No  tachypnea/labored breathing,  CTAB, diminished bilat bases  CV:  No  tachycardia  GI: soft non distended non tender    MUSK: weakness x4,  edema, ambulatory with assistance,   bedbound  SKIN:  Poor skin turgor, Pallor, Pressure ulcer stage:   NEURO:  No deficits, cognitive impairment, encephalopathic dsyphagia dysarthria paresis    VS: REVIEWED    Labs:   REVIEWED    RADIOLOGY & ADDITIONAL STUDIES: REVIEWED    GOC discussion:  [facility] Geriatrics and Palliative Care  [facility] Geriatrics & Palliative Care NP  Contact Info: Call [phone] (HEAL Line) or message on Microsoft Teams    HPI:    PCP: Pavithra Brandt MD  Pulmonologist: Dr Santo  Cardiologist : Dr France  [facility] [facility] [place]  - - - - -    HPI:   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 The patient had a recent admission and was discharged on 3/31/25 after he was treated for a COPD exacerbation with prednisone, antibiotics, and nebulizer treatment. He was discharged on 10mg prednisone and azithromycin 500mg M/W/F. He now re-presents to the ED due to shortness of breath, and increased coughing. He reports he initially felt improved from discharge, but then started to feel worse after which he called Dr. Brandt.     In terms of further ROS, patient denies associated fever/chills, wheezing, hemoptysis, recent sick contacts, abdominal symptoms, urinary symptoms, or rash    In the ED:  *VS: 124/87, HR 84, RR 25, T 97.4, 96% on 15 L NRBM-> attempted to place HFNC but had increased WOB so patient was placed on BiPAP  *Labs: WBC 11.62k, HgB 13.7, Platelets 148k. SNa 138, K 4.7. BUN/Cr 71/2.19. .   *Imaging:   CXR with No consolidation pleural effusion or pneumothorax. Unremarkable cardiac silhouette. No acute bone abnormality. Hypoinflation. Postop change. Minimal bibasilar focal atelectasis.  CT chest done pending read  *EKG: sinus, qtc 470  *Medications: NS 500cc x 1, duoneb x 3, tramadol 25mg x 1, lidocaine patch.  *Consults: none  (03 Apr 2025 18:01)    Hospital course, primary team, and consultant notes reviewed. Patient seen and examined, lethargic, audible secretions. Mildly tachypneic on NC. Declines to engage in ROS. Asking to be left "to rest" Per PCA, patient with limited PO intake.  No family at bedside. Comprehensive ROS as noted below, collateral obtained from chart/team.    Wife declines discussion with palliative care.     PAST MEDICAL & SURGICAL HISTORY:  COPD (chronic obstructive pulmonary disease)  Lung cancer  HTN (hypertension)  High cholesterol  BPH (benign prostatic hyperplasia)  History of repair of hip fracture  Pre-existing type 2 diabetes mellitus  Inguinal hernia  Preop examination  S/P lobectomy of lung  History of hip surgery  History of lumbar surgery    FAMILY HISTORY:   Reviewed; no history of     in mother or father    Opiate Naive (Y/N):   iStop reviewed (Y/N): Yes.   No Rx found on iStop review (Ref#:           Items that are not checked are not present  PSYCHOSOCIAL ASSESSMENT:  - Significant other/partner: [x  ]  Children: [  ]  - Living Situation: Home [x  ] Long term care [  ]  Rehab[  ]  Other[  ]  - Support system: strong[ x ] adequate[  ] inadequate[  ]  - Orthodoxy/Spiritual practice:  - Role of organized [employer] important [  ] some [  ] unable to assess dt pt mentation [ x ]  - Coping: well[  ]  with difficulty[  ]  poor coping[  ]  unable to assess dt pt mentation [x  ]    ADVANCE DIRECTIVES:    - MOLST[ x ]    DNR DNI  - Living Will [  ]     DECISION MAKER(s):  - Health Care Proxy(s) [  ]  Surrogate(s)[  ] Guardian[  ]           Name(s)/Phone Number(s):   - wife would be surrogate decision maker in the absence of a documented HCP    BASELINE (I)ADLs (prior to admission):  - [place]:  Total[x  ] Moderate[  ] Dependent[  ]    Allergies    No Known Allergies    Intolerances        Medications:    REVIEWED    24 hour PRN use: REVIEWED    PRESENT SYMPTOMS:   [x ]Patient unable to self report   Source if other than patient:  [ ]Family   [x ]Team     Pain [  ]      PAIN AD Score:   [email or web address] (press ctrl +  left click to view)    If [  ], pt denies symptom.   Dyspnea:         [x  ]  Anxiety:           [  ]  Difficulty sleeping: [  ]  Fatigue:           [  x]  Nausea:           [  ]  Loss of appetite:     [ x ]  Other Symptoms:    All other review of systems negative [  x]    ECOG Performance:     4  Current Palliative Performance Scale/Karnofsky Score:   30 %  Preadmit Karnofsky:   60 %          PEx:  General: older man lying in bed lethargic appearing in mild respiratory distress on NC  verbal   Behavioral: Withdrawn   HEENT:  No temporal wasting,  No dry mouth,   RESP: Reg rhythm, No  tachypnea/labored breathing,  diffuse rhonchi   CV:  No  tachycardia  GI: soft non distended non tender    MUSK: weakness x4,  No edema, mostly bedbound generalized muscle wasting   SKIN:  Poor skin turgor, Pallor  NEURO:  No deficits, cognitive impairment, encephalopathic dsyphagia dysarthria paresis    VS: REVIEWED    Labs:   REVIEWED    RADIOLOGY & ADDITIONAL STUDIES: REVIEWED    GOC discussion:

## 2025-04-07 NOTE — CONSULT NOTE ADULT - PROBLEM SELECTOR RECOMMENDATION 5
.  - DNR DNI order in place  - wife would be surrogate decision maker in the absence of a documented HCP

## 2025-04-07 NOTE — PROGRESS NOTE ADULT - ASSESSMENT
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     NEURO:  #AOx4  - No acute interventions    #Anxiety  Multi year history.   Patient endorses feeling anxious on the bipap.   Home Med: Lexapro 10mg qd  - Continue home lexapro 10mg qd  - consider low dose lorazepam 0.5mg w reassessment to assist w anxiety if needed   - Seroquel 12.5mg qhs  - Zyprexa 5mg PRN for agitation    CARDIOVASCULAR:  #HFmrEF  Chronic CHF.  Labs remarkable for BNP of 898 down from levels during prior admission last week.   Troponin of 82 -> 83.   Bedside pocus euvolemic.   EKG non ischemic, sinus. Echo 3/2025 EF 40-45%  Home Meds: losartan 25mg qd, lasix 20mg M/W/F, toprol 50mg qd, imdur 30mg qd  - continue home Lasix 20mg M/W/F  - C/w home toprol 50 qd, imdur 30 qd  - C/w supplemental O2, wean as able  - Will hold losartan to avoid HoTN  - Continue to measure strict I/O and daily weights  - F/U trops    #HTN  Muti year history of hypertension.   On admission, presented with BP normotensive.   Well controlled on home losartan 25mg qd.   - hold home losartan at this time  - CTM for CP/CT    #HLD  Multi year history of HLD. Well controlled on home atorvastatin 40mg qhs.  - c/w home atorvastatin 40mg qhs  - encourage healthy eating as outpatient and follow up with outpatient PCP Dr Brandt    #History of LV thrombus   - History of LV thrombus, now resolved   - No need for AC. Eliquis has been discontinued     #CAD   - C/w ASA 81mg qd     PULM:  #AHRF  Patient presented with ahrf SpO2 in the ED, initial O2 status documented as 96% on non rebreather mask. Tachypneic to 25. Has hx of copd not on home O2. Pt currently satting 97% on bipap.  CXR: No consolidation pleural effusion or pneumothorax. Hypoinflation. Postop change. Minimal bibasilar focal atelectasis.  Urine strep/legionella -, RVP -, MRSA swab -  - c/w: home azithromycin 500mg M/W/F  - c/w pseudomonas dosed zosyn 4.5 g q8hr  - c/w o2: wean as tolerated/goal >88%, BiPAP intermittent for comfort  - OOBTC, inventive spirometry  - duonebs q6 hr    #COPD  #COPD Exacerbation  #NSCLC  Pt presents w/ SOB as above. Initial O2 status documented as 96% on non rebreather mask. Pt currently satting 97% on bipap. History of COPD not on home O2.   On home medications: Trelegy, azithromycin 500mg M/W/F  - titrate O2 to maintain SpO2 88%-92%  - hold trelegy, duonebs q6hr  - c azithromycin M/W/F  - Solumedrol 20mg qd while NPO   - c/w Pseudomonal risk Zosyn 4.5g IV q8 hr  - bedside spirometry  - early mobility w/ PT, OOB to chair daily    GI:  - monitor BMs  - bowel reg    :  #BPH  Known, multi year history of BPH.   Well controlled on home flomax however of recent has noted he has had a harder time peeing.  Currently with urinary signs of retention  - Increase tamsulosin 0.8mg qhs  - bladder scan stat and q 6 hr  - f/u UA/Ucx  - encourage to avoid drinking excessive fluids late at night    RENAL:  #CKD3  #BERKLEY on CKD  On admission Cr: 2.19 (baseline closer to 1.5).  Differential likely postobstructive given no UOP since yesterday with a history of BPH. Oral intake has been at baseline per patient. However, no renal studies at this time to further elucidate etiology.   - Urine studies  - BS q6 hr  - if failing to produce UOP, place Indwelling Stinson   - I/O strict  - Avoid nephrotoxins  - Continue to trend Cr    ENDO:   ANNA    ID:   #Leukocytosis  #R/o PNA  Presented with a WBC count of 11.62, neutrophil leukocyte predominance.  Denies systemic symptoms such as fever, chills, nausea, vomiting.  Does have a small R sided infiltrate on imaging thus cannot r/o overlying pneumonia. In addition, patient on steroids thus could be contributing as well.   Procal 0.18  - Pseudomonal Zosyn 4.5 g q8hr for now given recent admission and with recent failure to improve w/ CTX/Azithro  - send sputum culture   - f/u urine culture    HEME/ONC:  ANNA    PREVENTIVE:   Fluids: NI  Diet: NPO while on bipap  E: Replete K>4, Mg>2   DVT ppx: heparin subq   GI ppx: NI  Activity: bedrest  
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     NEURO:  #AOx4  - No acute interventions    #Anxiety  Multi year history.   Patient endorses feeling anxious on the bipap.   Home Med: Lexapro 10mg qd  - Continue home lexapro 10mg qd  - consider low dose lorazepam 0.5mg w reassessment to assist w anxiety if needed   - Seroquel 12.5mg qhs  - Zyprexa 5mg PRN for agitation  - last QTc 501, use agents with caution    CARDIOVASCULAR:  #HFmrEF  Chronic CHF.  Labs remarkable for BNP of 898 down from levels during prior admission last week.   Troponin of 82 -> 83 -> 16, cleared  Bedside pocus euvolemic.   EKG non ischemic, sinus. Echo 3/2025 EF 40-45%  Home Meds: losartan 25mg qd, lasix 20mg M/W/F, toprol 50mg qd, imdur 30mg qd  - continue home Lasix 20mg M/W/F  - C/w home toprol 50 qd, imdur 30 qd  - C/w supplemental O2, wean as able  - Will hold losartan to avoid HoTN  - Continue to measure strict I/O and daily weights    #HTN  Muti year history of hypertension.   On admission, presented with BP normotensive.   Well controlled on home losartan 25mg qd.   - hold home losartan at this time  - CTM for CP/CT    #HLD  Multi year history of HLD. Well controlled on home atorvastatin 40mg qhs.  - c/w home atorvastatin 40mg qhs  - encourage healthy eating as outpatient and follow up with outpatient PCP Dr Brandt    #History of LV thrombus   - History of LV thrombus, now resolved   - No need for AC. Eliquis has been discontinued     #CAD   - C/w ASA 81mg qd     PULM:  #AHRF  Patient presented with ahrf SpO2 in the ED, initial O2 status documented as 96% on non rebreather mask. Tachypneic to 25. Has hx of copd not on home O2. Pt currently satting 97% on bipap.  CXR: No consolidation pleural effusion or pneumothorax. Hypoinflation. Postop change. Minimal bibasilar focal atelectasis.  Urine strep/legionella -, RVP -, MRSA swab -  - c/w: home azithromycin 500mg M/W/F  - c/w pseudomonas dosed zosyn 4.5 g q8hr  - c/w o2: wean as tolerated/goal >88%, BiPAP intermittent for comfort  - OOBTC, inventive spirometry  - duonebs q6 hr    #COPD  #COPD Exacerbation  #NSCLC  Pt presents w/ SOB as above. Initial O2 status documented as 96% on non rebreather mask. Pt currently satting 97% on bipap. History of COPD not on home O2.   On home medications: Trelegy, azithromycin 500mg M/W/F  - titrate O2 to maintain SpO2 88%-92%  - hold trelegy, duonebs q6hr  - c/w azithromycin M/W/F  - Solumedrol 20mg qd weaned to prednisone 10 mg daily on 4/6 (home regimen)  - c/w Pseudomonal risk Zosyn 4.5g IV q8 hr  - bedside spirometry  - early mobility w/ PT, OOB to chair daily    GI:  - monitor BMs  - bowel reg    :  #BPH  Known, multi year history of BPH.   Well controlled on home flomax however of recent has noted he has had a harder time peeing.  Currently with urinary signs of retention  - Increase tamsulosin 0.8mg qhs  - seaman in, monitor UOP  - encourage to avoid drinking excessive fluids late at night    RENAL:  #CKD3  #BERKLEY on CKD  On admission Cr: 2.19 (baseline closer to 1.5).  Differential likely postobstructive given no UOP since yesterday with a history of BPH. Oral intake has been at baseline per patient. However, no renal studies at this time to further elucidate etiology.   - Urine studies  - BS q6 hr  - Indwelling Seaman   - I/O strict  - Avoid nephrotoxins  - Continue to trend Cr    ENDO:   ANNA    ID:   #Leukocytosis  #R/o PNA  Presented with a WBC count of 11.62, neutrophil leukocyte predominance.  Denies systemic symptoms such as fever, chills, nausea, vomiting.  Does have a small R sided infiltrate on imaging thus cannot r/o overlying pneumonia. In addition, patient on steroids thus could be contributing as well.   Procal 0.18.   Negative UA  - Pseudomonal Zosyn 4.5 g q8hr for now given recent admission and with recent failure to improve w/ CTX/Azithro  - send sputum culture     HEME/ONC:  ANNA    PREVENTIVE:   Fluids: NI  Diet: easy to chew  E: Replete K>4, Mg>2   DVT ppx: heparin subq   GI ppx: NI  Activity: bedrest  
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 ED iso SOB, recent admission for AHRF 2/2 COPD Exacerbation, now representing w AHRF likely 2/2 COPD versus a component of PNA.     NEURO:  #AOx4  - No acute interventions    #Anxiety  Multi year history.   Patient endorses feeling anxious on the bipap.   Home Med: Lexapro 10mg qd  - Continue home lexapro 10mg qd  - consider low dose lorazepam 0.5mg w reassessment to assist w anxiety if needed     CARDIOVASCULAR:  #HFmrEF  Chronic CHF.  Labs remarkable for BNP of 898 down from levels during prior admission last week.   Troponin of 82 -> 83.   Bedside pocus euvolemic.   EKG non ischemic, sinus. Echo 3/2025 EF 40-45%  Home Meds: losartan 25mg qd, lasix 20mg M/W/F, toprol 50mg qd, imdur 30mg qd  - continue home Lasix 20mg M/W/F  - C/w home toprol 50 qd, imdur 30 qd  - C/w supplemental O2, wean as able  - Will hold losartan to avoid HoTN  - Continue to measure strict I/O and daily weights  - F/U trops    #HTN  Muti year history of hypertension.   On admission, presented with BP normotensive.   Well controlled on home losartan 25mg qd.   - hold home losartan at this time  - CTM for CP/CT    #HLD  Multi year history of HLD. Well controlled on home atorvastatin 40mg qhs.  - c/w home atorvastatin 40mg qhs  - encourage healthy eating as outpatient and follow up with outpatient PCP Dr Brandt    #History of LV thrombus   - History of LV thrombus, now resolved   - No need for AC. Eliquis has been discontinued     #CAD   - C/w ASA 81mg qd     PULM:  #AHRF  Patient presented with ahrf SpO2 in the ED, initial O2 status documented as 96% on non rebreather mask. Tachypneic to 25. Has hx of copd not on home O2. Pt currently satting 97% on bipap.  CXR: No consolidation pleural effusion or pneumothorax. Hypoinflation. Postop change. Minimal bibasilar focal atelectasis.  Urine strep/legionella -, RVP -, MRSA swab -  - c/w: home azithromycin 500mg M/W/F  - c/w pseudomonas dosed zosyn 4.5 g q8hr  - c/w o2: wean as tolerated/goal >88%, BiPAP intermittent for comfort  - OOBTC, inventive spirometry  - duonebs q6 hr    #COPD  #COPD Exacerbation  #NSCLC  Pt presents w/ SOB as above. Initial O2 status documented as 96% on non rebreather mask. Pt currently satting 97% on bipap. History of COPD not on home O2.   On home medications: Trelegy, azithromycin 500mg M/W/F  - titrate O2 to maintain SpO2 88%-92%  - hold trelegy, duonebs q6hr  - c azithromycin M/W/F  - Solumedrol 40mg qd while NPO   - c/w Pseudomonal risk Zosyn 4.5g IV q8 hr  - bedside spirometry  - early mobility w/ PT, OOB to chair daily    GI:  - monitor BMs  - bowel reg    :  #BPH  Known, multi year history of BPH.   Well controlled on home flomax however of recent has noted he has had a harder time peeing.  Currently with urinary signs of retention  - Increase tamsulosin 0.8mg qhs  - bladder scan stat and q 6 hr  - f/u UA/Ucx  - encourage to avoid drinking excessive fluids late at night    RENAL:  #CKD3  #BERKLEY on CKD  On admission Cr: 2.19 (baseline closer to 1.5).  Differential likely postobstructive given no UOP since yesterday with a history of BPH. Oral intake has been at baseline per patient. However, no renal studies at this time to further elucidate etiology.   - Urine studies stat  - BS q6 hr  - if failing to produce UOP, place Indwelling Stinson   - I/O strict  - Avoid nephrotoxins  - Continue to trend Cr    ENDO:   ANNA    ID:   #Leukocytosis  #R/o PNA  Presented with a WBC count of 11.62, neutrophil leukocyte predominance.  Denies systemic symptoms such as fever, chills, nausea, vomiting.  Does have a small R sided infiltrate on imaging thus cannot r/o overlying pneumonia. In addition, patient on steroids thus could be contributing as well.   Procal 0.18  - Pseudomonal Zosyn 4.5 g q8hr for now given recent admission and with recent failure to improve w/ CTX/Azithro  - send sputum culture   - f/u urine culture    HEME/ONC:  ANNA    PREVENTIVE:   Fluids: NI  Diet: NPO while on bipap  E: Replete K>4, Mg>2   DVT ppx: heparin subq   GI ppx: NI  Activity: bedrest
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     NEURO:  #AOx3  - No acute interventions  - Delirium precautions    #Anxiety  Multi year history.   Patient endorses feeling anxious on the bipap.   Home Med: Lexapro 10mg qd  - Continue home lexapro 10mg qd  - Mentation worsened with ativan, seroquel and zyprexa  - last QTc 501, use agents with caution    CARDIOVASCULAR:  #HFmrEF  Chronic CHF.  Labs remarkable for BNP of 898 down from levels during prior admission last week.   Troponin of 82 -> 83 -> 16, cleared  Bedside pocus euvolemic.   EKG non ischemic, sinus. Echo 3/2025 EF 40-45%  Home Meds: losartan 25mg qd, lasix 20mg M/W/F, toprol 50mg qd, imdur 30mg qd  - continue home Lasix 20mg M/W/F  - C/w home toprol 50 qd, imdur 30 qd  - C/w supplemental O2, wean as able  - Will hold losartan to avoid HoTN  - Continue to measure strict I/O and daily weights    #HTN  Muti year history of hypertension.   On admission, presented with BP normotensive.   Well controlled on home losartan 25mg qd.   - hold home losartan at this time  - CTM for CP/CT    #HLD  Multi year history of HLD. Well controlled on home atorvastatin 40mg qhs.  - c/w home atorvastatin 40mg qhs  - encourage healthy eating as outpatient and follow up with outpatient PCP Dr Brandt    #History of LV thrombus   - History of LV thrombus, now resolved   - No need for AC. Eliquis has been discontinued     #CAD   - C/w ASA 81mg qd     PULM:  #AHRF  Patient presented with ahrf SpO2 in the ED, initial O2 status documented as 96% on non rebreather mask. Tachypneic to 25. Has hx of copd not on home O2. Pt currently satting 97% on bipap.  CXR: No consolidation pleural effusion or pneumothorax. Hypoinflation. Postop change. Minimal bibasilar focal atelectasis.  Urine strep/legionella -, RVP -, MRSA swab -  - c/w: home azithromycin 500mg M/W/F  - c/w pseudomonas dosed zosyn 4.5 g q8hr (EOT 4/9)  - c/w o2: wean as tolerated/goal >88%, BiPAP intermittent for comfort  - OOBTC, inventive spirometry  - duonebs q6 hr    #COPD  #COPD Exacerbation  #NSCLC  Pt presents w/ SOB as above. Initial O2 status documented as 96% on non rebreather mask. Pt currently satting 97% on bipap. History of COPD not on home O2.   On home medications: Trelegy, azithromycin 500mg M/W/F  - titrate O2 to maintain SpO2 88%-92%  - hold trelegy, duonebs q6hr  - c/w azithromycin M/W/F  - Switched to prednisone 10 mg daily on 4/6 (home regimen) given agitation with solumedrol  - c/w Pseudomonal risk Zosyn 4.5g IV q8 hr  - bedside spirometry  - early mobility w/ PT, OOB to chair daily  -PT OT    GI:  - monitor BMs  - bowel reg    :  #BPH  Known, multi year history of BPH.   Well controlled on home flomax however of recent has noted he has had a harder time peeing.  Currently with urinary signs of retention  - Increase tamsulosin 0.8mg qhs  - seaman in, monitor UOP  - encourage to avoid drinking excessive fluids late at night    RENAL:  #CKD3  #BERKLEY on CKD  On admission Cr: 2.19 (baseline closer to 1.5).  Differential likely postobstructive given no UOP since yesterday with a history of BPH. Oral intake has been at baseline per patient. However, no renal studies at this time to further elucidate etiology.   - Urine studies  - BS q6 hr  - Indwelling Seaman   - I/O strict  - Avoid nephrotoxins  - Continue to trend Cr    ENDO:   ANNA    ID:   #Leukocytosis  #R/o PNA  Presented with a WBC count of 11.62, neutrophil leukocyte predominance.  Denies systemic symptoms such as fever, chills, nausea, vomiting.  Does have a small R sided infiltrate on imaging thus cannot r/o overlying pneumonia. In addition, patient on steroids thus could be contributing as well.   Procal 0.18.   Negative UA  - Pseudomonal Zosyn 4.5 g q8hr for now given recent admission and with recent failure to improve w/ CTX/Azithro  - send sputum culture     HEME/ONC:  ANNA    PREVENTIVE:   Fluids: NI  Diet: easy to chew  E: Replete K>4, Mg>2   DVT ppx: heparin subq   GI ppx: NI  Activity: bedrest

## 2025-04-07 NOTE — ED PROVIDER NOTE - NS ED MD DISPO ADMITTING SERVICE
[TextBox_4] : 87yo F PMH breast malignancy s/p lumpectomy + RT + DTM1 chemo c/b pulmonary toxicity, h/o LE DVT i/s/o malignancy on Eliquis, psoriatic arthritis on methotrexate, HTN, hypothyroidism, macular degeneration, h/o autoimmune hepatitis, p/w several weeks of flu-like symptoms and admitted for acute hypoxic RF due to community acquired pneumonia s/p antibiotic course of 5 days of ceftriaxone, 3 days of azithromycin. Pulmonary consulted for persistent hypoxia at rest/exertion and abnormal CT scan.  SURG

## 2025-04-07 NOTE — PHYSICAL THERAPY INITIAL EVALUATION ADULT - PERTINENT HX OF CURRENT PROBLEM, REHAB EVAL
Pt. is a 89 y.o male with h/o COPD, HF, CKD - stage3, NSCLC s/p lobectomy, h/o recent admission for COPD exacerbation in 03/25,, currently p/w worsening cough and SOB, Admitted for management of AHRF i/s/o COPD exacerbation.

## 2025-04-07 NOTE — CONSULT NOTE ADULT - PROBLEM SELECTOR RECOMMENDATION 2
.  respiratory failure iso COPD exacerbation   - cw abx, care per primary team  - supportive care  - if benefits outweigh burden and/or if wife elects symptom directed care recommend glycopyrrolate 0.4 mg IV q6h PRN, discontinuing IVF

## 2025-04-07 NOTE — PROGRESS NOTE ADULT - SUBJECTIVE AND OBJECTIVE BOX
HOSPITAL COURSE:     OVERNIGHT EVENTS:    SUBJECTIVE: Pt seen and evaluated bedside. _ .    ROS: otherwise negative      PHYSICAL EXAM:  Constitutional: resting comfortably in bed; NAD  Head: NC/AT  Eyes: EOMI, anicteric sclera  ENT: no nasal discharge; MMM  Neck: supple  Respiratory: CTA B/L; no W/R/R  Cardiac: RRR; no M/R/G  Gastrointestinal: soft, NT/ND; no rebound or guarding  Extremities: WWP, no clubbing or cyanosis; no peripheral edema  Musculoskeletal: NROM x4; no joint swelling, tenderness or erythema  Dermatologic: skin warm, dry and intact; no rashes, wounds, or scars  Neurologic: AAOx3; no focal deficits  Psychiatric: affect and characteristics of appearance, verbalizations, behaviors are appropriate        RADIOLOGY & ADDITIONAL TESTS: Reviewed   HOSPITAL COURSE: 89 yo M w PMHx HTN, HLD, COPD, 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 The patient had a recent admission and was discharged on 3/31/25 after he was treated for a COPD exacerbation with prednisone, antibiotics, and nebulizer treatment. He was discharged on 10mg prednisone and azithromycin 500mg M/W/F. Represented to the ED due to shortness of breath, and increased coughing. He reports he initially felt improved from discharge, but then started to feel worse after which he called Dr. Brandt. On zosyn/azith/prednisone for COPD exacerbation.    OVERNIGHT EVENTS:    SUBJECTIVE: Pt seen and evaluated bedside. _ .    ROS: otherwise negative      PHYSICAL EXAM:  Constitutional: resting comfortably in bed; NAD  Head: NC/AT  Eyes: EOMI, anicteric sclera  ENT: no nasal discharge; MMM  Neck: supple  Respiratory: CTA B/L; no W/R/R  Cardiac: RRR; no M/R/G  Gastrointestinal: soft, NT/ND; no rebound or guarding  Extremities: WWP, no clubbing or cyanosis; no peripheral edema  Musculoskeletal: NROM x4; no joint swelling, tenderness or erythema  Dermatologic: skin warm, dry and intact; no rashes, wounds, or scars  Neurologic: AAOx3; no focal deficits  Psychiatric: affect and characteristics of appearance, verbalizations, behaviors are appropriate        RADIOLOGY & ADDITIONAL TESTS: Reviewed   HOSPITAL COURSE: 89 yo M w PMHx HTN, HLD, COPD, 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 The patient had a recent admission and was discharged on 3/31/25 after he was treated for a COPD exacerbation with prednisone, antibiotics, and nebulizer treatment. He was discharged on 10mg prednisone and azithromycin 500mg M/W/F. Represented to the ED due to shortness of breath, and increased coughing. He reports he initially felt improved from discharge, but then started to feel worse after which he called Dr. Brandt. On zosyn/azith/prednisone for COPD exacerbation. CCB steroid vs hospital delirium which improved after stopping solumedrol, worsened with zyprexa/seroquel.    OVERNIGHT EVENTS: Lisha    SUBJECTIVE: Pt seen and evaluated bedside. _ .    ROS: otherwise negative      PHYSICAL EXAM:  Constitutional: resting comfortably in bed; NAD  Head: NC/AT  Eyes: EOMI, anicteric sclera  ENT: no nasal discharge; MMM  Neck: supple  Respiratory: CTA B/L; no W/R/R  Cardiac: RRR; no M/R/G  Gastrointestinal: soft, NT/ND; no rebound or guarding  Extremities: WWP, no clubbing or cyanosis; no peripheral edema  Musculoskeletal: NROM x4; no joint swelling, tenderness or erythema  Dermatologic: skin warm, dry and intact; no rashes, wounds, or scars  Neurologic: AAOx3; no focal deficits  Psychiatric: affect and characteristics of appearance, verbalizations, behaviors are appropriate        RADIOLOGY & ADDITIONAL TESTS: Reviewed   HOSPITAL COURSE: 89 yo M w PMHx HTN, HLD, COPD, 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 The patient had a recent admission and was discharged on 3/31/25 after he was treated for a COPD exacerbation with prednisone, antibiotics, and nebulizer treatment. He was discharged on 10mg prednisone and azithromycin 500mg M/W/F. Represented to the ED due to shortness of breath, and increased coughing. He reports he initially felt improved from discharge, but then started to feel worse after which he called Dr. Brandt. On zosyn/azith/prednisone for COPD exacerbation. CCB steroid vs hospital delirium which improved after stopping solumedrol, worsened with zyprexa/seroquel.    OVERNIGHT EVENTS: Lisha    SUBJECTIVE: Pt seen and evaluated bedside, mentation improved this AM. On room air currently and saturating well. Denies any chest pain, abdominal pain, N/V/D. Decreased PO intake, not interested in breakfast this AM, but overall reports improvement in breathing.    ROS: otherwise negative      PHYSICAL EXAM:  Constitutional: Mentation improved this AM compared to 4/6 with diffuse ecchymoses  HEENT: NC/AT, EOMI, anicteric sclera, MMM  Respiratory: CTAB, normal WOB  Cardiac: RRR  Gastrointestinal: soft, NT/ND  Extremities: WWP, no clubbing or cyanosis  Neurologic: AAOx3 with prompting, no focal deficits  Psychiatric: patient acutely agitated and anxious, able to be oriented with prompting but confusion is actively worsening        RADIOLOGY & ADDITIONAL TESTS: Reviewed

## 2025-04-07 NOTE — CONSULT NOTE ADULT - PROBLEM SELECTOR RECOMMENDATION 9
.  respiratory failure iso COPD exacerbation   - Recommend Hydrocodone 5 mg/homatropine (Hycodan) syrup 5mL q6h PRN cough   - Recommend benzonatate 100-200 mg PO q8 PRN, low threshold to schedule q8 ATC;  mg

## 2025-04-07 NOTE — PHYSICAL THERAPY INITIAL EVALUATION ADULT - MODALITIES TREATMENT COMMENTS
Pt. currently p/w significant deconditioning. limited endurance, impaired balance and transfers, increased unsteadiness of gait.

## 2025-04-07 NOTE — CONSULT NOTE ADULT - ASSESSMENT
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 Palliative consulted to discuss goals of care and treatment preferences in the setting of advanced illness.    Pt and wife are not interested in discussing trajectory let alone symptom directed approach to care. GOC to treat PNA, medically optimize, and return home. Overall patient is medically frail and is at significant risk for further and deconditioning with the possibility of nearing end-of-life status does not improve. DNR DNI order in place. Will continue to follow for support as hospital course progresses. Discussed case with his PCP Dr. Brandt.     - recommend Hydrocodone 5 mg/homatropine (Hycodan) syrup 5mL q6h PRN cough.      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 Palliative consulted to discuss goals of care and treatment preferences in the setting of advanced illness.    Pt and wife are not interested in discussing trajectory let alone symptom directed approach to care. GOC to treat PNA, medically optimize, and return home. Overall patient is medically frail and is at significant risk for further and deconditioning with the possibility of nearing end-of-life status does not improve. DNR DNI order in place. Will continue to follow for support as hospital course progresses. Discussed case with his PCP Dr. Brandt.     - recommend Hydrocodone 5 mg/homatropine (Hycodan) syrup 5mL q6h PRN cough.

## 2025-04-07 NOTE — CONSULT NOTE ADULT - PROBLEM SELECTOR RECOMMENDATION 6
.  Will continue to follow for ongoing GOC discussion / titration of palliative regimen. Emotional support provided, questions answered. Complex medical decision making / symptom management in the setting of COPD exacerbation, medical frailty, advanced age      Active Psychosocial Referrals:  [x]Social Work/Case management []PT/OT [ ]Chaplaincy [ ]Hospice   []Holistic RN []Massage Therapy [x]Music Therapy []Ethics  Coping: [] well [] with difficulty [ ] poor coping [x] unable to assess  Support system: [x] strong [] adequate [ ] inadequate    For new or uncontrolled symptoms, please call Palliative Care at [phone] [phone]). The service is available 24/7 (including nights & weekends) to provide symptom management recommendations over the phone as appropriate.

## 2025-04-07 NOTE — CONSULT NOTE ADULT - PROBLEM SELECTOR RECOMMENDATION 3
.  2/2 COPD exacerbation. patient recently hospitalized for COPD exacerbation   - cw care, abx per primary team  - DNR DNI order in place; GOC discussions to continue

## 2025-04-07 NOTE — CONSULT NOTE ADULT - CONSULT REQUESTED DATE/TIME
03-Apr-2025 17:26
Called patient. Informed patient of prescription refills. Pt verbalized understanding.  Navya Vickers RN  [facility]    
08-Apr-2025 08:59

## 2025-04-08 NOTE — DISCHARGE NOTE FOR THE EXPIRED PATIENT - HOSPITAL COURSE
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 The patient had a recent admission and was discharged on 3/31/25 after he was treated for a COPD exacerbation with prednisone, antibiotics, and nebulizer treatment. He was discharged on 10mg prednisone and azithromycin 500mg M/W/F. Represented to the ED due to shortness of breath, and increased coughing. He reports he initially felt improved from discharge, but then started to feel worse after which he called Dr. Brandt. On zosyn/azith/prednisone for COPD exacerbation and intermittently on BiPAP, however saturating >97% on 2L of nasal cannula while off BiPAP. CCB steroid vs hospital delirium which improved after stopping solumedrol, worsened with zyprexa/seroquel. Around 12pm informed by nursing pt was bradycardic to 40's and desaturating to 60% on 6L NC. Pt confirmed DNR/DNI code status last week, declined pressors/tube feeds or any further invasive measures. Exam around 1pm without chest rise, absent corneal reflex, distant heart sounds but had faint femoral pulses B/L. Repeat exam @1515 with faint femoral pulses around 30 BPM. 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 The patient had a recent admission and was discharged on 3/31/25 after he was treated for a COPD exacerbation with prednisone, antibiotics, and nebulizer treatment. He was discharged on 10mg prednisone and azithromycin 500mg M/W/F. Represented to the ED due to shortness of breath, and increased coughing. He reports he initially felt improved from discharge, but then started to feel worse after which he called Dr. Brandt. On zosyn/azith/prednisone for COPD exacerbation and intermittently on BiPAP, however saturating >97% on 2L of nasal cannula while off BiPAP. CCB steroid vs hospital delirium which improved after stopping solumedrol, worsened with zyprexa/seroquel. Around 12pm informed by nursing pt was bradycardic to 40's and desaturating to 60% on 6L NC. Pt confirmed DNR/DNI code status last week, declined pressors/tube feeds or any further invasive measures. Exam around 1pm without chest rise, absent corneal reflex, distant heart sounds but had faint femoral pulses B/L. Repeat exam @1515 with faint femoral pulses around 30 BPM. Femoral pulses absent, patient with absent breath and cardiac sounds, negative femoral/carotid pulses @17:54.     Time of death:  Cause of death: Respiratory failure secondary to end stage COPD  Comorbidities: Multivessel CAD, HTN, CKD, NSCLC

## 2025-04-08 NOTE — PROVIDER CONTACT NOTE (CHANGE IN STATUS NOTIFICATION) - SITUATION
Repeat death assessment performed on pt per MD East. MD felt faint femoral pulse despite pt continuing to be asystole on the monitor. No interventions at this time per MD East.

## 2025-04-09 DIAGNOSIS — R05.9 COUGH, UNSPECIFIED: ICD-10-CM

## 2025-04-09 DIAGNOSIS — J96.01 ACUTE RESPIRATORY FAILURE WITH HYPOXIA: ICD-10-CM

## 2025-04-09 DIAGNOSIS — Z51.5 ENCOUNTER FOR PALLIATIVE CARE: ICD-10-CM

## 2025-04-09 DIAGNOSIS — Z71.89 OTHER SPECIFIED COUNSELING: ICD-10-CM

## 2025-04-09 DIAGNOSIS — K11.7 DISTURBANCES OF SALIVARY SECRETION: ICD-10-CM

## 2025-04-09 DIAGNOSIS — J44.1 CHRONIC OBSTRUCTIVE PULMONARY DISEASE WITH (ACUTE) EXACERBATION: ICD-10-CM

## 2025-04-21 ENCOUNTER — APPOINTMENT (OUTPATIENT)
Dept: HEART AND VASCULAR | Facility: CLINIC | Age: 89
End: 2025-04-21

## 2025-05-05 ENCOUNTER — APPOINTMENT (OUTPATIENT)
Dept: PULMONOLOGY | Facility: CLINIC | Age: 89
End: 2025-05-05

## 2025-07-11 NOTE — H&P ADULT - NSHPSOURCEINFORD_GEN_ALL_CORE
I have ordered lab tests today.  You should receive a phone call or a [employer] message with those results.  If you have not heard from us in 7-10 days, please call the office.      You are eligible for a shingles and RSV vaccine through the pharmacy.  You are welcome to get a covid booster here or at the pharmacy.  I do advise that.     Please bring your paperwork for living will/advanced directive.     Plan labs at your next visit.       
Chart(s)/Patient/Physician/Provider

## (undated) DEVICE — POSITIONER FOAM EGG CRATE ULNAR 2PCS (PINK)

## (undated) DEVICE — DRAIN PENROSE .25" X 18" LATEX

## (undated) DEVICE — SUT CHROMIC 6-0 18" G-1

## (undated) DEVICE — SUT ETHILON 6-0 18" P-3

## (undated) DEVICE — SUCTION YANKAUER BULBOUS TIP W VENT

## (undated) DEVICE — ELCTR BOVIE PENCIL SMOKE EVACUATION

## (undated) DEVICE — SPEAR SURG EYE WECK-CELL CELOS

## (undated) DEVICE — ELCTR BOVIE TIP BLADE INSULATED 2.75" EDGE

## (undated) DEVICE — SYR CONTROL LUER LOK 10CC

## (undated) DEVICE — SUT VICRYL PLUS 2-0 18" TIES UNDYED

## (undated) DEVICE — APPLICATOR COTTON TIP 6"

## (undated) DEVICE — KNIFE ALCON STANDARD FULL HANDLE 30 DEG (PINK)

## (undated) DEVICE — VENODYNE/SCD SLEEVE CALF MEDIUM

## (undated) DEVICE — NDL HYPO REGULAR BEVEL 30G X 1" (BEIGE)

## (undated) DEVICE — SUT VICRYL 3-0 18" TIES UNDYED

## (undated) DEVICE — SUT NOVAFIL 0 30" T-19

## (undated) DEVICE — ELCTR COLORADO 3CM

## (undated) DEVICE — SUT MONOCRYL 4-0 18" PS-2

## (undated) DEVICE — SUT VICRYL 2-0 27" SH

## (undated) DEVICE — GLV 8 PROTEXIS (WHITE)

## (undated) DEVICE — GOWN ROYAL SILK XL

## (undated) DEVICE — SUT SILK 6-0 18" G-1

## (undated) DEVICE — PACK GENERAL MINOR

## (undated) DEVICE — SUT ETHILON 6-0 18" P-1 UNDYED

## (undated) DEVICE — DRAPE IOBAN 23" X 23"

## (undated) DEVICE — GOWN XL

## (undated) DEVICE — SUT PDS II 4-0 18" PS-2 UNDYED

## (undated) DEVICE — SUT POLYDEK  4-0 18" ME-2

## (undated) DEVICE — SUT ETHILON 5-0 18" P-3

## (undated) DEVICE — SUT CHROMIC 5-0 18" PS-3